# Patient Record
Sex: FEMALE | Race: WHITE | HISPANIC OR LATINO | Employment: UNEMPLOYED | URBAN - METROPOLITAN AREA
[De-identification: names, ages, dates, MRNs, and addresses within clinical notes are randomized per-mention and may not be internally consistent; named-entity substitution may affect disease eponyms.]

---

## 2017-12-28 ENCOUNTER — HOSPITAL ENCOUNTER (INPATIENT)
Facility: HOSPITAL | Age: 37
LOS: 6 days | Discharge: HOME | DRG: 663 | End: 2018-01-03
Attending: EMERGENCY MEDICINE | Admitting: INTERNAL MEDICINE
Payer: COMMERCIAL

## 2017-12-28 ENCOUNTER — APPOINTMENT (EMERGENCY)
Dept: CT IMAGING | Facility: HOSPITAL | Age: 37
DRG: 663 | End: 2017-12-28
Payer: COMMERCIAL

## 2017-12-28 DIAGNOSIS — N39.0 URINARY TRACT INFECTION: Primary | ICD-10-CM

## 2017-12-28 DIAGNOSIS — R04.2 HEMOPTYSIS: ICD-10-CM

## 2017-12-28 DIAGNOSIS — K50.10 CROHN'S COLITIS (HCC): ICD-10-CM

## 2017-12-28 DIAGNOSIS — R07.9 CHEST PAIN: ICD-10-CM

## 2017-12-28 DIAGNOSIS — R00.0 TACHYCARDIA: ICD-10-CM

## 2017-12-28 DIAGNOSIS — A41.9 SEPSIS (HCC): ICD-10-CM

## 2017-12-28 DIAGNOSIS — R47.81 SLURRED SPEECH: ICD-10-CM

## 2017-12-28 DIAGNOSIS — R20.0 LEFT SIDED NUMBNESS: ICD-10-CM

## 2017-12-28 LAB
ALBUMIN SERPL BCP-MCNC: 3.9 G/DL (ref 3.5–5)
ALP SERPL-CCNC: 75 U/L (ref 46–116)
ALT SERPL W P-5'-P-CCNC: 50 U/L (ref 12–78)
ANION GAP SERPL CALCULATED.3IONS-SCNC: 9 MMOL/L (ref 4–13)
APTT PPP: 27 SECONDS (ref 23–35)
AST SERPL W P-5'-P-CCNC: 13 U/L (ref 5–45)
BACTERIA UR QL AUTO: ABNORMAL /HPF
BASOPHILS # BLD AUTO: 0.04 THOUSANDS/ΜL (ref 0–0.1)
BASOPHILS NFR BLD AUTO: 0 % (ref 0–1)
BILIRUB SERPL-MCNC: 0.5 MG/DL (ref 0.2–1)
BILIRUB UR QL STRIP: ABNORMAL
BUN SERPL-MCNC: 14 MG/DL (ref 5–25)
CALCIUM SERPL-MCNC: 9.6 MG/DL (ref 8.3–10.1)
CAOX CRY URNS QL MICRO: ABNORMAL /HPF
CHLORIDE SERPL-SCNC: 104 MMOL/L (ref 100–108)
CLARITY UR: ABNORMAL
CO2 SERPL-SCNC: 27 MMOL/L (ref 21–32)
COLOR UR: ABNORMAL
CREAT SERPL-MCNC: 0.77 MG/DL (ref 0.6–1.3)
EOSINOPHIL # BLD AUTO: 0 THOUSAND/ΜL (ref 0–0.61)
EOSINOPHIL NFR BLD AUTO: 0 % (ref 0–6)
ERYTHROCYTE [DISTWIDTH] IN BLOOD BY AUTOMATED COUNT: 15.5 % (ref 11.6–15.1)
EXT PREG TEST URINE: NEGATIVE
GFR SERPL CREATININE-BSD FRML MDRD: 98 ML/MIN/1.73SQ M
GLUCOSE SERPL-MCNC: 77 MG/DL (ref 65–140)
GLUCOSE UR STRIP-MCNC: NEGATIVE MG/DL
HCT VFR BLD AUTO: 39.6 % (ref 34.8–46.1)
HGB BLD-MCNC: 13 G/DL (ref 11.5–15.4)
HGB UR QL STRIP.AUTO: NEGATIVE
INR PPP: 1.06 (ref 0.86–1.16)
KETONES UR STRIP-MCNC: ABNORMAL MG/DL
LACTATE SERPL-SCNC: 1.7 MMOL/L (ref 0.5–2)
LACTATE SERPL-SCNC: 2.9 MMOL/L (ref 0.5–2)
LEUKOCYTE ESTERASE UR QL STRIP: NEGATIVE
LIPASE SERPL-CCNC: 104 U/L (ref 73–393)
LYMPHOCYTES # BLD AUTO: 4.73 THOUSANDS/ΜL (ref 0.6–4.47)
LYMPHOCYTES NFR BLD AUTO: 22 % (ref 14–44)
MCH RBC QN AUTO: 28.8 PG (ref 26.8–34.3)
MCHC RBC AUTO-ENTMCNC: 32.8 G/DL (ref 31.4–37.4)
MCV RBC AUTO: 88 FL (ref 82–98)
MONOCYTES # BLD AUTO: 1.1 THOUSAND/ΜL (ref 0.17–1.22)
MONOCYTES NFR BLD AUTO: 5 % (ref 4–12)
MUCOUS THREADS UR QL AUTO: ABNORMAL
NEUTROPHILS # BLD AUTO: 15.38 THOUSANDS/ΜL (ref 1.85–7.62)
NEUTS SEG NFR BLD AUTO: 72 % (ref 43–75)
NITRITE UR QL STRIP: POSITIVE
NON-SQ EPI CELLS URNS QL MICRO: ABNORMAL /HPF
NRBC BLD AUTO-RTO: 0 /100 WBCS
PH UR STRIP.AUTO: 5.5 [PH] (ref 4.5–8)
PLATELET # BLD AUTO: 441 THOUSANDS/UL (ref 149–390)
PMV BLD AUTO: 9.6 FL (ref 8.9–12.7)
POTASSIUM SERPL-SCNC: 3.2 MMOL/L (ref 3.5–5.3)
PROT SERPL-MCNC: 7.9 G/DL (ref 6.4–8.2)
PROT UR STRIP-MCNC: ABNORMAL MG/DL
PROTHROMBIN TIME: 14 SECONDS (ref 12.1–14.4)
RBC # BLD AUTO: 4.52 MILLION/UL (ref 3.81–5.12)
RBC #/AREA URNS AUTO: ABNORMAL /HPF
SODIUM SERPL-SCNC: 140 MMOL/L (ref 136–145)
SP GR UR STRIP.AUTO: >=1.03 (ref 1–1.03)
TROPONIN I SERPL-MCNC: <0.02 NG/ML
UROBILINOGEN UR QL STRIP.AUTO: 1 E.U./DL
WBC # BLD AUTO: 21.39 THOUSAND/UL (ref 4.31–10.16)
WBC #/AREA URNS AUTO: ABNORMAL /HPF

## 2017-12-28 PROCEDURE — 81001 URINALYSIS AUTO W/SCOPE: CPT | Performed by: EMERGENCY MEDICINE

## 2017-12-28 PROCEDURE — 84484 ASSAY OF TROPONIN QUANT: CPT | Performed by: EMERGENCY MEDICINE

## 2017-12-28 PROCEDURE — 80053 COMPREHEN METABOLIC PANEL: CPT | Performed by: EMERGENCY MEDICINE

## 2017-12-28 PROCEDURE — 93005 ELECTROCARDIOGRAM TRACING: CPT

## 2017-12-28 PROCEDURE — 85025 COMPLETE CBC W/AUTO DIFF WBC: CPT | Performed by: EMERGENCY MEDICINE

## 2017-12-28 PROCEDURE — 36415 COLL VENOUS BLD VENIPUNCTURE: CPT | Performed by: EMERGENCY MEDICINE

## 2017-12-28 PROCEDURE — 83090 ASSAY OF HOMOCYSTEINE: CPT | Performed by: PHYSICIAN ASSISTANT

## 2017-12-28 PROCEDURE — 86140 C-REACTIVE PROTEIN: CPT | Performed by: PHYSICIAN ASSISTANT

## 2017-12-28 PROCEDURE — 81025 URINE PREGNANCY TEST: CPT | Performed by: EMERGENCY MEDICINE

## 2017-12-28 PROCEDURE — 83690 ASSAY OF LIPASE: CPT | Performed by: EMERGENCY MEDICINE

## 2017-12-28 PROCEDURE — 93005 ELECTROCARDIOGRAM TRACING: CPT | Performed by: EMERGENCY MEDICINE

## 2017-12-28 PROCEDURE — 74177 CT ABD & PELVIS W/CONTRAST: CPT

## 2017-12-28 PROCEDURE — 87040 BLOOD CULTURE FOR BACTERIA: CPT | Performed by: EMERGENCY MEDICINE

## 2017-12-28 PROCEDURE — 83605 ASSAY OF LACTIC ACID: CPT | Performed by: EMERGENCY MEDICINE

## 2017-12-28 PROCEDURE — 96366 THER/PROPH/DIAG IV INF ADDON: CPT

## 2017-12-28 PROCEDURE — 71275 CT ANGIOGRAPHY CHEST: CPT

## 2017-12-28 PROCEDURE — 85610 PROTHROMBIN TIME: CPT | Performed by: EMERGENCY MEDICINE

## 2017-12-28 PROCEDURE — 85730 THROMBOPLASTIN TIME PARTIAL: CPT | Performed by: EMERGENCY MEDICINE

## 2017-12-28 PROCEDURE — 96365 THER/PROPH/DIAG IV INF INIT: CPT

## 2017-12-28 PROCEDURE — 70450 CT HEAD/BRAIN W/O DYE: CPT

## 2017-12-28 RX ORDER — CYCLOBENZAPRINE HCL 10 MG
10 TABLET ORAL 3 TIMES DAILY PRN
COMMUNITY
End: 2020-01-07

## 2017-12-28 RX ORDER — OXYCODONE HYDROCHLORIDE 30 MG/1
30 TABLET ORAL 3 TIMES DAILY
COMMUNITY
End: 2020-01-07

## 2017-12-28 RX ORDER — MESALAMINE 1.2 G/1
1200 TABLET, DELAYED RELEASE ORAL
COMMUNITY
End: 2020-01-07

## 2017-12-28 RX ORDER — PROPRANOLOL HYDROCHLORIDE 80 MG/1
80 TABLET ORAL 2 TIMES DAILY
COMMUNITY
End: 2021-05-26

## 2017-12-28 RX ORDER — MECLIZINE HCL 12.5 MG/1
12.5 TABLET ORAL EVERY 8 HOURS PRN
COMMUNITY
End: 2021-05-26

## 2017-12-28 RX ORDER — PANTOPRAZOLE SODIUM 40 MG/1
40 TABLET, DELAYED RELEASE ORAL DAILY
COMMUNITY

## 2017-12-28 RX ORDER — OLANZAPINE 10 MG/1
10 TABLET, ORALLY DISINTEGRATING ORAL
Status: DISCONTINUED | OUTPATIENT
Start: 2017-12-28 | End: 2017-12-29

## 2017-12-28 RX ORDER — GABAPENTIN 300 MG/1
600 CAPSULE ORAL 3 TIMES DAILY
COMMUNITY
End: 2020-04-14 | Stop reason: SDUPTHER

## 2017-12-28 RX ORDER — SUMATRIPTAN 100 MG/1
1 TABLET, FILM COATED ORAL ONCE AS NEEDED
COMMUNITY

## 2017-12-28 RX ORDER — MONTELUKAST SODIUM 10 MG/1
10 TABLET ORAL
COMMUNITY

## 2017-12-28 RX ORDER — DIVALPROEX SODIUM 250 MG/1
250 TABLET, DELAYED RELEASE ORAL 2 TIMES DAILY
COMMUNITY

## 2017-12-28 RX ORDER — OXYCODONE AND ACETAMINOPHEN 10; 325 MG/1; MG/1
1 TABLET ORAL 3 TIMES DAILY
COMMUNITY
End: 2018-01-03 | Stop reason: HOSPADM

## 2017-12-28 RX ORDER — ASPIRIN 81 MG/1
81 TABLET ORAL DAILY
COMMUNITY
End: 2020-01-07

## 2017-12-28 RX ORDER — ALPRAZOLAM 0.5 MG/1
2 TABLET ORAL ONCE
Status: COMPLETED | OUTPATIENT
Start: 2017-12-28 | End: 2017-12-28

## 2017-12-28 RX ORDER — ALPRAZOLAM 2 MG/1
2 TABLET ORAL 2 TIMES DAILY
COMMUNITY
End: 2020-01-07 | Stop reason: SDUPTHER

## 2017-12-28 RX ORDER — AMITRIPTYLINE HYDROCHLORIDE 100 MG/1
100 TABLET, FILM COATED ORAL
COMMUNITY
End: 2021-03-02 | Stop reason: SDUPTHER

## 2017-12-28 RX ORDER — DEXTROAMPHETAMINE SACCHARATE, AMPHETAMINE ASPARTATE, DEXTROAMPHETAMINE SULFATE AND AMPHETAMINE SULFATE 5; 5; 5; 5 MG/1; MG/1; MG/1; MG/1
20 TABLET ORAL DAILY
COMMUNITY
End: 2018-01-12 | Stop reason: HOSPADM

## 2017-12-28 RX ORDER — OXYCODONE HYDROCHLORIDE 10 MG/1
20 TABLET ORAL ONCE
Status: COMPLETED | OUTPATIENT
Start: 2017-12-28 | End: 2017-12-28

## 2017-12-28 RX ADMIN — IOHEXOL 100 ML: 350 INJECTION, SOLUTION INTRAVENOUS at 21:30

## 2017-12-28 RX ADMIN — OXYCODONE HYDROCHLORIDE 20 MG: 10 TABLET ORAL at 20:10

## 2017-12-28 RX ADMIN — CEFTRIAXONE 1000 MG: 1 INJECTION, SOLUTION INTRAVENOUS at 18:12

## 2017-12-28 RX ADMIN — SODIUM CHLORIDE 1000 ML: 0.9 INJECTION, SOLUTION INTRAVENOUS at 17:52

## 2017-12-28 RX ADMIN — ALPRAZOLAM 2 MG: 0.5 TABLET ORAL at 20:10

## 2017-12-28 NOTE — ED PROVIDER NOTES
History  Chief Complaint   Patient presents with    Chest Pain     Per pt - c/o shortness of breath, chest pain radiating to left arm w/ tingling, pt reports confusion and slurred speech  Symptoms started last night   Shortness of Breath     Patient is a anxious appearing 75-year-old female with a reported history of multiple medical problems including endocarditis, gastritis, pancreatitis, rheumatoid arthritis, restrictive lung disease, stroke, cancer  I am unsure as to the validity of all of these historical factors and suspect that this may be anxiety related however for now will continue the workup assuming that these are all legitimate past medical history is that this pleasant patient has  She reports that she is very concerned that she is currently having a stroke  On exam     No dysarthria, nystagmus, dysphagia, diplopia, aphasia, vertigo, ataxia, visions loss, dysmetria  Normal finger to nose, gait, rapid alternating movement,  tandem gait, strength and sensation in bilat upper and lower extremities  Normal upper and lower reflexes  No pronator drift  Normal heel to shin  EOMI, no visual field defects  CN 2-13 intact  GCS 15  AOx3  Normal babinski  She notes that for the past 3 days she has had substernal chest pressure with some shortness of breath that radiates down the left arm  She notes a tingling sensation in the left arm but her entire neurological exam is normal  She notes night sweats and subjective fevers  She notes epigastric abdominal pain  She notes            Prior to Admission Medications   Prescriptions Last Dose Informant Patient Reported? Taking?    ALPRAZolam (XANAX) 2 MG tablet   Yes No   Sig: Take 2 mg by mouth Three times a day   Calcium Carb-Ergocalciferol 250-125 MG-UNIT TABS   Yes No   Sig: Take 1 tablet by mouth   SUMAtriptan (IMITREX) 100 mg tablet   Yes No   Sig: Take 1 tablet by mouth daily   amitriptyline (ELAVIL) 100 mg tablet   Yes No   Sig: Take 100 mg by mouth   amphetamine-dextroamphetamine (ADDERALL) 20 mg tablet   Yes No   Sig: Take 20 mg by mouth daily   aspirin (ECOTRIN LOW STRENGTH) 81 mg EC tablet   Yes Yes   Sig: Take 81 mg by mouth daily   cholecalciferol (VITAMIN D3) 1,000 units tablet   Yes No   Sig: Take 1,000 Units by mouth daily   cyclobenzaprine (FLEXERIL) 10 mg tablet   Yes Yes   Sig: Take 10 mg by mouth 3 (three) times a day as needed for muscle spasms   divalproex sodium (DEPAKOTE) 250 mg EC tablet   Yes Yes   Sig: Take 250 mg by mouth every 12 (twelve) hours   gabapentin (NEURONTIN) 300 mg capsule   Yes No   Sig: Take 900 mg by mouth 3 (three) times a day   meclizine (ANTIVERT) 12 5 MG tablet   Yes No   Sig: Take 12 5 mg by mouth every 8 (eight) hours as needed   mesalamine (LIALDA) 1 2 g EC tablet   Yes Yes   Sig: Take 4,800 mg by mouth daily with breakfast     montelukast (SINGULAIR) 10 mg tablet   Yes Yes   Sig: Take 10 mg by mouth daily at bedtime   oxyCODONE (ROXICODONE) 30 MG immediate release tablet   Yes No   Sig: Take 30 mg by mouth 3 (three) times a day   oxyCODONE-acetaminophen (PERCOCET)  mg per tablet   Yes No   Sig: Take 1 tablet by mouth Three times a day   pantoprazole (PROTONIX) 40 mg tablet   Yes Yes   Sig: Take 40 mg by mouth daily   propranolol (INDERAL) 80 mg tablet   Yes Yes   Sig: Take 80 mg by mouth 3 (three) times a day      Facility-Administered Medications: None       Past Medical History:   Diagnosis Date    Cancer ([facility] [address] )     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colitis (HCC)     Crohn's colitis (HCC)     Endocarditis     Fibromyalgia     Gastritis     Hypertension     IBS (irritable bowel syndrome)     Pancreatitis     RA (rheumatoid arthritis) (HCC)     Restrictive lung disease     Vertigo        Past Surgical History:   Procedure Laterality Date    APPENDECTOMY         History reviewed  No pertinent family history  I have reviewed and agree with the history as documented      Social History   Substance Use Topics    Smoking status: Current Every Day Smoker     Packs/day: 1 00    Smokeless tobacco: Never Used    Alcohol use No        Review of Systems   Constitutional: Negative for chills and fever  HENT: Negative for ear discharge and facial swelling  Respiratory: Negative for chest tightness, shortness of breath and wheezing  Cardiovascular: Negative for chest pain and palpitations  Gastrointestinal: Negative for abdominal pain, diarrhea and vomiting  Genitourinary: Negative for dysuria and hematuria  Musculoskeletal: Negative for arthralgias and neck stiffness  Skin: Negative for color change and rash  Allergic/Immunologic: Negative  Neurological: Negative for tremors, seizures and syncope  Psychiatric/Behavioral: Negative for hallucinations and suicidal ideas  All other systems reviewed and are negative  Physical Exam  ED Triage Vitals [12/28/17 1609]   Temperature Pulse Respirations Blood Pressure SpO2   98 8 °F (37 1 °C) (!) 132 21 135/80 100 %      Temp Source Heart Rate Source Patient Position - Orthostatic VS BP Location FiO2 (%)   Oral Monitor Sitting Left arm --      Pain Score       8           Orthostatic Vital Signs  Vitals:    12/28/17 1609 12/28/17 2312   BP: 135/80 131/62   Pulse: (!) 132 89   Patient Position - Orthostatic VS: Sitting        Physical Exam   Constitutional: She is oriented to person, place, and time  She appears well-developed and well-nourished  HENT:   Head: Normocephalic and atraumatic  Right Ear: External ear normal    Left Ear: External ear normal    Eyes: Conjunctivae and EOM are normal    Neck: Normal range of motion  Neck supple  No JVD present  No tracheal deviation present  Cardiovascular: Regular rhythm and normal heart sounds  tachycardia   Pulmonary/Chest: Effort normal  No respiratory distress  She has no wheezes  She has no rales  Abdominal: Soft  Bowel sounds are normal  There is no tenderness   There is no rebound and no guarding  Musculoskeletal: She exhibits no edema or tenderness  Neurological: She is alert and oriented to person, place, and time  Skin: Skin is warm and dry  No rash noted  No erythema  Psychiatric: She has a normal mood and affect  Thought content normal    Nursing note and vitals reviewed  ED Medications  Medications   OLANZapine (ZyPREXA ZYDIS) dispersible tablet 10 mg (10 mg Oral Not Given 12/28/17 2257)   sodium chloride 0 9 % bolus 1,000 mL (0 mL Intravenous Stopped 12/28/17 1852)   cefTRIAXone (ROCEPHIN) IVPB (premix) 1,000 mg (0 mg Intravenous Stopped 12/28/17 2251)   oxyCODONE (ROXICODONE) immediate release tablet 20 mg (20 mg Oral Given 12/28/17 2010)   ALPRAZolam (XANAX) tablet 2 mg (2 mg Oral Given 12/28/17 2010)   iohexol (OMNIPAQUE) 350 MG/ML injection (MULTI-DOSE) 100 mL (100 mL Intravenous Given 12/28/17 2130)       Diagnostic Studies  Results Reviewed     Procedure Component Value Units Date/Time    Lactic acid, plasma [[id number]]  (Normal) Collected:  12/28/17 2118    Lab Status:  Final result Specimen:  Blood from Arm, Right Updated:  12/28/17 2145     LACTIC ACID 1 7 mmol/L     Narrative:         Result may be elevated if tourniquet was used during collection  Lactic acid, plasma [[id number]]  (Abnormal) Collected:  12/28/17 1811    Lab Status:  Final result Specimen:  Blood from Arm, Left Updated:  12/28/17 1844     LACTIC ACID 2 9 (HH) mmol/L     Narrative:         Result may be elevated if tourniquet was used during collection      Troponin I [[id number]]  (Normal) Collected:  12/28/17 1746    Lab Status:  Final result Specimen:  Blood from Arm, Left Updated:  12/28/17 1822     Troponin I <0 02 ng/mL     Narrative:         [employer] Chemistry analyzer 99% cutoff is > 0 04 ng/mL in network labs    o cTnI 99% cutoff is useful only when applied to patients in the clinical setting of myocardial ischemia  o cTnI 99% cutoff should be interpreted in the context of clinical history, ECG findings and possibly cardiac imaging to establish correct diagnosis  o cTnI 99% cutoff may be suggestive but clearly not indicative of a coronary event without the clinical setting of myocardial ischemia  Lipase [[id number]]  (Normal) Collected:  12/28/17 1746    Lab Status:  Final result Specimen:  Blood from Arm, Left Updated:  12/28/17 1820     Lipase 104 u/L     Comprehensive metabolic panel [[id number]]  (Abnormal) Collected:  12/28/17 1746    Lab Status:  Final result Specimen:  Blood from Arm, Left Updated:  12/28/17 1820     Sodium 140 mmol/L      Potassium 3 2 (L) mmol/L      Chloride 104 mmol/L      CO2 27 mmol/L      Anion Gap 9 mmol/L      BUN 14 mg/dL      Creatinine 0 77 mg/dL      Glucose 77 mg/dL      Calcium 9 6 mg/dL      AST 13 U/L      ALT 50 U/L      Alkaline Phosphatase 75 U/L      Total Protein 7 9 g/dL      Albumin 3 9 g/dL      Total Bilirubin 0 50 mg/dL      eGFR 98 ml/min/1 73sq m     Narrative:         National Kidney Disease Education Program recommendations are as follows:  GFR calculation is accurate only with a steady state creatinine  Chronic Kidney disease less than 60 ml/min/1 73 sq  meters  Kidney failure less than 15 ml/min/1 73 sq  meters  Roseannsonia Short [[id number]]  (Normal) Collected:  12/28/17 1746    Lab Status:  Final result Specimen:  Blood from Arm, Left Updated:  12/28/17 1816     Protime 14 0 seconds      INR 1 06    APTT [[id number]]  (Normal) Collected:  12/28/17 1746    Lab Status:  Final result Specimen:  Blood from Arm, Left Updated:  12/28/17 1816     PTT 27 seconds     Narrative:          Therapeutic Heparin Range = 60-90 seconds    CBC and differential [[id number]]  (Abnormal) Collected:  12/28/17 1745    Lab Status:  Final result Specimen:  Blood from Arm, Left Updated:  12/28/17 1801     WBC 21 39 (H) Thousand/uL      RBC 4 52 Million/uL      Hemoglobin 13 0 g/dL      Hematocrit 39 6 %      MCV 88 fL      MCH 28 8 pg      MCHC 32 8 g/dL      RDW 15 5 (H) %      MPV 9 6 fL      Platelets 644 (H) Thousands/uL      nRBC 0 /100 WBCs      Neutrophils Relative 72 %      Lymphocytes Relative 22 %      Monocytes Relative 5 %      Eosinophils Relative 0 %      Basophils Relative 0 %      Neutrophils Absolute 15 38 (H) Thousands/µL      Lymphocytes Absolute 4 73 (H) Thousands/µL      Monocytes Absolute 1 10 Thousand/µL      Eosinophils Absolute 0 00 Thousand/µL      Basophils Absolute 0 04 Thousands/µL     Blood culture [[id number]] Collected:  12/28/17 1746    Lab Status: In process Specimen:  Blood from Arm, Left Updated:  12/28/17 1759    Blood culture [[id number]] Collected:  12/28/17 1746    Lab Status:   In process Specimen:  Blood from Arm, Left Updated:  12/28/17 1759    Urine Microscopic [[id number]]  (Abnormal) Collected:  12/28/17 1653    Lab Status:  Final result Specimen:  Urine from Urine, Clean Catch Updated:  12/28/17 1709     RBC, UA 0-1 (A) /hpf      WBC, UA None Seen /hpf      Epithelial Cells Occasional /hpf      Bacteria, UA Moderate (A) /hpf      Ca Oxalate Alexia, UA Moderate (A) /hpf      MUCOUS THREADS Moderate    UA w Reflex to Microscopic w Reflex to Culture [[id number]]  (Abnormal) Collected:  12/28/17 1653    Lab Status:  Final result Specimen:  Urine from Urine, Clean Catch Updated:  12/28/17 1702     Color, UA Dk Yellow     Clarity, UA Slightly Cloudy     Specific Gravity, UA >=1 030     pH, UA 5 5     Leukocytes, UA Negative     Nitrite, UA Positive (A)     Protein, UA 30 (1+) (A) mg/dl      Glucose, UA Negative mg/dl      Ketones, UA Trace (A) mg/dl      Urobilinogen, UA 1 0 E U /dl      Bilirubin, UA Moderate (A)     Blood, UA Negative    POCT pregnancy, urine [[id number]]  (Normal) Resulted:  12/28/17 1654    Lab Status:  Final result Updated:  12/28/17 1654     EXT PREG TEST UR (Ref: Negative) negative                 CT head without contrast   Final Result by Robert Jackson MD (12/28 2240)      No acute intracranial abnormality given limitations of streak artifact at the skull base limiting evaluation of the temporal lobes  If symptoms persist or worsen, consider brain MRI  Workstation performed: [id number]         PE Study with CT abdomen & pelvis with contrast   Final Result by Bairon Sanchez MD (12/28 2230)         1  No acute CT findings  2  Mild nonspecific intrahepatic biliary ductal dilatation  Extrahepatic biliary duct is prominent but appears to taper towards the ampulla  However, the pancreatic duct also appears to be prominent/dilated  Consider nonemergent outpatient MRCP  3  Questionable mild hepatomegaly  Workstation performed: [id number]                    Procedures  Procedures       Phone Contacts  ED Phone Contact    ED Course  ED Course as of Dec 29 0031   Thu Dec 28, 2017   1719 EKG with rate of 111, sinus, narrow QRS, no STEMI  1847 Recently finished levaquin, 750mg for 10 days for PNA and cough    2233 1  No acute CT findings  2  Mild nonspecific intrahepatic biliary ductal dilatation  Extrahepatic biliary duct is prominent but appears to taper towards the ampulla  However, the pancreatic duct also appears to be prominent/dilated  Consider nonemergent outpatient MRCP  3  Questionable mild hepatomegaly  Initial Sepsis Screening     Row Name 12/28/17 2239                Is the patient's history suggestive of a new or worsening infection? (!)  Yes (Proceed)  -JK        Suspected source of infection urinary tract infection  -JK        Are two or more of the following signs & symptoms of infection both present and new to the patient?  (!)  Yes (Proceed)  -JK        Indicate SIRS criteria Tachycardia > 90 bpm;Leukocytosis (WBC > 50388 IJL)  -JK        If the answer is yes to both questions, suspicion of sepsis is present          If severe sepsis is present AND tissue hypoperfusion perists in the hour after fluid resuscitation or lactate > 4, the patient meets criteria for SEPTIC SHOCK   Are any of the following organ dysfunction criteria present within 6 hours of suspected infection and SIRS criteria that are NOT considered to be chronic conditions? (!)  Yes  -JK        Organ dysfunction Lactate > 2 0 mmol/L  -JK        Date of presentation of severe sepsis 12/28/17  -JK        Time of presentation of severe sepsis 1720  -JK        Tissue hypoperfusion persists in the hour after crystalloid fluid administration, evidenced, by either:          Was hypotension present within one hour of the conclusion of crystalloid fluid administration?         Date of presentation of septic shock          Time of presentation of septic shock            User Key  (r) = Recorded By, (t) = Taken By, (c) = Cosigned By    Initials Name Provider Type    Selin Garcia MD Physician                  MDM  CritCare Time    Disposition  Final diagnoses:   Urinary tract infection   Chest pain   Tachycardia     Time reflects when diagnosis was documented in both MDM as applicable and the Disposition within this note     Time User Action Codes Description Comment    12/28/2017 10:37 PM [employer]ke, [address] [N39 0] Urinary tract infection     12/28/2017 10:37 PM [employer]ke, [place] T Add [R07 9] Chest pain     12/28/2017 10:37 PM [employer]ke, [place] T Add [R00 0] Tachycardia       ED Disposition     ED Disposition Condition Comment    Admit  Case was discussed with Wesly Brice and the patient's admission status was agreed to be Admission Status: inpatient status to the service of Dr Melvin Donovan   Follow-up Information    None       Patient's Medications   Discharge Prescriptions    No medications on file     No discharge procedures on file      ED Provider  Electronically Signed by           Francisco De La Cruz MD  12/29/17 9855

## 2017-12-29 ENCOUNTER — APPOINTMENT (INPATIENT)
Dept: ULTRASOUND IMAGING | Facility: HOSPITAL | Age: 37
DRG: 663 | End: 2017-12-29
Payer: COMMERCIAL

## 2017-12-29 ENCOUNTER — APPOINTMENT (INPATIENT)
Dept: NON INVASIVE DIAGNOSTICS | Facility: HOSPITAL | Age: 37
DRG: 663 | End: 2017-12-29
Payer: COMMERCIAL

## 2017-12-29 ENCOUNTER — APPOINTMENT (INPATIENT)
Dept: MRI IMAGING | Facility: HOSPITAL | Age: 37
DRG: 663 | End: 2017-12-29
Payer: COMMERCIAL

## 2017-12-29 PROBLEM — R07.9 CHEST PAIN: Status: ACTIVE | Noted: 2017-12-29

## 2017-12-29 PROBLEM — K83.8 DILATED BILE DUCT: Status: ACTIVE | Noted: 2017-12-29

## 2017-12-29 PROBLEM — Z72.0 TOBACCO ABUSE: Status: ACTIVE | Noted: 2017-12-29

## 2017-12-29 PROBLEM — R20.0 LEFT SIDED NUMBNESS: Status: ACTIVE | Noted: 2017-12-29

## 2017-12-29 PROBLEM — A41.9 SEPSIS (HCC): Status: ACTIVE | Noted: 2017-12-29

## 2017-12-29 PROBLEM — E87.6 HYPOKALEMIA: Status: ACTIVE | Noted: 2017-12-29

## 2017-12-29 PROBLEM — R16.0 HEPATOMEGALY: Status: ACTIVE | Noted: 2017-12-29

## 2017-12-29 PROBLEM — R47.81 SLURRED SPEECH: Status: ACTIVE | Noted: 2017-12-29

## 2017-12-29 PROBLEM — N39.0 UTI (URINARY TRACT INFECTION): Status: ACTIVE | Noted: 2017-12-29

## 2017-12-29 LAB
ANION GAP SERPL CALCULATED.3IONS-SCNC: 9 MMOL/L (ref 4–13)
APTT PPP: 29 SECONDS (ref 23–35)
ATRIAL RATE: 0 BPM
ATRIAL RATE: 111 BPM
BUN SERPL-MCNC: 16 MG/DL (ref 5–25)
CALCIUM SERPL-MCNC: 7.9 MG/DL (ref 8.3–10.1)
CHLORIDE SERPL-SCNC: 111 MMOL/L (ref 100–108)
CHOLEST SERPL-MCNC: 127 MG/DL (ref 50–200)
CO2 SERPL-SCNC: 26 MMOL/L (ref 21–32)
CREAT SERPL-MCNC: 0.58 MG/DL (ref 0.6–1.3)
CRP SERPL QL: 12.2 MG/L
ERYTHROCYTE [DISTWIDTH] IN BLOOD BY AUTOMATED COUNT: 15.7 % (ref 11.6–15.1)
EST. AVERAGE GLUCOSE BLD GHB EST-MCNC: 114 MG/DL
GFR SERPL CREATININE-BSD FRML MDRD: 116 ML/MIN/1.73SQ M
GLUCOSE SERPL-MCNC: 108 MG/DL (ref 65–140)
HBA1C MFR BLD: 5.6 % (ref 4.2–6.3)
HCT VFR BLD AUTO: 33.1 % (ref 34.8–46.1)
HCYS SERPL-SCNC: 12 UMOL/L (ref 3.7–11.2)
HDLC SERPL-MCNC: 31 MG/DL (ref 40–60)
HGB BLD-MCNC: 10.7 G/DL (ref 11.5–15.4)
INR PPP: 1.18 (ref 0.86–1.16)
LDLC SERPL CALC-MCNC: 63 MG/DL (ref 0–100)
MAGNESIUM SERPL-MCNC: 2 MG/DL (ref 1.6–2.6)
MCH RBC QN AUTO: 28.7 PG (ref 26.8–34.3)
MCHC RBC AUTO-ENTMCNC: 32.3 G/DL (ref 31.4–37.4)
MCV RBC AUTO: 89 FL (ref 82–98)
P AXIS: 80 DEGREES
PLATELET # BLD AUTO: 362 THOUSANDS/UL (ref 149–390)
PLATELET # BLD AUTO: 366 THOUSANDS/UL (ref 149–390)
PMV BLD AUTO: 10.4 FL (ref 8.9–12.7)
PMV BLD AUTO: 9.8 FL (ref 8.9–12.7)
POTASSIUM SERPL-SCNC: 3.5 MMOL/L (ref 3.5–5.3)
PR INTERVAL: 156 MS
PROTHROMBIN TIME: 15.3 SECONDS (ref 12.1–14.4)
QRS AXIS: 0 DEGREES
QRS AXIS: 44 DEGREES
QRSD INTERVAL: 0 MS
QRSD INTERVAL: 86 MS
QT INTERVAL: 0 MS
QT INTERVAL: 328 MS
QTC INTERVAL: 0 MS
QTC INTERVAL: 446 MS
RBC # BLD AUTO: 3.73 MILLION/UL (ref 3.81–5.12)
SODIUM SERPL-SCNC: 146 MMOL/L (ref 136–145)
T WAVE AXIS: 0 DEGREES
T WAVE AXIS: 65 DEGREES
TRIGL SERPL-MCNC: 163 MG/DL
TROPONIN I SERPL-MCNC: <0.02 NG/ML
VENTRICULAR RATE: 0 BPM
VENTRICULAR RATE: 111 BPM
WBC # BLD AUTO: 12.49 THOUSAND/UL (ref 4.31–10.16)

## 2017-12-29 PROCEDURE — 36415 COLL VENOUS BLD VENIPUNCTURE: CPT | Performed by: PHYSICIAN ASSISTANT

## 2017-12-29 PROCEDURE — 85610 PROTHROMBIN TIME: CPT | Performed by: PHYSICIAN ASSISTANT

## 2017-12-29 PROCEDURE — 85027 COMPLETE CBC AUTOMATED: CPT | Performed by: PHYSICIAN ASSISTANT

## 2017-12-29 PROCEDURE — 93306 TTE W/DOPPLER COMPLETE: CPT

## 2017-12-29 PROCEDURE — 70551 MRI BRAIN STEM W/O DYE: CPT

## 2017-12-29 PROCEDURE — 84484 ASSAY OF TROPONIN QUANT: CPT | Performed by: INTERNAL MEDICINE

## 2017-12-29 PROCEDURE — 83036 HEMOGLOBIN GLYCOSYLATED A1C: CPT | Performed by: PHYSICIAN ASSISTANT

## 2017-12-29 PROCEDURE — 80048 BASIC METABOLIC PNL TOTAL CA: CPT | Performed by: PHYSICIAN ASSISTANT

## 2017-12-29 PROCEDURE — 83735 ASSAY OF MAGNESIUM: CPT | Performed by: PHYSICIAN ASSISTANT

## 2017-12-29 PROCEDURE — 85049 AUTOMATED PLATELET COUNT: CPT | Performed by: PHYSICIAN ASSISTANT

## 2017-12-29 PROCEDURE — 80061 LIPID PANEL: CPT | Performed by: PHYSICIAN ASSISTANT

## 2017-12-29 PROCEDURE — 93970 EXTREMITY STUDY: CPT

## 2017-12-29 PROCEDURE — 99285 EMERGENCY DEPT VISIT HI MDM: CPT

## 2017-12-29 PROCEDURE — 84484 ASSAY OF TROPONIN QUANT: CPT | Performed by: PHYSICIAN ASSISTANT

## 2017-12-29 PROCEDURE — 85730 THROMBOPLASTIN TIME PARTIAL: CPT | Performed by: PHYSICIAN ASSISTANT

## 2017-12-29 RX ORDER — POTASSIUM CHLORIDE 20 MEQ/1
40 TABLET, EXTENDED RELEASE ORAL ONCE
Status: COMPLETED | OUTPATIENT
Start: 2017-12-29 | End: 2017-12-29

## 2017-12-29 RX ORDER — ALPRAZOLAM 0.5 MG/1
2 TABLET ORAL 3 TIMES DAILY PRN
Status: DISCONTINUED | OUTPATIENT
Start: 2017-12-29 | End: 2018-01-03 | Stop reason: HOSPADM

## 2017-12-29 RX ORDER — SUMATRIPTAN 25 MG/1
100 TABLET, FILM COATED ORAL DAILY PRN
Status: DISCONTINUED | OUTPATIENT
Start: 2017-12-29 | End: 2018-01-03 | Stop reason: HOSPADM

## 2017-12-29 RX ORDER — ATORVASTATIN CALCIUM 40 MG/1
40 TABLET, FILM COATED ORAL EVERY EVENING
Status: DISCONTINUED | OUTPATIENT
Start: 2017-12-29 | End: 2018-01-03 | Stop reason: HOSPADM

## 2017-12-29 RX ORDER — OXYCODONE HYDROCHLORIDE 10 MG/1
30 TABLET ORAL 3 TIMES DAILY
Status: DISCONTINUED | OUTPATIENT
Start: 2017-12-29 | End: 2018-01-02

## 2017-12-29 RX ORDER — AMITRIPTYLINE HYDROCHLORIDE 100 MG/1
100 TABLET, FILM COATED ORAL
Status: DISCONTINUED | OUTPATIENT
Start: 2017-12-29 | End: 2018-01-03 | Stop reason: HOSPADM

## 2017-12-29 RX ORDER — OXYCODONE HYDROCHLORIDE 10 MG/1
10 TABLET ORAL ONCE
Status: COMPLETED | OUTPATIENT
Start: 2017-12-29 | End: 2017-12-29

## 2017-12-29 RX ORDER — ONDANSETRON 2 MG/ML
4 INJECTION INTRAMUSCULAR; INTRAVENOUS EVERY 6 HOURS PRN
Status: DISCONTINUED | OUTPATIENT
Start: 2017-12-29 | End: 2018-01-03 | Stop reason: HOSPADM

## 2017-12-29 RX ORDER — ASPIRIN 81 MG/1
81 TABLET, CHEWABLE ORAL DAILY
Status: DISCONTINUED | OUTPATIENT
Start: 2017-12-29 | End: 2018-01-03 | Stop reason: HOSPADM

## 2017-12-29 RX ORDER — GABAPENTIN 300 MG/1
600 CAPSULE ORAL 3 TIMES DAILY
Status: DISCONTINUED | OUTPATIENT
Start: 2017-12-29 | End: 2018-01-03 | Stop reason: HOSPADM

## 2017-12-29 RX ORDER — PROPRANOLOL HYDROCHLORIDE 80 MG/1
80 CAPSULE, EXTENDED RELEASE ORAL DAILY
Status: DISCONTINUED | OUTPATIENT
Start: 2017-12-29 | End: 2018-01-03 | Stop reason: HOSPADM

## 2017-12-29 RX ORDER — PANTOPRAZOLE SODIUM 40 MG/1
40 TABLET, DELAYED RELEASE ORAL
Status: DISCONTINUED | OUTPATIENT
Start: 2017-12-29 | End: 2018-01-03 | Stop reason: HOSPADM

## 2017-12-29 RX ORDER — MORPHINE SULFATE 2 MG/ML
1 INJECTION, SOLUTION INTRAMUSCULAR; INTRAVENOUS EVERY 6 HOURS PRN
Status: DISCONTINUED | OUTPATIENT
Start: 2017-12-29 | End: 2017-12-29

## 2017-12-29 RX ORDER — MONTELUKAST SODIUM 10 MG/1
10 TABLET ORAL
Status: DISCONTINUED | OUTPATIENT
Start: 2017-12-29 | End: 2018-01-03 | Stop reason: HOSPADM

## 2017-12-29 RX ORDER — SIMETHICONE 80 MG
80 TABLET,CHEWABLE ORAL 4 TIMES DAILY PRN
Status: DISCONTINUED | OUTPATIENT
Start: 2017-12-29 | End: 2018-01-03 | Stop reason: HOSPADM

## 2017-12-29 RX ORDER — OXYCODONE HYDROCHLORIDE 10 MG/1
10 TABLET ORAL EVERY 4 HOURS PRN
Status: DISCONTINUED | OUTPATIENT
Start: 2017-12-29 | End: 2018-01-03 | Stop reason: HOSPADM

## 2017-12-29 RX ORDER — NICOTINE 21 MG/24HR
1 PATCH, TRANSDERMAL 24 HOURS TRANSDERMAL DAILY
Status: DISCONTINUED | OUTPATIENT
Start: 2017-12-29 | End: 2018-01-03 | Stop reason: HOSPADM

## 2017-12-29 RX ORDER — ALBUTEROL SULFATE 2.5 MG/3ML
2.5 SOLUTION RESPIRATORY (INHALATION) EVERY 4 HOURS PRN
Status: DISCONTINUED | OUTPATIENT
Start: 2017-12-29 | End: 2018-01-03 | Stop reason: HOSPADM

## 2017-12-29 RX ORDER — ACETAMINOPHEN 325 MG/1
650 TABLET ORAL EVERY 4 HOURS PRN
Status: DISCONTINUED | OUTPATIENT
Start: 2017-12-29 | End: 2018-01-03 | Stop reason: HOSPADM

## 2017-12-29 RX ORDER — MECLIZINE HCL 12.5 MG/1
12.5 TABLET ORAL EVERY 8 HOURS PRN
Status: DISCONTINUED | OUTPATIENT
Start: 2017-12-29 | End: 2018-01-03 | Stop reason: HOSPADM

## 2017-12-29 RX ORDER — ASPIRIN 81 MG/1
81 TABLET ORAL DAILY
Status: DISCONTINUED | OUTPATIENT
Start: 2017-12-29 | End: 2017-12-29 | Stop reason: SDUPTHER

## 2017-12-29 RX ORDER — SODIUM CHLORIDE 9 MG/ML
100 INJECTION, SOLUTION INTRAVENOUS CONTINUOUS
Status: DISCONTINUED | OUTPATIENT
Start: 2017-12-29 | End: 2018-01-02

## 2017-12-29 RX ORDER — DEXTROAMPHETAMINE SACCHARATE, AMPHETAMINE ASPARTATE, DEXTROAMPHETAMINE SULFATE AND AMPHETAMINE SULFATE 2.5; 2.5; 2.5; 2.5 MG/1; MG/1; MG/1; MG/1
20 TABLET ORAL
Status: DISCONTINUED | OUTPATIENT
Start: 2017-12-29 | End: 2018-01-03 | Stop reason: HOSPADM

## 2017-12-29 RX ORDER — MELATONIN
1000 DAILY
Status: DISCONTINUED | OUTPATIENT
Start: 2017-12-29 | End: 2018-01-03 | Stop reason: HOSPADM

## 2017-12-29 RX ADMIN — DEXTROAMPHETAMINE SACCHARATE, AMPHETAMINE ASPARTATE, DEXTROAMPHETAMINE SULFATE, AND AMPHETAMINE SULFATE 20 MG: 2.5; 2.5; 2.5; 2.5 TABLET ORAL at 14:05

## 2017-12-29 RX ADMIN — OXYCODONE HYDROCHLORIDE 10 MG: 10 TABLET ORAL at 02:59

## 2017-12-29 RX ADMIN — AMITRIPTYLINE HYDROCHLORIDE 100 MG: 100 TABLET, FILM COATED ORAL at 22:23

## 2017-12-29 RX ADMIN — AMITRIPTYLINE HYDROCHLORIDE 100 MG: 100 TABLET, FILM COATED ORAL at 03:00

## 2017-12-29 RX ADMIN — ASPIRIN 81 MG 81 MG: 81 TABLET ORAL at 08:09

## 2017-12-29 RX ADMIN — Medication 2000 MG: at 14:02

## 2017-12-29 RX ADMIN — ATORVASTATIN CALCIUM 40 MG: 40 TABLET, FILM COATED ORAL at 18:57

## 2017-12-29 RX ADMIN — OXYCODONE HYDROCHLORIDE 30 MG: 10 TABLET ORAL at 20:21

## 2017-12-29 RX ADMIN — GABAPENTIN 600 MG: 300 CAPSULE ORAL at 16:01

## 2017-12-29 RX ADMIN — DEXTROAMPHETAMINE SACCHARATE, AMPHETAMINE ASPARTATE, DEXTROAMPHETAMINE SULFATE, AND AMPHETAMINE SULFATE 20 MG: 2.5; 2.5; 2.5; 2.5 TABLET ORAL at 08:09

## 2017-12-29 RX ADMIN — NICOTINE 1 PATCH: 21 PATCH, EXTENDED RELEASE TRANSDERMAL at 08:08

## 2017-12-29 RX ADMIN — OXYCODONE HYDROCHLORIDE 30 MG: 10 TABLET ORAL at 15:56

## 2017-12-29 RX ADMIN — ALPRAZOLAM 2 MG: 0.5 TABLET ORAL at 22:24

## 2017-12-29 RX ADMIN — CHOLECALCIFEROL TAB 25 MCG (1000 UNIT) 1000 UNITS: 25 TAB at 09:49

## 2017-12-29 RX ADMIN — SODIUM CHLORIDE 125 ML/HR: 0.9 INJECTION, SOLUTION INTRAVENOUS at 03:04

## 2017-12-29 RX ADMIN — PROPRANOLOL HYDROCHLORIDE 80 MG: 80 CAPSULE, EXTENDED RELEASE ORAL at 09:49

## 2017-12-29 RX ADMIN — PANTOPRAZOLE SODIUM 40 MG: 40 TABLET, DELAYED RELEASE ORAL at 06:32

## 2017-12-29 RX ADMIN — POTASSIUM CHLORIDE 40 MEQ: 1500 TABLET, EXTENDED RELEASE ORAL at 03:03

## 2017-12-29 RX ADMIN — OXYCODONE HYDROCHLORIDE 10 MG: 10 TABLET ORAL at 14:05

## 2017-12-29 RX ADMIN — GABAPENTIN 600 MG: 300 CAPSULE ORAL at 08:09

## 2017-12-29 RX ADMIN — Medication 2000 MG: at 02:57

## 2017-12-29 RX ADMIN — ALPRAZOLAM 2 MG: 0.5 TABLET ORAL at 02:04

## 2017-12-29 RX ADMIN — SODIUM CHLORIDE 125 ML/HR: 0.9 INJECTION, SOLUTION INTRAVENOUS at 21:47

## 2017-12-29 RX ADMIN — OXYCODONE HYDROCHLORIDE 10 MG: 10 TABLET ORAL at 22:52

## 2017-12-29 RX ADMIN — MORPHINE SULFATE 1 MG: 2 INJECTION, SOLUTION INTRAMUSCULAR; INTRAVENOUS at 06:37

## 2017-12-29 RX ADMIN — ASPIRIN 81 MG 81 MG: 81 TABLET ORAL at 02:47

## 2017-12-29 RX ADMIN — ALPRAZOLAM 2 MG: 0.5 TABLET ORAL at 15:55

## 2017-12-29 RX ADMIN — MONTELUKAST SODIUM 10 MG: 10 TABLET, FILM COATED ORAL at 22:23

## 2017-12-29 RX ADMIN — MONTELUKAST SODIUM 10 MG: 10 TABLET, FILM COATED ORAL at 03:03

## 2017-12-29 RX ADMIN — GABAPENTIN 600 MG: 300 CAPSULE ORAL at 20:22

## 2017-12-29 RX ADMIN — OXYCODONE HYDROCHLORIDE 30 MG: 10 TABLET ORAL at 08:08

## 2017-12-29 NOTE — ED NOTES
Patient began to become frustrated about her pain medication schedule  Patient reports she does not want to wait for a doctor, she just wants to go home  Patient reassured and agreed to wait to see when internal medicine would be down to evaluate her  Mckenzie Alejandre PA-C made aware        Delano Ng RN  12/29/17 0372

## 2017-12-29 NOTE — ED NOTES
Patient became upset because her medication schedule is now off  Patient began to yell and stating she wants to go home  Patient states "I should have never given that nurse my medication, I am going to call my  to bring them to me so I can take them when I need them " It was explained to patient that medications were coming from the supervisor and would be dispensed to her as soon as possible       Lawrence Nelson RN  12/29/17 1985

## 2017-12-29 NOTE — CASE MANAGEMENT
Initial Clinical Review    Admission: Date/Time/Statement: 12/28/17 @ 2238     Orders Placed This Encounter   Procedures    Inpatient Admission (expected length of stay for this patient is greater than two midnights)     Standing Status:   Standing     Number of Occurrences:   1     Order Specific Question:   Admitting Physician     Answer:   Willie Winters [[id number]]     Order Specific Question:   Level of Care     Answer:   Med Surg [16]     Order Specific Question:   Estimated length of stay     Answer:   More than 2 Midnights     Order Specific Question:   Certification     Answer:   I certify that inpatient services are medically necessary for this patient for a duration of greater than two midnights  See H&P and MD Progress Notes for additional information about the patient's course of treatment  ED: Date/Time/Mode of Arrival:   ED Arrival Information     Expected Arrival Acuity Means of Arrival Escorted By Service Admission Type    - 12/28/2017 16:01 Urgent Walk-In Self General Medicine Urgent    Arrival Complaint    Stroke Like Symptoms          Chief Complaint:   Chief Complaint   Patient presents with    Chest Pain     Per pt - c/o shortness of breath, chest pain radiating to left arm w/ tingling, pt reports confusion and slurred speech  Symptoms started last night   Shortness of Breath       History of Illness:    Stephan Wall is a 42 y o  female who presents with past medical history of cervical cancer , pan colitis, Crohn's disease, endocarditis, fibromyalgia, hypertension, irritable bowel syndrome, pancreatitis, rheumatoid arthritis, restrictive lung disease, vertigo presenting with numerous complaints  I was unable to find records regarding this patient  She currently presented with chest pain with radiation down her left arm with numbness and tingling of her left upper and lower extremity  She also notes dizziness  She states that her  states that she was not making sense  She denies headache at this time  She states that she had a syncopal episode last week  She also admits to shortness of breath with coughing and wheezing  She states that she was recently admitted to [facility] last week and diagnosed with a PE  She does however deny that she was on any anticoagulation but then or now  She does not have a PE on exam at this time  She admits to epigastric pain  She did also admit to coughing up black stuff that looked like charcoal and bleeding out of her ears with epistaxis  I unsure how reliable her review of systems for diagnoses are given no records and multitude of complaints  We will need to get records from her doctors  She would greatly benefit from a psychiatric consult  She was very adamant and giving her pain medicine as scheduled at exactly the right time       ED Vital Signs:   ED Triage Vitals [12/28/17 1609]   Temperature Pulse Respirations Blood Pressure SpO2   98 8 °F (37 1 °C) (!) 132 21 135/80 100 %      Temp Source Heart Rate Source Patient Position - Orthostatic VS BP Location FiO2 (%)   Oral Monitor Sitting Left arm --      Pain Score       8        Wt Readings from Last 1 Encounters:   12/28/17 65 8 kg (145 lb)       Vital Signs (abnormal):   12/29/17 0415  --   107  20  107/83  99 %  --  --   12/29/17 0315  --  92  20  113/58  100 %  --  --   12/29/17 0215  --  90  18  120/78  100 %  None (Room air)  Sitting   12/28/17 2312  --  89  18  131/62  100 %  None (Room air)       Abnormal Labs/Diagnostic Test Results: lactic acid 2 9, K   3 2 , wbc  21 39, plt 441  Nitrite, UA Positive (A) Negative      Protein, UA 30 (1+) (A) Negative mg/dl     Glucose, UA Negative Negative mg/dl     Ketones, UA Trace (A) Negative mg/dl     Urobilinogen, UA 1 0 0 2, 1 0 E U /dl E U /dl     Bilirubin, UA Moderate (A) Negative    CT head- No acute intracranial abnormality given limitations of streak artifact at the skull base limiting evaluation of the temporal lobes   CT abd-    1  No acute CT findings  2  Mild nonspecific intrahepatic biliary ductal dilatation  Extrahepatic biliary duct is prominent but appears to taper towards the ampulla  However, the pancreatic duct also appears to be prominent/dilated  Consider nonemergent outpatient MRCP  3  Questionable mild hepatomegaly        EKG-ST       ED Treatment:   Medication Administration from 12/28/2017 1601 to 12/29/2017 1103       Date/Time Order Dose Route Action Action by Comments     12/28/2017 1852 sodium chloride 0 9 % bolus 1,000 mL 0 mL Intravenous Stopped Fely Prakash RN      12/28/2017 1752 sodium chloride 0 9 % bolus 1,000 mL 1,000 mL Intravenous New Bag Percemary kate Freed, TAYLOR      12/28/2017 2257 OLANZapine (ZyPREXA ZYDIS) dispersible tablet 10 mg 10 mg Oral Not Given Ashli Palmer RN      12/28/2017 2251 cefTRIAXone (ROCEPHIN) IVPB (premix) 1,000 mg 0 mg Intravenous Stopped Ashli Palmer RN      12/28/2017 1812 cefTRIAXone (ROCEPHIN) IVPB (premix) 1,000 mg 1,000 mg Intravenous New Bag Maria Luz Palmer RN      12/28/2017 2010 oxyCODONE (ROXICODONE) immediate release tablet 20 mg 20 mg Oral Given Ashli Palmer RN      12/28/2017 2010 ALPRAZolam Kaity Ron) tablet 2 mg 2 mg Oral Given Maria Luz Palmer RN      12/28/2017 2130 iohexol (OMNIPAQUE) 350 MG/ML injection (MULTI-DOSE) 100 mL 100 mL Intravenous Given Mario Hunt      12/29/2017 3506 ALPRAZolam (XANAX) tablet 2 mg 2 mg Oral Given Eris Thurston RN      12/29/2017 0300 amitriptyline (ELAVIL) tablet 100 mg 100 mg Oral Given Fely Prakash RN      12/29/2017 0809 amphetamine-dextroamphetamine (ADDERALL) tablet 20 mg 20 mg Oral Given Natalia Castillo RN      12/29/2017 6577 cholecalciferol (VITAMIN D3) tablet 1,000 Units 1,000 Units Oral Given Natalia Castillo RN      12/29/2017 0809 gabapentin (NEURONTIN) capsule 600 mg 600 mg Oral Given Natalia Castillo RN      12/29/2017 0303 montelukast (SINGULAIR) tablet 10 mg 10 mg Oral Given Mynor Santana RN      12/29/2017 7414 oxyCODONE (ROXICODONE) immediate release tablet 30 mg 30 mg Oral Given José Miguel Whalen RN      12/29/2017 4245 pantoprazole (PROTONIX) EC tablet 40 mg 40 mg Oral Given Mynor Santana RN      12/29/2017 0949 propranolol (INDERAL LA) 24 hr capsule 80 mg 80 mg Oral Given José Miguel Whalen RN      12/29/2017 0304 sodium chloride 0 9 % infusion 125 mL/hr Intravenous [address] [facility]      12/29/2017 8019 nicotine (NICODERM CQ) 21 mg/24 hr TD 24 hr patch 1 patch 1 patch Transdermal Medication Applied José Miguel Whalen RN      12/29/2017 0809 aspirin chewable tablet 81 mg 81 mg Oral Given José Miguel Whalen RN      12/29/2017 0247 aspirin chewable tablet 81 mg 81 mg Oral Given Mynor Santana RN      12/29/2017 0808 enoxaparin (LOVENOX) subcutaneous injection 40 mg 40 mg Subcutaneous Not Given José Miguel Whalen RN      12/29/2017 0303 potassium chloride (K-DUR,KLOR-CON) CR tablet 40 mEq 40 mEq Oral Given Mynor Santnaa RN      12/29/2017 0327 cefepime (MAXIPIME) 2,000 mg in dextrose 5 % 50 mL IVPB 0 mg Intravenous Stopped Mynor Santana RN      12/29/2017 0257 cefepime (MAXIPIME) 2,000 mg in dextrose 5 % 50 mL IVPB 2,000 mg Intravenous [address] Mynor Santana RN      12/29/2017 0259 oxyCODONE (ROXICODONE) immediate release tablet 10 mg 10 mg Oral Given Mynor Santana RN      12/29/2017 3783 morphine injection 1 mg 1 mg Intravenous Given Mynor Santana RN           Past Medical/Surgical History:    Active Ambulatory Problems     Diagnosis Date Noted    No Active Ambulatory Problems     Resolved Ambulatory Problems     Diagnosis Date Noted    No Resolved Ambulatory Problems     Past Medical History:   Diagnosis Date    Cancer [facility])     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[facility] )     Crohn's colitis [facility] )     Endocarditis     Fibromyalgia     Gastritis     Hypertension     IBS (irritable bowel syndrome)     Pancreatitis     RA (rheumatoid arthritis) (HCC)     Restrictive lung disease     Vertigo        Admitting Diagnosis: Stroke-like symptoms [R29 90]    Age/Sex: 42 y o  female    Assessment/Plan: Assessment/Plan:     Hospital Problem List:    Principal Problem:    Sepsis ([address] )  Active Problems:    UTI (urinary tract infection)    Hepatomegaly    Dilated bile duct    Hypokalemia    Chest pain    Slurred speech    Tobacco abuse    Left sided numbness   Plan for the Primary Problem(s):  · Sepsis secondary to urinary tract infection  ? Tachycardic, leukocytosis of 21, positive UA nitrate  Received IV rocephin in ED-will switch to cefepime to cover empiric sepsis coverage and  await blood and urine cultures, fluids, monitor vitals, lactic acid now within normal limits     Plan for Additional Problems:   · Hepatomegaly, dilated bile duct, dilated pancreatic duct  ? Follow up with outpatient GI   · Hypokalemia  ? Replace p o , monitor morning  · Chest pain with radiation down the left arm  ? Telemetry monitoring, trend troponins, aspirin, statin, lipid panel  · Slurred speech with left-sided numbness and tingling  ? I do not believe the patient is having a stroke at this time however I am unable to relate out  I will order brain MRI in the morning  Aspirin, statin, neuro checks and vital signs per stroke protocol, CT of the head was within normal limits, fall precautions  ? I did not order physical therapy, occupational therapy, speech, Physical Medicine Rehab, nutrition, case management consult given no acute CVA was found at this time  I did not order stroke education is no CVA was found at this time  ? I believe the patient would benefit from a psychiatric consult  · Tobacco abuse  ?  Cessation counseling, Nicoderm patch     VTE Prophylaxis: Enoxaparin (Lovenox)  / sequential compression device   Code Status:  Needs addressing  POLST: POLST form is not discussed and not completed at this time      Anticipated Length of Stay:  Patient will be admitted on an Inpatient basis with an anticipated length of stay of  > 2 midnights  Justification for Hospital Stay:  IV antibiotics and IV fluid hydration    Admission Orders:  Scheduled Meds:   amitriptyline 100 mg Oral HS   amphetamine-dextroamphetamine 20 mg Oral BID (AM & Afternoon)   aspirin 81 mg Oral Daily   atorvastatin 40 mg Oral QPM   cefepime 2,000 mg Intravenous Q12H   cholecalciferol 1,000 Units Oral Daily   enoxaparin 40 mg Subcutaneous Daily   gabapentin 600 mg Oral TID   montelukast 10 mg Oral HS   nicotine 1 patch Transdermal Daily   oxyCODONE 30 mg Oral TID   pantoprazole 40 mg Oral Daily Before Breakfast   propranolol 80 mg Oral Daily     Continuous Infusions:   sodium chloride 125 mL/hr Last Rate: 125 mL/hr (12/29/17 0304)     PRN Meds:   acetaminophen    ALPRAZolam    meclizine    morphine injection    ondansetron    simethicone    SUMAtriptan    Cardiac diet   Fall precautions  Up w/ assist   Inc spirom  Neuro checks q4hr   Echo   MRI brain   Serial trop   SCD  Tele   Neuro consult     Neuro consult 12/29  Assessment:  1  Symptoms of left-sided numbness and slurred speech presently with a nonfocal neurologic exam in the face of multiple systemic symptoms  2  Urinary tract infection     Plan:  MRI scan of the brain pending    MRI 12/29  2 small T2 and FLAIR hyperintense foci involving the right frontal lobe white matter, suspicious for mild chronic microangiopathic changes such as may accompany migraine headaches  Clinical correlation recommended  These foci were not visible by CT    No acute pathology identified by diffusion imaging       Thank you,  [address] [place] [address] in the [facility] by Juan Gonsalez for 2017  Network Utilization Review Department  Phone: [phone]; Fax [phone]  ATTENTION: The Network Utilization Review Department is now centralized for our 7 Facilities   Make a note that we have a new phone and fax numbers for our Department  Please call with any questions or concerns to [phone] and carefully follow the prompts so that you are directed to the right person  All voicemails are confidential  Fax any determinations, approvals, denials, and requests for initial or continue stay review clinical to [phone]  Due to HIGH CALL volume, it would be easier if you could please send faxed requests to expedite your requests and in part, help us provide discharge notifications faster

## 2017-12-29 NOTE — CONSULTS
Consultation - Neurology   Leesa Kirkland 42 y o  female MRN: [id number]  Unit/Bed#: ED 26 Encounter: [id number]      Physician Requesting Consult: Saúl Britt MD  Reason for Consult:  Symptoms of slurred speech and left-sided numbness    HPI: Leesa Kirkland is a right handed  42 y o  female who  I am asked to see for the above complaints  She reports that yesterday she noticed numbness in her left arm and leg associated with pain in the left side of her chest radiating into her left arm  She states her  reported that she had some slurring of her speech but she does not feel that she has any slurring right now  She states that she had similar symptoms of numbness associated with weakness on the left side about a month ago and went to [facility]  She states she was told to take aspirin after workup  She states she has a history of migraine headaches but did not have a headache associated with the symptoms  She reports multiple complaints including a history of PE as well as Crohn's disease and colitis and rheumatoid arthritis  She was noted to have an elevated white count and bacteria it was started on antibiotics  Review of Systems:  Pertinent review of systems as per HPI, otherwise negative  Historical Information   Past Medical History:   Diagnosis Date    Cancer [facility])     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[facility] ([facility] )     Crohn's colitis ([facility] )     Endocarditis     Fibromyalgia     Gastritis     Hypertension     IBS (irritable bowel syndrome)     Pancreatitis     RA [place]rheumatoid arthritis) ([place] [id number] )     Restrictive lung disease     Vertigo      Past Surgical History:   Procedure Laterality Date    APPENDECTOMY       Social History   History   Smoking Status    Current Every Day Smoker    Packs/day: 1 00    Types: Cigarettes   Smokeless Tobacco    Never Used     History   Alcohol Use No     History   Drug Use    Types: Marijuana       Family History:   History reviewed   No pertinent family history  Allergies   Allergen Reactions    Penicillins Hives    Toradol [Ketorolac Tromethamine] Rash       Meds:  All current active meds have been reviewed    Scheduled Meds:  amitriptyline 100 mg Oral HS   amphetamine-dextroamphetamine 20 mg Oral BID (AM & Afternoon)   aspirin 81 mg Oral Daily   atorvastatin 40 mg Oral QPM   cefepime 2,000 mg Intravenous Q12H   cholecalciferol 1,000 Units Oral Daily   enoxaparin 40 mg Subcutaneous Daily   gabapentin 600 mg Oral TID   montelukast 10 mg Oral HS   nicotine 1 patch Transdermal Daily   oxyCODONE 30 mg Oral TID   pantoprazole 40 mg Oral Daily Before Breakfast   propranolol 80 mg Oral Daily     PRN Meds:   acetaminophen    ALPRAZolam    meclizine    morphine injection    ondansetron    simethicone    SUMAtriptan    Physical Exam:   Objective   Vitals:   Vitals:    12/29/17 0808   BP: 105/55   Pulse: 98   Resp: 20   Temp:    SpO2:    ,Body mass index is 26 52 kg/m²  Patient was examined in emergency department bed  General appearance: Cooperative in no acute distress  Head & neck head is atraumatic and normocephalic  Neck is supple with full range of motion  Cardiovascular: Carotid arteriesno carotid bruits  Neurologic:   Mental statusthe patient is awake alert and oriented without aphasia or apraxia  Other high cortical  intellectual functions are intact  CN: Visual fields are full to confrontation, disks are flat  Extraocular movements are full without nystagmus  Pupils are 3 mm and reactive  Face is symmetrical to light touch  Movements of facial expression move symmetrically  Hearing is normal to finger rub bilaterally  Soft palate lifts symmetrically  Shoulder shrug is symmetrical  Tongue is midline without atrophy  Motor: No drift is noted on arm extension  Strength is full in the upper and lower extremities with normal bulk and tone     Sensory: Intact to temperature and vibratory sensation in the upper and lower extremities bilaterally  Cortical function is intact  Coordination: Finger to nose testing is performed accurately  Romberg is negative  Gait reveals a normal base with symmetrical arm swing  Tandem gait is normal    Reflexes: 2/4 and symmetrical in the biceps, triceps, brachial radialis, knee jerk and ankle jerk regions  Toes are downgoing  Lab Results:Lab Results:   CBC:   Results from last 7 days  Lab Units 12/29/17  0639 12/29/17  0305 12/28/17  1745   WBC Thousand/uL 12 49*  --  21 39*   RBC Million/uL 3 73*  --  4 52   HEMOGLOBIN g/dL 10 7*  --  13 0   HEMATOCRIT % 33 1*  --  39 6   MCV fL 89  --  88   PLATELETS Thousands/uL 362 366 441*   , BMP/CMP:   Results from last 7 days  Lab Units 12/29/17  0639 12/28/17  1746   SODIUM mmol/L 146* 140   POTASSIUM mmol/L 3 5 3 2*   CHLORIDE mmol/L 111* 104   CO2 mmol/L 26 27   ANION GAP mmol/L 9 9   BUN mg/dL 16 14   CREATININE mg/dL 0 58* 0 77   GLUCOSE RANDOM mg/dL 108 77   CALCIUM mg/dL 7 9* 9 6   AST U/L  --  13   ALT U/L  --  50   ALK PHOS U/L  --  75   TOTAL PROTEIN g/dL  --  7 9   ALBUMIN g/dL  --  3 9   BILIRUBIN TOTAL mg/dL  --  0 50   EGFR ml/min/1 73sq m 116 98   , Urinalysis:   Results from last 7 days  Lab Units 12/28/17  1653   COLOR UA  Dk Yellow   CLARITY UA  Slightly Cloudy   SPEC GRAV UA  >=1 030   PH UA  5 5   LEUKOCYTES UA  Negative   NITRITE UA  Positive*   PROTEIN UA mg/dl 30 (1+)*   GLUCOSE UA mg/dl Negative   KETONES UA mg/dl Trace*   BILIRUBIN UA  Moderate*   BLOOD UA  Negative     I have personally reviewed pertinent reports  EEG, Echo, Pathology, and Other Studies: I have personally reviewed pertinent films in PACS    Family, was not present at the bedside for history and examination  Assessment:  1  Symptoms of left-sided numbness and slurred speech presently with a nonfocal neurologic exam in the face of multiple systemic symptoms  2   Urinary tract infection    Plan:  MRI scan of the brain pending        Danelle Templeton MD  12/29/2017,10:52 AM    Dictation voice to text software has been used in the creation of this document

## 2017-12-29 NOTE — H&P
History and Physical - [facility] Internal Medicine    Patient Information: Alyce Villavicencio 42 y o  female MRN: [id number]  Unit/Bed#: ED 28 Encounter: [id number]  Admitting Physician: Pauline Acharya PA-C  PCP: Lola Lindsey DO  Date of Admission:  12/29/17    Assessment/Plan:    Hospital Problem List:     Principal Problem:    Sepsis [facility])  Active Problems:    UTI (urinary tract infection)    Hepatomegaly    Dilated bile duct    Hypokalemia    Chest pain    Slurred speech    Tobacco abuse    Left sided numbness      Plan for the Primary Problem(s):  · Sepsis secondary to urinary tract infection  · Tachycardic, leukocytosis of 21, positive UA nitrate  Received IV rocephin in ED-will switch to cefepime to cover empiric sepsis coverage and  await blood and urine cultures, fluids, monitor vitals, lactic acid now within normal limits    Plan for Additional Problems:   · Hepatomegaly, dilated bile duct, dilated pancreatic duct  · Follow up with outpatient GI   · Hypokalemia  · Replace p o , monitor morning  · Chest pain with radiation down the left arm  · Telemetry monitoring, trend troponins, aspirin, statin, lipid panel  · Slurred speech with left-sided numbness and tingling  · I do not believe the patient is having a stroke at this time however I am unable to relate out  I will order brain MRI in the morning  Aspirin, statin, neuro checks and vital signs per stroke protocol, CT of the head was within normal limits, fall precautions  · I did not order physical therapy, occupational therapy, speech, Physical Medicine Rehab, nutrition, case management consult given no acute CVA was found at this time  I did not order stroke education is no CVA was found at this time    · I believe the patient would benefit from a psychiatric consult  · Tobacco abuse  · Cessation counseling, Nicoderm patch    VTE Prophylaxis: Enoxaparin (Lovenox)  / sequential compression device   Code Status:  Needs addressing  POLST: POLST form is not discussed and not completed at this time  Anticipated Length of Stay:  Patient will be admitted on an Inpatient basis with an anticipated length of stay of  > 2 midnights  Justification for Hospital Stay:  IV antibiotics and IV fluid hydration    Total Time for Visit, including Counseling / Coordination of Care: 1 hour  Greater than 50% of this total time spent on direct patient counseling and coordination of care  Chief Complaint:   Chest pain with radiation down the left arm    History of Present Illness:    Glenice Carrel is a 42 y o  female who presents with past medical history of cervical cancer , pan colitis, Crohn's disease, endocarditis, fibromyalgia, hypertension, irritable bowel syndrome, pancreatitis, rheumatoid arthritis, restrictive lung disease, vertigo presenting with numerous complaints  I was unable to find records regarding this patient  She currently presented with chest pain with radiation down her left arm with numbness and tingling of her left upper and lower extremity  She also notes dizziness  She states that her  states that she was not making sense  She denies headache at this time  She states that she had a syncopal episode last week  She also admits to shortness of breath with coughing and wheezing  She states that she was recently admitted to [facility] last week and diagnosed with a PE  She does however deny that she was on any anticoagulation but then or now  She does not have a PE on exam at this time  She admits to epigastric pain  She did also admit to coughing up black stuff that looked like charcoal and bleeding out of her ears with epistaxis  I unsure how reliable her review of systems for diagnoses are given no records and multitude of complaints  We will need to get records from her doctors  She would greatly benefit from a psychiatric consult  She was very adamant and giving her pain medicine as scheduled at exactly the right time  Review of Systems:    Review of Systems   HENT: Positive for nosebleeds  Respiratory: Positive for cough, chest tightness, shortness of breath and wheezing  Cardiovascular: Positive for chest pain  Gastrointestinal: Positive for abdominal pain  Neurological: Positive for dizziness, syncope, speech difficulty, numbness and headaches (hx of migraine, none currently)  Past Medical and Surgical History:     Past Medical History:   Diagnosis Date    Cancer [facility])     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colitis ([facility] )     Crohn's colitis ([place] [address] )     Endocarditis     Fibromyalgia     Gastritis     Hypertension     IBS (irritable bowel syndrome)     Pancreatitis     RA (rheumatoid arthritis) ([place] [address] )     Restrictive lung disease     Vertigo        Past Surgical History:   Procedure Laterality Date    APPENDECTOMY         Meds/Allergies:    Prior to Admission medications    Medication Sig Start Date End Date Taking?  Authorizing Provider   aspirin (ECOTRIN LOW STRENGTH) 81 mg EC tablet Take 81 mg by mouth daily   Yes Historical Provider, MD   cyclobenzaprine (FLEXERIL) 10 mg tablet Take 10 mg by mouth 3 (three) times a day as needed for muscle spasms   Yes Historical Provider, MD   divalproex sodium (DEPAKOTE) 250 mg EC tablet Take 250 mg by mouth every 12 (twelve) hours   Yes Historical Provider, MD   mesalamine (LIALDA) 1 2 g EC tablet Take 4,800 mg by mouth daily with breakfast     Yes Historical Provider, MD   montelukast (SINGULAIR) 10 mg tablet Take 10 mg by mouth daily at bedtime   Yes Historical Provider, MD   pantoprazole (PROTONIX) 40 mg tablet Take 40 mg by mouth daily   Yes Historical Provider, MD   propranolol (INDERAL) 80 mg tablet Take 80 mg by mouth 3 (three) times a day   Yes Historical Provider, MD   ALPRAZolam (XANAX) 2 MG tablet Take 2 mg by mouth Three times a day    Historical Provider, MD   amitriptyline (ELAVIL) 100 mg tablet Take 100 mg by mouth    Historical Provider, MD amphetamine-dextroamphetamine (ADDERALL) 20 mg tablet Take 20 mg by mouth daily    Historical Provider, MD   Calcium Carb-Ergocalciferol 250-125 MG-UNIT TABS Take 1 tablet by mouth    Historical Provider, MD   cholecalciferol (VITAMIN D3) 1,000 units tablet Take 1,000 Units by mouth daily    Historical Provider, MD   gabapentin (NEURONTIN) 300 mg capsule Take 900 mg by mouth 3 (three) times a day    Historical Provider, MD   meclizine (ANTIVERT) 12 5 MG tablet Take 12 5 mg by mouth every 8 (eight) hours as needed    Historical Provider, MD   oxyCODONE (ROXICODONE) 30 MG immediate release tablet Take 30 mg by mouth 3 (three) times a day    Historical Provider, MD   oxyCODONE-acetaminophen (PERCOCET)  mg per tablet Take 1 tablet by mouth Three times a day    Historical Provider, MD   SUMAtriptan (IMITREX) 100 mg tablet Take 1 tablet by mouth daily    Historical Provider, MD     nurse med rec    Allergies: Allergies   Allergen Reactions    Penicillins Hives    Toradol [Ketorolac Tromethamine] Rash       Social History:     Marital Status: /Civil Union   Occupation: works in a half-way  Patient Pre-hospital Living Situation: has a   Patient Pre-hospital Level of Mobility: full  Patient Pre-hospital Diet Restrictions: unknown  Substance Use History:   History   Alcohol Use No     History   Smoking Status    Current Every Day Smoker    Packs/day: 1 00    Types: Cigarettes   Smokeless Tobacco    Never Used     History   Drug Use    Types: Marijuana       Family History:    History reviewed  No pertinent family history      Physical Exam:     Vitals:   Blood Pressure: 131/62 (12/28/17 2312)  Pulse: 89 (12/28/17 2312)  Temperature: 98 8 °F (37 1 °C) (12/28/17 1609)  Temp Source: Oral (12/28/17 1609)  Respirations: 18 (12/28/17 2312)  Height: 5' 2" (157 5 cm) (12/28/17 1609)  Weight - Scale: 65 8 kg (145 lb) (12/28/17 1609)  SpO2: 100 % (12/28/17 2312)    Physical Exam   Constitutional: She is oriented to person, place, and time  She appears well-developed and well-nourished  No distress  HENT:   Head: Normocephalic and atraumatic  Eyes: Conjunctivae are normal  Right eye exhibits no discharge  Left eye exhibits no discharge  No scleral icterus  Neck: Neck supple  Cardiovascular: Normal rate, regular rhythm, normal heart sounds and intact distal pulses  Exam reveals no gallop and no friction rub  No murmur heard  Pulmonary/Chest: Effort normal  No respiratory distress  She has wheezes  She has no rales  She exhibits no tenderness  Abdominal: Soft  Bowel sounds are normal  She exhibits no distension and no mass  There is tenderness (suprapubic)  There is no rebound and no guarding  Musculoskeletal: Normal range of motion  She exhibits no edema, tenderness or deformity  Muscle strength 4/5 LUE and LLE compared to 5/5 on the RUE and RLE   Neurological: She is alert and oriented to person, place, and time  Full sensation UE and LE bilaterally  No facial droop or slurred speech noted   Skin: Skin is warm and dry  No rash noted  She is not diaphoretic  No erythema  No pallor  Psychiatric:   Appears anxious   Nursing note and vitals reviewed  Additional Data:     Lab Results: I have personally reviewed pertinent reports  Results from last 7 days  Lab Units 12/28/17  1745   WBC Thousand/uL 21 39*   HEMOGLOBIN g/dL 13 0   HEMATOCRIT % 39 6   PLATELETS Thousands/uL 441*   NEUTROS PCT % 72   LYMPHS PCT % 22   MONOS PCT % 5   EOS PCT % 0       Results from last 7 days  Lab Units 12/28/17  1746   SODIUM mmol/L 140   POTASSIUM mmol/L 3 2*   CHLORIDE mmol/L 104   CO2 mmol/L 27   BUN mg/dL 14   CREATININE mg/dL 0 77   CALCIUM mg/dL 9 6   TOTAL PROTEIN g/dL 7 9   BILIRUBIN TOTAL mg/dL 0 50   ALK PHOS U/L 75   ALT U/L 50   AST U/L 13   GLUCOSE RANDOM mg/dL 77       Results from last 7 days  Lab Units 12/28/17  1746   INR  1 06       Imaging: I have personally reviewed pertinent reports  Ct Head Without Contrast    Result Date: 12/28/2017  Narrative: CT BRAIN - WITHOUT CONTRAST INDICATION:  Headache COMPARISON:  None  TECHNIQUE:  CT examination of the brain was performed  In addition to axial images, coronal reformatted images were created and submitted for interpretation  Radiation dose length product (DLP) for this visit:  990 mGy-cm   This examination, like all CT scans performed in the [place], was performed utilizing techniques to minimize radiation dose exposure, including the use of iterative reconstruction and automated exposure control  IMAGE QUALITY:  Diagnostic  FINDINGS:  PARENCHYMA:  No intracranial mass, mass effect or midline shift  No CT signs of acute infarction  There is no parenchymal hemorrhage  Streak artifact at the skull base limiting evaluation of the temporal lobes  VENTRICLES AND EXTRA-AXIAL SPACES:  Normal for patient's age  VISUALIZED ORBITS AND PARANASAL SINUSES:  Unremarkable  CALVARIUM AND EXTRACRANIAL SOFT TISSUES:   Normal      Impression: No acute intracranial abnormality given limitations of streak artifact at the skull base limiting evaluation of the temporal lobes  If symptoms persist or worsen, consider brain MRI  Workstation performed: [id number]     Pe Study With Ct Abdomen & Pelvis With Contrast    Result Date: 12/28/2017  Narrative: CT PULMONARY ANGIOGRAM OF THE CHEST AND CT ABDOMEN AND PELVIS WITH INTRAVENOUS CONTRAST INDICATION:  Chest pain radiating to left arm  COMPARISON: None  TECHNIQUE:  CT examination of the chest, abdomen and pelvis was performed  Thin section CT angiographic technique was used in the chest in order to evaluate for pulmonary embolus and coronal 3D MIP postprocessing was performed on the acquisition scanner  Reformatted images were created in axial, sagittal, and coronal planes  Radiation dose length product (DLP) for this visit:  612 mGy-cm     This examination, like all CT scans performed in the [facility], was performed utilizing techniques to minimize radiation dose exposure, including the use of iterative reconstruction and automated exposure control  IV Contrast:  100 mL of iohexol (OMNIPAQUE)         Enteric Contrast:  Enteric contrast was not administered  FINDINGS: CHEST PULMONARY ARTERIAL TREE:  No pulmonary embolus is seen  LUNGS:  There is mucus filling of a left lower lobe bronchus best seen on series 2 image 143 There is no tracheal or endobronchial lesion  PLEURA:  Unremarkable  HEART/AORTA:  Unremarkable for patient's age  MEDIASTINUM AND JONATHAN:  Mild left and infrahilar adenopathy  Right hilar mild adenopathy  CHEST WALL AND LOWER NECK:       Normal  ABDOMEN LIVER/BILIARY TREE:  Mild nonspecific intrahepatic biliary ductal dilatation  Extrahepatic biliary duct is prominent but appears to taper towards the ampulla  However, the pancreatic duct also appears to be prominent/dilated  Consider nonemergent outpatient MRCP  Questionable mild hepatomegaly  GALLBLADDER:  No calcified gallstones  No pericholecystic inflammatory change  SPLEEN:  Unremarkable  PANCREAS:  Unremarkable  ADRENAL GLANDS: Unremarkable  KIDNEYS/URETERS:  Unremarkable  No hydronephrosis  STOMACH AND BOWEL:  Unremarkable  APPENDIX:  No findings to suggest appendicitis  ABDOMINOPELVIC CAVITY:  No ascites or free intraperitoneal air  No lymphadenopathy  VESSELS:  Unremarkable for patient's age  PELVIS REPRODUCTIVE ORGANS:  Unremarkable for patient's age  URINARY BLADDER:  Unremarkable  ABDOMINAL WALL/INGUINAL REGIONS:  Unremarkable  OSSEOUS STRUCTURES:  No acute fracture or destructive osseous lesion  Impression: 1  No acute CT findings  2  Mild nonspecific intrahepatic biliary ductal dilatation  Extrahepatic biliary duct is prominent but appears to taper towards the ampulla  However, the pancreatic duct also appears to be prominent/dilated  Consider nonemergent outpatient MRCP   3  Questionable mild hepatomegaly  Workstation performed: [id number]       EKG, Pathology, and Other Studies Reviewed on Admission:   · CT head, PE study, CMP, trop, lactic, CBC    Allscripts / Epic Records Reviewed: No     ** Please Note: This note has been constructed using a voice recognition system   **

## 2017-12-29 NOTE — ED NOTES
MRI came to  patient  Patient refused to go to at this time  Patient states "I need to sleep, I won't go now " Patient informed that refusing MRI at this time will hold up her care  Patient states she understands and could wait until tomorrow        Hugo Woods RN  12/29/17 4615

## 2017-12-29 NOTE — ED NOTES
Patient given lunch box at this time as requested  Patient laying in bed, call bell at bedside  Will continue to monitor        Adrienne Roger RN  12/29/17 4111

## 2017-12-29 NOTE — PLAN OF CARE
Problem: Potential for Falls  Goal: Patient will remain free of falls  INTERVENTIONS:  - Assess patient frequently for physical needs  -  Identify cognitive and physical deficits and behaviors that affect risk of falls    -  [place] fall precautions as indicated by assessment   - Educate patient/family on patient safety including physical limitations  - Instruct patient to call for assistance with activity based on assessment  - Modify environment to reduce risk of injury  - Consider OT/PT consult to assist with strengthening/mobility   Outcome: Progressing

## 2017-12-29 NOTE — SEPSIS NOTE
Sepsis Note   Stephan Wall 42 y o  female MRN: [id number]  Unit/Bed#: ED 28 Encounter: [id number]            Initial Sepsis Screening     Row Name 12/28/17 4133                Is the patient's history suggestive of a new or worsening infection? (!)  Yes (Proceed)  -JK        Suspected source of infection urinary tract infection  -JK        Are two or more of the following signs & symptoms of infection both present and new to the patient? (!)  Yes (Proceed)  -JK        Indicate SIRS criteria Tachycardia > 90 bpm;Leukocytosis (WBC > [address] IJL)  -JK        If the answer is yes to both questions, suspicion of sepsis is present          If severe sepsis is present AND tissue hypoperfusion perists in the hour after fluid resuscitation or lactate > 4, the patient meets criteria for SEPTIC SHOCK          Are any of the following organ dysfunction criteria present within 6 hours of suspected infection and SIRS criteria that are NOT considered to be chronic conditions? (!)  Yes  -JK        Organ dysfunction Lactate > 2 0 mmol/L  -JK        Date of presentation of severe sepsis 12/28/17  -JK        Time of presentation of severe sepsis 1720  -JK        Tissue hypoperfusion persists in the hour after crystalloid fluid administration, evidenced, by either:          Was hypotension present within one hour of the conclusion of crystalloid fluid administration?           Date of presentation of septic shock          Time of presentation of septic shock            User Key  (r) = Recorded By, (t) = Taken By, (c) = Cosigned By    [address] Name Provider Zarina Webb MD Physician

## 2017-12-30 LAB
ANION GAP SERPL CALCULATED.3IONS-SCNC: 7 MMOL/L (ref 4–13)
BUN SERPL-MCNC: 14 MG/DL (ref 5–25)
CALCIUM SERPL-MCNC: 8.1 MG/DL (ref 8.3–10.1)
CHLORIDE SERPL-SCNC: 111 MMOL/L (ref 100–108)
CO2 SERPL-SCNC: 27 MMOL/L (ref 21–32)
CREAT SERPL-MCNC: 0.6 MG/DL (ref 0.6–1.3)
ERYTHROCYTE [DISTWIDTH] IN BLOOD BY AUTOMATED COUNT: 15.2 % (ref 11.6–15.1)
GFR SERPL CREATININE-BSD FRML MDRD: 115 ML/MIN/1.73SQ M
GLUCOSE SERPL-MCNC: 102 MG/DL (ref 65–140)
HCT VFR BLD AUTO: 33.1 % (ref 34.8–46.1)
HGB BLD-MCNC: 10.5 G/DL (ref 11.5–15.4)
MAGNESIUM SERPL-MCNC: 2 MG/DL (ref 1.6–2.6)
MCH RBC QN AUTO: 28.9 PG (ref 26.8–34.3)
MCHC RBC AUTO-ENTMCNC: 31.7 G/DL (ref 31.4–37.4)
MCV RBC AUTO: 91 FL (ref 82–98)
PLATELET # BLD AUTO: 314 THOUSANDS/UL (ref 149–390)
PMV BLD AUTO: 9.9 FL (ref 8.9–12.7)
POTASSIUM SERPL-SCNC: 3.6 MMOL/L (ref 3.5–5.3)
RBC # BLD AUTO: 3.63 MILLION/UL (ref 3.81–5.12)
SODIUM SERPL-SCNC: 145 MMOL/L (ref 136–145)
WBC # BLD AUTO: 9.44 THOUSAND/UL (ref 4.31–10.16)

## 2017-12-30 PROCEDURE — 94640 AIRWAY INHALATION TREATMENT: CPT

## 2017-12-30 PROCEDURE — 94664 DEMO&/EVAL PT USE INHALER: CPT

## 2017-12-30 PROCEDURE — 85027 COMPLETE CBC AUTOMATED: CPT | Performed by: FAMILY MEDICINE

## 2017-12-30 PROCEDURE — 94760 N-INVAS EAR/PLS OXIMETRY 1: CPT

## 2017-12-30 PROCEDURE — 83735 ASSAY OF MAGNESIUM: CPT | Performed by: FAMILY MEDICINE

## 2017-12-30 PROCEDURE — 80048 BASIC METABOLIC PNL TOTAL CA: CPT | Performed by: FAMILY MEDICINE

## 2017-12-30 RX ORDER — SODIUM CHLORIDE FOR INHALATION 0.9 %
3 VIAL, NEBULIZER (ML) INHALATION
Status: DISCONTINUED | OUTPATIENT
Start: 2017-12-30 | End: 2018-01-03 | Stop reason: HOSPADM

## 2017-12-30 RX ORDER — GUAIFENESIN 600 MG
600 TABLET, EXTENDED RELEASE 12 HR ORAL EVERY 12 HOURS SCHEDULED
Status: DISCONTINUED | OUTPATIENT
Start: 2017-12-30 | End: 2018-01-03 | Stop reason: HOSPADM

## 2017-12-30 RX ORDER — AZITHROMYCIN 250 MG/1
500 TABLET, FILM COATED ORAL EVERY 24 HOURS
Status: DISCONTINUED | OUTPATIENT
Start: 2017-12-30 | End: 2017-12-31

## 2017-12-30 RX ORDER — LEVALBUTEROL 1.25 MG/.5ML
1.25 SOLUTION, CONCENTRATE RESPIRATORY (INHALATION)
Status: DISCONTINUED | OUTPATIENT
Start: 2017-12-30 | End: 2018-01-03 | Stop reason: HOSPADM

## 2017-12-30 RX ADMIN — OXYCODONE HYDROCHLORIDE 10 MG: 10 TABLET ORAL at 12:14

## 2017-12-30 RX ADMIN — Medication 2000 MG: at 02:32

## 2017-12-30 RX ADMIN — PROPRANOLOL HYDROCHLORIDE 80 MG: 80 CAPSULE, EXTENDED RELEASE ORAL at 09:09

## 2017-12-30 RX ADMIN — PANTOPRAZOLE SODIUM 40 MG: 40 TABLET, DELAYED RELEASE ORAL at 06:38

## 2017-12-30 RX ADMIN — DEXTROAMPHETAMINE SACCHARATE, AMPHETAMINE ASPARTATE, DEXTROAMPHETAMINE SULFATE, AND AMPHETAMINE SULFATE 20 MG: 2.5; 2.5; 2.5; 2.5 TABLET ORAL at 14:17

## 2017-12-30 RX ADMIN — CHOLECALCIFEROL TAB 25 MCG (1000 UNIT) 1000 UNITS: 25 TAB at 09:09

## 2017-12-30 RX ADMIN — ATORVASTATIN CALCIUM 40 MG: 40 TABLET, FILM COATED ORAL at 19:03

## 2017-12-30 RX ADMIN — OXYCODONE HYDROCHLORIDE 10 MG: 10 TABLET ORAL at 20:31

## 2017-12-30 RX ADMIN — GUAIFENESIN 600 MG: 600 TABLET, EXTENDED RELEASE ORAL at 12:13

## 2017-12-30 RX ADMIN — NICOTINE 1 PATCH: 21 PATCH, EXTENDED RELEASE TRANSDERMAL at 09:11

## 2017-12-30 RX ADMIN — SIMETHICONE CHEW TAB 80 MG 80 MG: 80 TABLET ORAL at 20:31

## 2017-12-30 RX ADMIN — AMITRIPTYLINE HYDROCHLORIDE 100 MG: 100 TABLET, FILM COATED ORAL at 21:28

## 2017-12-30 RX ADMIN — OXYCODONE HYDROCHLORIDE 10 MG: 10 TABLET ORAL at 16:25

## 2017-12-30 RX ADMIN — OXYCODONE HYDROCHLORIDE 30 MG: 10 TABLET ORAL at 21:27

## 2017-12-30 RX ADMIN — ALPRAZOLAM 2 MG: 0.5 TABLET ORAL at 16:24

## 2017-12-30 RX ADMIN — SODIUM CHLORIDE 125 ML/HR: 0.9 INJECTION, SOLUTION INTRAVENOUS at 10:07

## 2017-12-30 RX ADMIN — GABAPENTIN 600 MG: 300 CAPSULE ORAL at 09:09

## 2017-12-30 RX ADMIN — OXYCODONE HYDROCHLORIDE 30 MG: 10 TABLET ORAL at 15:04

## 2017-12-30 RX ADMIN — OXYCODONE HYDROCHLORIDE 30 MG: 10 TABLET ORAL at 07:25

## 2017-12-30 RX ADMIN — SODIUM CHLORIDE 125 ML/HR: 0.9 INJECTION, SOLUTION INTRAVENOUS at 20:27

## 2017-12-30 RX ADMIN — DEXTROAMPHETAMINE SACCHARATE, AMPHETAMINE ASPARTATE, DEXTROAMPHETAMINE SULFATE, AND AMPHETAMINE SULFATE 20 MG: 2.5; 2.5; 2.5; 2.5 TABLET ORAL at 09:09

## 2017-12-30 RX ADMIN — AZITHROMYCIN 500 MG: 250 TABLET, FILM COATED ORAL at 12:13

## 2017-12-30 RX ADMIN — LEVALBUTEROL 1.25 MG: 1.25 SOLUTION, CONCENTRATE RESPIRATORY (INHALATION) at 21:05

## 2017-12-30 RX ADMIN — ASPIRIN 81 MG 81 MG: 81 TABLET ORAL at 09:09

## 2017-12-30 RX ADMIN — GABAPENTIN 600 MG: 300 CAPSULE ORAL at 16:24

## 2017-12-30 RX ADMIN — MONTELUKAST SODIUM 10 MG: 10 TABLET, FILM COATED ORAL at 21:26

## 2017-12-30 RX ADMIN — ALPRAZOLAM 2 MG: 0.5 TABLET ORAL at 09:19

## 2017-12-30 RX ADMIN — GABAPENTIN 600 MG: 300 CAPSULE ORAL at 21:27

## 2017-12-30 RX ADMIN — GUAIFENESIN 600 MG: 600 TABLET, EXTENDED RELEASE ORAL at 21:26

## 2017-12-30 RX ADMIN — ISODIUM CHLORIDE 3 ML: 0.03 SOLUTION RESPIRATORY (INHALATION) at 21:05

## 2017-12-30 NOTE — PROGRESS NOTES
[facility] Internal Medicine Progress Note  Patient: Laura Ferguson 42 y o  female   MRN: [id number]  PCP: Gonzalo Gallegos DO  Unit/Bed#: -01 Encounter: [id number]  Date Of Visit: 12/30/17    Assessment:    Principal Problem:    Sepsis ([address] )  Active Problems:    UTI (urinary tract infection)    Hepatomegaly    Dilated bile duct    Hypokalemia    Chest pain    Slurred speech    Tobacco abuse    Left sided numbness      Plan:    · Sepsis: This present on admission  Unfortunately do not have a good source  We were initially thinking a urinary tract infection secondary to nitrites however she has no WBCs noted  She could likely have a acute bronchitis  She also has some mucous plugging noted  I will put her on azithromycin and monitor  She has finished a course of Levaquin as an outpatient  She will need close pulmonology follow-up  I do not think this is GI related  The patient was recently diagnosed with pancolitis but does not have any a evidence of that at this time  · Left-sided numbness: This is likely secondary to atypical migraines  Neurology evaluation was appreciated  · Hypokalemia:  Replete  · Dilated bile duct: Follow up as an outpatient with an MRCP  · Tobacco abuse:  Continue Nicoderm patch      VTE Pharmacologic Prophylaxis:   Pharmacologic: Enoxaparin (Lovenox)  Mechanical VTE Prophylaxis in Place: Yes    Patient Centered Rounds: I have performed bedside rounds with nursing staff today  Education and Discussions with Family / Patient:  Discussed with patient    Time Spent for Care: 20 minutes  More than 50% of total time spent on counseling and coordination of care as described above      Current Length of Stay: 2 day(s)    Current Patient Status: Inpatient   Certification Statement: The patient will continue to require additional inpatient hospital stay due to Needing further monitoring and follow up on the cultures    Discharge Plan: Anticipate discharge tomorrow    Code Status: Level 1 - Full Code      Subjective:   Patient seen and examined  She still has a cough and some shortness of breath  No diarrhea today  Objective:     Vitals:   Temp (24hrs), Av 3 °F (36 8 °C), Min:98 °F (36 7 °C), Max:98 6 °F (37 °C)    HR:  [83-99] 86  Resp:  [18-22] 18  BP: ()/(52-78) 90/52  SpO2:  [95 %-98 %] 98 %  Body mass index is 27 07 kg/m²  Input and Output Summary (last 24 hours): Intake/Output Summary (Last 24 hours) at 17 1053  Last data filed at 17 1007   Gross per 24 hour   Intake          4121 25 ml   Output                0 ml   Net          4121 25 ml       Physical Exam:     General Appearance:    Alert, cooperative, no distress, appears stated age                               Lungs:     Clear to auscultation bilaterally, respirations unlabored       Heart:    Regular rate and rhythm, S1 and S2 normal, no murmur, rub    or gallop   Abdomen:     Soft, non-tender, bowel sounds active all four quadrants,     no masses, no organomegaly           Extremities:   Extremities normal, atraumatic, no cyanosis or edema                       Additional Data:     Labs:      Results from last 7 days  Lab Units 17  0558  17  1745   WBC Thousand/uL 9 44  < > 21 39*   HEMOGLOBIN g/dL 10 5*  < > 13 0   HEMATOCRIT % 33 1*  < > 39 6   PLATELETS Thousands/uL 314  < > 441*   NEUTROS PCT %  --   --  72   LYMPHS PCT %  --   --  22   MONOS PCT %  --   --  5   EOS PCT %  --   --  0   < > = values in this interval not displayed      Results from last 7 days  Lab Units 17  0558  17  1746   SODIUM mmol/L 145  < > 140   POTASSIUM mmol/L 3 6  < > 3 2*   CHLORIDE mmol/L 111*  < > 104   CO2 mmol/L 27  < > 27   BUN mg/dL 14  < > 14   CREATININE mg/dL 0 60  < > 0 77   CALCIUM mg/dL 8 1*  < > 9 6   TOTAL PROTEIN g/dL  --   --  7 9   BILIRUBIN TOTAL mg/dL  --   --  0 50   ALK PHOS U/L  --   --  75   ALT U/L  --   --  50   AST U/L  --   --  13   GLUCOSE RANDOM mg/dL 102  < > 77   < > = values in this interval not displayed  Results from last 7 days  Lab Units 12/29/17  0639   INR  1 18*       * I Have Reviewed All Lab Data Listed Above  * Additional Pertinent Lab Tests Reviewed: Tono Billy Admission Reviewed        Recent Cultures (last 7 days):       Results from last 7 days  Lab Units 12/28/17  1746   BLOOD CULTURE  No Growth at 24 hrs  No Growth at 24 hrs  Last 24 Hours Medication List:     amitriptyline 100 mg Oral HS   amphetamine-dextroamphetamine 20 mg Oral BID (AM & Afternoon)   aspirin 81 mg Oral Daily   atorvastatin 40 mg Oral QPM   azithromycin 500 mg Oral Q24H   cholecalciferol 1,000 Units Oral Daily   enoxaparin 40 mg Subcutaneous Daily   gabapentin 600 mg Oral TID   guaiFENesin 600 mg Oral Q12H MIKE   montelukast 10 mg Oral HS   nicotine 1 patch Transdermal Daily   oxyCODONE 30 mg Oral TID   pantoprazole 40 mg Oral Daily Before Breakfast   propranolol 80 mg Oral Daily        Today, Patient Was Seen By: Diaz Moy MD    ** Please Note: Dragon 360 Dictation voice to text software may have been used in the creation of this document   **

## 2017-12-30 NOTE — PROGRESS NOTES
Progress Note - Neurology   Velma Daniel 42 y o  female MRN: [id number]  Unit/Bed#: -01 Encounter: [id number]      Subjective:   Monica Aviles currently reports some numbness in her left ear  She denies any numbness in her face or in her extremities  She denies any headache or localizing numbness or weakness  She does report some shortness of breath and it is felt that she may have bronchitis is the source of her infection    ROS: 12 system cued query was unchanged from org  consult note  Vitals:   Vitals:    12/30/17 0900   BP: 90/52   Pulse: 86   Resp: 18   Temp: 98 6 °F (37 °C)   SpO2: 98%   ,Body mass index is 27 07 kg/m²  MEDS:    amitriptyline 100 mg Oral HS   amphetamine-dextroamphetamine 20 mg Oral BID (AM & Afternoon)   aspirin 81 mg Oral Daily   atorvastatin 40 mg Oral QPM   azithromycin 500 mg Oral Q24H   cholecalciferol 1,000 Units Oral Daily   enoxaparin 40 mg Subcutaneous Daily   gabapentin 600 mg Oral TID   guaiFENesin 600 mg Oral Q12H MIKE   montelukast 10 mg Oral HS   nicotine 1 patch Transdermal Daily   oxyCODONE 30 mg Oral TID   pantoprazole 40 mg Oral Daily Before Breakfast   propranolol 80 mg Oral Daily   :    Physical Exam:  General appearance: alert, appears stated age and cooperative  HEENT/NECK: Head is atraumatic normocephalic, neck is supple  NEUROLOGIC:  Mental Status: Awake and alert without aphasia  Cranial Nerves: Extraocular movements are full  Face is symmetrical  Motor:  No drift is noted on arm extension  Coordination:  Minimal end tremor is noted on finger-to-nose testing    Lab Results: I have personally reviewed pertinent reports  Imaging Studies: I have personally reviewed pertinent films in PACS 1 or 2 areas of high signal on T2 weighted imaging is noted in the right frontal region, most likely secondary to migraine  No other significant abnormalities noted      Assessment:  1   Transient left-sided paresthesias - no obvious organic source, question migraine related however she was not having migraine around the time her symptoms occurred  2  History of migraine headaches  3  Bronchitis    Plan:  She will follow up with her primary neurologist as an outpatient      Navya Bruce MD  12/30/2017,1:05 PM    Dictation voice to text software has been used in the creation of this document

## 2017-12-31 PROBLEM — K62.5 RECTAL BLEEDING: Status: ACTIVE | Noted: 2017-12-31

## 2017-12-31 LAB
ANION GAP SERPL CALCULATED.3IONS-SCNC: 8 MMOL/L (ref 4–13)
BUN SERPL-MCNC: 11 MG/DL (ref 5–25)
CALCIUM SERPL-MCNC: 8.9 MG/DL (ref 8.3–10.1)
CHLORIDE SERPL-SCNC: 106 MMOL/L (ref 100–108)
CO2 SERPL-SCNC: 28 MMOL/L (ref 21–32)
CREAT SERPL-MCNC: 0.56 MG/DL (ref 0.6–1.3)
ERYTHROCYTE [DISTWIDTH] IN BLOOD BY AUTOMATED COUNT: 14.1 % (ref 11.6–15.1)
GFR SERPL CREATININE-BSD FRML MDRD: 118 ML/MIN/1.73SQ M
GLUCOSE SERPL-MCNC: 133 MG/DL (ref 65–140)
HCT VFR BLD AUTO: 32.9 % (ref 34.8–46.1)
HGB BLD-MCNC: 11 G/DL (ref 11.5–15.4)
MCH RBC QN AUTO: 29.3 PG (ref 26.8–34.3)
MCHC RBC AUTO-ENTMCNC: 33.4 G/DL (ref 31.4–37.4)
MCV RBC AUTO: 88 FL (ref 82–98)
PLATELET # BLD AUTO: 305 THOUSANDS/UL (ref 149–390)
PMV BLD AUTO: 10.1 FL (ref 8.9–12.7)
POTASSIUM SERPL-SCNC: 3.9 MMOL/L (ref 3.5–5.3)
RBC # BLD AUTO: 3.75 MILLION/UL (ref 3.81–5.12)
SODIUM SERPL-SCNC: 142 MMOL/L (ref 136–145)
WBC # BLD AUTO: 14.81 THOUSAND/UL (ref 4.31–10.16)

## 2017-12-31 PROCEDURE — 94760 N-INVAS EAR/PLS OXIMETRY 1: CPT

## 2017-12-31 PROCEDURE — 94640 AIRWAY INHALATION TREATMENT: CPT

## 2017-12-31 PROCEDURE — 80048 BASIC METABOLIC PNL TOTAL CA: CPT | Performed by: FAMILY MEDICINE

## 2017-12-31 PROCEDURE — 85027 COMPLETE CBC AUTOMATED: CPT | Performed by: FAMILY MEDICINE

## 2017-12-31 RX ORDER — DOXYCYCLINE HYCLATE 100 MG/1
100 CAPSULE ORAL EVERY 12 HOURS SCHEDULED
Status: DISCONTINUED | OUTPATIENT
Start: 2017-12-31 | End: 2018-01-01

## 2017-12-31 RX ADMIN — ATORVASTATIN CALCIUM 40 MG: 40 TABLET, FILM COATED ORAL at 17:37

## 2017-12-31 RX ADMIN — OXYCODONE HYDROCHLORIDE 10 MG: 10 TABLET ORAL at 10:33

## 2017-12-31 RX ADMIN — CHOLECALCIFEROL TAB 25 MCG (1000 UNIT) 1000 UNITS: 25 TAB at 09:56

## 2017-12-31 RX ADMIN — ALPRAZOLAM 2 MG: 0.5 TABLET ORAL at 10:33

## 2017-12-31 RX ADMIN — ISODIUM CHLORIDE 3 ML: 0.03 SOLUTION RESPIRATORY (INHALATION) at 08:19

## 2017-12-31 RX ADMIN — NICOTINE 1 PATCH: 21 PATCH, EXTENDED RELEASE TRANSDERMAL at 09:58

## 2017-12-31 RX ADMIN — DEXTROAMPHETAMINE SACCHARATE, AMPHETAMINE ASPARTATE, DEXTROAMPHETAMINE SULFATE, AND AMPHETAMINE SULFATE 20 MG: 2.5; 2.5; 2.5; 2.5 TABLET ORAL at 10:29

## 2017-12-31 RX ADMIN — OXYCODONE HYDROCHLORIDE 30 MG: 10 TABLET ORAL at 07:38

## 2017-12-31 RX ADMIN — ALPRAZOLAM 2 MG: 0.5 TABLET ORAL at 16:09

## 2017-12-31 RX ADMIN — OXYCODONE HYDROCHLORIDE 10 MG: 10 TABLET ORAL at 05:15

## 2017-12-31 RX ADMIN — OXYCODONE HYDROCHLORIDE 10 MG: 10 TABLET ORAL at 22:24

## 2017-12-31 RX ADMIN — OXYCODONE HYDROCHLORIDE 10 MG: 10 TABLET ORAL at 17:37

## 2017-12-31 RX ADMIN — PANTOPRAZOLE SODIUM 40 MG: 40 TABLET, DELAYED RELEASE ORAL at 07:38

## 2017-12-31 RX ADMIN — ALPRAZOLAM 2 MG: 0.5 TABLET ORAL at 00:04

## 2017-12-31 RX ADMIN — GABAPENTIN 600 MG: 300 CAPSULE ORAL at 20:47

## 2017-12-31 RX ADMIN — GABAPENTIN 600 MG: 300 CAPSULE ORAL at 09:56

## 2017-12-31 RX ADMIN — LEVALBUTEROL 1.25 MG: 1.25 SOLUTION, CONCENTRATE RESPIRATORY (INHALATION) at 08:19

## 2017-12-31 RX ADMIN — GUAIFENESIN 600 MG: 600 TABLET, EXTENDED RELEASE ORAL at 09:56

## 2017-12-31 RX ADMIN — ASPIRIN 81 MG 81 MG: 81 TABLET ORAL at 09:56

## 2017-12-31 RX ADMIN — GUAIFENESIN 600 MG: 600 TABLET, EXTENDED RELEASE ORAL at 20:46

## 2017-12-31 RX ADMIN — OXYCODONE HYDROCHLORIDE 30 MG: 10 TABLET ORAL at 20:47

## 2017-12-31 RX ADMIN — MONTELUKAST SODIUM 10 MG: 10 TABLET, FILM COATED ORAL at 22:24

## 2017-12-31 RX ADMIN — OXYCODONE HYDROCHLORIDE 30 MG: 10 TABLET ORAL at 15:16

## 2017-12-31 RX ADMIN — OXYCODONE HYDROCHLORIDE 10 MG: 10 TABLET ORAL at 00:31

## 2017-12-31 RX ADMIN — DOXYCYCLINE HYCLATE 100 MG: 100 CAPSULE, GELATIN COATED ORAL at 09:56

## 2017-12-31 RX ADMIN — DEXTROAMPHETAMINE SACCHARATE, AMPHETAMINE ASPARTATE, DEXTROAMPHETAMINE SULFATE, AND AMPHETAMINE SULFATE 20 MG: 2.5; 2.5; 2.5; 2.5 TABLET ORAL at 13:18

## 2017-12-31 RX ADMIN — DOXYCYCLINE HYCLATE 100 MG: 100 CAPSULE, GELATIN COATED ORAL at 20:47

## 2017-12-31 RX ADMIN — PROPRANOLOL HYDROCHLORIDE 80 MG: 80 CAPSULE, EXTENDED RELEASE ORAL at 09:58

## 2017-12-31 RX ADMIN — GABAPENTIN 600 MG: 300 CAPSULE ORAL at 15:16

## 2017-12-31 NOTE — PROGRESS NOTES
[facility] Progress Note  Patient: Ayanna Garcia 42 y o  female   MRN: [id number]  PCP: Audrey Bender DO  Unit/Bed#: [facility]-01 Encounter: [id number]  Date Of Visit: 12/31/17    Assessment:    Principal Problem:    Sepsis ([address] )  Active Problems:    UTI (urinary tract infection)    Hepatomegaly    Dilated bile duct    Hypokalemia    Chest pain    Slurred speech    Tobacco abuse    Left sided numbness    Rectal bleeding      Plan:    · Sepsis: This is present on admission  This is likely and Respiratory source versus secondary to her Crohn's  However the CT scan was negative for any abdominal pathology  Her leukocytosis did increase today  I did change her antibiotics from azithromycin to doxycycline in anticipation of discharge tomorrow  I took her off IV antibiotics yesterday secondary to seeing if she can tolerate the medications and what happens with her WBC count  · Hemoptysis: This has been an ongoing thing for the patient  She did have a bronchoscopy to follow up with primary pulmonologist  · Rectal bleed with history of Crohn's  H&H has been stable  I will continue to monitor  If her hemoglobin were to drop or she were to have increased bleeding  I will have GI evaluate the patient but will hold off for now  · Left-sided numbness: This is likely secondary to buckle migraines  This is stable  · Dilated bile duct: Follow up with an MRCP as an outpatient  · Tobacco abuse:  Continue Nicoderm patch  ·       VTE Pharmacologic Prophylaxis:   Pharmacologic: Enoxaparin (Lovenox)  Mechanical VTE Prophylaxis in Place: Yes    Patient Centered Rounds: I have performed bedside rounds with nursing staff today  Education and Discussions with Family / Patient:  Patient    Time Spent for Care: 20 minutes  More than 50% of total time spent on counseling and coordination of care as described above      Current Length of Stay: 3 day(s)    Current Patient Status: Inpatient   Certification Statement: The patient will continue to require additional inpatient hospital stay due to Having worsening WBC count as well as hemoptysis    Discharge Plan:  Hopeful discharge in 24 hours    Code Status: Level 1 - Full Code      Subjective:   Patient seen examined  She states she had some diaphoresis this morning and was soaked  She also had some hemoptysis    Objective:     Vitals:   Temp (24hrs), Av 5 °F (36 9 °C), Min:98 °F (36 7 °C), Max:99 3 °F (37 4 °C)    HR:  [77-91] 77  Resp:  [16-18] 18  BP: (106-123)/(58-70) 109/68  SpO2:  [94 %-97 %] 96 %  Body mass index is 27 07 kg/m²  Input and Output Summary (last 24 hours): Intake/Output Summary (Last 24 hours) at 17 2940  Last data filed at 17   Gross per 24 hour   Intake          5581 25 ml   Output              200 ml   Net          5381 25 ml       Physical Exam:     General Appearance:    Alert, cooperative, no distress, appears stated age                               Lungs:      scattered rhonchi       Heart:    Regular rate and rhythm, S1 and S2 normal, no murmur, rub    or gallop   Abdomen:     Soft, non-tender, bowel sounds active all four quadrants,     no masses, no organomegaly           Extremities:   Extremities normal, atraumatic, no cyanosis or edema                       Additional Data:     Labs:      Results from last 7 days  Lab Units 17  0507  17  1745   WBC Thousand/uL 14 81*  < > 21 39*   HEMOGLOBIN g/dL 11 0*  < > 13 0   HEMATOCRIT % 32 9*  < > 39 6   PLATELETS Thousands/uL 305  < > 441*   NEUTROS PCT %  --   --  72   LYMPHS PCT %  --   --  22   MONOS PCT %  --   --  5   EOS PCT %  --   --  0   < > = values in this interval not displayed      Results from last 7 days  Lab Units 17  0507  17  1746   SODIUM mmol/L 142  < > 140   POTASSIUM mmol/L 3 9  < > 3 2*   CHLORIDE mmol/L 106  < > 104   CO2 mmol/L 28  < > 27   BUN mg/dL 11  < > 14   CREATININE mg/dL 0 56*  < > 0 77   CALCIUM mg/dL 8 9  < > 9 6   TOTAL PROTEIN g/dL  --   --  7 9   BILIRUBIN TOTAL mg/dL  --   --  0 50   ALK PHOS U/L  --   --  75   ALT U/L  --   --  50   AST U/L  --   --  13   GLUCOSE RANDOM mg/dL 133  < > 77   < > = values in this interval not displayed  Results from last 7 days  Lab Units 12/29/17  0639   INR  1 18*       * I Have Reviewed All Lab Data Listed Above  * Additional Pertinent Lab Tests Reviewed: [facility] Admission Reviewed        Recent Cultures (last 7 days):       Results from last 7 days  Lab Units 12/28/17  1746   BLOOD CULTURE  No Growth at 48 hrs  No Growth at 48 hrs  Last 24 Hours Medication List:     amitriptyline 100 mg Oral HS   amphetamine-dextroamphetamine 20 mg Oral BID (AM & Afternoon)   aspirin 81 mg Oral Daily   atorvastatin 40 mg Oral QPM   cholecalciferol 1,000 Units Oral Daily   doxycycline hyclate 100 mg Oral Q12H MIKE   enoxaparin 40 mg Subcutaneous Daily   gabapentin 600 mg Oral TID   guaiFENesin 600 mg Oral Q12H MIKE   levalbuterol 1 25 mg Nebulization TID   montelukast 10 mg Oral HS   nicotine 1 patch Transdermal Daily   oxyCODONE 30 mg Oral TID   pantoprazole 40 mg Oral Daily Before Breakfast   propranolol 80 mg Oral Daily   sodium chloride 3 mL Nebulization TID        Today, Patient Was Seen By: Lamont Rosas MD    ** Please Note: Dragon 360 Dictation voice to text software may have been used in the creation of this document   **

## 2017-12-31 NOTE — PROGRESS NOTES
Pt complains of being confused, stating that she thought she was in th operating room this am  RN made Dr Julio Mohan aware of this complaint, will continue to monitor

## 2018-01-01 LAB
ANION GAP SERPL CALCULATED.3IONS-SCNC: 7 MMOL/L (ref 4–13)
BUN SERPL-MCNC: 14 MG/DL (ref 5–25)
CALCIUM SERPL-MCNC: 9.1 MG/DL (ref 8.3–10.1)
CHLORIDE SERPL-SCNC: 107 MMOL/L (ref 100–108)
CO2 SERPL-SCNC: 31 MMOL/L (ref 21–32)
CREAT SERPL-MCNC: 0.62 MG/DL (ref 0.6–1.3)
ERYTHROCYTE [DISTWIDTH] IN BLOOD BY AUTOMATED COUNT: 14.6 % (ref 11.6–15.1)
GFR SERPL CREATININE-BSD FRML MDRD: 114 ML/MIN/1.73SQ M
GLUCOSE SERPL-MCNC: 89 MG/DL (ref 65–140)
HCT VFR BLD AUTO: 39.5 % (ref 34.8–46.1)
HGB BLD-MCNC: 12.4 G/DL (ref 11.5–15.4)
MCH RBC QN AUTO: 28.6 PG (ref 26.8–34.3)
MCHC RBC AUTO-ENTMCNC: 31.4 G/DL (ref 31.4–37.4)
MCV RBC AUTO: 91 FL (ref 82–98)
PLATELET # BLD AUTO: 331 THOUSANDS/UL (ref 149–390)
PMV BLD AUTO: 10.7 FL (ref 8.9–12.7)
POTASSIUM SERPL-SCNC: 3.9 MMOL/L (ref 3.5–5.3)
RBC # BLD AUTO: 4.33 MILLION/UL (ref 3.81–5.12)
SODIUM SERPL-SCNC: 145 MMOL/L (ref 136–145)
WBC # BLD AUTO: 19.7 THOUSAND/UL (ref 4.31–10.16)

## 2018-01-01 PROCEDURE — 80048 BASIC METABOLIC PNL TOTAL CA: CPT | Performed by: FAMILY MEDICINE

## 2018-01-01 PROCEDURE — 94640 AIRWAY INHALATION TREATMENT: CPT

## 2018-01-01 PROCEDURE — 94760 N-INVAS EAR/PLS OXIMETRY 1: CPT

## 2018-01-01 PROCEDURE — 85027 COMPLETE CBC AUTOMATED: CPT | Performed by: FAMILY MEDICINE

## 2018-01-01 RX ORDER — LEVOFLOXACIN 5 MG/ML
750 INJECTION, SOLUTION INTRAVENOUS EVERY 24 HOURS
Status: DISCONTINUED | OUTPATIENT
Start: 2018-01-01 | End: 2018-01-02

## 2018-01-01 RX ORDER — MESALAMINE 1.2 G/1
4.8 TABLET, DELAYED RELEASE ORAL DAILY
Status: DISCONTINUED | OUTPATIENT
Start: 2018-01-01 | End: 2018-01-03 | Stop reason: HOSPADM

## 2018-01-01 RX ADMIN — ATORVASTATIN CALCIUM 40 MG: 40 TABLET, FILM COATED ORAL at 17:48

## 2018-01-01 RX ADMIN — ISODIUM CHLORIDE 3 ML: 0.03 SOLUTION RESPIRATORY (INHALATION) at 14:33

## 2018-01-01 RX ADMIN — ISODIUM CHLORIDE 3 ML: 0.03 SOLUTION RESPIRATORY (INHALATION) at 08:12

## 2018-01-01 RX ADMIN — DOXYCYCLINE HYCLATE 100 MG: 100 CAPSULE, GELATIN COATED ORAL at 09:10

## 2018-01-01 RX ADMIN — ISODIUM CHLORIDE 3 ML: 0.03 SOLUTION RESPIRATORY (INHALATION) at 19:36

## 2018-01-01 RX ADMIN — PROPRANOLOL HYDROCHLORIDE 80 MG: 80 CAPSULE, EXTENDED RELEASE ORAL at 09:16

## 2018-01-01 RX ADMIN — ALPRAZOLAM 2 MG: 0.5 TABLET ORAL at 09:27

## 2018-01-01 RX ADMIN — OXYCODONE HYDROCHLORIDE 10 MG: 10 TABLET ORAL at 14:19

## 2018-01-01 RX ADMIN — OXYCODONE HYDROCHLORIDE 10 MG: 10 TABLET ORAL at 17:48

## 2018-01-01 RX ADMIN — LEVALBUTEROL 1.25 MG: 1.25 SOLUTION, CONCENTRATE RESPIRATORY (INHALATION) at 19:36

## 2018-01-01 RX ADMIN — GABAPENTIN 600 MG: 300 CAPSULE ORAL at 16:24

## 2018-01-01 RX ADMIN — AMITRIPTYLINE HYDROCHLORIDE 100 MG: 100 TABLET, FILM COATED ORAL at 01:34

## 2018-01-01 RX ADMIN — OXYCODONE HYDROCHLORIDE 30 MG: 10 TABLET ORAL at 20:07

## 2018-01-01 RX ADMIN — CHOLECALCIFEROL TAB 25 MCG (1000 UNIT) 1000 UNITS: 25 TAB at 09:10

## 2018-01-01 RX ADMIN — GUAIFENESIN 600 MG: 600 TABLET, EXTENDED RELEASE ORAL at 09:10

## 2018-01-01 RX ADMIN — MONTELUKAST SODIUM 10 MG: 10 TABLET, FILM COATED ORAL at 22:44

## 2018-01-01 RX ADMIN — ASPIRIN 81 MG 81 MG: 81 TABLET ORAL at 09:11

## 2018-01-01 RX ADMIN — ALPRAZOLAM 2 MG: 0.5 TABLET ORAL at 16:24

## 2018-01-01 RX ADMIN — OXYCODONE HYDROCHLORIDE 10 MG: 10 TABLET ORAL at 22:44

## 2018-01-01 RX ADMIN — LEVOFLOXACIN 750 MG: 5 INJECTION, SOLUTION INTRAVENOUS at 12:19

## 2018-01-01 RX ADMIN — PANTOPRAZOLE SODIUM 40 MG: 40 TABLET, DELAYED RELEASE ORAL at 08:22

## 2018-01-01 RX ADMIN — MECLIZINE 12.5 MG: 12.5 TABLET ORAL at 14:54

## 2018-01-01 RX ADMIN — GABAPENTIN 600 MG: 300 CAPSULE ORAL at 20:07

## 2018-01-01 RX ADMIN — LEVALBUTEROL 1.25 MG: 1.25 SOLUTION, CONCENTRATE RESPIRATORY (INHALATION) at 14:33

## 2018-01-01 RX ADMIN — DEXTROAMPHETAMINE SACCHARATE, AMPHETAMINE ASPARTATE, DEXTROAMPHETAMINE SULFATE, AND AMPHETAMINE SULFATE 20 MG: 2.5; 2.5; 2.5; 2.5 TABLET ORAL at 14:19

## 2018-01-01 RX ADMIN — LEVALBUTEROL 1.25 MG: 1.25 SOLUTION, CONCENTRATE RESPIRATORY (INHALATION) at 08:12

## 2018-01-01 RX ADMIN — ALPRAZOLAM 2 MG: 0.5 TABLET ORAL at 01:34

## 2018-01-01 RX ADMIN — NICOTINE 1 PATCH: 21 PATCH, EXTENDED RELEASE TRANSDERMAL at 09:14

## 2018-01-01 RX ADMIN — MESALAMINE 4.8 G: 1.2 TABLET, DELAYED RELEASE ORAL at 14:20

## 2018-01-01 RX ADMIN — OXYCODONE HYDROCHLORIDE 10 MG: 10 TABLET ORAL at 08:22

## 2018-01-01 RX ADMIN — OXYCODONE HYDROCHLORIDE 10 MG: 10 TABLET ORAL at 02:24

## 2018-01-01 RX ADMIN — GABAPENTIN 600 MG: 300 CAPSULE ORAL at 09:11

## 2018-01-01 RX ADMIN — GUAIFENESIN 600 MG: 600 TABLET, EXTENDED RELEASE ORAL at 20:06

## 2018-01-01 RX ADMIN — OXYCODONE HYDROCHLORIDE 30 MG: 10 TABLET ORAL at 05:40

## 2018-01-01 RX ADMIN — OXYCODONE HYDROCHLORIDE 30 MG: 10 TABLET ORAL at 12:17

## 2018-01-01 RX ADMIN — DEXTROAMPHETAMINE SACCHARATE, AMPHETAMINE ASPARTATE, DEXTROAMPHETAMINE SULFATE, AND AMPHETAMINE SULFATE 20 MG: 2.5; 2.5; 2.5; 2.5 TABLET ORAL at 09:11

## 2018-01-01 NOTE — PROGRESS NOTES
Had ED RN come up and place IV with site right, able to get site  Upon reattaching IV ABX, site infiltrated  Pt became tearful, complaining of increased pain and stomach cramps  Pain meds given and Dr Margarita Magana aware of situation  Will continue to monitor

## 2018-01-01 NOTE — PROGRESS NOTES
Pt states no pain relief from meds,  becomes irate and calls floor  Situation escalated to [facility] Supervisor  Pt requesting 3mg dilaudid IV  Case discussed with Sigrid JARRELL with [facility]  No changes to pain medication made

## 2018-01-01 NOTE — PROGRESS NOTES
[facility] Progress Note  Patient: Ayanna Garcia 42 y o  female   MRN: [id number]  PCP: Audrey Bender DO  Unit/Bed#: -01 Encounter: [id number]  Date Of Visit: 01/01/18    Assessment:    Principal Problem:    Sepsis ([address] )  Active Problems:    UTI (urinary tract infection)    Hepatomegaly    Dilated bile duct    Hypokalemia    Chest pain    Slurred speech    Tobacco abuse    Left sided numbness    Rectal bleeding      Plan:    · Sepsis: This present on admission  I do not have a source  This notes not seem to be urinary in nature  I tried to put the patient on oral antibiotics initially with azithromycin for possible bronchitis and switch over to doxycycline however her leukocytosis is worsening  I will put on IV Levaquin and have ID evaluate the patient tomorrow  · Hemoptysis: We will have pulmonology evaluated the patient  · Dilated bile duct:  Patient will need an none emergent MRCP  · Slurred speech with left-sided weakness:  Neurology evaluation was appreciated  This is not a stroke  The patient likely has atypical migraines  · History of Crohn's:  Continue the patient on mesalamine  · Tobacco abuse:  Continue with Nicoderm patch  ·       VTE Pharmacologic Prophylaxis:   Pharmacologic: Enoxaparin (Lovenox)  Mechanical VTE Prophylaxis in Place: Yes    Patient Centered Rounds: I have performed bedside rounds with nursing staff today  Education and Discussions with Family / Patient:  Family at the bedside    Time Spent for Care: 20 minutes  More than 50% of total time spent on counseling and coordination of care as described above      Current Length of Stay: 4 day(s)    Current Patient Status: Inpatient   Certification Statement: The patient will continue to require additional inpatient hospital stay due to Having worsening leukocytosis needing monitoring    Discharge Plan:  Patient might be here for at least 1 or 2 days    Code Status: Level 1 - Full Code      Subjective: Patient seen examined  She is complaining of hemoptysis  States he also had some arm pain last night and left-sided numbness and tingling    Objective:     Vitals:   Temp (24hrs), Av 2 °F (36 8 °C), Min:98 °F (36 7 °C), Max:98 6 °F (37 °C)    HR:  [82-94] 88  Resp:  [16-18] 16  BP: (105-133)/(53-76) 105/57  SpO2:  [92 %-99 %] 99 %  Body mass index is 27 07 kg/m²  Input and Output Summary (last 24 hours): Intake/Output Summary (Last 24 hours) at 18 0935  Last data filed at 17 2044   Gross per 24 hour   Intake              480 ml   Output                0 ml   Net              480 ml       Physical Exam:     General Appearance:    Alert, cooperative, no distress, appears stated age                               Lungs:     Diffuse rhonchi       Heart:    Regular rate and rhythm, S1 and S2 normal, no murmur, rub    or gallop   Abdomen:     Soft, non-tender, bowel sounds active all four quadrants,     no masses, no organomegaly           Extremities:   Extremities normal, atraumatic, no cyanosis or edema   Pulses:   2+ and symmetric all extremities                   Additional Data:     Labs:      Results from last 7 days  Lab Units 18  0517  1745   WBC Thousand/uL 19 70*  < > 21 39*   HEMOGLOBIN g/dL 12 4  < > 13 0   HEMATOCRIT % 39 5  < > 39 6   PLATELETS Thousands/uL 331  < > 441*   NEUTROS PCT %  --   --  72   LYMPHS PCT %  --   --  22   MONOS PCT %  --   --  5   EOS PCT %  --   --  0   < > = values in this interval not displayed      Results from last 7 days  Lab Units 18  0517  1746   SODIUM mmol/L 145  < > 140   POTASSIUM mmol/L 3 9  < > 3 2*   CHLORIDE mmol/L 107  < > 104   CO2 mmol/L 31  < > 27   BUN mg/dL 14  < > 14   CREATININE mg/dL 0 62  < > 0 77   CALCIUM mg/dL 9 1  < > 9 6   TOTAL PROTEIN g/dL  --   --  7 9   BILIRUBIN TOTAL mg/dL  --   --  0 50   ALK PHOS U/L  --   --  75   ALT U/L  --   --  50   AST U/L  --   --  13   GLUCOSE RANDOM mg/dL 89  < > 77   < > = values in this interval not displayed  Results from last 7 days  Lab Units 12/29/17  0639   INR  1 18*       * I Have Reviewed All Lab Data Listed Above  * Additional Pertinent Lab Tests Reviewed: [facility] Admission Reviewed        Recent Cultures (last 7 days):       Results from last 7 days  Lab Units 12/28/17  1746   BLOOD CULTURE  No Growth at 72 hrs  No Growth at 72 hrs  Last 24 Hours Medication List:     amitriptyline 100 mg Oral HS   amphetamine-dextroamphetamine 20 mg Oral BID (AM & Afternoon)   aspirin 81 mg Oral Daily   atorvastatin 40 mg Oral QPM   cholecalciferol 1,000 Units Oral Daily   enoxaparin 40 mg Subcutaneous Daily   gabapentin 600 mg Oral TID   guaiFENesin 600 mg Oral Q12H [facility]   levalbuterol 1 25 mg Nebulization TID   levofloxacin 750 mg Intravenous Q24H   montelukast 10 mg Oral HS   nicotine 1 patch Transdermal Daily   NON FORMULARY 4 8 g Oral Daily   oxyCODONE 30 mg Oral TID   pantoprazole 40 mg Oral Daily Before Breakfast   propranolol 80 mg Oral Daily   sodium chloride 3 mL Nebulization TID        Today, Patient Was Seen By: Brian Antunez MD    ** Please Note: Dragon 360 Dictation voice to text software may have been used in the creation of this document   **

## 2018-01-02 ENCOUNTER — APPOINTMENT (INPATIENT)
Dept: CT IMAGING | Facility: HOSPITAL | Age: 38
DRG: 663 | End: 2018-01-02
Payer: COMMERCIAL

## 2018-01-02 LAB
ALBUMIN SERPL BCP-MCNC: 3.3 G/DL (ref 3.5–5)
ALP SERPL-CCNC: 62 U/L (ref 46–116)
ALT SERPL W P-5'-P-CCNC: 30 U/L (ref 12–78)
AMMONIA PLAS-SCNC: 16 UMOL/L (ref 11–35)
ANION GAP SERPL CALCULATED.3IONS-SCNC: 7 MMOL/L (ref 4–13)
AST SERPL W P-5'-P-CCNC: 29 U/L (ref 5–45)
BACTERIA BLD CULT: NORMAL
BACTERIA BLD CULT: NORMAL
BILIRUB DIRECT SERPL-MCNC: 0.08 MG/DL (ref 0–0.2)
BILIRUB SERPL-MCNC: 0.4 MG/DL (ref 0.2–1)
BUN SERPL-MCNC: 14 MG/DL (ref 5–25)
CALCIUM SERPL-MCNC: 8.8 MG/DL (ref 8.3–10.1)
CHLORIDE SERPL-SCNC: 107 MMOL/L (ref 100–108)
CO2 SERPL-SCNC: 29 MMOL/L (ref 21–32)
CREAT SERPL-MCNC: 0.63 MG/DL (ref 0.6–1.3)
ERYTHROCYTE [DISTWIDTH] IN BLOOD BY AUTOMATED COUNT: 15 % (ref 11.6–15.1)
GFR SERPL CREATININE-BSD FRML MDRD: 113 ML/MIN/1.73SQ M
GLUCOSE SERPL-MCNC: 78 MG/DL (ref 65–140)
HCT VFR BLD AUTO: 38.8 % (ref 34.8–46.1)
HGB BLD-MCNC: 12.4 G/DL (ref 11.5–15.4)
MCH RBC QN AUTO: 29.1 PG (ref 26.8–34.3)
MCHC RBC AUTO-ENTMCNC: 32 G/DL (ref 31.4–37.4)
MCV RBC AUTO: 91 FL (ref 82–98)
PLATELET # BLD AUTO: 301 THOUSANDS/UL (ref 149–390)
PMV BLD AUTO: 10.4 FL (ref 8.9–12.7)
POTASSIUM SERPL-SCNC: 4.6 MMOL/L (ref 3.5–5.3)
PROT SERPL-MCNC: 6.8 G/DL (ref 6.4–8.2)
RBC # BLD AUTO: 4.26 MILLION/UL (ref 3.81–5.12)
SODIUM SERPL-SCNC: 143 MMOL/L (ref 136–145)
WBC # BLD AUTO: 13.79 THOUSAND/UL (ref 4.31–10.16)

## 2018-01-02 PROCEDURE — 94640 AIRWAY INHALATION TREATMENT: CPT

## 2018-01-02 PROCEDURE — 70450 CT HEAD/BRAIN W/O DYE: CPT

## 2018-01-02 PROCEDURE — 85027 COMPLETE CBC AUTOMATED: CPT | Performed by: FAMILY MEDICINE

## 2018-01-02 PROCEDURE — 80048 BASIC METABOLIC PNL TOTAL CA: CPT | Performed by: FAMILY MEDICINE

## 2018-01-02 PROCEDURE — 82140 ASSAY OF AMMONIA: CPT | Performed by: FAMILY MEDICINE

## 2018-01-02 PROCEDURE — 94760 N-INVAS EAR/PLS OXIMETRY 1: CPT

## 2018-01-02 PROCEDURE — 80076 HEPATIC FUNCTION PANEL: CPT | Performed by: PHYSICIAN ASSISTANT

## 2018-01-02 RX ORDER — DICYCLOMINE HYDROCHLORIDE 10 MG/1
10 CAPSULE ORAL
Status: DISCONTINUED | OUTPATIENT
Start: 2018-01-02 | End: 2018-01-03 | Stop reason: HOSPADM

## 2018-01-02 RX ORDER — ACETYLCYSTEINE 200 MG/ML
3 SOLUTION ORAL; RESPIRATORY (INHALATION)
Status: DISCONTINUED | OUTPATIENT
Start: 2018-01-02 | End: 2018-01-02

## 2018-01-02 RX ORDER — ACETYLCYSTEINE 200 MG/ML
3 SOLUTION ORAL; RESPIRATORY (INHALATION)
Status: DISCONTINUED | OUTPATIENT
Start: 2018-01-02 | End: 2018-01-03 | Stop reason: HOSPADM

## 2018-01-02 RX ORDER — OXYCODONE HYDROCHLORIDE 10 MG/1
30 TABLET ORAL EVERY 8 HOURS
Status: DISCONTINUED | OUTPATIENT
Start: 2018-01-02 | End: 2018-01-03 | Stop reason: HOSPADM

## 2018-01-02 RX ORDER — FUROSEMIDE 10 MG/ML
40 INJECTION INTRAMUSCULAR; INTRAVENOUS
Status: DISCONTINUED | OUTPATIENT
Start: 2018-01-02 | End: 2018-01-02

## 2018-01-02 RX ADMIN — DEXTROAMPHETAMINE SACCHARATE, AMPHETAMINE ASPARTATE, DEXTROAMPHETAMINE SULFATE, AND AMPHETAMINE SULFATE 20 MG: 2.5; 2.5; 2.5; 2.5 TABLET ORAL at 14:15

## 2018-01-02 RX ADMIN — ALPRAZOLAM 2 MG: 0.5 TABLET ORAL at 12:28

## 2018-01-02 RX ADMIN — ONDANSETRON 4 MG: 2 INJECTION INTRAMUSCULAR; INTRAVENOUS at 14:15

## 2018-01-02 RX ADMIN — GUAIFENESIN 600 MG: 600 TABLET, EXTENDED RELEASE ORAL at 21:02

## 2018-01-02 RX ADMIN — GABAPENTIN 600 MG: 300 CAPSULE ORAL at 16:56

## 2018-01-02 RX ADMIN — GABAPENTIN 600 MG: 300 CAPSULE ORAL at 21:02

## 2018-01-02 RX ADMIN — MESALAMINE 4.8 G: 1.2 TABLET, DELAYED RELEASE ORAL at 09:21

## 2018-01-02 RX ADMIN — PANTOPRAZOLE SODIUM 40 MG: 40 TABLET, DELAYED RELEASE ORAL at 06:08

## 2018-01-02 RX ADMIN — GUAIFENESIN 600 MG: 600 TABLET, EXTENDED RELEASE ORAL at 09:14

## 2018-01-02 RX ADMIN — GABAPENTIN 600 MG: 300 CAPSULE ORAL at 09:13

## 2018-01-02 RX ADMIN — ISODIUM CHLORIDE 3 ML: 0.03 SOLUTION RESPIRATORY (INHALATION) at 09:16

## 2018-01-02 RX ADMIN — ATORVASTATIN CALCIUM 40 MG: 40 TABLET, FILM COATED ORAL at 17:07

## 2018-01-02 RX ADMIN — AMITRIPTYLINE HYDROCHLORIDE 100 MG: 100 TABLET, FILM COATED ORAL at 21:02

## 2018-01-02 RX ADMIN — AMITRIPTYLINE HYDROCHLORIDE 100 MG: 100 TABLET, FILM COATED ORAL at 00:51

## 2018-01-02 RX ADMIN — OXYCODONE HYDROCHLORIDE 10 MG: 10 TABLET ORAL at 13:38

## 2018-01-02 RX ADMIN — OXYCODONE HYDROCHLORIDE 10 MG: 10 TABLET ORAL at 09:14

## 2018-01-02 RX ADMIN — DICYCLOMINE HYDROCHLORIDE 10 MG: 10 CAPSULE ORAL at 16:56

## 2018-01-02 RX ADMIN — NICOTINE 1 PATCH: 21 PATCH, EXTENDED RELEASE TRANSDERMAL at 09:13

## 2018-01-02 RX ADMIN — OXYCODONE HYDROCHLORIDE 30 MG: 10 TABLET ORAL at 06:07

## 2018-01-02 RX ADMIN — OXYCODONE HYDROCHLORIDE 30 MG: 10 TABLET ORAL at 22:34

## 2018-01-02 RX ADMIN — DEXTROAMPHETAMINE SACCHARATE, AMPHETAMINE ASPARTATE, DEXTROAMPHETAMINE SULFATE, AND AMPHETAMINE SULFATE 20 MG: 2.5; 2.5; 2.5; 2.5 TABLET ORAL at 09:15

## 2018-01-02 RX ADMIN — CHOLECALCIFEROL TAB 25 MCG (1000 UNIT) 1000 UNITS: 25 TAB at 09:13

## 2018-01-02 RX ADMIN — LEVALBUTEROL 1.25 MG: 1.25 SOLUTION, CONCENTRATE RESPIRATORY (INHALATION) at 13:58

## 2018-01-02 RX ADMIN — LEVALBUTEROL 1.25 MG: 1.25 SOLUTION, CONCENTRATE RESPIRATORY (INHALATION) at 09:15

## 2018-01-02 RX ADMIN — ALPRAZOLAM 2 MG: 0.5 TABLET ORAL at 21:13

## 2018-01-02 RX ADMIN — OXYCODONE HYDROCHLORIDE 10 MG: 10 TABLET ORAL at 18:41

## 2018-01-02 RX ADMIN — FUROSEMIDE 40 MG: 10 INJECTION, SOLUTION INTRAMUSCULAR; INTRAVENOUS at 12:28

## 2018-01-02 RX ADMIN — MONTELUKAST SODIUM 10 MG: 10 TABLET, FILM COATED ORAL at 21:02

## 2018-01-02 RX ADMIN — OXYCODONE HYDROCHLORIDE 30 MG: 10 TABLET ORAL at 13:59

## 2018-01-02 RX ADMIN — ISODIUM CHLORIDE 3 ML: 0.03 SOLUTION RESPIRATORY (INHALATION) at 13:58

## 2018-01-02 RX ADMIN — ASPIRIN 81 MG 81 MG: 81 TABLET ORAL at 09:22

## 2018-01-02 NOTE — CONSULTS
Consultation - Pulmonary Medicine   Noe Lux 42 y o  female MRN: [id number]  Unit/Bed#: -01 Encounter: [id number]      Assessment:  1  Hemoptysis  2  Mucus plugging on CT of chest with mild hilar lymphadenopathy  3  Leukocytosis    Plan:   1  Hemoptysis with Mucus plugging on CT of chest with mild hilar lymphadenopathy  - currently saturating 98% on room air  - patient reports episodes of hemoptysis have been going on for months, she has pictures of these episodes (most recently last night?); advised patient to keep samples so we can view the samples in person  - reviewed CT of chest with no evidence of acute infiltrate; agree with monitoring off antibiotics  - consider holding aspirin?  - patient has had 2 bronchoscopies at [facility] within the last few months  Both of these were negative for endobronchial lesion/unremarkable viral, fungal, bacterial, PCP, and chlamydial cultures  Will hold off on bronchoscopy at this time  - continue nebulizer treatments with levalbuterol in saline t i d ; add in Mucomyst and Mucinex given visible mucus secretions on CT of chest  - will attempt to PFTs from [facility]? 10/2017 PFTs in care everywhere in 11/2017 note with reduced FEV1 of 2 23 L, TLC of 4 45 L, diffusion capacity reduced at 42%  - will discuss above with attending physician  2  Leukocytosis  - Infectious Disease following  - monitoring off antibiotics  - continue to monitor    History of Present Illness   Physician Requesting Consult: Josh Dash MD  Reason for Consult / Principal Problem:  Hemoptysis  Hx and PE limited by:  None  HPI: Noe Lux is a 42y o  year old female actively smoking with about a 20 pack-year smoking history with significant past medical history of Crohn's colitis, fibromyalgia, anxiety, IBS, hypertension, endocarditis in 2008 who originally presented to the ED for numbness/tingling of the left side    Patient has had multiple hospitalizations within the last few months secondary to pneumonia/shortness of breath/hemoptysis  Patient was diagnosed with pneumonia in August 2017 and treated with IV antibiotics at [facility]  Patient underwent bronchoscopy x 2 within the last few months, most recently at the beginning of December  Each time, cultures were unremarkable; no evidence of endobronchial lesion  Patient states she has had episodes of hemoptysis over the last few months  She states that she is "bleeding out of her ears, nose, and coughing up blood "      Inpatient consult to Pulmonology  Consult performed by: Amanda Kaufman ordered by: Sera Hernandez          Review of Systems   Constitutional: Positive for activity change and fatigue  Negative for chills and fever  HENT: Positive for congestion and ear discharge  Respiratory: Positive for cough and shortness of breath  Negative for chest tightness and wheezing  Cardiovascular: Negative for chest pain and leg swelling  Gastrointestinal: Positive for abdominal pain  Genitourinary: Negative for difficulty urinating  Musculoskeletal: Negative for arthralgias and gait problem  Skin: Positive for rash  Neurological: Positive for tremors, light-headedness and numbness  Hematological: Negative for adenopathy  Psychiatric/Behavioral: Positive for behavioral problems  Negative for agitation         Historical Information   Past Medical History:   Diagnosis Date    Cancer [facility])     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colitis ([facility] )     Crohn's colitis ([place] [address] )     Endocarditis     Fibromyalgia     Gastritis     Hypertension     IBS (irritable bowel syndrome)     Pancreatitis     RA (rheumatoid arthritis) ([place] [address] )     Restrictive lung disease     Vertigo      Past Surgical History:   Procedure Laterality Date    APPENDECTOMY       Social History   History   Alcohol Use No     History   Drug Use    Types: Marijuana     History   Smoking Status    Current Every Day Smoker    Packs/day: 1 00    Types: Cigarettes   Smokeless Tobacco    Never Used     Occupational History: disabled     Family History: non-contributory    Meds/Allergies   all current active meds have been reviewed    Allergies   Allergen Reactions    Penicillins Hives    Toradol [Ketorolac Tromethamine] Rash       Objective   Vitals: Blood pressure 90/60, pulse 90, temperature 97 7 °F (36 5 °C), temperature source Oral, resp  rate 16, height 5' 2" (1 575 m), weight 67 1 kg (148 lb), SpO2 98 %  ,Body mass index is 27 07 kg/m²  Intake/Output Summary (Last 24 hours) at 01/02/18 1354  Last data filed at 01/02/18 0913   Gross per 24 hour   Intake              240 ml   Output                0 ml   Net              240 ml     Invasive Devices     Peripheral Intravenous Line            Peripheral IV 01/01/18 Right Antecubital less than 1 day                Physical Exam    Lab Results:   I have personally reviewed pertinent lab results  , CBC:   Lab Results   Component Value Date    WBC 13 79 (H) 01/02/2018    HGB 12 4 01/02/2018    HCT 38 8 01/02/2018    MCV 91 01/02/2018     01/02/2018    MCH 29 1 01/02/2018    MCHC 32 0 01/02/2018    RDW 15 0 01/02/2018    MPV 10 4 01/02/2018   , CMP:   Lab Results   Component Value Date     01/02/2018    K 4 6 01/02/2018     01/02/2018    CO2 29 01/02/2018    ANIONGAP 7 01/02/2018    BUN 14 01/02/2018    CREATININE 0 63 01/02/2018    GLUCOSE 78 01/02/2018    CALCIUM 8 8 01/02/2018    AST 29 01/02/2018    ALT 30 01/02/2018    ALKPHOS 62 01/02/2018    PROT 6 8 01/02/2018    ALBUMIN 3 3 (L) 01/02/2018    BILITOT 0 40 01/02/2018    EGFR 113 01/02/2018     Imaging Studies: I have personally reviewed pertinent reports  EKG, Pathology, and Other Studies: I have personally reviewed pertinent reports      VTE Prophylaxis: Reason for no pharmacologic prophylaxis hemoptysis    Code Status: Level 1 - Full Code  Advance Directive and Living Will:      Power of :    POLST:      Counseling/Coordination of Care: Total floor / unit time spent today Thirty-five minutes  Greater than 50% of total time was spent with the patient and / or family counseling and / or coordination of care   A description of the counseling / coordination of care: Discussion with attending physician, review of diagnostic studies with patient, discussion of treatment plan/options, review of clinical impressions, discussion with nursing staff

## 2018-01-02 NOTE — PLAN OF CARE
DISCHARGE PLANNING     Discharge to home or other facility with appropriate resources Progressing        INFECTION - ADULT     Absence or prevention of progression during hospitalization Progressing        Knowledge Deficit     Patient/family/caregiver demonstrates understanding of disease process, treatment plan, medications, and discharge instructions Progressing        PAIN - ADULT     Verbalizes/displays adequate comfort level or baseline comfort level Progressing        Potential for Falls     Patient will remain free of falls Progressing        SAFETY ADULT     Maintain or return to baseline ADL function Progressing     Maintain or return mobility status to optimal level [address] Discharge to home or other facility with appropriate resources Progressing        INFECTION - ADULT     Absence or prevention of progression during hospitalization Progressing        Knowledge Deficit     Patient/family/caregiver demonstrates understanding of disease process, treatment plan, medications, and discharge instructions Progressing        PAIN - ADULT     Verbalizes/displays adequate comfort level or baseline comfort level Progressing        Potential for Falls     Patient will remain free of falls Progressing        SAFETY ADULT     Maintain or return to baseline ADL function Progressing     Maintain or return mobility status to optimal level Progressing

## 2018-01-02 NOTE — CONSULTS
Consultation - Infectious Disease   Leticia Bloom 42 y o  female MRN: [id number]  Unit/Bed#: -01 Encounter: [id number]      IMPRESSION & RECOMMENDATIONS:   Impression/Recommendations:  1  SIRS versus sepsis  POA:  Leukocytosis and tachycardia  Unclear source   UA, blood cultures, CT chest negative  Treated extensively in the past several weeks to months with antibiotics  No obvious acute infection at this time  Appears clinically stable with benign exam  Rec:   · Discontinue antibiotics and follow closely  · Follow up final blood cultures  · Follow temperatures and white blood cell count closely  · Supportive care as per the primary service    2  COPD/restrictive lung disease  Unclear etiology and history  No clinical or radiographic evidence of acute infection  Rec:   · Discontinue antibiotics as above  · Await Pulmonary consultation    3   UC/Crohn's disease  On outpatient mesalamine  Appears to be reasonably controlled  Rec:   · Continue outpatient regimen    4  Acute encephalopathy  POA and fluctuating during admission  Suspect secondary to extensive psychotropic medications  MRI brain unremarkable  Rec:  · Continue to follow mental status closely  · Consider weaning or discontinuation of some of her medications    Patient appears stable from an ID perspective  Discussed in detail with Dr Susan Rosas    Antibiotics:  Levofloxacin #2  Antibiotics #6    Thank you for this consultation  We will follow along with you  HISTORY OF PRESENT ILLNESS:  Reason for Consult:  Sepsis    HPI: Leticia Bloom is a 42 y o  female who is somewhat of a vague historian so the history is obtained both from the patient in the medical record  She has a history of Crohn/ulcerative colitis, rheumatoid arthritis  She is on mesalamine  She was previously on Humira but this was stopped due to infection and compromised immune system  She is followed by a doctor at [facility] and was seen 3 weeks ago    She also carries a diagnosis of COPD with restrictive lung disease with recent PFT showing reduced total lung capacity and decreased diffusion capacity  She is known to have chronic dyspnea and cough  She has been seen extensively through [facility] and was last seen by pulmonology there on 11/28/2017  She is on inhalers  She states she has been on and off antibiotics since August   She is also noted to be on multiple psychiatric medications including Flexeril, Xanax, Adderall, Neurontin, oxycodone, and amitriptyline  She presented to our emergency department on 12/28 complaining of chest pain, shortness of breath, and left arm tingling  She also had confusion and slurred speech  Upon presentation she was noted to be afebrile but had tachycardia with a leukocytosis and lactic acidosis  She is given a dose of ceftriaxone and started on cefepime which she received 12/29-12/30, then Doxycycline 12/31-1/1, then levofloxacin 1/1  Since admission she has remained afebrile but her white blood cell count has fluctuated  Given the concern for leukocytosis we are asked to comment on further evaluation and management  Of note prior to my exam she was noted to be very difficult to arouse by nursing  She is now awake eating breakfast but appears to be groggy  Over the past 24 hour she has received 140 mg of oxycodone, 6 mg of Xanax, 12 5 mg of meclizine, 1800 mg of Neurontin, 100 mg of Elavil, and 400 mg of Adderall  REVIEW OF SYSTEMS:  Patient complains of chronic headache, cough, nausea, diarrhea, and joint pains  A complete system-based review of systems is otherwise negative      PAST MEDICAL HISTORY:  Past Medical History:   Diagnosis Date    Cancer [facility])     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[facility] [facility]     [facility]     Endocarditis     Fibromyalgia     Gastritis     Hypertension     IBS (irritable bowel syndrome)     Pancreatitis     [facility]     Restrictive lung disease     Vertigo      Past Surgical History:   Procedure Laterality Date    APPENDECTOMY         FAMILY HISTORY:  Non-contributory    SOCIAL HISTORY:  History   Alcohol Use No     History   Drug Use    Types: Marijuana     History   Smoking Status    Current Every Day Smoker    Packs/day: 1 00    Types: Cigarettes   Smokeless Tobacco    Never Used       ALLERGIES:  Allergies   Allergen Reactions    Penicillins Hives    Toradol [Ketorolac Tromethamine] Rash       MEDICATIONS:  All current active medications have been reviewed  PHYSICAL EXAM:  Vitals:  HR:  [75-96] 75  Resp:  [16-18] 16  BP: (105-173)/(57-76) 120/73  SpO2:  [97 %-100 %] 98 %  Temp (24hrs), Av °F (36 7 °C), Min:97 7 °F (36 5 °C), Max:98 2 °F (36 8 °C)  Current: Temperature: 97 7 °F (36 5 °C)     Physical Exam:  General:  Well-nourished, well-developed, in no acute distress, vigorously eating pancakes for breakfast  Eyes:  Conjunctive clear with no hemorrhages or effusions  Oropharynx:  No ulcers, no lesions  Neck:  Supple, no lymphadenopathy  Lungs:  Scattered expiratory rhonchi, no accessory muscle use  Cardiac:  Regular rate and rhythm, no murmurs  Abdomen:  Soft, diffuse non reproducible tenderness, non-distended  Extremities:  No peripheral cyanosis, clubbing  Nonpitting lower extremity edema  Skin:  No rashes, no ulcers  Neurological:  Moves all four extremities spontaneously, sensation grossly intact  Patient appears groggy with some slurred speech  LABS, IMAGING, & OTHER STUDIES:  Lab Results:  I have personally reviewed pertinent labs      Results from last 7 days  Lab Units 18  0520 18  0526 17  0507  17  1746   SODIUM mmol/L 143 145 142  < > 140   POTASSIUM mmol/L 4 6 3 9 3 9  < > 3 2*   CHLORIDE mmol/L 107 107 106  < > 104   CO2 mmol/L 29 31 28  < > 27   ANION GAP mmol/L 7 7 8  < > 9   BUN mg/dL 14 14 11  < > 14   CREATININE mg/dL 0 63 0 62 0 56*  < > 0 77   EGFR ml/min/1 73sq m 113 114 118  < > 98   GLUCOSE RANDOM mg/dL 78 89 133  < > 77   CALCIUM mg/dL 8 8 9 1 8 9  < > 9 6   AST U/L  --   --   --   --  13   ALT U/L  --   --   --   --  50   ALK PHOS U/L  --   --   --   --  75   TOTAL PROTEIN g/dL  --   --   --   --  7 9   ALBUMIN g/dL  --   --   --   --  3 9   BILIRUBIN TOTAL mg/dL  --   --   --   --  0 50   < > = values in this interval not displayed  Results from last 7 days  Lab Units 01/02/18  0520 01/01/18  0526 12/31/17  0507   WBC Thousand/uL 13 79* 19 70* 14 81*   HEMOGLOBIN g/dL 12 4 12 4 11 0*   PLATELETS Thousands/uL 301 331 305       Results from last 7 days  Lab Units 12/28/17  1746   BLOOD CULTURE  No Growth After 4 Days  No Growth After 4 Days  HIV April 2017    Imaging Studies:   I have personally reviewed pertinent imaging study reports and images in PACS  Dopplers 12/29 no DVT  MRI brain 12/29 no acute abnormality  CT chest/abdomen/pelvis 12/28 no pneumonia    EKG, Pathology, and Other Studies:   I have personally reviewed pertinent reports

## 2018-01-02 NOTE — PROGRESS NOTES
[facility] Progress Note  Patient: Leticia Bloom 42 y o  female   MRN: [id number]  PCP: Amalia De La Cruz DO  Unit/Bed#: [facility]-01 Encounter: [id number]  Date Of Visit: 01/02/18    Assessment:    Principal Problem:    Sepsis ([address] )  Active Problems:    UTI (urinary tract infection)    Hepatomegaly    Dilated bile duct    Hypokalemia    Chest pain    Slurred speech    Tobacco abuse    Left sided numbness    Rectal bleeding      Plan:    · Sepsis: This is present on admission  Her leukocytosis is improving with IV fluids  I do not have a source  This could possibly Respiratory  · Lethargy: This could be secondary to not being able to sleep  We will continue to monitor  If it continues we may check an ABG  May need to consider EEG  · Possible bronchitis with a bronchoscopy in the recent past   She still continues to have some hemoptysis   Pulmonology evaluation is requested  Since the patient also has this leukocytosis with no source I will ask for ID evaluation  · Slurred speech with left-sided numbness: This is probably atypical migraines  Neurology did evaluate the patient earlier on  Patient did not have a stroke  · Chest pain:  This has resolved  Cardiac enzymes are negative  · Dilated common bile duct:  Patient will need an MRA as an outpatient  · Rectal bleeding with a history of Crohn's:  This is stable  H&H has remained stable  · Events from last night were reviewed      VTE Pharmacologic Prophylaxis:   Pharmacologic: Enoxaparin (Lovenox)  Mechanical VTE Prophylaxis in Place: Yes    Patient Centered Rounds: I have performed bedside rounds with nursing staff today  Discussions with Specialists or Other Care Team Provider:  Infectious Disease    Education and Discussions with Family / Patient:  Patient    Time Spent for Care: 20 minutes  More than 50% of total time spent on counseling and coordination of care as described above      Current Length of Stay: 5 day(s)    Current Patient Status: Inpatient   Certification Statement: The patient will continue to require additional inpatient hospital stay due to Being lethargic and having leukocytosis needing further workup    Discharge Plan:  Patient will hopefully be discharged in 24-48 hours    Code Status: Level 1 - Full Code      Subjective:   Patient seen examined  Little lethargic this morning but easily arousable    Objective:     Vitals:   Temp (24hrs), Av °F (36 7 °C), Min:97 7 °F (36 5 °C), Max:98 2 °F (36 8 °C)    HR:  [75-96] 75  Resp:  [16-18] 16  BP: (105-173)/(57-76) 120/73  SpO2:  [97 %-100 %] 98 %  Body mass index is 27 07 kg/m²  Input and Output Summary (last 24 hours): Intake/Output Summary (Last 24 hours) at 18 0814  Last data filed at 18 1300   Gross per 24 hour   Intake              240 ml   Output              200 ml   Net               40 ml       Physical Exam:     General Appearance:    Alert, cooperative, no distress, appears stated age                               Lungs:     Diffuse rhonchi       Heart:    Regular rate and rhythm, S1 and S2 normal, no murmur, rub    or gallop   Abdomen:     Soft, non-tender, bowel sounds active all four quadrants,     no masses, no organomegaly           Extremities:   Extremities normal, atraumatic, no cyanosis or edema                       Additional Data:     Labs:      Results from last 7 days  Lab Units 18  0517  1745   WBC Thousand/uL 13 79*  < > 21 39*   HEMOGLOBIN g/dL 12 4  < > 13 0   HEMATOCRIT % 38 8  < > 39 6   PLATELETS Thousands/uL 301  < > 441*   NEUTROS PCT %  --   --  72   LYMPHS PCT %  --   --  22   MONOS PCT %  --   --  5   EOS PCT %  --   --  0   < > = values in this interval not displayed      Results from last 7 days  Lab Units 18  0517  1746   SODIUM mmol/L 143  < > 140   POTASSIUM mmol/L 4 6  < > 3 2*   CHLORIDE mmol/L 107  < > 104   CO2 mmol/L 29  < > 27   BUN mg/dL 14  < > 14   CREATININE mg/dL 0 63  < > 0 77   CALCIUM mg/dL 8 8  < > 9 6   TOTAL PROTEIN g/dL  --   --  7 9   BILIRUBIN TOTAL mg/dL  --   --  0 50   ALK PHOS U/L  --   --  75   ALT U/L  --   --  50   AST U/L  --   --  13   GLUCOSE RANDOM mg/dL 78  < > 77   < > = values in this interval not displayed  Results from last 7 days  Lab Units 12/29/17  0639   INR  1 18*       * I Have Reviewed All Lab Data Listed Above  * Additional Pertinent Lab Tests Reviewed: [address] Admission Reviewed      Recent Cultures (last 7 days):       Results from last 7 days  Lab Units 12/28/17  1746   BLOOD CULTURE  No Growth After 4 Days  No Growth After 4 Days  Last 24 Hours Medication List:     amitriptyline 100 mg Oral HS   amphetamine-dextroamphetamine 20 mg Oral BID (AM & Afternoon)   aspirin 81 mg Oral Daily   atorvastatin 40 mg Oral QPM   cholecalciferol 1,000 Units Oral Daily   enoxaparin 40 mg Subcutaneous Daily   gabapentin 600 mg Oral TID   guaiFENesin 600 mg Oral Q12H MIKE   levalbuterol 1 25 mg Nebulization TID   levofloxacin 750 mg Intravenous Q24H   mesalamine 4 8 g Oral Daily   montelukast 10 mg Oral HS   nicotine 1 patch Transdermal Daily   oxyCODONE 30 mg Oral TID   pantoprazole 40 mg Oral Daily Before Breakfast   propranolol 80 mg Oral Daily   sodium chloride 3 mL Nebulization TID        Today, Patient Was Seen By: Blaise Ochoa MD    ** Please Note: Dragon 360 Dictation voice to text software may have been used in the creation of this document   **

## 2018-01-02 NOTE — CONSULTS
Consultation - [facility]  Glenice Carrel 42 y o  female MRN: [id number]  Unit/Bed#: -01 Encounter: [id number]        Consults    Reason for Consult / Principal Problem: Hx Crohn's    HPI: Ms Shamar Mace is a 41 yo F with a PMH of Crohn's colitis, RA, pancreatitis, HTN, hx endocarditis, who initially presented on 12/29 with chest pain radiating down the left arm associated with paresthesias  GI is consulted for her hx of IBD  She reports her last colonoscopy was 1 month ago while inpatient at [place] [address] with her GI doctor, Dr Jerry Ulloa, and was told she has pancolitis  She unfortunately had to stop her Humira in August due to being treated for pneumonia and then subsequently lost her insurance so she could not restart  She was given Lialda instead to help her symptoms until she could get insurance and restart on Humira or another biologic  She was not on prednisone either due to concern for immunosuppression, per the patient  She is having abdominal pain but denies any melena or hematochezia  She denies nausea, vomiting or reflux symptoms  The symptoms mentioned are chronic symptoms and are not acute  REVIEW OF SYSTEMS: Negative except for as stated above    Historical Information   Past Medical History:   Diagnosis Date    Cancer ([facility] )     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colitis ([facility] )     Crohn's colitis ([facility] )     Endocarditis     Fibromyalgia     Gastritis     Hypertension     IBS (irritable bowel syndrome)     Pancreatitis     RA [facility]rheumatoid arthritis) ([facility] )     Restrictive lung disease     Vertigo      Past Surgical History:   Procedure Laterality Date    APPENDECTOMY       Social History   History   Alcohol Use No     History   Drug Use    Types: Marijuana     History   Smoking Status    Current Every Day Smoker    Packs/day: 1 00    Types: Cigarettes   Smokeless Tobacco    Never Used     History reviewed  No pertinent family history      Meds/Allergies     Prescriptions Prior to Admission   Medication    aspirin (ECOTRIN LOW STRENGTH) 81 mg EC tablet    cyclobenzaprine (FLEXERIL) 10 mg tablet    divalproex sodium (DEPAKOTE) 250 mg EC tablet    mesalamine (LIALDA) 1 2 g EC tablet    montelukast (SINGULAIR) 10 mg tablet    pantoprazole (PROTONIX) 40 mg tablet    propranolol (INDERAL) 80 mg tablet    ALPRAZolam (XANAX) 2 MG tablet    amitriptyline (ELAVIL) 100 mg tablet    amphetamine-dextroamphetamine (ADDERALL) 20 mg tablet    Calcium Carb-Ergocalciferol 250-125 MG-UNIT TABS    cholecalciferol (VITAMIN D3) 1,000 units tablet    gabapentin (NEURONTIN) 300 mg capsule    meclizine (ANTIVERT) 12 5 MG tablet    oxyCODONE (ROXICODONE) 30 MG immediate release tablet    oxyCODONE-acetaminophen (PERCOCET)  mg per tablet    SUMAtriptan (IMITREX) 100 mg tablet     Current Facility-Administered Medications   Medication Dose Route Frequency    acetaminophen (TYLENOL) tablet 650 mg  650 mg Oral Q4H PRN    acetylcysteine (MUCOMYST) 200 mg/mL inhalation solution 600 mg  3 mL Nebulization TID    albuterol inhalation solution 2 5 mg  2 5 mg Nebulization Q4H PRN    ALPRAZolam (XANAX) tablet 2 mg  2 mg Oral TID PRN    amitriptyline (ELAVIL) tablet 100 mg  100 mg Oral HS    amphetamine-dextroamphetamine (ADDERALL) tablet 20 mg  20 mg Oral BID (AM & Afternoon)    aspirin chewable tablet 81 mg  81 mg Oral Daily    atorvastatin (LIPITOR) tablet 40 mg  40 mg Oral QPM    cholecalciferol (VITAMIN D3) tablet 1,000 Units  1,000 Units Oral Daily    enoxaparin (LOVENOX) subcutaneous injection 40 mg  40 mg Subcutaneous Daily    gabapentin (NEURONTIN) capsule 600 mg  600 mg Oral TID    guaiFENesin (MUCINEX) 12 hr tablet 600 mg  600 mg Oral Q12H MIKE    levalbuterol (XOPENEX) inhalation solution 1 25 mg  1 25 mg Nebulization TID    meclizine (ANTIVERT) tablet 12 5 mg  12 5 mg Oral Q8H PRN    mesalamine (LIALDA) EC tablet 4 8 g  4 8 g Oral Daily    montelukast (SINGULAIR) tablet 10 mg  10 mg Oral HS    nicotine (NICODERM CQ) 21 mg/24 hr TD 24 hr patch 1 patch  1 patch Transdermal Daily    ondansetron (ZOFRAN) injection 4 mg  4 mg Intravenous Q6H PRN    oxyCODONE (ROXICODONE) immediate release tablet 10 mg  10 mg Oral Q4H PRN    oxyCODONE (ROXICODONE) immediate release tablet 30 mg  30 mg Oral Q8H    pantoprazole (PROTONIX) EC tablet 40 mg  40 mg Oral Daily Before Breakfast    propranolol (INDERAL LA) 24 hr capsule 80 mg  80 mg Oral Daily    simethicone (MYLICON) chewable tablet 80 mg  80 mg Oral 4x Daily PRN    sodium chloride 0 9 % inhalation solution 3 mL  3 mL Nebulization TID    SUMAtriptan (IMITREX) tablet 100 mg  100 mg Oral Daily PRN    witch hazel-glycerin (TUCKS) topical pad 1 pad  1 pad Topical Q4H PRN       Allergies   Allergen Reactions    Penicillins Hives    Toradol [Ketorolac Tromethamine] Rash           Objective     Blood pressure 124/50, pulse 103, temperature 98 1 °F (36 7 °C), temperature source Oral, resp  rate 20, height 5' 2" (1 575 m), weight 67 1 kg (148 lb), SpO2 94 %        Intake/Output Summary (Last 24 hours) at 01/02/18 1523  Last data filed at 01/02/18 1359   Gross per 24 hour   Intake              480 ml   Output                0 ml   Net              480 ml         PHYSICAL EXAM:      General Appearance:   Alert, no acute distress, appears disheveled, pressured speech   HENT:  Eyes[de-identified]   Normocephalic, atraumatic  Anicteric   Neck:  Supple, symmetrical, trachea midline   Lungs:   Clear to auscultation bilaterally, no respiratory distress   Heart[de-identified]   RRR, no murmur   Abdomen:   (+) Mild distention, BS active, generalized TTP   Rectal:   Deferred    Extremities:  No cyanosis or edema    Skin:  No jaundice or pallor     Lab Results:     Results from last 7 days  Lab Units 01/02/18  0520  12/28/17  1745   WBC Thousand/uL 13 79*  < > 21 39*   HEMOGLOBIN g/dL 12 4  < > 13 0   HEMATOCRIT % 38 8  < > 39 6   PLATELETS Thousands/uL 301  < > 441*   NEUTROS PCT %  --   --  72   LYMPHS PCT %  --   --  22   MONOS PCT %  --   --  5   EOS PCT %  --   --  0   < > = values in this interval not displayed  Results from last 7 days  Lab Units 01/02/18  0520   SODIUM mmol/L 143   POTASSIUM mmol/L 4 6   CHLORIDE mmol/L 107   CO2 mmol/L 29   BUN mg/dL 14   CREATININE mg/dL 0 63   CALCIUM mg/dL 8 8   TOTAL PROTEIN g/dL 6 8   BILIRUBIN TOTAL mg/dL 0 40   ALK PHOS U/L 62   ALT U/L 30   AST U/L 29   GLUCOSE RANDOM mg/dL 78       Results from last 7 days  Lab Units 12/29/17  0639   INR  1 18*       Results from last 7 days  Lab Units 12/28/17  1746   LIPASE u/L 104       Imaging Studies: I have personally reviewed pertinent imaging studies  Ct Head Wo Contrast  Result Date: 1/2/2018  Impression: No acute intracranial abnormality  Ct Head Without Contrast  Result Date: 12/28/2017  Impression: No acute intracranial abnormality given limitations of streak artifact at the skull base limiting evaluation of the temporal lobes  If symptoms persist or worsen, consider brain MRI  Mri Brain Wo Contrast  Result Date: 12/29/2017  Impression: 2 small T2 and FLAIR hyperintense foci involving the right frontal lobe white matter, suspicious for mild chronic microangiopathic changes such as may accompany migraine headaches  Clinical correlation recommended  These foci were not visible by CT  No acute pathology identified by diffusion imaging  Pe Study With Ct Abdomen & Pelvis With Contrast  Result Date: 12/28/2017  Impression: 1  No acute CT findings  2  Mild nonspecific intrahepatic biliary ductal dilatation  Extrahepatic biliary duct is prominent but appears to taper towards the ampulla  However, the pancreatic duct also appears to be prominent/dilated  Consider nonemergent outpatient MRCP  3  Questionable mild hepatomegaly        ASSESSMENT and PLAN:    Principal Problem:    Sepsis ([address] )  Active Problems:    UTI (urinary tract infection)    Hepatomegaly    Dilated bile duct Hypokalemia    Chest pain    Slurred speech    Tobacco abuse    Left sided numbness    Rectal bleeding    Hx IBD  - Unclear if she has Crohn's v ulcerative colitis, no scope reports/path available  - Per patient, she is on Lialda as outpatient in the meantime until she has insurance and can restart Humira or another biologic  - Continue Lialda as ordered  - She will need close follow up with her outpatient GI, she reports a scheduled appt on 1/5/18  - No plan for repeat endoscopic evaluation as she reports colonoscopy 1 month ago      Intrahepatic/Extrahepatic Biliary Dilatation  - LFTs stable on bloodwork  - Agree with outpatient MRI/MRCP with her outpatient GI      Patient will be seen and examined by Dr Greg Bell  All myers medical decisions were made by Dr Greg Bell  GI will sign off  Call with questions

## 2018-01-02 NOTE — CASE MANAGEMENT
Continued Stay Review    Date: 1/2    Vital Signs: BP 90/60   Pulse 90   Temp 97 7 °F (36 5 °C) (Oral)   Resp 16   Ht 5' 2" (1 575 m)   Wt 67 1 kg (148 lb)   SpO2 94%   BMI 27 07 kg/m²     Medications:   Scheduled Meds:   acetylcysteine 3 mL Nebulization Q8H   amitriptyline 100 mg Oral HS   amphetamine-dextroamphetamine 20 mg Oral BID (AM & Afternoon)   aspirin 81 mg Oral Daily   atorvastatin 40 mg Oral QPM   cholecalciferol 1,000 Units Oral Daily   enoxaparin 40 mg Subcutaneous Daily   gabapentin 600 mg Oral TID   guaiFENesin 600 mg Oral Q12H MIKE   levalbuterol 1 25 mg Nebulization TID   mesalamine 4 8 g Oral Daily   montelukast 10 mg Oral HS   nicotine 1 patch Transdermal Daily   oxyCODONE 30 mg Oral Q8H   pantoprazole 40 mg Oral Daily Before Breakfast   propranolol 80 mg Oral Daily   sodium chloride 3 mL Nebulization TID     Continuous Infusions:    PRN Meds:   acetaminophen    albuterol    ALPRAZolam    meclizine    ondansetron    oxyCODONE    simethicone    SUMAtriptan    witch hazel-glycerin  Abnormal Labs/Diagnostic Results:   Results from last 7 days  Lab Units 01/02/18  0520   12/28/17  1745   WBC Thousand/uL 13 79*  < > 21 39*   HEMOGLOBIN g/dL 12 4  < > 13 0   HEMATOCRIT % 38 8  < > 39 6   PLATELETS Thousands/uL 301  < > 441*     1/2  CT head- wnl   1/2 wbc  13 79 , alb  3 3        Age/Sex: 42 y o  female     Assessment/Plan:    Assessment:     Principal Problem:    Sepsis ([address] )  Active Problems:    UTI (urinary tract infection)    Hepatomegaly    Dilated bile duct    Hypokalemia    Chest pain    Slurred speech    Tobacco abuse    Left sided numbness    Rectal bleeding        Plan:     · Sepsis: This is present on admission  Her leukocytosis is improving with IV fluids  I do not have a source  This could possibly Respiratory  · Lethargy: This could be secondary to not being able to sleep  We will continue to monitor  If it continues we may check an ABG    May need to consider EEG  · Possible bronchitis with a bronchoscopy in the recent past   She still continues to have some hemoptysis   Pulmonology evaluation is requested  Since the patient also has this leukocytosis with no source I will ask for ID evaluation  · Slurred speech with left-sided numbness: This is probably atypical migraines  Neurology did evaluate the patient earlier on  Patient did not have a stroke  · Chest pain:  This has resolved  Cardiac enzymes are negative  · Dilated common bile duct:  Patient will need an MRA as an outpatient  · Rectal bleeding with a history of Crohn's:  This is stable  H&H has remained stable  · Events from last night were reviewed        VTE Pharmacologic Prophylaxis:   Pharmacologic: Enoxaparin (Lovenox)  Mechanical VTE Prophylaxis in Place: Yes     Patient Centered Rounds: I have performed bedside rounds with nursing staff today      Discussions with Specialists or Other Care Team Provider:  Infectious Disease     Education and Discussions with Family / Patient:  Patient     Time Spent for Care: 20 minutes  More than 50% of total time spent on counseling and coordination of care as described above      Current Length of Stay: 5 day(s)     Current Patient Status: Inpatient   Certification Statement: The patient will continue to require additional inpatient hospital stay due to Being lethargic and having leukocytosis needing further workup     Discharge Plan:  Patient will hopefully be discharged in 24-48 hours     ID consult   1/2  Impression/Recommendations:  1    SIRS versus sepsis  POA:  Leukocytosis and tachycardia  Unclear source   UA, blood cultures, CT chest negative  Treated extensively in the past several weeks to months with antibiotics  No obvious acute infection at this time  Appears clinically stable with benign exam  Rec:       · Discontinue antibiotics and follow closely  · Follow up final blood cultures  · Follow temperatures and white blood cell count closely  · Supportive care as per the primary service     2  COPD/restrictive lung disease  Unclear etiology and history  No clinical or radiographic evidence of acute infection  Rec:       § Discontinue antibiotics as above  § Await Pulmonary consultation     3   UC/Crohn's disease  On outpatient mesalamine  Appears to be reasonably controlled  Rec:       § Continue outpatient regimen     4  Acute encephalopathy  POA and fluctuating during admission  Suspect secondary to extensive psychotropic medications  MRI brain unremarkable  Rec:  · Continue to follow mental status closely  · Consider weaning or discontinuation of some of her medications     Patient appears stable from an ID perspective  Discussed in detail with Dr Mary Conley     Antibiotics:  Levofloxacin #2  Antibiotics #6    Pulm consult 1/2  Assessment:  1  Hemoptysis  2  Mucus plugging on CT of chest with mild hilar lymphadenopathy  3  Leukocytosis     Plan:   1  Hemoptysis with Mucus plugging on CT of chest with mild hilar lymphadenopathy  - currently saturating 98% on room air  - patient reports episodes of hemoptysis have been going on for months, she has pictures of these episodes (most recently last night?); advised patient to keep samples so we can view the samples in person  - reviewed CT of chest with no evidence of acute infiltrate; agree with monitoring off antibiotics  - consider holding aspirin?  - patient has had 2 bronchoscopies at [facility] within the last few months  Both of these were negative for endobronchial lesion/unremarkable viral, fungal, bacterial, PCP, and chlamydial cultures  Will hold off on bronchoscopy at this time     - continue nebulizer treatments with levalbuterol in saline t i d ; add in Mucomyst and Mucinex given visible mucus secretions on CT of chest  - will attempt to PFTs from [facility]? 10/2017 PFTs in care everywhere in 11/2017 note with reduced FEV1 of 2 23 L, TLC of 4 45 L, diffusion capacity reduced at 42%  - will discuss above with attending physician  2  Leukocytosis  - Infectious Disease following  - monitoring off antibiotics  - continue to monitor    Thank you,  [facility] in the [facility] by Juan Gonsalez for 2017  Network Utilization Review Department  Phone: [phone]; Fax [phone]  ATTENTION: The Network Utilization Review Department is now centralized for our 7 Facilities  Make a note that we have a new phone and fax numbers for our Department  Please call with any questions or concerns to [phone] and carefully follow the prompts so that you are directed to the right person  All voicemails are confidential  Fax any determinations, approvals, denials, and requests for initial or continue stay review clinical to [phone]  Due to HIGH CALL volume, it would be easier if you could please send faxed requests to expedite your requests and in part, help us provide discharge notifications faster

## 2018-01-03 VITALS
SYSTOLIC BLOOD PRESSURE: 161 MMHG | DIASTOLIC BLOOD PRESSURE: 85 MMHG | HEIGHT: 62 IN | OXYGEN SATURATION: 97 % | HEART RATE: 91 BPM | WEIGHT: 148 LBS | TEMPERATURE: 97.8 F | RESPIRATION RATE: 18 BRPM | BODY MASS INDEX: 27.23 KG/M2

## 2018-01-03 PROBLEM — A41.9 SEPSIS (HCC): Status: RESOLVED | Noted: 2017-12-29 | Resolved: 2018-01-03

## 2018-01-03 PROBLEM — K62.5 RECTAL BLEEDING: Status: RESOLVED | Noted: 2017-12-31 | Resolved: 2018-01-03

## 2018-01-03 PROBLEM — R07.9 CHEST PAIN: Status: RESOLVED | Noted: 2017-12-29 | Resolved: 2018-01-03

## 2018-01-03 PROBLEM — R20.0 LEFT SIDED NUMBNESS: Status: RESOLVED | Noted: 2017-12-29 | Resolved: 2018-01-03

## 2018-01-03 LAB
ALBUMIN SERPL BCP-MCNC: 3.1 G/DL (ref 3.5–5)
ALP SERPL-CCNC: 58 U/L (ref 46–116)
ALT SERPL W P-5'-P-CCNC: 28 U/L (ref 12–78)
ANION GAP SERPL CALCULATED.3IONS-SCNC: 5 MMOL/L (ref 4–13)
AST SERPL W P-5'-P-CCNC: 15 U/L (ref 5–45)
BILIRUB SERPL-MCNC: 0.2 MG/DL (ref 0.2–1)
BUN SERPL-MCNC: 13 MG/DL (ref 5–25)
CALCIUM SERPL-MCNC: 9.1 MG/DL (ref 8.3–10.1)
CHLORIDE SERPL-SCNC: 102 MMOL/L (ref 100–108)
CO2 SERPL-SCNC: 34 MMOL/L (ref 21–32)
CREAT SERPL-MCNC: 0.75 MG/DL (ref 0.6–1.3)
ERYTHROCYTE [DISTWIDTH] IN BLOOD BY AUTOMATED COUNT: 14.4 % (ref 11.6–15.1)
GFR SERPL CREATININE-BSD FRML MDRD: 101 ML/MIN/1.73SQ M
GLUCOSE SERPL-MCNC: 128 MG/DL (ref 65–140)
HCT VFR BLD AUTO: 36.4 % (ref 34.8–46.1)
HGB BLD-MCNC: 11.5 G/DL (ref 11.5–15.4)
MCH RBC QN AUTO: 29.1 PG (ref 26.8–34.3)
MCHC RBC AUTO-ENTMCNC: 31.6 G/DL (ref 31.4–37.4)
MCV RBC AUTO: 92 FL (ref 82–98)
PLATELET # BLD AUTO: 265 THOUSANDS/UL (ref 149–390)
PMV BLD AUTO: 10 FL (ref 8.9–12.7)
POTASSIUM SERPL-SCNC: 4.4 MMOL/L (ref 3.5–5.3)
PROT SERPL-MCNC: 6.6 G/DL (ref 6.4–8.2)
RBC # BLD AUTO: 3.95 MILLION/UL (ref 3.81–5.12)
SODIUM SERPL-SCNC: 141 MMOL/L (ref 136–145)
WBC # BLD AUTO: 19.35 THOUSAND/UL (ref 4.31–10.16)

## 2018-01-03 PROCEDURE — 80053 COMPREHEN METABOLIC PANEL: CPT | Performed by: FAMILY MEDICINE

## 2018-01-03 PROCEDURE — 85027 COMPLETE CBC AUTOMATED: CPT | Performed by: FAMILY MEDICINE

## 2018-01-03 PROCEDURE — 94640 AIRWAY INHALATION TREATMENT: CPT

## 2018-01-03 PROCEDURE — 94760 N-INVAS EAR/PLS OXIMETRY 1: CPT

## 2018-01-03 RX ADMIN — NICOTINE 1 PATCH: 21 PATCH, EXTENDED RELEASE TRANSDERMAL at 08:30

## 2018-01-03 RX ADMIN — MESALAMINE 4.8 G: 1.2 TABLET, DELAYED RELEASE ORAL at 08:30

## 2018-01-03 RX ADMIN — OXYCODONE HYDROCHLORIDE 10 MG: 10 TABLET ORAL at 00:41

## 2018-01-03 RX ADMIN — OXYCODONE HYDROCHLORIDE 30 MG: 10 TABLET ORAL at 06:01

## 2018-01-03 RX ADMIN — LEVALBUTEROL 1.25 MG: 1.25 SOLUTION, CONCENTRATE RESPIRATORY (INHALATION) at 08:42

## 2018-01-03 RX ADMIN — DEXTROAMPHETAMINE SACCHARATE, AMPHETAMINE ASPARTATE, DEXTROAMPHETAMINE SULFATE, AND AMPHETAMINE SULFATE 20 MG: 2.5; 2.5; 2.5; 2.5 TABLET ORAL at 11:10

## 2018-01-03 RX ADMIN — CHOLECALCIFEROL TAB 25 MCG (1000 UNIT) 1000 UNITS: 25 TAB at 08:28

## 2018-01-03 RX ADMIN — PROPRANOLOL HYDROCHLORIDE 80 MG: 80 CAPSULE, EXTENDED RELEASE ORAL at 08:29

## 2018-01-03 RX ADMIN — ISODIUM CHLORIDE 3 ML: 0.03 SOLUTION RESPIRATORY (INHALATION) at 13:43

## 2018-01-03 RX ADMIN — GUAIFENESIN 600 MG: 600 TABLET, EXTENDED RELEASE ORAL at 08:28

## 2018-01-03 RX ADMIN — OXYCODONE HYDROCHLORIDE 30 MG: 10 TABLET ORAL at 14:34

## 2018-01-03 RX ADMIN — LEVALBUTEROL 1.25 MG: 1.25 SOLUTION, CONCENTRATE RESPIRATORY (INHALATION) at 13:43

## 2018-01-03 RX ADMIN — ASPIRIN 81 MG 81 MG: 81 TABLET ORAL at 08:28

## 2018-01-03 RX ADMIN — PANTOPRAZOLE SODIUM 40 MG: 40 TABLET, DELAYED RELEASE ORAL at 06:01

## 2018-01-03 RX ADMIN — ALPRAZOLAM 2 MG: 0.5 TABLET ORAL at 08:29

## 2018-01-03 RX ADMIN — DICYCLOMINE HYDROCHLORIDE 10 MG: 10 CAPSULE ORAL at 06:01

## 2018-01-03 RX ADMIN — ACETYLCYSTEINE 600 MG: 200 SOLUTION ORAL; RESPIRATORY (INHALATION) at 13:43

## 2018-01-03 RX ADMIN — ACETYLCYSTEINE 600 MG: 200 SOLUTION ORAL; RESPIRATORY (INHALATION) at 08:43

## 2018-01-03 RX ADMIN — GABAPENTIN 600 MG: 300 CAPSULE ORAL at 08:28

## 2018-01-03 RX ADMIN — OXYCODONE HYDROCHLORIDE 10 MG: 10 TABLET ORAL at 08:29

## 2018-01-03 RX ADMIN — ISODIUM CHLORIDE 3 ML: 0.03 SOLUTION RESPIRATORY (INHALATION) at 08:42

## 2018-01-03 NOTE — PROGRESS NOTES
Progress Note - Neurology   Edie Wasserman 42 y o  female MRN: [id number]  Unit/Bed#: -01 Encounter: [id number]      Subjective:   Martin Da Silva reports multiple neurologic symptoms that occurred yesterday including her vision blacking out for about 30 seconds  She noted no other symptoms associated with this  She also reported some slurred speech yesterday as well as numbness in her left ear, left arm and left leg  She states the symptoms have all resolved  There is no change in level of consciousness associated with them  ROS: 12 system cued query was unchanged from org  consult note  Vitals:   Vitals:    01/03/18 0844   BP:    Pulse:    Resp:    Temp:    SpO2: 96%   ,Body mass index is 27 07 kg/m²  MEDS:    acetylcysteine 3 mL Nebulization TID   amitriptyline 100 mg Oral HS   amphetamine-dextroamphetamine 20 mg Oral BID (AM & Afternoon)   aspirin 81 mg Oral Daily   atorvastatin 40 mg Oral QPM   cholecalciferol 1,000 Units Oral Daily   dicyclomine 10 mg Oral TID AC   enoxaparin 40 mg Subcutaneous Daily   gabapentin 600 mg Oral TID   guaiFENesin 600 mg Oral Q12H MIKE   levalbuterol 1 25 mg Nebulization TID   mesalamine 4 8 g Oral Daily   montelukast 10 mg Oral HS   nicotine 1 patch Transdermal Daily   oxyCODONE 30 mg Oral Q8H   pantoprazole 40 mg Oral Daily Before Breakfast   propranolol 80 mg Oral Daily   sodium chloride 3 mL Nebulization TID   :    Physical Exam:  General appearance: alert, appears stated age and cooperative, currently eating breakfast  HEENT/NECK: Head is atraumatic normocephalic, neck is supple  NEUROLOGIC:  Mental Status: Awake and alert without aphasia  Cranial Nerves: Extraocular movements are full  Face is symmetrical to light touch in movements  Motor:  No drift is noted on arm extension  Coordination:  Finger-to-nose testing is performed accurately  Reflexes:  1+ and symmetrical    Lab Results: I have personally reviewed pertinent reports    Imaging Studies: I have personally reviewed pertinent films in PACS      Assessment:  1  Multiple transient neurologic symptoms in the face of normal MRI brain with history of migraine, rule out migraine aura without headache    Plan:  Continue supportive treatment  She plans to follow up with her neurologist as an outpatient regarding migraine management       Ben Ayala MD  1/3/2018,8:54 AM    Dictation voice to text software has been used in the creation of this document

## 2018-01-03 NOTE — PROGRESS NOTES
Progress Note - Infectious Disease   Tonya Blanco 42 y o  female MRN: [id number]  Unit/Bed#: -01 Encounter: [id number]      Impression/Recommendations:  1  Leukocytosis  Consider chronic as WBC in teens in early December and 2016  UA, blood cultures, CT chest negative  Low suspicion for intra-abdominal infection given benign exam  Treated extensively in the past several weeks to months with antibiotics  No obvious acute infection at this time  Appears clinically stable and well  Rec:       · Continue to follow closely off antibiotics  · Repeat CBC as an outpatient  · If WBC remains elevated may need outpatient hematology evaluation  · The patient is stable from an ID standpoint for DC home today with close outpatient follow-up     2  COPD/restrictive lung disease  Unclear etiology and history  No clinical or radiographic evidence of acute infection  Rec:       · Continue to follow closely off antibiotics  · Mucomyst and chest PT as per Pulmonary  · Outpatient Pulmonary follow-up at [facility]     3   UC/Crohn's disease  On outpatient mesalamine  Appears to be reasonably controlled  Rec:      · Continue outpatient regimen with close GI follow-up ongoing     4  Acute encephalopathy  POA and fluctuating during admission  Suspect secondary to extensive psychotropic medications  MRI brain unremarkable  Rec:  · Continue to follow mental status closel  · Advised consideration of weaning or discontinuation of some of her medications to which the patient was resistant and defensive     Patient appears stable from an ID perspective  Discussed in detail with Dr Margarita Magana     Antibiotics:  Off antibiotics # 1    Subjective:  Patient seen on AM rounds  Continues to complain of hand and leg tingling  Denies fevers, chills, or sweats  Denies nausea, vomiting, or diarrhea  And I suggested some of her symptoms may be due to medication she became very defensive about weaning      Objective:  Vitals:  HR:  [] 93  Resp: [18-20] 18  BP: ()/(50-61) 106/58  SpO2:  [94 %-98 %] 96 %  Temp (24hrs), Av 2 °F (36 8 °C), Min:97 8 °F (36 6 °C), Max:98 9 °F (37 2 °C)  Current: Temperature: 97 8 °F (36 6 °C)    Physical Exam:   General:  No acute distress  Psychiatric:  Awake and alert  Pulmonary:  Normal respiratory excursion without accessory muscle use, clear to auscultation bilaterally  Abdomen:  Soft, nontender  Extremities:  No edema  Skin:  No rashes    Lab Results:  I have personally reviewed pertinent labs  Results from last 7 days  Lab Units 18  0525 18  0520 18  0526  17  1746   SODIUM mmol/L 141 143 145  < > 140   POTASSIUM mmol/L 4 4 4 6 3 9  < > 3 2*   CHLORIDE mmol/L 102 107 107  < > 104   CO2 mmol/L 34* 29 31  < > 27   ANION GAP mmol/L 5 7 7  < > 9   BUN mg/dL 13 14 14  < > 14   CREATININE mg/dL 0 75 0 63 0 62  < > 0 77   EGFR ml/min/1 73sq m 101 113 114  < > 98   GLUCOSE RANDOM mg/dL 128 78 89  < > 77   CALCIUM mg/dL 9 1 8 8 9 1  < > 9 6   AST U/L 15 29  --   --  13   ALT U/L 28 30  --   --  50   ALK PHOS U/L 58 62  --   --  75   TOTAL PROTEIN g/dL 6 6 6 8  --   --  7 9   ALBUMIN g/dL 3 1* 3 3*  --   --  3 9   BILIRUBIN TOTAL mg/dL 0 20 0 40  --   --  0 50   < > = values in this interval not displayed  Results from last 7 days  Lab Units 18  0525 18  0520 18  0526   WBC Thousand/uL 19 35* 13 79* 19 70*   HEMOGLOBIN g/dL 11 5 12 4 12 4   PLATELETS Thousands/uL 265 301 331       Results from last 7 days  Lab Units 17  1746   BLOOD CULTURE  No Growth After 5 Days  No Growth After 5 Days  Imaging Studies:   I have personally reviewed pertinent imaging study reports and images in PACS  EKG, Pathology, and Other Studies:   I have personally reviewed pertinent reports

## 2018-01-03 NOTE — DISCHARGE SUMMARY
Discharge Summary - [facility] Internal Medicine    Patient Information: Rita Mcmahon 42 y o  female MRN: [id number]  Unit/Bed#: -01 Encounter: [id number]    Discharging Physician / Practitioner: Abdiel Lazo MD  PCP: Renda Severe, DO  Admission Date: 12/28/2017  Discharge Date: 01/03/18    Reason for Admission:  Sepsis criteria with multiple complaints including slurred speech and left-sided weakness as well as rectal bleeding and shortness of breath    Discharge Diagnoses:     Principal Problem (Resolved):    Sepsis ([address] )  Active Problems:    UTI (urinary tract infection)    Hepatomegaly    Dilated bile duct    Hypokalemia    Slurred speech    Tobacco abuse  Resolved Problems:    Chest pain    Left sided numbness    Rectal bleeding      Consultations During Hospital Stay:  · Infectious disease  · Pulmonology  · Gastroenterology  · Neurology    Procedures Performed:     CT scan:1  No acute CT findings  2  Mild nonspecific intrahepatic biliary ductal dilatation  Extrahepatic biliary duct is prominent but appears to taper towards the ampulla  However, the pancreatic duct also appears to be prominent/dilated  Consider nonemergent outpatient MRCP  3  Questionable mild hepatomegaly  MRI:2 small T2 and FLAIR hyperintense foci involving the right frontal lobe white matter, suspicious for mild chronic microangiopathic changes such as may accompany migraine headaches    Normal Vascuar dopplers    Echocardiogram:  Showed grade 1 diastolic dysfunction with normal ejection fraction of 55-65%    Significant Findings / Test Results:     · None    Incidental Findings:   · See above for the CT scan findings     Test Results Pending at Discharge (will require follow up):    · None     Outpatient Tests Requested:  · MRCP will need to be ordered as an outpatient    Complications:  None    Hospital Course:     Rita Mcmahon is a 42 y o  female patient who originally presented to the hospital on 12/28/2017 due to multiple complaints  History of presenting Chang Lebron is a 42 y o  female who presents with past medical history of cervical cancer , pan colitis, Crohn's disease, endocarditis, fibromyalgia, hypertension, irritable bowel syndrome, pancreatitis, rheumatoid arthritis, restrictive lung disease, vertigo presenting with numerous complaints  I was unable to find records regarding this patient  She currently presented with chest pain with radiation down her left arm with numbness and tingling of her left upper and lower extremity  She also notes dizziness  She states that her  states that she was not making sense  She denies headache at this time  She states that she had a syncopal episode last week  She also admits to shortness of breath with coughing and wheezing  She states that she was recently admitted to [facility] last week and diagnosed with a PE  She does however deny that she was on any anticoagulation but then or now  She does not have a PE on exam at this time  She admits to epigastric pain  She did also admit to coughing up black stuff that looked like charcoal and bleeding out of her ears with epistaxis  I unsure how reliable her review of systems for diagnoses are given no records and multitude of complaints  We will need to get records from her doctors  She would greatly benefit from a psychiatric consult  She was very adamant and giving her pain medicine as scheduled at exactly the right time    Hospital course:  Patient was admitted for the reasons while she was here she continued to have multiple complaints  One was a shortness of breath but she has had multiple bronchoscopies in the past all which failed to reveal any abnormality  All the cultures from the previous bronchoscopies have been negative per pulmonology  We did have pulmonology evaluate the patient here there is nothing from their standpoint to be done    Patient to follow up with her primary pulmonologist as an outpatient    In regards to her abdominal pain  She did have a little bit of abdominal discomfort  She does follow up with GI as an outpatient  She did have a CT scan done which they recommend an MRCP as an outpatient on a nonurgent basis  She does have a history of Crohn's as was maintained on her Lialda  Patient states she has dark stools but hemoglobin has been stable and she has a follow-up appointment with Molly Araiza  She can follow up with him  Patient was seen by GI here and there is nothing further to be done    In regards to the patient's facial numbness and left-sided numbness and tingling  This was likely atypical migraines  The patient had an MRI and echocardiogram done both were unremarkable  The MRI could be consistent with migraines  The patient was seen by Neurology  Nothing further from the Neurology standpoint to be done  Patient never had any decreased muscle strength and was otherwise doing okay from that standpoint  She did have some episodes where she said she had slurred speech but when we went to evaluate her she was okay  Patient is on multiple medications which could be causing some of her symptoms  She is set her medications and does not want to change them and has multiple follow ups  She will follow up with her neurologist    The patient was also found to have a leukocytosis and was originally thought to have urinary tract infection present on admission however she had no bacteria noted  She does have some nitrites and WBC counts were negative  We felt the patient could have had a bronchitis  She [place] a [place] [address] which with IV antibiotics improved a little to 14,000  I did switch over to oral antibiotics at 1 point and put her on azithromycin but her white count continued to climb and then she was put on doxycycline white count continued to climb a little again    I did put her on IV Levaquin then treating a respiratory source and her white count did improve slightly  Given the patient's leukocytosis I had Infectious Disease see the patient  We did not have any real source for the infection so the patient was observed off antibiotics  Her white count is 18729 on discharge but she has no source of infection  Looking back at the records she does have a history of some leukocytosis in the past   She should follow up with Hematology if this continues  But there is nothing further from the infectious standpoint to be done at this time    Condition at Discharge:  Stable    Discharge Day Visit / Exam:     Subjective:  Patient seen examined  No complaints today  Vitals: Blood Pressure: 106/58 (01/03/18 0711)  Pulse: 93 (01/03/18 0711)  Temperature: 97 8 °F (36 6 °C) (01/03/18 0711)  Temp Source: Oral (01/03/18 0711)  Respirations: 18 (01/03/18 0711)  Height: 5' 2" (157 5 cm) (12/29/17 1823)  Weight - Scale: 67 1 kg (148 lb) (12/29/17 1823)  SpO2: 96 % (01/03/18 0844)  Exam:   Physical Exam  (   General Appearance:    Alert, cooperative, no distress, appears stated age                               Lungs:     Clear to auscultation bilaterally, respirations unlabored       Heart:    Regular rate and rhythm, S1 and S2 normal, no murmur, rub    or gallop   Abdomen:     Soft, non-tender, bowel sounds active all four quadrants,     no masses, no organomegaly           Extremities:   Extremities normal, atraumatic, no cyanosis or edema     Discussion with Family:  Patient    Discharge instructions/Information to patient and family:   See after visit summary for information provided to patient and family  Provisions for Follow-Up Care:  See after visit summary for information related to follow-up care and any pertinent home health orders        Disposition:     Home    For Discharges to [facility] SNF:   · Not Applicable to this Patient - Not Applicable to this Patient    Planned Readmission:  None anticipated     Discharge Statement:  I spent 35 minutes discharging the patient  This time was spent on the day of discharge  I had direct contact with the patient on the day of discharge  Greater than 50% of the total time was spent examining patient, answering all patient questions, arranging and discussing plan of care with patient as well as directly providing post-discharge instructions  Additional time then spent on discharge activities  Time was spent in reviewing the hospital course and speaking to consultants involved in the care  Discharge Medications:  See after visit summary for reconciled discharge medications provided to patient and family        ** Please Note: This note has been constructed using a voice recognition system **

## 2018-01-03 NOTE — PLAN OF CARE
Problem: DISCHARGE PLANNING - CARE MANAGEMENT  Goal: Discharge to post-acute care or home with appropriate resources  INTERVENTIONS:  - Conduct assessment to determine patient/family and health care team treatment goals, and need for post-acute services based on payer coverage, community resources, and patient preferences, and barriers to discharge  - Address psychosocial, clinical, and financial barriers to discharge as identified in assessment in conjunction with the patient/family and health care team  - Arrange appropriate level of post-acute services according to patient's   needs and preference and payer coverage in collaboration with the physician and health care team  - Communicate with and update the patient/family, physician, and health care team regarding progress on the discharge plan  - Arrange appropriate transportation to post-acute venues  Outcome: Progressing  CM met with patient at bedside  Pt was defensive with answering questions stating "I would answer questions and next thing I know is someone coming in to try to take my children  Patient states she lives home with her   Patient will not disclose the type of home she lives in and the number of steps it takes to enter the house  Patient states she does not have rehab hx and hhc hx patient states she fills her Rx wherever it is convenient for her  Patient did not answer question on mental health dx and substance abuse  Patient states her chldren will speak for her in the even she is unable to speak for herself  Patient states she is employed  Patient states she drives to her doctor's appointments  Patient states upon discharge she will probably drive herself home

## 2018-01-03 NOTE — PROGRESS NOTES
Progress Note - Pulmonary   Bernarda Medley 42 y o  female MRN: [id number]  Unit/Bed#: -01 Encounter: [id number]    Assessment:  1  Hemoptysis? Questionable  2   Mucus plugging on CT of chest with mild hilar lymphadenopathy  3  Leukocytosis, chronic    Plan:  1  Hemoptysis? with Mucus plugging on CT of chest with mild hilar lymphadenopathy  - currently saturating 98% on room air  - patient reports episodes of hemoptysis have been going on for months; upon my examination, minimal/mild streaky blood with mucus; hemoglobin stable; vitals stable  - reviewed CT of chest with no evidence of acute infiltrate; agree with monitoring off antibiotics  - patient has had 2 bronchoscopies at [facility] within the last few months  Both of these were negative for endobronchial lesion/unremarkable viral, fungal, bacterial, PCP, and chlamydial cultures  Will hold off on bronchoscopy at this time  - continue nebulizer treatments with levalbuterol in saline t i d ; continue Mucomyst and Mucinex given visible mucus secretions on CT of chest  - plan to follow-up with outpatient pulmonologist, Dr Ryan Cordero  2  Leukocytosis  - Infectious Disease following  - monitoring off antibiotics  - continue to monitor    Patient is stable from a pulmonary standpoint for discharge home today    Chief Complaint:   Numbness and tingling    Subjective:   Patient continues to complain of numbness and tingling; not able to show any samples of "hemoptysis "  Does continue to cough up mild amount of mucus  Denies shortness of breath, fever, chills  Objective:     Vitals: Blood pressure 106/58, pulse 93, temperature 97 8 °F (36 6 °C), temperature source Oral, resp  rate 18, height 5' 2" (1 575 m), weight 67 1 kg (148 lb), SpO2 96 %  ,Body mass index is 27 07 kg/m²        Intake/Output Summary (Last 24 hours) at 01/03/18 1311  Last data filed at 01/02/18 2115   Gross per 24 hour   Intake              480 ml   Output                0 ml   Net 480 ml       Invasive Devices          No matching active lines, drains, or airways          Physical Exam: /58   Pulse 93   Temp 97 8 °F (36 6 °C) (Oral)   Resp 18   Ht 5' 2" (1 575 m)   Wt 67 1 kg (148 lb)   SpO2 96%   BMI 27 07 kg/m²   General appearance: alert, appears stated age and cooperative  Head: Normocephalic, without obvious abnormality, atraumatic  Lungs: clear to auscultation bilaterally  Heart: regular rate and rhythm, S1, S2 normal, no murmur, click, rub or gallop     Labs: I have personally reviewed pertinent lab results  , CBC:   Lab Results   Component Value Date    WBC 19 35 (H) 01/03/2018    HGB 11 5 01/03/2018    HCT 36 4 01/03/2018    MCV 92 01/03/2018     01/03/2018    MCH 29 1 01/03/2018    MCHC 31 6 01/03/2018    RDW 14 4 01/03/2018    MPV 10 0 01/03/2018   , CMP:   Lab Results   Component Value Date     01/03/2018    K 4 4 01/03/2018     01/03/2018    CO2 34 (H) 01/03/2018    ANIONGAP 5 01/03/2018    BUN 13 01/03/2018    CREATININE 0 75 01/03/2018    GLUCOSE 128 01/03/2018    CALCIUM 9 1 01/03/2018    AST 15 01/03/2018    ALT 28 01/03/2018    ALKPHOS 58 01/03/2018    PROT 6 6 01/03/2018    ALBUMIN 3 1 (L) 01/03/2018    BILITOT 0 20 01/03/2018    EGFR 101 01/03/2018     Imaging and other studies: I have personally reviewed pertinent reports

## 2018-01-03 NOTE — SOCIAL WORK
CM met with patient at bedside  Pt was defensive with answering questions stating "I would answer questions and next thing I know is someone coming in to try to take my children  Patient states she lives home with her   Patient will not disclose the type of home she lives in and the number of steps it takes to enter the house  Patient states she does not have rehab hx and [facility] hx patient states she fills her Rx wherever it is convenient for her  Patient did not answer question on mental health dx and substance abuse  Patient states her chldren will speak for her in the even she is unable to speak for herself  Patient states she is employed  Patient states she drives to her doctor's appointments  Patient states upon discharge she will probably drive herself home

## 2018-01-07 ENCOUNTER — HOSPITAL ENCOUNTER (INPATIENT)
Facility: HOSPITAL | Age: 38
LOS: 1 days | Discharge: LEFT BEFORE DISCHARGE | DRG: 663 | End: 2018-01-07
Attending: EMERGENCY MEDICINE | Admitting: INTERNAL MEDICINE
Payer: COMMERCIAL

## 2018-01-07 ENCOUNTER — APPOINTMENT (EMERGENCY)
Dept: CT IMAGING | Facility: HOSPITAL | Age: 38
DRG: 663 | End: 2018-01-07
Payer: COMMERCIAL

## 2018-01-07 VITALS
OXYGEN SATURATION: 96 % | HEIGHT: 62 IN | WEIGHT: 162.92 LBS | TEMPERATURE: 99.3 F | SYSTOLIC BLOOD PRESSURE: 140 MMHG | RESPIRATION RATE: 20 BRPM | BODY MASS INDEX: 29.98 KG/M2 | DIASTOLIC BLOOD PRESSURE: 65 MMHG | HEART RATE: 122 BPM

## 2018-01-07 DIAGNOSIS — R00.0 TACHYCARDIA: ICD-10-CM

## 2018-01-07 DIAGNOSIS — J18.9 HEALTHCARE-ASSOCIATED PNEUMONIA: Primary | ICD-10-CM

## 2018-01-07 PROBLEM — D72.829 LEUKOCYTOSIS: Status: ACTIVE | Noted: 2018-01-07

## 2018-01-07 PROBLEM — R87.612 LOW GRADE SQUAMOUS INTRAEPITH LESION ON CYTOLOGIC SMEAR CERVIX (LGSIL): Status: ACTIVE | Noted: 2018-01-07

## 2018-01-07 PROBLEM — K50.90 CROHN DISEASE (HCC): Chronic | Status: ACTIVE | Noted: 2018-01-07

## 2018-01-07 PROBLEM — R20.0 LEFT ARM NUMBNESS: Status: ACTIVE | Noted: 2018-01-07

## 2018-01-07 LAB
ALBUMIN SERPL BCP-MCNC: 3.7 G/DL (ref 3.5–5)
ALP SERPL-CCNC: 84 U/L (ref 46–116)
ALT SERPL W P-5'-P-CCNC: 28 U/L (ref 12–78)
AMPHETAMINES SERPL QL SCN: NEGATIVE
ANION GAP SERPL CALCULATED.3IONS-SCNC: 8 MMOL/L (ref 4–13)
AST SERPL W P-5'-P-CCNC: 17 U/L (ref 5–45)
ATRIAL RATE: 130 BPM
BARBITURATES UR QL: NEGATIVE
BASOPHILS # BLD AUTO: 0.09 THOUSANDS/ΜL (ref 0–0.1)
BASOPHILS NFR BLD AUTO: 0 % (ref 0–1)
BENZODIAZ UR QL: POSITIVE
BILIRUB SERPL-MCNC: 0.2 MG/DL (ref 0.2–1)
BILIRUB UR QL STRIP: NEGATIVE
BUN SERPL-MCNC: 10 MG/DL (ref 5–25)
CALCIUM SERPL-MCNC: 9 MG/DL (ref 8.3–10.1)
CHLORIDE SERPL-SCNC: 101 MMOL/L (ref 100–108)
CLARITY UR: ABNORMAL
CO2 SERPL-SCNC: 33 MMOL/L (ref 21–32)
COCAINE UR QL: NEGATIVE
COLOR UR: YELLOW
CREAT SERPL-MCNC: 0.85 MG/DL (ref 0.6–1.3)
EOSINOPHIL # BLD AUTO: 0.27 THOUSAND/ΜL (ref 0–0.61)
EOSINOPHIL NFR BLD AUTO: 1 % (ref 0–6)
ERYTHROCYTE [DISTWIDTH] IN BLOOD BY AUTOMATED COUNT: 14.8 % (ref 11.6–15.1)
EXT PREG TEST URINE: NEGATIVE
GFR SERPL CREATININE-BSD FRML MDRD: 87 ML/MIN/1.73SQ M
GLUCOSE SERPL-MCNC: 87 MG/DL (ref 65–140)
GLUCOSE UR STRIP-MCNC: NEGATIVE MG/DL
HCT VFR BLD AUTO: 36 % (ref 34.8–46.1)
HGB BLD-MCNC: 11.7 G/DL (ref 11.5–15.4)
HGB UR QL STRIP.AUTO: NEGATIVE
KETONES UR STRIP-MCNC: ABNORMAL MG/DL
LACTATE SERPL-SCNC: 0.8 MMOL/L (ref 0.5–2)
LACTATE SERPL-SCNC: 1.5 MMOL/L (ref 0.5–2)
LACTATE SERPL-SCNC: 2 MMOL/L (ref 0.5–2)
LEUKOCYTE ESTERASE UR QL STRIP: NEGATIVE
LIPASE SERPL-CCNC: 97 U/L (ref 73–393)
LYMPHOCYTES # BLD AUTO: 4.69 THOUSANDS/ΜL (ref 0.6–4.47)
LYMPHOCYTES NFR BLD AUTO: 21 % (ref 14–44)
MAGNESIUM SERPL-MCNC: 2.1 MG/DL (ref 1.6–2.6)
MCH RBC QN AUTO: 29.6 PG (ref 26.8–34.3)
MCHC RBC AUTO-ENTMCNC: 32.5 G/DL (ref 31.4–37.4)
MCV RBC AUTO: 91 FL (ref 82–98)
METHADONE UR QL: NEGATIVE
MONOCYTES # BLD AUTO: 1.18 THOUSAND/ΜL (ref 0.17–1.22)
MONOCYTES NFR BLD AUTO: 5 % (ref 4–12)
NEUTROPHILS # BLD AUTO: 16.23 THOUSANDS/ΜL (ref 1.85–7.62)
NEUTS SEG NFR BLD AUTO: 72 % (ref 43–75)
NITRITE UR QL STRIP: NEGATIVE
NRBC BLD AUTO-RTO: 0 /100 WBCS
OPIATES UR QL SCN: POSITIVE
P AXIS: 71 DEGREES
PCP UR QL: NEGATIVE
PH UR STRIP.AUTO: 8 [PH] (ref 4.5–8)
PLATELET # BLD AUTO: 276 THOUSANDS/UL (ref 149–390)
PMV BLD AUTO: 9.7 FL (ref 8.9–12.7)
POTASSIUM SERPL-SCNC: 3.2 MMOL/L (ref 3.5–5.3)
PR INTERVAL: 140 MS
PROT SERPL-MCNC: 7.1 G/DL (ref 6.4–8.2)
PROT UR STRIP-MCNC: NEGATIVE MG/DL
QRS AXIS: 44 DEGREES
QRSD INTERVAL: 86 MS
QT INTERVAL: 298 MS
QTC INTERVAL: 438 MS
RBC # BLD AUTO: 3.95 MILLION/UL (ref 3.81–5.12)
S PNEUM AG UR QL: NEGATIVE
SODIUM SERPL-SCNC: 142 MMOL/L (ref 136–145)
SP GR UR STRIP.AUTO: 1.01 (ref 1–1.03)
T WAVE AXIS: 41 DEGREES
THC UR QL: NEGATIVE
TROPONIN I SERPL-MCNC: <0.02 NG/ML
TSH SERPL DL<=0.05 MIU/L-ACNC: 1.18 UIU/ML (ref 0.36–3.74)
UROBILINOGEN UR QL STRIP.AUTO: 0.2 E.U./DL
VENTRICULAR RATE: 130 BPM
WBC # BLD AUTO: 22.58 THOUSAND/UL (ref 4.31–10.16)

## 2018-01-07 PROCEDURE — 87040 BLOOD CULTURE FOR BACTERIA: CPT | Performed by: EMERGENCY MEDICINE

## 2018-01-07 PROCEDURE — 93005 ELECTROCARDIOGRAM TRACING: CPT | Performed by: EMERGENCY MEDICINE

## 2018-01-07 PROCEDURE — 84484 ASSAY OF TROPONIN QUANT: CPT | Performed by: EMERGENCY MEDICINE

## 2018-01-07 PROCEDURE — 96375 TX/PRO/DX INJ NEW DRUG ADDON: CPT

## 2018-01-07 PROCEDURE — 80053 COMPREHEN METABOLIC PANEL: CPT | Performed by: EMERGENCY MEDICINE

## 2018-01-07 PROCEDURE — 85025 COMPLETE CBC W/AUTO DIFF WBC: CPT | Performed by: EMERGENCY MEDICINE

## 2018-01-07 PROCEDURE — 93005 ELECTROCARDIOGRAM TRACING: CPT

## 2018-01-07 PROCEDURE — 96374 THER/PROPH/DIAG INJ IV PUSH: CPT

## 2018-01-07 PROCEDURE — 36415 COLL VENOUS BLD VENIPUNCTURE: CPT | Performed by: EMERGENCY MEDICINE

## 2018-01-07 PROCEDURE — 96361 HYDRATE IV INFUSION ADD-ON: CPT

## 2018-01-07 PROCEDURE — 81003 URINALYSIS AUTO W/O SCOPE: CPT | Performed by: EMERGENCY MEDICINE

## 2018-01-07 PROCEDURE — 80307 DRUG TEST PRSMV CHEM ANLYZR: CPT | Performed by: EMERGENCY MEDICINE

## 2018-01-07 PROCEDURE — 84443 ASSAY THYROID STIM HORMONE: CPT | Performed by: EMERGENCY MEDICINE

## 2018-01-07 PROCEDURE — 87798 DETECT AGENT NOS DNA AMP: CPT | Performed by: PHYSICIAN ASSISTANT

## 2018-01-07 PROCEDURE — 81025 URINE PREGNANCY TEST: CPT | Performed by: EMERGENCY MEDICINE

## 2018-01-07 PROCEDURE — 87449 NOS EACH ORGANISM AG IA: CPT | Performed by: PHYSICIAN ASSISTANT

## 2018-01-07 PROCEDURE — 83690 ASSAY OF LIPASE: CPT | Performed by: EMERGENCY MEDICINE

## 2018-01-07 PROCEDURE — 87077 CULTURE AEROBIC IDENTIFY: CPT | Performed by: EMERGENCY MEDICINE

## 2018-01-07 PROCEDURE — 99285 EMERGENCY DEPT VISIT HI MDM: CPT

## 2018-01-07 PROCEDURE — 87086 URINE CULTURE/COLONY COUNT: CPT | Performed by: EMERGENCY MEDICINE

## 2018-01-07 PROCEDURE — 74177 CT ABD & PELVIS W/CONTRAST: CPT

## 2018-01-07 PROCEDURE — 83605 ASSAY OF LACTIC ACID: CPT | Performed by: EMERGENCY MEDICINE

## 2018-01-07 PROCEDURE — 70450 CT HEAD/BRAIN W/O DYE: CPT

## 2018-01-07 PROCEDURE — 71275 CT ANGIOGRAPHY CHEST: CPT

## 2018-01-07 PROCEDURE — 83735 ASSAY OF MAGNESIUM: CPT | Performed by: PHYSICIAN ASSISTANT

## 2018-01-07 RX ORDER — SODIUM CHLORIDE 9 MG/ML
125 INJECTION, SOLUTION INTRAVENOUS CONTINUOUS
Status: DISCONTINUED | OUTPATIENT
Start: 2018-01-07 | End: 2018-01-07 | Stop reason: HOSPADM

## 2018-01-07 RX ORDER — MECLIZINE HCL 12.5 MG/1
12.5 TABLET ORAL EVERY 8 HOURS PRN
Status: DISCONTINUED | OUTPATIENT
Start: 2018-01-07 | End: 2018-01-07 | Stop reason: HOSPADM

## 2018-01-07 RX ORDER — ONDANSETRON 2 MG/ML
4 INJECTION INTRAMUSCULAR; INTRAVENOUS EVERY 6 HOURS PRN
Status: DISCONTINUED | OUTPATIENT
Start: 2018-01-07 | End: 2018-01-07 | Stop reason: HOSPADM

## 2018-01-07 RX ORDER — OXYCODONE HYDROCHLORIDE 10 MG/1
30 TABLET ORAL 3 TIMES DAILY
Status: DISCONTINUED | OUTPATIENT
Start: 2018-01-07 | End: 2018-01-07 | Stop reason: HOSPADM

## 2018-01-07 RX ORDER — ACETAMINOPHEN 325 MG/1
650 TABLET ORAL EVERY 6 HOURS PRN
Status: DISCONTINUED | OUTPATIENT
Start: 2018-01-07 | End: 2018-01-07 | Stop reason: HOSPADM

## 2018-01-07 RX ORDER — MONTELUKAST SODIUM 10 MG/1
10 TABLET ORAL
Status: DISCONTINUED | OUTPATIENT
Start: 2018-01-07 | End: 2018-01-07 | Stop reason: HOSPADM

## 2018-01-07 RX ORDER — CALCIUM CARBONATE 200(500)MG
1000 TABLET,CHEWABLE ORAL DAILY PRN
Status: DISCONTINUED | OUTPATIENT
Start: 2018-01-07 | End: 2018-01-07 | Stop reason: HOSPADM

## 2018-01-07 RX ORDER — VANCOMYCIN HYDROCHLORIDE 1 G/200ML
15 INJECTION, SOLUTION INTRAVENOUS ONCE
Status: COMPLETED | OUTPATIENT
Start: 2018-01-07 | End: 2018-01-07

## 2018-01-07 RX ORDER — PANTOPRAZOLE SODIUM 40 MG/1
40 TABLET, DELAYED RELEASE ORAL
Status: DISCONTINUED | OUTPATIENT
Start: 2018-01-08 | End: 2018-01-07 | Stop reason: HOSPADM

## 2018-01-07 RX ORDER — ALPRAZOLAM 0.5 MG/1
2 TABLET ORAL 3 TIMES DAILY
Status: DISCONTINUED | OUTPATIENT
Start: 2018-01-07 | End: 2018-01-07 | Stop reason: HOSPADM

## 2018-01-07 RX ORDER — DEXTROAMPHETAMINE SACCHARATE, AMPHETAMINE ASPARTATE, DEXTROAMPHETAMINE SULFATE AND AMPHETAMINE SULFATE 2.5; 2.5; 2.5; 2.5 MG/1; MG/1; MG/1; MG/1
20 TABLET ORAL DAILY
Status: DISCONTINUED | OUTPATIENT
Start: 2018-01-08 | End: 2018-01-07 | Stop reason: HOSPADM

## 2018-01-07 RX ORDER — GABAPENTIN 300 MG/1
900 CAPSULE ORAL 3 TIMES DAILY
Status: DISCONTINUED | OUTPATIENT
Start: 2018-01-07 | End: 2018-01-07 | Stop reason: HOSPADM

## 2018-01-07 RX ORDER — PROPRANOLOL HYDROCHLORIDE 20 MG/1
80 TABLET ORAL 3 TIMES DAILY
Status: DISCONTINUED | OUTPATIENT
Start: 2018-01-07 | End: 2018-01-07 | Stop reason: HOSPADM

## 2018-01-07 RX ORDER — AMITRIPTYLINE HYDROCHLORIDE 100 MG/1
100 TABLET, FILM COATED ORAL
Status: DISCONTINUED | OUTPATIENT
Start: 2018-01-07 | End: 2018-01-07 | Stop reason: HOSPADM

## 2018-01-07 RX ORDER — LORAZEPAM 2 MG/ML
2 INJECTION INTRAMUSCULAR ONCE
Status: COMPLETED | OUTPATIENT
Start: 2018-01-07 | End: 2018-01-07

## 2018-01-07 RX ORDER — MESALAMINE 1.2 G/1
4800 TABLET, DELAYED RELEASE ORAL
Status: DISCONTINUED | OUTPATIENT
Start: 2018-01-08 | End: 2018-01-07 | Stop reason: HOSPADM

## 2018-01-07 RX ORDER — VANCOMYCIN HYDROCHLORIDE 1 G/200ML
15 INJECTION, SOLUTION INTRAVENOUS ONCE
Status: DISCONTINUED | OUTPATIENT
Start: 2018-01-07 | End: 2018-01-07

## 2018-01-07 RX ORDER — VANCOMYCIN HYDROCHLORIDE 1 G/200ML
15 INJECTION, SOLUTION INTRAVENOUS EVERY 12 HOURS
Status: DISCONTINUED | OUTPATIENT
Start: 2018-01-07 | End: 2018-01-07 | Stop reason: HOSPADM

## 2018-01-07 RX ORDER — CYCLOBENZAPRINE HCL 10 MG
10 TABLET ORAL 3 TIMES DAILY PRN
Status: DISCONTINUED | OUTPATIENT
Start: 2018-01-07 | End: 2018-01-07 | Stop reason: HOSPADM

## 2018-01-07 RX ORDER — NICOTINE 21 MG/24HR
1 PATCH, TRANSDERMAL 24 HOURS TRANSDERMAL DAILY
Status: DISCONTINUED | OUTPATIENT
Start: 2018-01-07 | End: 2018-01-07 | Stop reason: HOSPADM

## 2018-01-07 RX ORDER — ASPIRIN 81 MG/1
81 TABLET ORAL DAILY
Status: DISCONTINUED | OUTPATIENT
Start: 2018-01-07 | End: 2018-01-07 | Stop reason: HOSPADM

## 2018-01-07 RX ORDER — POTASSIUM CHLORIDE 20 MEQ/1
40 TABLET, EXTENDED RELEASE ORAL ONCE
Status: COMPLETED | OUTPATIENT
Start: 2018-01-07 | End: 2018-01-07

## 2018-01-07 RX ADMIN — LORAZEPAM 2 MG: 2 INJECTION INTRAMUSCULAR; INTRAVENOUS at 06:19

## 2018-01-07 RX ADMIN — VANCOMYCIN HYDROCHLORIDE 1000 MG: 1 INJECTION, SOLUTION INTRAVENOUS at 11:22

## 2018-01-07 RX ADMIN — HYDROMORPHONE HYDROCHLORIDE 1 MG: 1 INJECTION, SOLUTION INTRAMUSCULAR; INTRAVENOUS; SUBCUTANEOUS at 10:49

## 2018-01-07 RX ADMIN — CEFEPIME HYDROCHLORIDE 2000 MG: 2 INJECTION, POWDER, FOR SOLUTION INTRAVENOUS at 10:35

## 2018-01-07 RX ADMIN — POTASSIUM CHLORIDE 40 MEQ: 1500 TABLET, EXTENDED RELEASE ORAL at 11:09

## 2018-01-07 RX ADMIN — NICOTINE 1 PATCH: 21 PATCH, EXTENDED RELEASE TRANSDERMAL at 13:46

## 2018-01-07 RX ADMIN — SODIUM CHLORIDE 1000 ML: 0.9 INJECTION, SOLUTION INTRAVENOUS at 06:12

## 2018-01-07 RX ADMIN — SODIUM CHLORIDE 125 ML/HR: 0.9 INJECTION, SOLUTION INTRAVENOUS at 13:26

## 2018-01-07 RX ADMIN — SODIUM CHLORIDE 1000 ML: 0.9 INJECTION, SOLUTION INTRAVENOUS at 10:32

## 2018-01-07 RX ADMIN — IOHEXOL 100 ML: 350 INJECTION, SOLUTION INTRAVENOUS at 08:03

## 2018-01-07 NOTE — ASSESSMENT & PLAN NOTE
Recently admitted and evaluated by Neurology, who felt symptoms were most likely related to complex migraines  Patient has a nonfocal neurologic exam, neurologic evaluation last admission was unrevealing for acute etiology  Will continue to monitor, she will continue to follow up outpatient  There appears to be no acute change, will hold off on neurology evaluation  If new neurologic symptoms or progression, will consult Neurology for re-evaluation

## 2018-01-07 NOTE — ED PROVIDER NOTES
History  Chief Complaint   Patient presents with    Dizziness     pt has multiple complains that include loss of balance, confusion, numbness and tingeling in L arm that "it is not going away"     Patient is a 23-year-old female  She has a history of Crohn's disease  There is a history of cervical cancer  She has a history of fibromyalgia  She gives a history of endocarditis  She also gives a history of rheumatoid arthritis  She was recently admitted she is being worked up for left-sided numbness and slurred speech  MRI was negative for acute stroke  It did have small vessel changes that were possibly felt to be due to migraine  Neurology saw patient and felt that she was appropriate for outpatient management  They felt that her slurred speech might be related to medications  There was a question of infection  She had a leukocytosis of 19,000  She had a history of leukocytosis in the past   Initially it was felt that she might have a urinary tract infection  Urine did not culture out anything  It was later felt that she had bronchitis  Patient was referred to Hematology for her elevated white blood cell count  She has not followed up yet  She returns to the emergency room today complaining of continued left arm numbness  She also reports low-grade fevers  She is a smoker and continues to cough  She is wheezing  She denies abdominal pain  No diarrhea  No vomiting  She has no urinary symptoms  No rashes  No headache, neck pain or photophobia  Prior to Admission Medications   Prescriptions Last Dose Informant Patient Reported? Taking?    ALPRAZolam (XANAX) 2 MG tablet   Yes No   Sig: Take 2 mg by mouth Three times a day   Calcium Carb-Ergocalciferol 250-125 MG-UNIT TABS   Yes No   Sig: Take 1 tablet by mouth   SUMAtriptan (IMITREX) 100 mg tablet   Yes No   Sig: Take 1 tablet by mouth daily   amitriptyline (ELAVIL) 100 mg tablet   Yes No   Sig: Take 100 mg by mouth amphetamine-dextroamphetamine (ADDERALL) 20 mg tablet   Yes No   Sig: Take 20 mg by mouth daily   aspirin (ECOTRIN LOW STRENGTH) 81 mg EC tablet   Yes No   Sig: Take 81 mg by mouth daily   cholecalciferol (VITAMIN D3) 1,000 units tablet   Yes No   Sig: Take 1,000 Units by mouth daily   cyclobenzaprine (FLEXERIL) 10 mg tablet   Yes No   Sig: Take 10 mg by mouth 3 (three) times a day as needed for muscle spasms   divalproex sodium (DEPAKOTE) 250 mg EC tablet   Yes No   Sig: Take 250 mg by mouth every 12 (twelve) hours   gabapentin (NEURONTIN) 300 mg capsule   Yes No   Sig: Take 900 mg by mouth 3 (three) times a day   meclizine (ANTIVERT) 12 5 MG tablet   Yes No   Sig: Take 12 5 mg by mouth every 8 (eight) hours as needed   mesalamine (LIALDA) 1 2 g EC tablet   Yes No   Sig: Take 4,800 mg by mouth daily with breakfast     montelukast (SINGULAIR) 10 mg tablet   Yes No   Sig: Take 10 mg by mouth daily at bedtime   oxyCODONE (ROXICODONE) 30 MG immediate release tablet   Yes No   Sig: Take 30 mg by mouth 3 (three) times a day   pantoprazole (PROTONIX) 40 mg tablet   Yes No   Sig: Take 40 mg by mouth daily   propranolol (INDERAL) 80 mg tablet   Yes No   Sig: Take 80 mg by mouth 3 (three) times a day      Facility-Administered Medications: None       Past Medical History:   Diagnosis Date    Cancer ([facility] [address] )     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colitis (HCC)     Crohn's colitis (HCC)     Endocarditis     Fibromyalgia     Gastritis     Hypertension     IBS (irritable bowel syndrome)     Pancreatitis     RA (rheumatoid arthritis) (HCC)     Restrictive lung disease     Vertigo        Past Surgical History:   Procedure Laterality Date    APPENDECTOMY         History reviewed  No pertinent family history  I have reviewed and agree with the history as documented      Social History   Substance Use Topics    Smoking status: Current Every Day Smoker     Packs/day: 1 00     Types: Cigarettes    Smokeless tobacco: Never Used  Alcohol use No        Review of Systems   Constitutional: Positive for fever  Negative for chills  HENT: Negative for rhinorrhea and sore throat  Eyes: Negative for pain, redness and visual disturbance  Respiratory: Positive for cough  Negative for shortness of breath  Cardiovascular: Negative for chest pain and leg swelling  Gastrointestinal: Positive for diarrhea  Negative for abdominal pain and vomiting  Endocrine: Negative for polydipsia and polyuria  Genitourinary: Negative for dysuria, frequency, hematuria, vaginal bleeding and vaginal discharge  Musculoskeletal: Negative for back pain and neck pain  Skin: Negative for rash and wound  Allergic/Immunologic: Negative for immunocompromised state  Neurological: Positive for dizziness and numbness  Negative for weakness and headaches  Hematological: Does not bruise/bleed easily  Psychiatric/Behavioral: Negative for hallucinations and suicidal ideas  The patient is nervous/anxious  Physical Exam  ED Triage Vitals   Temperature Pulse Respirations Blood Pressure SpO2   01/07/18 0530 01/07/18 0526 01/07/18 0526 01/07/18 0526 01/07/18 0526   99 3 °F (37 4 °C) (!) 134 18 164/79 100 %      Temp Source Heart Rate Source Patient Position - Orthostatic VS BP Location FiO2 (%)   01/07/18 0530 01/07/18 0526 01/07/18 0526 01/07/18 0526 --   Oral Monitor Lying Right arm       Pain Score       01/07/18 0526       Worst Possible Pain           Orthostatic Vital Signs  Vitals:    01/07/18 0526   BP: 164/79   Pulse: (!) 134   Patient Position - Orthostatic VS: Lying       Physical Exam   Constitutional: She is oriented to person, place, and time  She appears well-developed and well-nourished  HENT:   Head: Normocephalic and atraumatic  Mouth/Throat: Oropharynx is clear and moist    Eyes: Conjunctivae and EOM are normal  Pupils are equal, round, and reactive to light  Right eye exhibits no discharge  Left eye exhibits no discharge   No scleral icterus  Neck: Normal range of motion  Neck supple  Cardiovascular: Regular rhythm, normal heart sounds and intact distal pulses  Exam reveals no gallop and no friction rub  No murmur heard  Tachycardic rate   Pulmonary/Chest: Effort normal  No stridor  No respiratory distress  She has wheezes  She has no rales  Abdominal: Soft  Bowel sounds are normal  She exhibits no distension  There is no tenderness  There is no rebound and no guarding  Musculoskeletal: Normal range of motion  She exhibits no edema, tenderness or deformity  No CVA tenderness  No calf tenderness  Neurological: She is alert and oriented to person, place, and time  She has normal strength  No sensory deficit  GCS eye subscore is 4  GCS verbal subscore is 5  GCS motor subscore is 6  Skin: Skin is warm and dry  No rash noted  Psychiatric:   Patient is very anxious  Vitals reviewed        ED Medications  Medications   sodium chloride 0 9 % bolus 1,000 mL (not administered)   LORazepam (ATIVAN) 2 mg/mL injection 2 mg (not administered)       Diagnostic Studies  Results Reviewed     Procedure Component Value Units Date/Time    Lipase [[id number]]     Lab Status:  No result Specimen:  Blood     Lactic acid, plasma [[id number]]     Lab Status:  No result Specimen:  Blood     Troponin I [[id number]]     Lab Status:  No result Specimen:  Blood     Rapid drug screen, urine [[id number]]     Lab Status:  No result Specimen:  Urine     POCT pregnancy, urine [[id number]]     Lab Status:  No result     CBC and differential [[id number]]     Lab Status:  No result Specimen:  Blood     Blood culture #1 [[id number]]     Lab Status:  No result Specimen:  Blood     Blood culture #2 [[id number]]     Lab Status:  No result Specimen:  Blood     UA w Reflex to Microscopic w Reflex to Culture [[id number]]     Lab Status:  No result Specimen:  Urine     Comprehensive metabolic panel [[id number]]     Lab Status:  No result Specimen:  Blood     TSH [[id number]]     Lab Status:  No result Specimen:  Blood                  CT head without contrast    (Results Pending)   PE Study with CT Abdomen and Pelvis with contrast    (Results Pending)              Procedures  ECG 12 Lead Documentation  Date/Time: 1/7/2018 6:15 AM  Performed by: Wilfrido Islas by: Stoney Avitia     ECG reviewed by me, the ED Provider: yes    Patient location:  ED  Interpretation:     Interpretation: non-specific    Rate:     ECG rate assessment: tachycardic    Rhythm:     Rhythm: sinus rhythm    Ectopy:     Ectopy: none    QRS:     QRS axis:  Normal  Conduction:     Conduction: normal    ST segments:     ST segments:  Normal  T waves:     T waves: normal             Phone Contacts  ED Phone Contact    ED Course  ED Course                                [facility]  CritCare Time    Disposition  Final diagnoses:   None     ED Disposition     None      Follow-up Information    None       Patient's Medications   Discharge Prescriptions    No medications on file     No discharge procedures on file      ED Provider  Electronically Signed by           Kaylah Freeman MD  01/10/18 7699

## 2018-01-07 NOTE — ASSESSMENT & PLAN NOTE
Patient with multiple hospitalizations, recently discharged 1/03, has been on antibiotics frequently since August, presented with continued left arm numbness  CT of the chest on previous admission showed no evidence of pneumonia  CTA of the chest 1/7 does not show clear evidence of pulmonary embolism, but does show bilateral patchy to nodular pulmonary infiltrates suspicious for bronchopneumonia  Mild reactive adenopathy in the hilar and mediastinal regions  Patient had and initiated on HCAP pathway with vancomycin and cefepime  Unfortunately, patient reports a family emergency and left the hospital AMA  Per nursing, she had already pulled her IV site and was dressed and nearly out the door, before they could try to discuss with her stay in the hospital   I caught patient in the hallway, very briefly was able to advise her of the risks of leaving AMA including worsening of her infection and death, and she reported to she would need leave  There is no pharmacy on file, patient stated [facility], but I cannot find any local [facility] with this information  Unfortunately, I am unable to prescribe this patient any antibiotics at this time  AMA paperwork signed by the patient with nurse, I signed the paper after

## 2018-01-07 NOTE — ASSESSMENT & PLAN NOTE
Appears chronic, on review of her labs  She was to be seen by Hematology outpatient, has not yet had the opportunity to follow-up  Presently 22,000, which is more elevated in the setting of acute pulmonary infection (was 19,000 on last discharge)    Patient will need to follow up Hematology

## 2018-01-07 NOTE — ASSESSMENT & PLAN NOTE
Patient presently smoking 1 pack per day, she does not appear ready to quit  I did  the patient on tobacco use and risks, hopefully she can follow up with her a provider regarding quitting

## 2018-01-07 NOTE — ASSESSMENT & PLAN NOTE
Previously on Humira, currently on hold secondary to lack of insurance as well as discontinued after pneumonia in the fall of 2017  Continue to hold Humira, okay to continue mesalamine at this point  No acute GI complaints, will monitor

## 2018-01-07 NOTE — ASSESSMENT & PLAN NOTE
Patient presently smoking 1 pack per day, she does not appear ready to quit  Nicotine patch while hospitalized, will continue to educate patient  I counseled her on the risks and benefits

## 2018-01-07 NOTE — ASSESSMENT & PLAN NOTE
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

## 2018-01-07 NOTE — ED NOTES
Bladder scanner- 302ml  Dr Chandler Nelson made aware  Awaiting order for straight cath        Roberta Velasco, TAYLOR  01/07/18 8897

## 2018-01-07 NOTE — ED NOTES
Patient appears frantic and pulled off all wires and IV line and attempting to get dressed  RN notified Slim and attempted to deescalate patient     Patient asked to wait until admitting came down to see her  Patient stating"she has to leave due to a family emergency"  Patient stated"Idont have to sign an AMA form I am leaving"  RN notifed charge nurse and paged Slim  Slim came down and explained to patient she should stay but patient refused and left AMA signature was obtained prior to departure       Aryan Jerez RN  01/07/18 4029

## 2018-01-07 NOTE — H&P
H&P- Edie Wasserman 1980, 42 y o  female MRN: [id number]    Unit/Bed#: ED 14 Encounter: [id number]    Primary Care Provider: Jay Jay Lira DO   Date and time admitted to hospital: 1/7/2018  5:19 AM        * HCAP (healthcare-associated pneumonia)   Assessment & Plan    Patient with multiple hospitalizations, recently discharged 1/03, has been on antibiotics frequently since August, presented with continued left arm numbness  CT of the chest on previous admission showed no evidence of pneumonia  CTA of the chest today does not show clear evidence of pulmonary embolism, but does show bilateral patchy to nodular pulmonary infiltrates suspicious for bronchopneumonia  Mild reactive adenopathy in the hilar and mediastinal regions  Admit patient, HCAP pathway with vancomycin and cefepime for gram-(-) coverage and MRSA coverage  Sputum culture pending  Tylenol as needed for fever, afebrile presently  Sepsis criteria met on admission with tachycardia, leukocytosis, infiltrates  Will give fluids and monitor  Lactic acid has normalized already  Consider pulmonary consult Monday, patient has had multiple bronchoscopies in the past which are reported as (-), through [facility]  Tobacco abuse   Assessment & Plan    Patient presently smoking 1 pack per day, she does not appear ready to quit  Nicotine patch while hospitalized, will continue to educate patient  I counseled her on the risks and benefits  Crohn disease ([facility] [address] )   Assessment & Plan    Previously on Humira, currently on hold secondary to lack of insurance as well as discontinued after pneumonia in the fall of 2017  Continue to hold Humira, okay to continue mesalamine at this point  No acute GI complaints, will monitor  Leukocytosis   Assessment & Plan    Appears chronic, on review of her labs  She was to be seen by Hematology outpatient, has not yet had the opportunity to follow-up    Presently 22 58, which is elevated in the setting of acute pulmonary infection  Will trend CBC with diff  Recommend follow-up with Hematology after discharge  Left arm numbness   Assessment & Plan    Recently admitted and evaluated by Neurology, who felt symptoms were most likely related to complex migraines  Patient has a nonfocal neurologic exam, neurologic evaluation last admission was unrevealing for acute etiology  Will continue to monitor, she will continue to follow up outpatient  There appears to be no acute change, will hold off on neurology evaluation  If new neurologic symptoms or progression, will consult Neurology for re-evaluation  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)   Assessment & Plan    Patient complains of "rapidly growing vaginal lesion"  Patient is followed by an outside gynecologist, through [facility], and per review of the chart has history of LGSIL  Patient is unsure of what treatment she has had to this point, though when asked she tells me that she "had cervical cancer"  Will need to obtain records from outside gynecologist, and can consider inpatient Gynecology consultation if develops bleeding or severe pain, otherwise can follow up outpatient  VTE Prophylaxis: Enoxaparin (Lovenox)  / sequential compression device   Code Status: FULL  POLST: POLST is not applicable to this patient  Discussion with family: patient seen in ER, no family members at the bedside  Patient declined to have me call her  for an update  Anticipated Length of Stay:  Patient will be admitted on an Inpatient basis with an anticipated length of stay of  > 2 midnights  Justification for Hospital Stay:  Healthcare associated pneumonia requiring IV antibiotics, leukocytosis requiring close monitoring    Total Time for Visit, including Counseling / Coordination of Care: 1 hour  Greater than 50% of this total time spent on direct patient counseling and coordination of care      Chief Complaint:   "I am sick and I just want to know what's wrong"    History of Present Illness:    Tru Martinez is a 42 y o  female who presents with HCAP  Patient has a past medical history significant for fibromyalgia, rheumatoid arthritis, Crohn's disease, pancolitis, hypertension, IBS, restrictive lung disease, vertigo  Patient presents here with multiple complaints  She was recently in the hospital and discharged 1/03 for complaints of left facial and arm numbness and tingling  Neurologic workup at that time was unrevealing, neurology felt she was having atypical migraines  She was to follow up outpatient  She reports continued numbness of the left arm without weakness  She also reports cough and shortness of breath  No chest pain or palpitations  CT on admission does show evidence of bilateral pneumonia  She reports I have had pneumonia since August and has been on multiple rounds of antibiotics  She was seen by infectious disease the last admission, secondary to persistent leukocytosis  At that time, there is no obvious source of infection and antibiotics were discontinued  Patient reports temporal temperature at home was T-max 102° F  Patient also reports headache behind the right eye since discontinuation of Depakote after the last admission  She reports she was on this medication for her migraines  She remains on propanolol, reports taking Imitrex 15 days a months, and remains on amitriptyline  Patient also reports rapid growing lesion in the vaginal region  She reports she has a history of cervical cancer, she is unsure what treatment she received, follows with Dr Carol Morales at [facility]  On review of her chart, I am not able to see office notes in detail, however there is notation of low-grade squamous intraepithelial lesion      Review of Systems:    Patient appears to be a poor historian, and is jumping between thought processes  Review of Systems   Constitutional: Positive for activity change, appetite change and fever (Patient reported 102)  HENT: Positive for hearing loss (Reported left hearing loss)  Negative for congestion and ear pain  Respiratory: Positive for cough (Productive, thick yellow) and shortness of breath  Cardiovascular: Positive for leg swelling (Ankle bilaterally)  Negative for chest pain and palpitations  Gastrointestinal: Negative for blood in stool, diarrhea and nausea  Genitourinary: Negative for difficulty urinating, dysuria, vaginal bleeding and vaginal pain  Patient reports vaginal lesion   Musculoskeletal:        Chronic pain, fibromyalgia   Allergic/Immunologic: Negative for immunocompromised state (Not presently on biologic agent)  Past Medical and Surgical History:     Past Medical History:   Diagnosis Date    Cervical cancer ([facility] )     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colitis ([place] [address] )     Crohn's colitis ([place] [address] )     Endocarditis     Fibromyalgia     Gastritis     Hypertension     IBS (irritable bowel syndrome)     Pancreatitis     RA (rheumatoid arthritis) ([place] [address] )     Restrictive lung disease     Vertigo        Past Surgical History:   Procedure Laterality Date    APPENDECTOMY      BRONCHOSCOPY      multiple       Meds/Allergies:    Prior to Admission medications    Medication Sig Start Date End Date Taking?  Authorizing Provider   ALPRAZolam Laith Rush) 2 MG tablet Take 2 mg by mouth Three times a day    Historical Provider, MD   amitriptyline (ELAVIL) 100 mg tablet Take 100 mg by mouth    Historical Provider, MD   amphetamine-dextroamphetamine (ADDERALL) 20 mg tablet Take 20 mg by mouth daily    Historical Provider, MD   aspirin (ECOTRIN LOW STRENGTH) 81 mg EC tablet Take 81 mg by mouth daily    Historical Provider, MD   Calcium Carb-Ergocalciferol 250-125 MG-UNIT TABS Take 1 tablet by mouth    Historical Provider, MD   cholecalciferol (VITAMIN D3) 1,000 units tablet Take 1,000 Units by mouth daily    Historical Provider, MD cyclobenzaprine (FLEXERIL) 10 mg tablet Take 10 mg by mouth 3 (three) times a day as needed for muscle spasms    Historical Provider, MD   divalproex sodium (DEPAKOTE) 250 mg EC tablet Take 250 mg by mouth every 12 (twelve) hours    Historical Provider, MD   gabapentin (NEURONTIN) 300 mg capsule Take 900 mg by mouth 3 (three) times a day    Historical Provider, MD   meclizine (ANTIVERT) 12 5 MG tablet Take 12 5 mg by mouth every 8 (eight) hours as needed    Historical Provider, MD   mesalamine (LIALDA) 1 2 g EC tablet Take 4,800 mg by mouth daily with breakfast      Historical Provider, MD   montelukast (SINGULAIR) 10 mg tablet Take 10 mg by mouth daily at bedtime    Historical Provider, MD   oxyCODONE (ROXICODONE) 30 MG immediate release tablet Take 30 mg by mouth 3 (three) times a day    Historical Provider, MD   pantoprazole (PROTONIX) 40 mg tablet Take 40 mg by mouth daily    Historical Provider, MD   propranolol (INDERAL) 80 mg tablet Take 80 mg by mouth 3 (three) times a day    Historical Provider, MD   SUMAtriptan (IMITREX) 100 mg tablet Take 1 tablet by mouth daily    Historical Provider, MD     I have reviewed home medications with patient personally  Allergies:    Allergies   Allergen Reactions    Penicillins Hives    Toradol [Ketorolac Tromethamine] Rash       Social History:     Marital Status: /Civil Union   Occupation: not presently employed  Patient Pre-hospital Living Situation:  Home, with children and   Patient Pre-hospital Level of Mobility:  Independent  Patient Pre-hospital Diet Restrictions:  Regular diet  Substance Use History:   History   Alcohol Use No     History   Smoking Status    Current Every Day Smoker    Packs/day: 1 00    Types: Cigarettes   Smokeless Tobacco    Never Used     History   Drug Use    Types: Marijuana       Family History:    Family History   Problem Relation Age of Onset    Colon cancer Brother 28    Crohn's disease Brother        Physical Exam:     Vitals:   Blood Pressure: 140/65 (01/07/18 1205)  Pulse: (!) 109 (01/07/18 1205)  Temperature: 99 3 °F (37 4 °C) (01/07/18 0530)  Temp Source: Oral (01/07/18 0530)  Respirations: 20 (01/07/18 1205)  Height: 5' 2" (157 5 cm) (01/07/18 0526)  Weight - Scale: 73 9 kg (162 lb 14 7 oz) (01/07/18 0526)  SpO2: 97 % (01/07/18 1205)    Physical Exam   Constitutional: She appears well-developed and well-nourished  Non-toxic appearance  Majority of examination/interview the patient, she kept her eyes closed though does not report any light sensitivity   HENT:   Head: Normocephalic and atraumatic  Right Ear: Hearing, tympanic membrane, external ear and ear canal normal    Left Ear: Hearing, tympanic membrane, external ear and ear canal normal    Nose: No rhinorrhea  Mouth/Throat: Mucous membranes are normal  No oropharyngeal exudate  Eyes: Conjunctivae are normal    Neck: Neck supple  Cardiovascular: Regular rhythm, S1 normal, S2 normal, normal heart sounds and intact distal pulses  Tachycardia present  No murmur heard  Pulmonary/Chest: Effort normal  No respiratory distress  She has no wheezes  She has rhonchi in the right lower field, the left middle field and the left lower field  She has no rales  Abdominal: Soft  Normal appearance and bowel sounds are normal  She exhibits no distension  There is no tenderness  Musculoskeletal: Normal range of motion  She exhibits no edema  Lymphadenopathy:     She has no cervical adenopathy  Neurological: She is alert  She has normal strength and normal reflexes  No cranial nerve deficit  Coordination normal    Skin: Skin is warm, dry and intact  Psychiatric: Her mood appears anxious  Patient is tangential, anxious, time lines are not consistent   Nursing note and vitals reviewed  Additional Data:     Lab Results: I have personally reviewed pertinent reports          Results from last 7 days  Lab Units 01/07/18  0608   WBC Thousand/uL 22 58* HEMOGLOBIN g/dL 11 7   HEMATOCRIT % 36 0   PLATELETS Thousands/uL 276   NEUTROS PCT % 72   LYMPHS PCT % 21   MONOS PCT % 5   EOS PCT % 1       Results from last 7 days  Lab Units 01/07/18  0608   SODIUM mmol/L 142   POTASSIUM mmol/L 3 2*   CHLORIDE mmol/L 101   CO2 mmol/L 33*   BUN mg/dL 10   CREATININE mg/dL 0 85   CALCIUM mg/dL 9 0   TOTAL PROTEIN g/dL 7 1   BILIRUBIN TOTAL mg/dL 0 20   ALK PHOS U/L 84   ALT U/L 28   AST U/L 17   GLUCOSE RANDOM mg/dL 87           Imaging: I have personally reviewed pertinent reports  PE Study with CT Abdomen and Pelvis with contrast   Final Result by Freedom Garcia MD (01/07 0827)      Motion degraded exam mostly in the chest region  No definitive evidence of pulmonary emboli  The patient has developed bilateral patchy to nodular pulmonary infiltrates suspicious for bronchopneumonia  Mild reactive adenopathy in the hilar and mediastinal regions  No acute abdominopelvic pathology seen  Persistent mild common bile duct prominence without obstructing stone identified  Workstation performed: [id number]         CT head without contrast   Final Result by Freedom Garcia MD (01/07 0820)      No acute intracranial abnormality is visible  Workstation performed: [id number]             EKG, Pathology, and Other Studies Reviewed on Admission:   · EKG: sinus tachy    Allscripts / Epic Records Reviewed: Yes     ** Please Note: This note has been constructed using a voice recognition system   **

## 2018-01-07 NOTE — ASSESSMENT & PLAN NOTE
Patient with multiple hospitalizations, recently discharged 1/03, has been on antibiotics frequently since August, presented with continued left arm numbness  CT of the chest on previous admission showed no evidence of pneumonia  CTA of the chest today does not show clear evidence of pulmonary embolism, but does show bilateral patchy to nodular pulmonary infiltrates suspicious for bronchopneumonia  Mild reactive adenopathy in the hilar and mediastinal regions  Admit patient, HCAP pathway with vancomycin and cefepime for gram-(-) coverage and MRSA coverage  Sputum culture pending  Tylenol as needed for fever, afebrile presently  Sepsis criteria met on admission with tachycardia, leukocytosis, infiltrates  Will give fluids and monitor  Lactic acid has normalized already

## 2018-01-07 NOTE — ASSESSMENT & PLAN NOTE
Recently admitted and evaluated by Neurology, who felt symptoms were most likely related to complex migraines  Patient has a nonfocal neurologic exam, neurologic evaluation last admission was unrevealing for acute etiology  Will continue to monitor, she will continue to follow up outpatient  There is no obvious acute change, patient had full range of motion and strength    Follow-up Neurology outpatient

## 2018-01-07 NOTE — DISCHARGE SUMMARY
Discharge- Leonie Madrid 1980, 42 y o  female MRN: [id number]    Unit/Bed#: ED 14 Encounter: [id number]    Primary Care Provider: Noel Farr DO   Date and time admitted to hospital: 1/7/2018  5:19 AM        * HCAP (healthcare-associated pneumonia)   Assessment & Plan    Patient with multiple hospitalizations, recently discharged 1/03, has been on antibiotics frequently since August, presented with continued left arm numbness  CT of the chest on previous admission showed no evidence of pneumonia  CTA of the chest 1/7 does not show clear evidence of pulmonary embolism, but does show bilateral patchy to nodular pulmonary infiltrates suspicious for bronchopneumonia  Mild reactive adenopathy in the hilar and mediastinal regions  Patient was admitted to [place] [address], initiated on HCAP pathway with vancomycin and cefepime  Unfortunately, patient reports a family emergency and left the hospital AMA  Per nursing, she had already pulled her IV site and was dressed and nearly out the door, before they could try to discuss with her stay in the hospital   I caught patient in the hallway, very briefly was able to advise her of the risks of leaving AMA including worsening of her infection and death, and she reported to she would need leave  There is no pharmacy on file, patient stated [facility], unfortunately I cannot find any local [facility] with this information and am therefore unable to prescribe this patient any antibiotics at this time  AMA paperwork signed by the patient with nurse, I signed the paper after  Tobacco abuse   Assessment & Plan    Patient presently smoking 1 pack per day, she does not appear ready to quit  I did  the patient on tobacco use and risks, hopefully she can follow up with her a provider regarding quitting          Crohn disease ([facility] [address] )   Assessment & Plan    Previously on Humira, currently on hold secondary to lack of insurance as well as discontinued after pneumonia in the fall of 2017  Continue to hold Humira, okay to continue mesalamine at this point  Patient did not have any GI complaints during brief hospitalization  Leukocytosis   Assessment & Plan    Appears chronic, on review of her labs  She was to be seen by Hematology outpatient, has not yet had the opportunity to follow-up  Presently 22,000, which is more elevated in the setting of acute pulmonary infection (was 19,000 on last discharge)  Patient will need to follow up Hematology        Left arm numbness   Assessment & Plan    Recently admitted and evaluated by Neurology, who felt symptoms were most likely related to complex migraines  Patient has a nonfocal neurologic exam, neurologic evaluation last admission was unrevealing for acute etiology  Will continue to monitor, she will continue to follow up outpatient  There is no obvious acute change, patient had full range of motion and strength  Follow-up Neurology outpatient        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)   Assessment & Plan    Patient complained of "rapidly growing vaginal lesion"  Patient is followed by an outside gynecologist, through [facility], and per review of the chart has history of LGSIL  Patient is unsure of what treatment she has had to this point (suspect colposcopy, but I do not see any results)  When asked, she tells me that she "had cervical cancer"  She will need to follow up with her gynecologist for further evaluation  Consultations During Hospital Stay:  · None    Procedures Performed:     · CTA chest - bilateral pneumonia    Significant Findings / Test Results:     · Leukocytosis 22,000 with elevated absolute neutrophil count  · Hypokalemia  · Urine drug screen (+) benzos, opiates    Incidental Findings:   · None     Test Results Pending at Discharge (will require follow up):    · Blood cultures, sputum culture, influenza swab     Outpatient Tests Requested:  · Hematology consultation for leukocytosis  · Pulmonology follow-up for pneumonia    Complications:  Patient left AMA    Reason for Admission:  Pneumonia    Hospital Course:     Alyce Villavicencio is a 42 y o  female patient who originally presented to the hospital on 1/7/2018 due to multiple complaints  She was recently in the hospital and discharged 1/03 for complaints of left facial and arm numbness and tingling  Neurologic workup at that time was unrevealing, neurology felt she was having atypical migraines  She was to follow up outpatient  She reports continued numbness of the left arm without weakness  Patient also had complained of shortness of breath and cough, headache, vaginal lesion      Patient was admitted to the hospitalist service, however reported a family emergency, and left the hospital AMA despite attempts to keep patient for treatment  Very briefly I was able to discuss risks and benefits of her leaving, she was able to sign AMA paperwork  Please see above list of diagnoses and related plan for additional information  Condition at Discharge: serious     Discharge Day Visit / Exam:     * Please refer to separate progress note for these details *    Discussion with Family:  None, no family members present    Discharge instructions/Information to patient and family:   See after visit summary for information provided to patient and family  Provisions for Follow-Up Care:  See after visit summary for information related to follow-up care and any pertinent home health orders  Disposition:     Home    For Discharges to [facility]:   · Not Applicable to this Patient - Not Applicable to this Patient    Planned Readmission:  Highly likely     Discharge Statement:  I spent approximately 10 minutes discharging the patient  This time was spent on the day of discharge  I had direct contact with the patient on the day of discharge   Greater than 50% of the total time was spent examining patient, answering all patient questions, arranging and discussing plan of care with patient as well as directly providing post-discharge instructions  Additional time then spent on discharge activities  Discharge Medications:  See after visit summary for reconciled discharge medications provided to patient and family        ** Please Note: This note has been constructed using a voice recognition system **

## 2018-01-07 NOTE — ASSESSMENT & PLAN NOTE
Appears chronic, on review of her labs  She was to be seen by Hematology outpatient, has not yet had the opportunity to follow-up  Presently 22 58, which is elevated in the setting of acute pulmonary infection  Will trend CBC with diff  Recommend follow-up with Hematology after discharge

## 2018-01-07 NOTE — ASSESSMENT & PLAN NOTE
Patient complains of "rapidly growing vaginal lesion"  Patient is followed by an outside gynecologist, through [facility], and per review of the chart has history of LGSIL  Patient is unsure of what treatment she has had to this point, though when asked she tells me that she "had cervical cancer"  Will need to obtain records from outside gynecologist, and can consider inpatient Gynecology consultation if develops bleeding or severe pain, otherwise can follow up outpatient

## 2018-01-08 LAB
FLUAV AG SPEC QL: NORMAL
FLUBV AG SPEC QL: NORMAL
RSV B RNA SPEC QL NAA+PROBE: NORMAL

## 2018-01-08 NOTE — CASE MANAGEMENT
Initial Clinical Review    Admission: Date/Time/Statement: 1/7/18 @ 1039     Orders Placed This Encounter   Procedures    Inpatient Admission (expected length of stay for this patient is greater than two midnights)     Standing Status:   Standing     Number of Occurrences:   1     Order Specific Question:   Admitting Physician     Answer:   Hernan Ng, Bienvenido Espino     Order Specific Question:   Level of Care     Answer:   Med Surg [16]     Order Specific Question:   Estimated length of stay     Answer:   More than 2 Midnights     Order Specific Question:   Certification     Answer:   I certify that inpatient services are medically necessary for this patient for a duration of greater than two midnights  See H&P and MD Progress Notes for additional information about the patient's course of treatment  ED: Date/Time/Mode of Arrival:   ED Arrival Information     Expected Arrival Acuity Means of Arrival Escorted By Service Admission Type    - 1/7/2018 05:16 Urgent Walk-In Self General Medicine Urgent    Arrival Complaint    fever,confusion, dizziness          Chief Complaint:   Chief Complaint   Patient presents with    Dizziness     pt has multiple complains that include loss of balance, confusion, numbness and tingeling in L arm that "it is not going away"       History of Illness:    Laura Ferguson is a 42 y o  female who presents with HCAP  Patient has a past medical history significant for fibromyalgia, rheumatoid arthritis, Crohn's disease, pancolitis, hypertension, IBS, restrictive lung disease, vertigo      Patient presents here with multiple complaints  She was recently in the hospital and discharged 1/03 for complaints of left facial and arm numbness and tingling  Neurologic workup at that time was unrevealing, neurology felt she was having atypical migraines  She was to follow up outpatient    She reports continued numbness of the left arm without weakness      She also reports cough and shortness of breath  No chest pain or palpitations  CT on admission does show evidence of bilateral pneumonia  She reports I have had pneumonia since August and has been on multiple rounds of antibiotics  She was seen by infectious disease the last admission, secondary to persistent leukocytosis  At that time, there is no obvious source of infection and antibiotics were discontinued  Patient reports temporal temperature at home was T-max 102° F      Patient also reports headache behind the right eye since discontinuation of Depakote after the last admission  She reports she was on this medication for her migraines  She remains on propanolol, reports taking Imitrex 15 days a months, and remains on amitriptyline      Patient also reports rapid growing lesion in the vaginal region  She reports she has a history of cervical cancer, she is unsure what treatment she received, follows with Dr Catalina Giff[facility] at [facility]    On review of her chart, I am not able to see office notes in detail, however there is notation of low-grade squamous intraepithelial lesion        ED Vital Signs:   ED Triage Vitals   Temperature Pulse Respirations Blood Pressure SpO2   01/07/18 0530 01/07/18 0526 01/07/18 0526 01/07/18 0526 01/07/18 0526   99 3 °F (37 4 °C) (!) 134 18 164/79 100 %      Temp Source Heart Rate Source Patient Position - Orthostatic VS BP Location FiO2 (%)   01/07/18 0530 01/07/18 0526 01/07/18 0526 01/07/18 0526 --   Oral Monitor Lying Right arm       Pain Score       01/07/18 0526       Worst Possible Pain        Wt Readings from Last 1 Encounters:   01/07/18 73 9 kg (162 lb 14 7 oz)       Vital Signs (abnormal):   01/07/18 0957 --  123 18 125/74 97 % -- BF   01/07/18 0930 --  125 22 -- -- -- DB   01/07/18 0915 --  119  24 -- 96 % -- DB   01/07/18 0900 --  117 22 -- 98 % -- DB   01/07/18 0745 --  114 22 -- 97 % -- DB   01/07/18 0730 --  123 22 134/77 98 % -- DB   01/07/18 0645 --  130 21 -- -- -- DB         Abnormal Labs/Diagnostic Test Results: UDS + benzos and opiates , K  3 2, co2 33, wbc  22 58  cThead- wnl  Ct chest -      Motion degraded exam mostly in the chest region   No definitive evidence of pulmonary emboli  The patient has developed bilateral patchy to nodular pulmonary infiltrates suspicious for bronchopneumonia   Mild reactive adenopathy in the hilar and mediastinal regions        EKG - ST     ED Treatment:   Medication Administration from 01/07/2018 0516 to 01/08/2018 1409       Date/Time Order Dose Route Action Action by Comments     01/07/2018 1032 sodium chloride 0 9 % bolus 1,000 mL 0 mL Intravenous Stopped Nata Medellin RN      01/07/2018 0612 sodium chloride 0 9 % bolus 1,000 mL 1,000 mL Intravenous [address] Aretha Doyle RN      01/07/2018 4616 LORazepam (ATIVAN) 2 mg/mL injection 2 mg 2 mg Intravenous Given Leticia Ferguson RN      01/07/2018 0803 iohexol (OMNIPAQUE) 350 MG/ML injection (MULTI-DOSE) 100 mL 100 mL Intravenous Given Lila Teran      01/07/2018 1050 cefepime (MAXIPIME) 2,000 mg in dextrose 5 % 50 mL IVPB 0 mg Intravenous Stopped Nata Medellin RN      01/07/2018 1035 cefepime (MAXIPIME) 2,000 mg in dextrose 5 % 50 mL IVPB 2,000 mg Intravenous [address] Nata Medellin RN      01/07/2018 1256 vancomycin (VANCOCIN) IVPB (premix) 1,000 mg 0 mg/kg Intravenous Stopped Nata Medellin RN      01/07/2018 1122 vancomycin (VANCOCIN) IVPB (premix) 1,000 mg 1,000 mg Intravenous [address] Nata Medellin RN      01/07/2018 1257 sodium chloride 0 9 % bolus 1,000 mL 0 mL Intravenous Stopped Nata Medellin RN      01/07/2018 1032 sodium chloride 0 9 % bolus 1,000 mL 1,000 mL Intravenous [address] Nata Medellin RN      01/07/2018 1049 HYDROmorphone (DILAUDID) 1 mg/mL injection 1 mg 1 mg Intravenous Given Nata Medellin RN      01/07/2018 1109 potassium chloride (K-DUR,KLOR-CON) CR tablet 40 mEq 40 mEq Oral Given Nata Medellin RN      01/07/2018 1433 aspirin (ECOTRIN LOW STRENGTH) EC tablet 81 mg 81 mg Oral Not Given Nick Ibarra RN      01/07/2018 1459 sodium chloride 0 9 % infusion 0 mL/hr Intravenous Stopped Nick Ibarra RN      01/07/2018 1326 sodium chloride 0 9 % infusion 125 mL/hr Intravenous New Bag Desiree Interiano RN      01/07/2018 1346 nicotine (NICODERM CQ) 21 mg/24 hr TD 24 hr patch 1 patch 1 patch Transdermal Medication Applied Desiree Interiano RN      01/07/2018 1327 enoxaparin (LOVENOX) subcutaneous injection 40 mg 40 mg Subcutaneous Not Given Desiree Interiano RN           Past Medical/Surgical History: Active Ambulatory Problems     Diagnosis Date Noted    UTI (urinary tract infection) 12/29/2017    Hepatomegaly 12/29/2017    Dilated bile duct 12/29/2017    Hypokalemia 12/29/2017    Slurred speech 12/29/2017    Tobacco abuse 12/29/2017     Resolved Ambulatory Problems     Diagnosis Date Noted    Sepsis ([facility] ) 12/29/2017    Chest pain 12/29/2017    Left sided numbness 12/29/2017    Rectal bleeding 12/31/2017     Past Medical History:   Diagnosis Date    Cervical cancer ([facility] )     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colitis ([facility] )     Crohn's colitis ([facility] )     Endocarditis     Fibromyalgia     Gastritis     Hypertension     IBS (irritable bowel syndrome)     Pancreatitis     RA (rheumatoid arthritis) ([facility] )     Restrictive lung disease     Vertigo        Admitting Diagnosis: Dizziness [R42]    Age/Sex: 42 y o  female    Assessment/Plan:   * HCAP (healthcare-associated pneumonia)   Assessment & Plan     Patient with multiple hospitalizations, recently discharged 1/03, has been on antibiotics frequently since August, presented with continued left arm numbness  CT of the chest on previous admission showed no evidence of pneumonia    CTA of the chest today does not show clear evidence of pulmonary embolism, but does show bilateral patchy to nodular pulmonary infiltrates suspicious for bronchopneumonia   Mild reactive adenopathy in the hilar and mediastinal regions       Admit patient, HCAP pathway with vancomycin and cefepime for gram-(-) coverage and MRSA coverage  Sputum culture pending      Tylenol as needed for fever, afebrile presently      Sepsis criteria met on admission with tachycardia, leukocytosis, infiltrates  Will give fluids and monitor  Lactic acid has normalized already      Consider pulmonary consult Monday, patient has had multiple bronchoscopies in the past which are reported as (-), through [facility]      Tobacco abuse   Assessment & Plan     Patient presently smoking 1 pack per day, she does not appear ready to quit  Nicotine patch while hospitalized, will continue to educate patient  I counseled her on the risks and benefits        Crohn disease ([facility] [facility] )   Assessment & Plan     Previously on Humira, currently on hold secondary to lack of insurance as well as discontinued after pneumonia in the fall of 2017  Continue to hold Humira, okay to continue mesalamine at this point  No acute GI complaints, will monitor        Leukocytosis   Assessment & Plan     Appears chronic, on review of her labs  She was to be seen by Hematology outpatient, has not yet had the opportunity to follow-up  Presently 22 58, which is elevated in the setting of acute pulmonary infection  Will trend CBC with diff  Recommend follow-up with Hematology after discharge        Left arm numbness   Assessment & Plan     Recently admitted and evaluated by Neurology, who felt symptoms were most likely related to complex migraines  Patient has a nonfocal neurologic exam, neurologic evaluation last admission was unrevealing for acute etiology  Will continue to monitor, she will continue to follow up outpatient      There appears to be no acute change, will hold off on neurology evaluation    If new neurologic symptoms or progression, will consult Neurology for re-evaluation        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)   Assessment & Plan     Patient complains of "rapidly growing vaginal lesion"  Patient is followed by an outside gynecologist, through [facility], and per review of the chart has history of LGSIL  Patient is unsure of what treatment she has had to this point, though when asked she tells me that she "had cervical cancer"  Will need to obtain records from outside gynecologist, and can consider inpatient Gynecology consultation if develops bleeding or severe pain, otherwise can follow up outpatient              VTE Prophylaxis: Enoxaparin (Lovenox)  / sequential compression device   Code Status: FULL  POLST: POLST is not applicable to this patient  Discussion with family: patient seen in ER, no family members at the bedside  Patient declined to have me call her  for an update      Anticipated Length of Stay:  Patient will be admitted on an Inpatient basis with an anticipated length of stay of  > 2 midnights  Justification for Hospital Stay:  Healthcare associated pneumonia requiring IV antibiotics, leukocytosis requiring close monitoring         Admission Orders:  Scheduled Meds:   Continuous Infusions:   No current facility-administered medications for this encounter  PRN Meds:     Tele   Iv cefepime q12h   Iv vanco q12h  Lactic acid q2hr   Act as justino   Reg diet   Po kdur   Flu swab   Droplet isolation  Iv nss @ 125/hr  Iv zofran q6h prn   Po xanax , elavil , adderall , asa, flexeril , neurontin , antivert, singulair , oxycodone, protonix, inderal , lialda, tums  Nicotine patch qd  Sq lovenox qd      DC summary  1/7  PT LEFT AMA   HCAP (healthcare-associated pneumonia)   Assessment & Plan     Patient with multiple hospitalizations, recently discharged 1/03, has been on antibiotics frequently since August, presented with continued left arm numbness  CT of the chest on previous admission showed no evidence of pneumonia    CTA of the chest 1/7 does not show clear evidence of pulmonary embolism, but does show bilateral patchy to nodular pulmonary infiltrates suspicious for bronchopneumonia   Mild reactive adenopathy in the hilar and mediastinal regions      Patient was admitted to [place] [address], initiated on HCAP pathway with vancomycin and cefepime  Unfortunately, patient reports a family emergency and left the hospital AMA  Per nursing, she had already pulled her IV site and was dressed and nearly out the door, before they could try to discuss with her stay in the hospital   I caught patient in the hallway, very briefly was able to advise her of the risks of leaving AMA including worsening of her infection and death, and she reported to she would need leave  There is no pharmacy on file, patient stated [facility], unfortunately I cannot find any local [facility] with this information and am therefore unable to prescribe this patient any antibiotics at this time      AMA paperwork signed by the patient with nurse, I signed the paper after                Thank you,  [facility] in the [facility] by Juan Gonsalez for 2017  Network Utilization Review Department  Phone: [phone]; Fax [phone]  ATTENTION: The Network Utilization Review Department is now centralized for our 7 Facilities  Make a note that we have a new phone and fax numbers for our Department  Please call with any questions or concerns to [phone] and carefully follow the prompts so that you are directed to the right person  All voicemails are confidential  Fax any determinations, approvals, denials, and requests for initial or continue stay review clinical to [phone]  Due to HIGH CALL volume, it would be easier if you could please send faxed requests to expedite your requests and in part, help us provide discharge notifications faster

## 2018-01-09 LAB — BACTERIA UR CULT: ABNORMAL

## 2018-01-09 NOTE — CASE MANAGEMENT
Notification of Discharge  [id number]      This is a Notification of Discharge from our facility [address]  Please be advised that this patient has been discharge from our facility  Below you will find the admission and discharge date and time including the patients disposition  PRESENTATION DATE: 12/28/2017  4:20 PM  IP ADMISSION DATE: 12/28/17 2238  DISCHARGE DATE: 1/3/2018  3:16 PM  DISPOSITION: Left against medical advice or discontinued care    [address] [facility] in the [facility] by Juan Gonsalez for 2017  Network Utilization Review Department  Phone: [phone]; Fax [phone]  ATTENTION: The Network Utilization Review Department is now centralized for our 7 Facilities  Make a note that we have a new phone and fax numbers for our Department  Please call with any questions or concerns to [phone] and carefully follow the prompts so that you are directed to the right person  All voicemails are confidential  Fax any determinations, approvals, denials, and requests for initial or continue stay review clinical to [phone]  Due to HIGH CALL volume, it would be easier if you could please send faxed requests to expedite your requests and in part, help us provide discharge notifications faster

## 2018-01-10 ENCOUNTER — APPOINTMENT (EMERGENCY)
Dept: RADIOLOGY | Facility: HOSPITAL | Age: 38
DRG: 720 | End: 2018-01-10
Payer: COMMERCIAL

## 2018-01-10 ENCOUNTER — HOSPITAL ENCOUNTER (INPATIENT)
Facility: HOSPITAL | Age: 38
LOS: 2 days | Discharge: HOME | DRG: 720 | End: 2018-01-12
Attending: EMERGENCY MEDICINE | Admitting: GENERAL PRACTICE
Payer: COMMERCIAL

## 2018-01-10 ENCOUNTER — APPOINTMENT (EMERGENCY)
Dept: CT IMAGING | Facility: HOSPITAL | Age: 38
DRG: 720 | End: 2018-01-10
Payer: COMMERCIAL

## 2018-01-10 DIAGNOSIS — R00.0 TACHYCARDIA: ICD-10-CM

## 2018-01-10 DIAGNOSIS — R20.0 LEFT ARM NUMBNESS: ICD-10-CM

## 2018-01-10 DIAGNOSIS — R65.20 SEVERE SEPSIS (HCC): ICD-10-CM

## 2018-01-10 DIAGNOSIS — R07.9 CHEST PAIN: Primary | ICD-10-CM

## 2018-01-10 DIAGNOSIS — J18.9 HCAP (HEALTHCARE-ASSOCIATED PNEUMONIA): ICD-10-CM

## 2018-01-10 DIAGNOSIS — N39.0 UTI (URINARY TRACT INFECTION): ICD-10-CM

## 2018-01-10 DIAGNOSIS — R47.81 SLURRED SPEECH: ICD-10-CM

## 2018-01-10 DIAGNOSIS — J18.9 BILATERAL PNEUMONIA: ICD-10-CM

## 2018-01-10 DIAGNOSIS — A41.9 SEVERE SEPSIS (HCC): ICD-10-CM

## 2018-01-10 LAB
ALBUMIN SERPL BCP-MCNC: 3.4 G/DL (ref 3.5–5)
ALP SERPL-CCNC: 79 U/L (ref 46–116)
ALT SERPL W P-5'-P-CCNC: 29 U/L (ref 12–78)
ANION GAP SERPL CALCULATED.3IONS-SCNC: 9 MMOL/L (ref 4–13)
AST SERPL W P-5'-P-CCNC: 19 U/L (ref 5–45)
ATRIAL RATE: 129 BPM
BACTERIA UR QL AUTO: ABNORMAL /HPF
BASOPHILS # BLD MANUAL: 0 THOUSAND/UL (ref 0–0.1)
BASOPHILS NFR MAR MANUAL: 0 % (ref 0–1)
BILIRUB SERPL-MCNC: 0.2 MG/DL (ref 0.2–1)
BILIRUB UR QL STRIP: ABNORMAL
BUN SERPL-MCNC: 8 MG/DL (ref 5–25)
CALCIUM SERPL-MCNC: 8.8 MG/DL (ref 8.3–10.1)
CHLORIDE SERPL-SCNC: 106 MMOL/L (ref 100–108)
CLARITY UR: ABNORMAL
CO2 SERPL-SCNC: 26 MMOL/L (ref 21–32)
COLOR UR: ABNORMAL
CREAT SERPL-MCNC: 0.61 MG/DL (ref 0.6–1.3)
CRP SERPL QL: 65.3 MG/L
EOSINOPHIL # BLD MANUAL: 0 THOUSAND/UL (ref 0–0.4)
EOSINOPHIL NFR BLD MANUAL: 0 % (ref 0–6)
ERYTHROCYTE [DISTWIDTH] IN BLOOD BY AUTOMATED COUNT: 15.4 % (ref 11.6–15.1)
ERYTHROCYTE [SEDIMENTATION RATE] IN BLOOD: 40 MM/HOUR (ref 0–20)
EXT PREG TEST URINE: NEGATIVE
GFR SERPL CREATININE-BSD FRML MDRD: 115 ML/MIN/1.73SQ M
GLUCOSE SERPL-MCNC: 96 MG/DL (ref 65–140)
GLUCOSE UR STRIP-MCNC: ABNORMAL MG/DL
HCT VFR BLD AUTO: 38.5 % (ref 34.8–46.1)
HGB BLD-MCNC: 12.4 G/DL (ref 11.5–15.4)
HGB UR QL STRIP.AUTO: ABNORMAL
KETONES UR STRIP-MCNC: ABNORMAL MG/DL
LACTATE SERPL-SCNC: 2.7 MMOL/L (ref 0.5–2)
LEUKOCYTE ESTERASE UR QL STRIP: ABNORMAL
LYMPHOCYTES # BLD AUTO: 1.41 THOUSAND/UL (ref 0.6–4.47)
LYMPHOCYTES # BLD AUTO: 9 % (ref 14–44)
MAGNESIUM SERPL-MCNC: 1.7 MG/DL (ref 1.6–2.6)
MCH RBC QN AUTO: 29.2 PG (ref 26.8–34.3)
MCHC RBC AUTO-ENTMCNC: 32.2 G/DL (ref 31.4–37.4)
MCV RBC AUTO: 91 FL (ref 82–98)
MONOCYTES # BLD AUTO: 0.47 THOUSAND/UL (ref 0–1.22)
MONOCYTES NFR BLD: 3 % (ref 4–12)
NEUTROPHILS # BLD MANUAL: 13.8 THOUSAND/UL (ref 1.85–7.62)
NEUTS BAND NFR BLD MANUAL: 4 % (ref 0–8)
NEUTS SEG NFR BLD AUTO: 84 % (ref 43–75)
NITRITE UR QL STRIP: POSITIVE
NON-SQ EPI CELLS URNS QL MICRO: ABNORMAL /HPF
NRBC BLD AUTO-RTO: 0 /100 WBCS
P AXIS: 116 DEGREES
PH UR STRIP.AUTO: 5.5 [PH] (ref 4.5–8)
PLATELET # BLD AUTO: 265 THOUSANDS/UL (ref 149–390)
PLATELET BLD QL SMEAR: ADEQUATE
PMV BLD AUTO: 9.9 FL (ref 8.9–12.7)
POTASSIUM SERPL-SCNC: 3.5 MMOL/L (ref 3.5–5.3)
PR INTERVAL: 146 MS
PROT SERPL-MCNC: 7.4 G/DL (ref 6.4–8.2)
PROT UR STRIP-MCNC: ABNORMAL MG/DL
QRS AXIS: 124 DEGREES
QRSD INTERVAL: 76 MS
QT INTERVAL: 294 MS
QTC INTERVAL: 430 MS
RBC # BLD AUTO: 4.25 MILLION/UL (ref 3.81–5.12)
RBC #/AREA URNS AUTO: ABNORMAL /HPF
RBC MORPH BLD: NORMAL
SODIUM SERPL-SCNC: 141 MMOL/L (ref 136–145)
SP GR UR STRIP.AUTO: >=1.03 (ref 1–1.03)
T WAVE AXIS: 154 DEGREES
TOTAL CELLS COUNTED SPEC: 100
TROPONIN I SERPL-MCNC: <0.02 NG/ML
UROBILINOGEN UR QL STRIP.AUTO: 0.2 E.U./DL
VENTRICULAR RATE: 129 BPM
WBC # BLD AUTO: 15.68 THOUSAND/UL (ref 4.31–10.16)
WBC #/AREA URNS AUTO: ABNORMAL /HPF

## 2018-01-10 PROCEDURE — 93005 ELECTROCARDIOGRAM TRACING: CPT | Performed by: EMERGENCY MEDICINE

## 2018-01-10 PROCEDURE — 87040 BLOOD CULTURE FOR BACTERIA: CPT | Performed by: EMERGENCY MEDICINE

## 2018-01-10 PROCEDURE — 83735 ASSAY OF MAGNESIUM: CPT | Performed by: EMERGENCY MEDICINE

## 2018-01-10 PROCEDURE — 96376 TX/PRO/DX INJ SAME DRUG ADON: CPT

## 2018-01-10 PROCEDURE — 71046 X-RAY EXAM CHEST 2 VIEWS: CPT

## 2018-01-10 PROCEDURE — 85652 RBC SED RATE AUTOMATED: CPT | Performed by: EMERGENCY MEDICINE

## 2018-01-10 PROCEDURE — 84484 ASSAY OF TROPONIN QUANT: CPT | Performed by: EMERGENCY MEDICINE

## 2018-01-10 PROCEDURE — 70498 CT ANGIOGRAPHY NECK: CPT

## 2018-01-10 PROCEDURE — 96361 HYDRATE IV INFUSION ADD-ON: CPT

## 2018-01-10 PROCEDURE — 96375 TX/PRO/DX INJ NEW DRUG ADDON: CPT

## 2018-01-10 PROCEDURE — 87086 URINE CULTURE/COLONY COUNT: CPT | Performed by: EMERGENCY MEDICINE

## 2018-01-10 PROCEDURE — 36415 COLL VENOUS BLD VENIPUNCTURE: CPT | Performed by: EMERGENCY MEDICINE

## 2018-01-10 PROCEDURE — 85007 BL SMEAR W/DIFF WBC COUNT: CPT | Performed by: EMERGENCY MEDICINE

## 2018-01-10 PROCEDURE — 86140 C-REACTIVE PROTEIN: CPT | Performed by: EMERGENCY MEDICINE

## 2018-01-10 PROCEDURE — 93005 ELECTROCARDIOGRAM TRACING: CPT

## 2018-01-10 PROCEDURE — 70496 CT ANGIOGRAPHY HEAD: CPT

## 2018-01-10 PROCEDURE — 81001 URINALYSIS AUTO W/SCOPE: CPT | Performed by: EMERGENCY MEDICINE

## 2018-01-10 PROCEDURE — 80053 COMPREHEN METABOLIC PANEL: CPT | Performed by: EMERGENCY MEDICINE

## 2018-01-10 PROCEDURE — 81025 URINE PREGNANCY TEST: CPT | Performed by: EMERGENCY MEDICINE

## 2018-01-10 PROCEDURE — 85027 COMPLETE CBC AUTOMATED: CPT | Performed by: EMERGENCY MEDICINE

## 2018-01-10 PROCEDURE — 83605 ASSAY OF LACTIC ACID: CPT | Performed by: EMERGENCY MEDICINE

## 2018-01-10 PROCEDURE — 96365 THER/PROPH/DIAG IV INF INIT: CPT

## 2018-01-10 RX ORDER — VANCOMYCIN HYDROCHLORIDE 1 G/200ML
15 INJECTION, SOLUTION INTRAVENOUS ONCE
Status: COMPLETED | OUTPATIENT
Start: 2018-01-10 | End: 2018-01-11

## 2018-01-10 RX ORDER — LORAZEPAM 2 MG/ML
0.5 INJECTION INTRAMUSCULAR ONCE
Status: COMPLETED | OUTPATIENT
Start: 2018-01-10 | End: 2018-01-10

## 2018-01-10 RX ORDER — FENTANYL CITRATE 50 UG/ML
50 INJECTION, SOLUTION INTRAMUSCULAR; INTRAVENOUS ONCE
Status: COMPLETED | OUTPATIENT
Start: 2018-01-10 | End: 2018-01-10

## 2018-01-10 RX ADMIN — FENTANYL CITRATE 50 MCG: 50 INJECTION INTRAMUSCULAR; INTRAVENOUS at 19:32

## 2018-01-10 RX ADMIN — LORAZEPAM 0.5 MG: 2 INJECTION, SOLUTION INTRAMUSCULAR; INTRAVENOUS at 19:32

## 2018-01-10 RX ADMIN — SODIUM CHLORIDE 1000 ML: 0.9 INJECTION, SOLUTION INTRAVENOUS at 17:50

## 2018-01-10 RX ADMIN — LORAZEPAM 0.5 MG: 2 INJECTION, SOLUTION INTRAMUSCULAR; INTRAVENOUS at 17:50

## 2018-01-10 RX ADMIN — SODIUM CHLORIDE 1000 ML: 0.9 INJECTION, SOLUTION INTRAVENOUS at 21:19

## 2018-01-10 RX ADMIN — IOHEXOL 100 ML: 350 INJECTION, SOLUTION INTRAVENOUS at 18:46

## 2018-01-10 RX ADMIN — CEFEPIME HYDROCHLORIDE 2000 MG: 2 INJECTION, POWDER, FOR SOLUTION INTRAVENOUS at 20:19

## 2018-01-10 RX ADMIN — VANCOMYCIN HYDROCHLORIDE 1000 MG: 1 INJECTION, SOLUTION INTRAVENOUS at 21:19

## 2018-01-10 NOTE — ED PROVIDER NOTES
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, rheumatoid arthritis, Crohn's, hypertension coming in today for left-sided chest discomfort as well as persistent numbness and tingling down the left upper extremity and bilateral lower extremities  Patient states she was here on January 3rd for left facial, left side arm numbness and tingling where she was admitted to the neuro eval including an MRI and was discharged home  These were thought to believed to be a typical migraines  (MRI was completed)  Patient then was admitted to the hospital on January 7th after CT of the chest showed no PE however nodule pulmonary infiltrates suspicious for bilateral bronchopneumonia  Hcap protocol was started for vancomycin and cefepime; however, patient had a leave AMA from the hospital due to a family emergency  Patient states left sided facial as well as left upper extremity paresthesias have not changed; however, now has paresthesias throughout the bilateral lower extremities  Patient unsure if she has had any fevers at home but felt feverish  She continues to have left-sided chest discomfort, blood streaks, productive cough with yellow-green sputum that has remained unchanged since her AMA  No hemoptysis  She has no nausea vomiting diarrhea  Approximately 1 hour ago patient started "shaking"  She has had no falls, change in mental status slurred speech  Spouse is in the room and states she has not slurring her speech or noticed any facial droop    Patient does complain of left-sided neck discomfort and ear "pressure"        History provided by:  Patient and spouse   used: No        Prior to Admission Medications   Prescriptions Last Dose Informant Patient Reported? Taking?    ALPRAZolam (XANAX) 2 MG tablet   Yes No   Sig: Take 2 mg by mouth Three times a day   Calcium Carb-Ergocalciferol 250-125 MG-UNIT TABS   Yes No   Sig: Take 1 tablet by mouth   SUMAtriptan (IMITREX) 100 mg tablet   Yes No   Sig: Take 1 tablet by mouth daily   amitriptyline (ELAVIL) 100 mg tablet   Yes No   Sig: Take 100 mg by mouth   amphetamine-dextroamphetamine (ADDERALL) 20 mg tablet   Yes No   Sig: Take 20 mg by mouth daily   aspirin (ECOTRIN LOW STRENGTH) 81 mg EC tablet   Yes No   Sig: Take 81 mg by mouth daily   cholecalciferol (VITAMIN D3) 1,000 units tablet   Yes No   Sig: Take 1,000 Units by mouth daily   cyclobenzaprine (FLEXERIL) 10 mg tablet   Yes No   Sig: Take 10 mg by mouth 3 (three) times a day as needed for muscle spasms   divalproex sodium (DEPAKOTE) 250 mg EC tablet   Yes No   Sig: Take 250 mg by mouth every 12 (twelve) hours   gabapentin (NEURONTIN) 300 mg capsule   Yes No   Sig: Take 900 mg by mouth 3 (three) times a day   meclizine (ANTIVERT) 12 5 MG tablet   Yes No   Sig: Take 12 5 mg by mouth every 8 (eight) hours as needed   mesalamine (LIALDA) 1 2 g EC tablet   Yes No   Sig: Take 4,800 mg by mouth daily with breakfast     montelukast (SINGULAIR) 10 mg tablet   Yes No   Sig: Take 10 mg by mouth daily at bedtime   oxyCODONE (ROXICODONE) 30 MG immediate release tablet   Yes No   Sig: Take 30 mg by mouth 3 (three) times a day   pantoprazole (PROTONIX) 40 mg tablet   Yes No   Sig: Take 40 mg by mouth daily   propranolol (INDERAL) 80 mg tablet   Yes No   Sig: Take 80 mg by mouth 3 (three) times a day      Facility-Administered Medications: None       Past Medical History:   Diagnosis Date    Cervical cancer ([facility] )     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colitis ([facility] )     Crohn's colitis ([facility] )     Endocarditis     Fibromyalgia     Gastritis     Hypertension     IBS (irritable bowel syndrome)     Pancreatitis     RA (rheumatoid arthritis) (HCC)     Restrictive lung disease     Vertigo        Past Surgical History:   Procedure Laterality Date    APPENDECTOMY      BRONCHOSCOPY      multiple       Family History   Problem Relation Age of Onset    Colon cancer Brother 28    Crohn's disease Brother      I have reviewed and agree with the history as documented  Social History   Substance Use Topics    Smoking status: Current Every Day Smoker     Packs/day: 1 00     Types: Cigarettes    Smokeless tobacco: Never Used    Alcohol use No        Review of Systems   Constitutional: Positive for fatigue and fever  Respiratory: Positive for cough  Negative for choking, chest tightness, shortness of breath, wheezing and stridor  Cardiovascular: Positive for chest pain and palpitations  Negative for leg swelling  Gastrointestinal: Negative for abdominal distention, abdominal pain, anal bleeding, blood in stool, constipation, diarrhea, nausea and vomiting  Musculoskeletal: Negative for neck pain and neck stiffness  Neurological: Positive for tremors and weakness  Physical Exam  ED Triage Vitals [01/10/18 1658]   Temperature Pulse Respirations Blood Pressure SpO2   98 2 °F (36 8 °C) (!) 126 20 133/56 100 %      Temp Source Heart Rate Source Patient Position - Orthostatic VS BP Location FiO2 (%)   Oral Monitor Sitting Left arm --      Pain Score       Worst Possible Pain           Orthostatic Vital Signs  Vitals:    01/10/18 1658 01/10/18 2022   BP: 133/56 116/72   Pulse: (!) 126 (!) 122   Patient Position - Orthostatic VS: Sitting Lying       Physical Exam   Constitutional: She is oriented to person, place, and time  She appears well-developed and well-nourished  No distress  HENT:   Head: Normocephalic and atraumatic  Right Ear: External ear normal    Left Ear: External ear normal    Nose: Nose normal    Oropharynx clear  Uvula midline with no edema  Dry mucous membranes   Eyes: Conjunctivae and EOM are normal  Pupils are equal, round, and reactive to light  Neck: Normal range of motion  Neck supple  No JVD present  No tracheal deviation present  Cardiovascular: Regular rhythm, normal heart sounds and intact distal pulses  Tachycardia present  No murmur heard  Pulmonary/Chest: Effort normal and breath sounds normal  No stridor  No respiratory distress  Abdominal: Soft  Bowel sounds are normal  She exhibits no distension  There is no tenderness  Musculoskeletal: Normal range of motion  She exhibits no edema  Neurological: She is alert and oriented to person, place, and time  She displays normal reflexes  No cranial nerve deficit  She exhibits normal muscle tone  Coordination normal    Generalized resting tremor  No focality  Negative pronator drift   Skin: Skin is warm  Capillary refill takes less than 2 seconds  She is not diaphoretic  Psychiatric: She has a normal mood and affect  Her behavior is normal    Nursing note and vitals reviewed  ED Medications  Medications   cefepime (MAXIPIME) 2,000 mg in dextrose 5 % 50 mL IVPB (2,000 mg Intravenous New Bag 1/10/18 2019)   sodium chloride 0 9 % bolus 1,000 mL (not administered)   vancomycin (VANCOCIN) IVPB (premix) 1,000 mg (not administered)   sodium chloride 0 9 % bolus 1,000 mL (0 mL Intravenous Stopped 1/10/18 2019)   LORazepam (ATIVAN) 2 mg/mL injection 0 5 mg (0 5 mg Intravenous Given 1/10/18 1750)   iohexol (OMNIPAQUE) 350 MG/ML injection (MULTI-DOSE) 100 mL (100 mL Intravenous Given 1/10/18 1846)   LORazepam (ATIVAN) 2 mg/mL injection 0 5 mg (0 5 mg Intravenous Given 1/10/18 1932)   fentanyl citrate (PF) 100 MCG/2ML 50 mcg (50 mcg Intravenous Given 1/10/18 1932)       Diagnostic Studies  Results Reviewed     Procedure Component Value Units Date/Time    Blood culture #2 [[id number]] Collected:  01/10/18 2035    Lab Status:   In process Specimen:  Blood from Arm, Left Updated:  01/10/18 2038    Blood culture #1 [[id number]] Collected:  01/10/18 2035    Lab Status: In process Specimen:  Blood from Hand, Right Updated:  01/10/18 2038    Lactic acid, plasma [[id number]] Collected:  01/10/18 2036    Lab Status: In process Specimen:  Blood from Arm, Left Updated:  01/10/18 2038    Sedimentation rate, automated [[id number]]  (Abnormal) Collected:  01/10/18 1756    Lab Status:  Final result Specimen:  Blood from Arm, Left Updated:  01/10/18 1929     Sed Rate 40 (H) mm/hour     C-reactive protein [[id number]]  (Abnormal) Collected:  01/10/18 1749    Lab Status:  Final result Specimen:  Blood from Arm, Left Updated:  01/10/18 1857     CRP 65 3 (H) mg/L     Magnesium [[id number]]  (Normal) Collected:  01/10/18 1749    Lab Status:  Final result Specimen:  Blood from Arm, Left Updated:  01/10/18 1857     Magnesium 1 7 mg/dL     CBC and differential [[id number]]  (Abnormal) Collected:  01/10/18 1749    Lab Status:  Final result Specimen:  Blood from Arm, Left Updated:  01/10/18 1855     WBC 15 68 (H) Thousand/uL      RBC 4 25 Million/uL      Hemoglobin 12 4 g/dL      Hematocrit 38 5 %      MCV 91 fL      MCH 29 2 pg      MCHC 32 2 g/dL      RDW 15 4 (H) %      MPV 9 9 fL      Platelets 702 Thousands/uL      nRBC 0 /100 WBCs     Urine Microscopic [[id number]]  (Abnormal) Collected:  01/10/18 1727    Lab Status:  Final result Specimen:  Urine from Urine, Clean Catch Updated:  01/10/18 1832     RBC, UA Innumerable (A) /hpf      WBC, UA 10-20 (A) /hpf      Epithelial Cells Innumerable (A) /hpf      Bacteria, UA Moderate (A) /hpf     Urine culture [[id number]] Collected:  01/10/18 1727    Lab Status:   In process Specimen:  Urine from Urine, Clean Catch Updated:  01/10/18 1832    Troponin I [[id number]]  (Normal) Collected:  01/10/18 1749    Lab Status:  Final result Specimen:  Blood from Arm, Left Updated:  01/10/18 1824     Troponin I <0 02 ng/mL     Narrative:         [employer] Chemistry analyzer 99% cutoff is > 0 04 ng/mL in network labs    o cTnI 99% cutoff is useful only when applied to patients in the clinical setting of myocardial ischemia  o cTnI 99% cutoff should be interpreted in the context of clinical history, ECG findings and possibly cardiac imaging to establish correct diagnosis  o cTnI 99% cutoff may be suggestive but clearly not indicative of a coronary event without the clinical setting of myocardial ischemia  Comprehensive metabolic panel [[id number]]  (Abnormal) Collected:  01/10/18 1749    Lab Status:  Final result Specimen:  Blood from Arm, Left Updated:  01/10/18 1823     Sodium 141 mmol/L      Potassium 3 5 mmol/L      Chloride 106 mmol/L      CO2 26 mmol/L      Anion Gap 9 mmol/L      BUN 8 mg/dL      Creatinine 0 61 mg/dL      Glucose 96 mg/dL      Calcium 8 8 mg/dL      AST 19 U/L      ALT 29 U/L      Alkaline Phosphatase 79 U/L      Total Protein 7 4 g/dL      Albumin 3 4 (L) g/dL      Total Bilirubin 0 20 mg/dL      eGFR 115 ml/min/1 73sq m     Narrative:         National Kidney Disease Education Program recommendations are as follows:  GFR calculation is accurate only with a steady state creatinine  Chronic Kidney disease less than 60 ml/min/1 73 sq  meters  Kidney failure less than 15 ml/min/1 73 sq  meters      UA w Reflex to Microscopic w Reflex to Culture [[id number]]  (Abnormal) Collected:  01/10/18 1727    Lab Status:  Final result Specimen:  Urine from Urine, Clean Catch Updated:  01/10/18 1755     Color, UA Gema     Clarity, UA Cloudy     Specific Gravity, UA >=1 030     pH, UA 5 5     Leukocytes, UA Trace (A)     Nitrite, UA Positive (A)     Protein, UA 30 (1+) (A) mg/dl      Glucose,  (1/10%) (A) mg/dl      Ketones, UA Trace (A) mg/dl      Urobilinogen, UA 0 2 E U /dl      Bilirubin, UA Moderate (A)     Blood, UA Large (A)    POCT pregnancy, urine [[id number]]  (Normal) Resulted:  01/10/18 1731    Lab Status:  Final result Updated:  01/10/18 1731     EXT PREG TEST UR (Ref: Negative) negative                 CTA head and neck w wo contrast   Final Result by Munir Zacarias MD (01/10 1905)      No evidence of occlusion, dissection, aneurysm or significant stenosis  No acute intracranial process  Groundglass opacities in the right middle and left upper lobe could suggest atelectasis and/or pneumonia  If there is persistent or worsening symptomatology, consider noncontrast brain MRI  Periodontal disease  Workstation performed: [id number]         X-ray chest 2 views   Final Result by Perez Alex DO (01/10 1900)      No active pulmonary disease  Workstation performed: [id number]                    Procedures  Procedures       Phone Contacts  ED Phone Contact    ED Course  ED Course                                MDM  Number of Diagnoses or Management Options  Bilateral pneumonia:   Chest pain:   Tachycardia:   UTI (urinary tract infection):   Diagnosis management comments: 6:33 PM  Patient resting in bed no tremors at this time after Ativan IV  Patient's heart rate 100-110 beats per minute  Patient feels better  Pending CTs and chest x-ray  7:19 PM  Chest x-ray without acute pathology; however, on CTs consistent with right middle and upper lobe pneumonia  Patient does have multiple comorbidities, has been having persistent symptoms left AMA several days ago and has not been properly treated for Hcap  Long discussion with patient regarding bed hold here and offered admission to warrant  Patient refused  8:46 PM    Discussed with Alonzo  We had a detailed discussion of the patient's condition and case,  including need for admission  Accepts to his/her service  Bed request/bridging orders placed  EKG: sinus tachycardia  Intervals within normal limits  QTC measured at 430 milliseconds  Nonspecific ST segment changes  Artifact present             Amount and/or Complexity of Data Reviewed  Clinical lab tests: ordered and reviewed  Tests in the radiology section of CPT®: ordered and reviewed  Tests in the medicine section of CPT®: ordered and reviewed  Independent visualization of images, tracings, or specimens: yes      CritCare Time    Disposition  Final diagnoses:   Chest pain   UTI (urinary tract infection)   Tachycardia   Bilateral pneumonia     Time reflects when diagnosis was documented in both MDM as applicable and the Disposition within this note     Time User Action Codes Description Comment    1/10/2018  6:55 PM Charanjit Tommy L Add [R07 9] Chest pain     1/10/2018  6:55 PM [place], [place] L Add [N39 0] UTI (urinary tract infection)     1/10/2018  6:55 PM [place], [place] L Add [R00 0] Tachycardia     1/10/2018  7:20 PM [place], [place] L Add [J18 9] Bilateral pneumonia       ED Disposition     ED Disposition Condition Comment    Admit  Case was discussed with Alonzo and the patient's admission status was agreed to be Admission Status: inpatient status to the service of Dr Jo Garner   Follow-up Information    None       Patient's Medications   Discharge Prescriptions    No medications on file     No discharge procedures on file      ED Provider  Electronically Signed by           Hill Anderson DO  01/10/18 9881

## 2018-01-11 LAB
ANION GAP SERPL CALCULATED.3IONS-SCNC: 8 MMOL/L (ref 4–13)
BACTERIA UR CULT: NORMAL
BUN SERPL-MCNC: 8 MG/DL (ref 5–25)
CALCIUM SERPL-MCNC: 8 MG/DL (ref 8.3–10.1)
CHLORIDE SERPL-SCNC: 112 MMOL/L (ref 100–108)
CO2 SERPL-SCNC: 24 MMOL/L (ref 21–32)
CREAT SERPL-MCNC: 0.54 MG/DL (ref 0.6–1.3)
ERYTHROCYTE [DISTWIDTH] IN BLOOD BY AUTOMATED COUNT: 15.3 % (ref 11.6–15.1)
GFR SERPL CREATININE-BSD FRML MDRD: 119 ML/MIN/1.73SQ M
GLUCOSE SERPL-MCNC: 117 MG/DL (ref 65–140)
HCT VFR BLD AUTO: 32.5 % (ref 34.8–46.1)
HGB BLD-MCNC: 10.6 G/DL (ref 11.5–15.4)
LACTATE SERPL-SCNC: 0.9 MMOL/L (ref 0.5–2)
MAGNESIUM SERPL-MCNC: 1.9 MG/DL (ref 1.6–2.6)
MCH RBC QN AUTO: 29.5 PG (ref 26.8–34.3)
MCHC RBC AUTO-ENTMCNC: 32.6 G/DL (ref 31.4–37.4)
MCV RBC AUTO: 91 FL (ref 82–98)
PLATELET # BLD AUTO: 211 THOUSANDS/UL (ref 149–390)
PMV BLD AUTO: 9.5 FL (ref 8.9–12.7)
POTASSIUM SERPL-SCNC: 3.2 MMOL/L (ref 3.5–5.3)
RBC # BLD AUTO: 3.59 MILLION/UL (ref 3.81–5.12)
SODIUM SERPL-SCNC: 144 MMOL/L (ref 136–145)
TROPONIN I SERPL-MCNC: <0.02 NG/ML
WBC # BLD AUTO: 8.66 THOUSAND/UL (ref 4.31–10.16)

## 2018-01-11 PROCEDURE — 87081 CULTURE SCREEN ONLY: CPT | Performed by: NURSE PRACTITIONER

## 2018-01-11 PROCEDURE — 80048 BASIC METABOLIC PNL TOTAL CA: CPT | Performed by: NURSE PRACTITIONER

## 2018-01-11 PROCEDURE — 36415 COLL VENOUS BLD VENIPUNCTURE: CPT | Performed by: NURSE PRACTITIONER

## 2018-01-11 PROCEDURE — 84484 ASSAY OF TROPONIN QUANT: CPT | Performed by: INTERNAL MEDICINE

## 2018-01-11 PROCEDURE — 83605 ASSAY OF LACTIC ACID: CPT | Performed by: NURSE PRACTITIONER

## 2018-01-11 PROCEDURE — 85027 COMPLETE CBC AUTOMATED: CPT | Performed by: NURSE PRACTITIONER

## 2018-01-11 PROCEDURE — 83735 ASSAY OF MAGNESIUM: CPT | Performed by: NURSE PRACTITIONER

## 2018-01-11 PROCEDURE — 87449 NOS EACH ORGANISM AG IA: CPT | Performed by: NURSE PRACTITIONER

## 2018-01-11 RX ORDER — NICOTINE 21 MG/24HR
1 PATCH, TRANSDERMAL 24 HOURS TRANSDERMAL DAILY
Status: DISCONTINUED | OUTPATIENT
Start: 2018-01-11 | End: 2018-01-12 | Stop reason: HOSPADM

## 2018-01-11 RX ORDER — POTASSIUM CHLORIDE 20 MEQ/1
40 TABLET, EXTENDED RELEASE ORAL ONCE
Status: COMPLETED | OUTPATIENT
Start: 2018-01-11 | End: 2018-01-11

## 2018-01-11 RX ORDER — PROPRANOLOL HYDROCHLORIDE 20 MG/1
80 TABLET ORAL 3 TIMES DAILY
Status: DISCONTINUED | OUTPATIENT
Start: 2018-01-11 | End: 2018-01-12 | Stop reason: HOSPADM

## 2018-01-11 RX ORDER — MELATONIN
1000 DAILY
Status: DISCONTINUED | OUTPATIENT
Start: 2018-01-11 | End: 2018-01-12 | Stop reason: HOSPADM

## 2018-01-11 RX ORDER — OXYCODONE HYDROCHLORIDE 10 MG/1
10 TABLET ORAL EVERY 4 HOURS PRN
Status: DISCONTINUED | OUTPATIENT
Start: 2018-01-11 | End: 2018-01-12 | Stop reason: HOSPADM

## 2018-01-11 RX ORDER — SODIUM CHLORIDE FOR INHALATION 0.9 %
3 VIAL, NEBULIZER (ML) INHALATION
Status: DISCONTINUED | OUTPATIENT
Start: 2018-01-11 | End: 2018-01-12 | Stop reason: HOSPADM

## 2018-01-11 RX ORDER — SUMATRIPTAN 50 MG/1
100 TABLET, FILM COATED ORAL DAILY PRN
Status: DISCONTINUED | OUTPATIENT
Start: 2018-01-11 | End: 2018-01-12 | Stop reason: HOSPADM

## 2018-01-11 RX ORDER — GABAPENTIN 300 MG/1
900 CAPSULE ORAL 3 TIMES DAILY
Status: DISCONTINUED | OUTPATIENT
Start: 2018-01-11 | End: 2018-01-12 | Stop reason: HOSPADM

## 2018-01-11 RX ORDER — LEVALBUTEROL 1.25 MG/.5ML
1.25 SOLUTION, CONCENTRATE RESPIRATORY (INHALATION)
Status: DISCONTINUED | OUTPATIENT
Start: 2018-01-11 | End: 2018-01-12 | Stop reason: HOSPADM

## 2018-01-11 RX ORDER — CYCLOBENZAPRINE HCL 10 MG
10 TABLET ORAL 3 TIMES DAILY PRN
Status: DISCONTINUED | OUTPATIENT
Start: 2018-01-11 | End: 2018-01-12 | Stop reason: HOSPADM

## 2018-01-11 RX ORDER — DIVALPROEX SODIUM 250 MG/1
250 TABLET, DELAYED RELEASE ORAL EVERY 12 HOURS SCHEDULED
Status: DISCONTINUED | OUTPATIENT
Start: 2018-01-11 | End: 2018-01-12 | Stop reason: HOSPADM

## 2018-01-11 RX ORDER — LORAZEPAM 2 MG/ML
1 INJECTION INTRAMUSCULAR ONCE
Status: COMPLETED | OUTPATIENT
Start: 2018-01-11 | End: 2018-01-11

## 2018-01-11 RX ORDER — MONTELUKAST SODIUM 10 MG/1
10 TABLET ORAL
Status: DISCONTINUED | OUTPATIENT
Start: 2018-01-11 | End: 2018-01-12 | Stop reason: HOSPADM

## 2018-01-11 RX ORDER — HEPARIN SODIUM 5000 [USP'U]/ML
5000 INJECTION, SOLUTION INTRAVENOUS; SUBCUTANEOUS EVERY 8 HOURS SCHEDULED
Status: DISCONTINUED | OUTPATIENT
Start: 2018-01-11 | End: 2018-01-12 | Stop reason: HOSPADM

## 2018-01-11 RX ORDER — AMITRIPTYLINE HYDROCHLORIDE 100 MG/1
100 TABLET, FILM COATED ORAL
Status: DISCONTINUED | OUTPATIENT
Start: 2018-01-11 | End: 2018-01-12 | Stop reason: HOSPADM

## 2018-01-11 RX ORDER — OXYCODONE HYDROCHLORIDE 10 MG/1
30 TABLET ORAL 3 TIMES DAILY
Status: DISCONTINUED | OUTPATIENT
Start: 2018-01-11 | End: 2018-01-12 | Stop reason: HOSPADM

## 2018-01-11 RX ORDER — DEXTROAMPHETAMINE SACCHARATE, AMPHETAMINE ASPARTATE, DEXTROAMPHETAMINE SULFATE AND AMPHETAMINE SULFATE 2.5; 2.5; 2.5; 2.5 MG/1; MG/1; MG/1; MG/1
20 TABLET ORAL DAILY
Status: DISCONTINUED | OUTPATIENT
Start: 2018-01-11 | End: 2018-01-12 | Stop reason: HOSPADM

## 2018-01-11 RX ORDER — VANCOMYCIN HYDROCHLORIDE 1 G/200ML
15 INJECTION, SOLUTION INTRAVENOUS EVERY 12 HOURS
Status: DISCONTINUED | OUTPATIENT
Start: 2018-01-11 | End: 2018-01-11

## 2018-01-11 RX ORDER — MORPHINE SULFATE 2 MG/ML
1 INJECTION, SOLUTION INTRAMUSCULAR; INTRAVENOUS EVERY 6 HOURS PRN
Status: DISCONTINUED | OUTPATIENT
Start: 2018-01-11 | End: 2018-01-11

## 2018-01-11 RX ORDER — MESALAMINE 1.2 G/1
4.8 TABLET, DELAYED RELEASE ORAL
Status: DISCONTINUED | OUTPATIENT
Start: 2018-01-11 | End: 2018-01-12 | Stop reason: HOSPADM

## 2018-01-11 RX ORDER — ACETAMINOPHEN 325 MG/1
650 TABLET ORAL EVERY 6 HOURS PRN
Status: DISCONTINUED | OUTPATIENT
Start: 2018-01-11 | End: 2018-01-12 | Stop reason: HOSPADM

## 2018-01-11 RX ORDER — SODIUM CHLORIDE 9 MG/ML
125 INJECTION, SOLUTION INTRAVENOUS CONTINUOUS
Status: DISCONTINUED | OUTPATIENT
Start: 2018-01-11 | End: 2018-01-12

## 2018-01-11 RX ORDER — ONDANSETRON 2 MG/ML
4 INJECTION INTRAMUSCULAR; INTRAVENOUS EVERY 6 HOURS PRN
Status: DISCONTINUED | OUTPATIENT
Start: 2018-01-11 | End: 2018-01-12 | Stop reason: HOSPADM

## 2018-01-11 RX ORDER — OXYCODONE HYDROCHLORIDE 10 MG/1
10 TABLET ORAL EVERY 4 HOURS PRN
Status: DISCONTINUED | OUTPATIENT
Start: 2018-01-11 | End: 2018-01-11

## 2018-01-11 RX ORDER — LORAZEPAM 2 MG/ML
INJECTION INTRAMUSCULAR
Status: DISPENSED
Start: 2018-01-11 | End: 2018-01-11

## 2018-01-11 RX ORDER — ASPIRIN 81 MG/1
81 TABLET ORAL DAILY
Status: DISCONTINUED | OUTPATIENT
Start: 2018-01-11 | End: 2018-01-12 | Stop reason: HOSPADM

## 2018-01-11 RX ORDER — MESALAMINE 400 MG/1
4800 CAPSULE, DELAYED RELEASE ORAL DAILY
Status: DISCONTINUED | OUTPATIENT
Start: 2018-01-11 | End: 2018-01-11 | Stop reason: SDUPTHER

## 2018-01-11 RX ORDER — PANTOPRAZOLE SODIUM 40 MG/1
40 TABLET, DELAYED RELEASE ORAL
Status: DISCONTINUED | OUTPATIENT
Start: 2018-01-11 | End: 2018-01-12 | Stop reason: HOSPADM

## 2018-01-11 RX ORDER — ALPRAZOLAM 0.5 MG/1
2 TABLET ORAL 3 TIMES DAILY PRN
Status: DISCONTINUED | OUTPATIENT
Start: 2018-01-11 | End: 2018-01-12 | Stop reason: HOSPADM

## 2018-01-11 RX ADMIN — ISODIUM CHLORIDE 3 ML: 0.03 SOLUTION RESPIRATORY (INHALATION) at 21:00

## 2018-01-11 RX ADMIN — GABAPENTIN 900 MG: 300 CAPSULE ORAL at 01:54

## 2018-01-11 RX ADMIN — GABAPENTIN 900 MG: 300 CAPSULE ORAL at 21:09

## 2018-01-11 RX ADMIN — DIVALPROEX SODIUM 250 MG: 250 TABLET, DELAYED RELEASE ORAL at 01:55

## 2018-01-11 RX ADMIN — LORAZEPAM 1 MG: 2 INJECTION INTRAMUSCULAR; INTRAVENOUS at 00:43

## 2018-01-11 RX ADMIN — SODIUM CHLORIDE 125 ML/HR: 0.9 INJECTION, SOLUTION INTRAVENOUS at 02:52

## 2018-01-11 RX ADMIN — ALPRAZOLAM 2 MG: 0.5 TABLET ORAL at 23:06

## 2018-01-11 RX ADMIN — SODIUM CHLORIDE 125 ML/HR: 0.9 INJECTION, SOLUTION INTRAVENOUS at 23:10

## 2018-01-11 RX ADMIN — ALPRAZOLAM 2 MG: 0.5 TABLET ORAL at 06:45

## 2018-01-11 RX ADMIN — CYCLOBENZAPRINE HYDROCHLORIDE 10 MG: 10 TABLET, FILM COATED ORAL at 06:21

## 2018-01-11 RX ADMIN — POTASSIUM CHLORIDE 40 MEQ: 1500 TABLET, EXTENDED RELEASE ORAL at 13:07

## 2018-01-11 RX ADMIN — MONTELUKAST SODIUM 10 MG: 10 TABLET, FILM COATED ORAL at 02:27

## 2018-01-11 RX ADMIN — OXYCODONE HYDROCHLORIDE 30 MG: 10 TABLET ORAL at 21:08

## 2018-01-11 RX ADMIN — ISODIUM CHLORIDE 3 ML: 0.03 SOLUTION RESPIRATORY (INHALATION) at 16:30

## 2018-01-11 RX ADMIN — OXYCODONE HYDROCHLORIDE 30 MG: 10 TABLET ORAL at 16:04

## 2018-01-11 RX ADMIN — GABAPENTIN 900 MG: 300 CAPSULE ORAL at 16:04

## 2018-01-11 RX ADMIN — PANTOPRAZOLE SODIUM 40 MG: 40 TABLET, DELAYED RELEASE ORAL at 06:21

## 2018-01-11 RX ADMIN — MONTELUKAST SODIUM 10 MG: 10 TABLET, FILM COATED ORAL at 22:01

## 2018-01-11 RX ADMIN — OXYCODONE HYDROCHLORIDE 30 MG: 10 TABLET ORAL at 01:54

## 2018-01-11 RX ADMIN — DIVALPROEX SODIUM 250 MG: 250 TABLET, DELAYED RELEASE ORAL at 21:08

## 2018-01-11 RX ADMIN — AMITRIPTYLINE HYDROCHLORIDE 100 MG: 100 TABLET, FILM COATED ORAL at 02:27

## 2018-01-11 RX ADMIN — OXYCODONE HYDROCHLORIDE 10 MG: 10 TABLET ORAL at 06:44

## 2018-01-11 RX ADMIN — AMITRIPTYLINE HYDROCHLORIDE 100 MG: 100 TABLET, FILM COATED ORAL at 21:00

## 2018-01-11 RX ADMIN — PROPRANOLOL HYDROCHLORIDE 80 MG: 20 TABLET ORAL at 21:13

## 2018-01-11 RX ADMIN — OXYCODONE HYDROCHLORIDE 10 MG: 10 TABLET ORAL at 13:07

## 2018-01-11 RX ADMIN — LEVALBUTEROL HYDROCHLORIDE 1.25 MG: 1.25 SOLUTION, CONCENTRATE RESPIRATORY (INHALATION) at 21:00

## 2018-01-11 RX ADMIN — PROPRANOLOL HYDROCHLORIDE 80 MG: 20 TABLET ORAL at 16:05

## 2018-01-11 RX ADMIN — PROPRANOLOL HYDROCHLORIDE 80 MG: 20 TABLET ORAL at 01:55

## 2018-01-11 RX ADMIN — LEVALBUTEROL HYDROCHLORIDE 1.25 MG: 1.25 SOLUTION, CONCENTRATE RESPIRATORY (INHALATION) at 16:30

## 2018-01-11 NOTE — CONSULTS
Consultation - Infectious Disease   Katy Sherman 42 y o  female MRN: [id number]  Unit/Bed#: FT 03 Encounter: [id number]      IMPRESSION & RECOMMENDATIONS:   Impression/Recommendations:  1  SIRS  POA:  Tachycardia and leukocytosis  No clear evidence of sepsis  Tachycardia may have be secondary to anxiety seen on admission, ?benzo withdrawal?  Leukocytosis has been chronic, now normal  Consider role of #3  Blood cultures pending, UA consistent with contaminated specimen  Exam benign although limited by # 2  Appears hemodynamically stable and nontoxic  Rec:   · Discontinue antibiotics and follow closely  · Follow up blood cultures from admission  · Follow temperatures and white blood cell count closely  · Supportive care as per the primary service    2  Acute encephalopathy  Likely due to over medication  Suspect transient neurological complaints also due to polypharmacy  Consider medication abuse as patient has been resistant to tapering  Rec:   · Limit psychotropic medications  · Follow mental status closely    3  Suspected aspiration  Likely due to # 2  In the absence of fever or significant respiratory symptoms doubt clinical pneumonia  Rec:   · Follow respiratory status closely  · Limit sedating medications    Discussed in detail with Dr Homer Cesar    Antibiotics:  Vancomycin/cefepime # 1    Thank you for this consultation  We will follow along with you  HISTORY OF PRESENT ILLNESS:  Reason for Consult: Bilateral pneumonia    HPI: Katy Sherman is a 42 y o  female with multiple medical problems who was recently admitted to the hospital in late December for multiple complaints including slurred speech and arm tingling  She previously had had multiple admissions at [facility] and [facility] and recently initiated care with [facility] over the past several months  She had an extensive workup at that time including MRI and neurology consultation which was largely unremarkable    There was a suggestion that many of her psychotropic medications could be contributing to her symptoms but the patient was resistant to tapering  She was seen by me during that time for leukocytosis which was felt to be chronic  She had received multiple courses of antibiotics over the past several months for questionable pneumonia  At that time her respiratory symptoms are minimal in her chest imaging was unremarkable  She was ultimately discharged home on 01/03  She was seen again in the emergency department on 01/07 for continued left arm numbness  She underwent pan CT with suggested patchy bilateral nodular infiltrates suspicious for bronchopneumonia  Patient subsequently left the ED AMA due to a family emergency  She returned to the ED yesterday with increased left-sided chest pain and numbness radiating down the left arm  She also appeared to have anxiety and shaking by ED staff  Upon presentation she was noted to be afebrile with tachycardia, leukocytosis, and lactic acidosis  She underwent a chest x-ray which was negative  A CTA of the neck showed possible right middle and left upper lobe ground-glass opacities suggesting atelectasis versus pneumonia  She was started on vancomycin and cefepime  The patient is currently obtunded and very difficult to arouse so was unable to provide any history so all of this was obtained through the medical record  Review of her medication list reveals that since midnight she has received 1 mg IV Ativan, 2 mg Ativan, 10 mg Flexeril, 40 mg oxycodone, amitriptyline, and Neurontin  REVIEW OF SYSTEMS:  Unable to be obtained as she is up tended  A complete system-based review of systems is otherwise negative      PAST MEDICAL HISTORY:  Past Medical History:   Diagnosis Date    [facility] )     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colitis ([facility] )     Crohn's colitis ([facility] )     Endocarditis     Fibromyalgia     Gastritis     Hypertension     IBS (irritable bowel syndrome)     Pancreatitis  RA (rheumatoid arthritis) (HCC)     Restrictive lung disease     Vertigo      Past Surgical History:   Procedure Laterality Date    APPENDECTOMY      BRONCHOSCOPY      multiple       FAMILY HISTORY:  Non-contributory    SOCIAL HISTORY:  History   Alcohol Use No     History   Drug Use    Types: Marijuana     History   Smoking Status    Current Every Day Smoker    Packs/day: 1 00    Types: Cigarettes   Smokeless Tobacco    Never Used       ALLERGIES:  Allergies   Allergen Reactions    Penicillins Hives    Toradol [Ketorolac Tromethamine] Rash       MEDICATIONS:  All current active medications have been reviewed  PHYSICAL EXAM:  Vitals:  HR:  [110-126] 110  Resp:  [17-20] 17  BP: (116-133)/(56-76) 123/76  SpO2:  [97 %-100 %] 99 %  Temp (24hrs), Av 3 °F (36 8 °C), Min:98 2 °F (36 8 °C), Max:98 3 °F (36 8 °C)  Current: Temperature: 98 3 °F (36 8 °C)     Physical Exam:  General:  Well-nourished, well-developed, in no acute distress  Eyes:  Conjunctive clear with no hemorrhages or effusions  Oropharynx:  No ulcers, no lesions  Neck:  Supple, no lymphadenopathy  Lungs:  Clear to auscultation bilaterally, no accessory muscle use  Cardiac:  Regular rate and rhythm, no murmurs  Abdomen:  Soft, non-tender, non-distended  Extremities:  No peripheral cyanosis, clubbing, or edema  Skin:  No rashes, no ulcers  Neurological:  Moves all four extremities spontaneously, sensation grossly intact  Psych:  Noted to be sleeping with sonorous breath sounds with contents of her purse open appearing that she was previously attempting to apply makeup  She is briefly arousable to sternal rub then quickly falls back asleep and returns to snoring  LABS, IMAGING, & OTHER STUDIES:  Lab Results:  I have personally reviewed pertinent labs      Results from last 7 days  Lab Units 18  0601 01/10/18  1749 18  0608   SODIUM mmol/L 144 141 142   POTASSIUM mmol/L 3 2* 3 5 3 2*   CHLORIDE mmol/L 112* 106 101   CO2 mmol/L 24 26 33*   ANION GAP mmol/L 8 9 8   BUN mg/dL 8 8 10   CREATININE mg/dL 0 54* 0 61 0 85   EGFR ml/min/1 73sq m 119 115 87   GLUCOSE RANDOM mg/dL 117 96 87   CALCIUM mg/dL 8 0* 8 8 9 0   AST U/L  --  19 17   ALT U/L  --  29 28   ALK PHOS U/L  --  79 84   TOTAL PROTEIN g/dL  --  7 4 7 1   ALBUMIN g/dL  --  3 4* 3 7   BILIRUBIN TOTAL mg/dL  --  0 20 0 20       Results from last 7 days  Lab Units 01/11/18  0601 01/10/18  1749 01/07/18  0608   WBC Thousand/uL 8 66 15 68* 22 58*   HEMOGLOBIN g/dL 10 6* 12 4 11 7   PLATELETS Thousands/uL 211 265 276       Results from last 7 days  Lab Units 01/07/18  1115 01/07/18  0732 01/07/18  0608 01/07/18  0606   BLOOD CULTURE   --  No Growth at 72 hrs  No Growth at 72 hrs   --    URINE CULTURE   --   --   --  80,000-89,000 cfu/ml Lactobacillus species*   INFLUENZA A PCR  None Detected  --   --   --    INFLUENZA B PCR  None Detected  --   --   --    RSV PCR  None Detected  --   --   --      Blood cultures pending    Imaging Studies:   I have personally reviewed pertinent imaging study reports and images in PACS  CT chest 1/7 and CT neck 1/10 show fleeting faint patchy lung infiltrates without lobar predominance    EKG, Pathology, and Other Studies:   I have personally reviewed pertinent reports

## 2018-01-11 NOTE — SOCIAL WORK
Spoke with patient in her ER room and patient was very lethargic and was slurring her speech and falling asleep when I was speaking to her  She states she lives with her family in a house with 6 steps to get in and 2 inside which she uses without difficulty  She is independanr with ADL's and ambulation  She has no DME, nor has used homecare  She purchases her meds at [employer] and is able to afford all of her meds  No POA  Pt drives and does have a ride home  She denies any history of mental illness or substance abuse

## 2018-01-11 NOTE — ASSESSMENT & PLAN NOTE
· Recently diagnosed with pneumonia, was hospitalized  Patient left AMA  · Patient does meet SIRS criteria  · Continue cefepime and Vanco pending cultures  · Review of records negative for flu  · Pending sputum cultures  · Respiratory protocol with p r n  neb treatments  Incentive spirometer  · Mucinex for cough  · Will consult Pulmonary as patient report previous history of multiple episodes of pneumonia, pulmonary nodules, history of recent bronch by Dr Lucian Maravilla from [facility]

## 2018-01-11 NOTE — PLAN OF CARE
Problem: DISCHARGE PLANNING  Goal: Discharge to home or other facility with appropriate resources  INTERVENTIONS:  - Identify barriers to discharge w/patient and caregiver  - Arrange for needed discharge resources and transportation as appropriate  - Identify discharge learning needs (meds, wound care, etc )  - Arrange for interpretive services to assist at discharge as needed  - Refer to Case Management Department for coordinating discharge planning if the patient needs post-hospital services based on physician/advanced practitioner order or complex needs related to functional status, cognitive ability, or social support system  Outcome: Progressing  Spoke with patient in her ER room and patient was very lethargic and was slurring her speech and falling asleep when I was speaking to her  She states she lives with her family in a house with 6 steps to get in and 2 inside which she uses without difficulty  She is independanr with ADL's and ambulation  She has no DME, nor has used homecare  [place] [address] and is able to afford all of her meds  No POA  Pt drives and does have a ride home  She denies any history of mental illness or substance abuse

## 2018-01-11 NOTE — CASE MANAGEMENT
Thank you,  [facility] in the [facility] by Juan Gonsalez for 2017  Network Utilization Review Department  Phone: [phone]; Fax [phone]  ATTENTION: The Network Utilization Review Department is now centralized for our 7 Facilities  Make a note that we have a new phone and fax numbers for our Department  Please call with any questions or concerns to [phone] and carefully follow the prompts so that you are directed to the right person  All voicemails are confidential  Fax any determinations, approvals, denials, and requests for initial or continue stay review clinical to [phone]  Due to HIGH CALL volume, it would be easier if you could please send faxed requests to expedite your requests and in part, help us provide discharge notifications faster  Initial Clinical Review    Admission: Date/Time/Statement: INPATIENT 1/10/18 @ 2040     Orders Placed This Encounter   Procedures    Inpatient Admission (expected length of stay for this patient is greater than two midnights)     Standing Status:   Standing     Number of Occurrences:   1     Order Specific Question:   Admitting Physician     Answer:   Vicente Mills [[id number]]     Order Specific Question:   Level of Care     Answer:   Med Surg [16]     Order Specific Question:   Estimated length of stay     Answer:   More than 2 Midnights     Order Specific Question:   Certification     Answer:   I certify that inpatient services are medically necessary for this patient for a duration of greater than two midnights  See H&P and MD Progress Notes for additional information about the patient's course of treatment         ED: Date/Time/Mode of Arrival:   ED Arrival Information     Expected Arrival Acuity Means of Arrival Escorted By Service Admission Type    - 1/10/2018 16:38 Emergent Walk-In Self General Medicine Emergency    Arrival Complaint    CHEST PAIN        Chief Complaint:   Chief Complaint Patient presents with    Chest Pain     Pt states she was here on Sunday for chest pain but "had to leave"  Pt returns to ED today due to increasing chest pain on L side of chest  Pt states the pain and numbness is radiating down L arm  Pt appears to be anxious and "shaking"     History of Illness: Jael Oconnor is a 42 y o  female history of hypertension,  bilateral pneumonia left AMA 3 days ago, endocarditis about 10 years ago history of Lyme, rheumatoid arthritis, Crohn's, who presents with chest pain radiating to left arm, she also complains of left ear pain, assess no acute infectious process noted     Patient was recently diagnosed 3 days ago with bilateral pneumonia Hcap was initiated with cefepime and Vanco   She left AMA due to family emergency  Patient associated symptoms include cough with yellow green sputum intermittent bloody streaks, positive chills  Denies fevers  Denies nausea, vomiting or diarrhea  ED Vital Signs:   ED Triage Vitals [01/10/18 1658]   Temperature Pulse Respirations Blood Pressure SpO2   98 2 °F (36 8 °C) (!) 126 20 133/56 100 %      Temp Source Heart Rate Source Patient Position - Orthostatic VS BP Location FiO2 (%)   Oral Monitor Sitting Left arm --      Pain Score       Worst Possible Pain        Wt Readings from Last 1 Encounters:   01/10/18 69 9 kg (154 lb)       Vital Signs (abnormal):   01/11/18 0930  -- 74 18  85/51 99 %   01/11/18 0048  98 3 °F (36 8 °C)  110 -- -- --   01/10/18 2235  --  112 17 123/76 99 %   01/10/18 2022  --  122 20 116/72 97 %     Abnormal Labs/Diagnostic Test Results:    Alb 3 4   Wbc 15 68   Lactic acid 2 7   Sed rate 40    crp 65 3  UA: cloudy, sg>=1 030   100 gluc   Tr ket   lg bld   +nitrites   Tr leuks  innum epithelials  +1prot   Mod bili   innum rbc   10-20 wbc   Mod bacteria  bld c&s pending   Urine c&s pending  EKG Sinus tachycardia   Cannot rule out Lateral infarct , age undetermined  CXR: no active dz  CTA head/neck: No evidence of occlusion, dissection, aneurysm or significant stenosis  No acute intracranial process  Groundglass opacities in the right middle and left upper lobe could suggest atelectasis and/or pneumonia  If there is persistent or worsening symptomatology, consider noncontrast brain MRI  Periodontal disease  1/11: k 3 2   Cl 112   Creat   54   Ca 8   hgb 10 6   hct 32 5       ED Treatment:   Medication Administration from 01/10/2018 1638 to 01/11/2018 1052       Date/Time Order Dose Route Action Action by Comments     01/10/2018 1750 sodium chloride 0 9 % bolus 1,000 mL 1,000 mL Intravenous Donnie Swanson RN      01/10/2018 1750 LORazepam (ATIVAN) 2 mg/mL injection 0 5 mg 0 5 mg Intravenous Given Alfredito Cazares RN      01/10/2018 2019 cefepime (MAXIPIME) 2,000 mg in dextrose 5 % 50 mL IVPB 2,000 mg Intravenous [address] Yoko Sykes RN      01/10/2018 2119 sodium chloride 0 9 % bolus 1,000 mL 1,000 mL Intravenous [address] Yoko Sykes RN      01/10/2018 1932 LORazepam (ATIVAN) 2 mg/mL injection 0 5 mg 0 5 mg Intravenous Given Yoko Sykes RN      01/10/2018 1932 fentanyl citrate (PF) 100 MCG/2ML 50 mcg 50 mcg Intravenous Given Yoko Sykes RN      01/10/2018 2119 vancomycin (VANCOCIN) IVPB (premix) 1,000 mg 1,000 mg Intravenous [address] Yoko Sykes, [address] [place] [address]      01/11/2018 0154 oxyCODONE (ROXICODONE) immediate release tablet 30 mg 30 mg Oral Given Smita Swift RN      01/11/2018 0252 sodium chloride 0 9 % infusion 125 mL/hr Intravenous [address]      01/11/2018 0043 LORazepam (ATIVAN) 2 mg/mL injection 1 mg 1 mg Intravenous Given Smita Swift RN      01/11/2018 5540 oxyCODONE (ROXICODONE) immediate release tablet 10 mg 10 mg Oral Given Smita Swift RN           Past Medical/Surgical History:    Active Ambulatory Problems     Diagnosis Date Noted    UTI (urinary tract infection) 12/29/2017    Hepatomegaly 12/29/2017    Dilated bile duct 12/29/2017    Hypokalemia 12/29/2017    Chest pain 12/29/2017    Slurred speech 12/29/2017    Tobacco abuse 12/29/2017    HCAP (healthcare-associated pneumonia) 01/07/2018    [facility] ) 01/07/2018    Left arm numbness 01/07/2018    Leukocytosis 01/07/2018    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) 01/07/2018     Resolved Ambulatory Problems     Diagnosis Date Noted    Sepsis ([facility] ) 12/29/2017    Left sided numbness 12/29/2017    Rectal bleeding 12/31/2017     Past Medical History:   Diagnosis Date    Cervical [place] ([place] [address] )     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colitis ([place] [address] )     Crohn's colitis ([place] [address] )     Endocarditis     Fibromyalgia     Gastritis     Hypertension     IBS (irritable bowel syndrome)     Pancreatitis     RA (rheumatoid arthritis) ([place] [address] )     Restrictive lung disease     Vertigo        Admitting Diagnosis: Chest pain [R07 9]    Age/Sex: 42 y o  female    Assessment/Plan:   Severe sepsis ([facility] )   Assessment & Plan     · Lactate 2 7,  leukocytosis WBC 15 6  Sinus tach, Likely secondary to UTI and Hcap  · Sepsis protocol initiated with IV fluids  · Will repeat lactate and monitor  · Will treat underlying infection  · Cefepime and vancomycin was initiated in ED and will continue  · Blood cultures, sputum cultures, urine culture pending  · Continue to monitor WBC and vital signs for fever        HCAP (healthcare-associated pneumonia)   Assessment & Plan     · Recently diagnosed with H  pneumonia, was hospitalized  Patient left AMA 3 days ago  · Patient does meet SIRS criteria  · Recent CT reviewed right middle and left upper lobe could suggest atelectasis and/or pneumonia  · Continue cefepime and Vanco pending cultures  · Review of records negative for flu  · Pending sputum cultures  · Respiratory protocol with p r n  neb treatments  Incentive spirometer  · Mucinex for cough    · Will consult Pulmonary as patient report previous history of multiple episodes of pneumonia, pulmonary nodules, history of recent bronch by Dr Aissatou Mckay from [facility]        * Chest pain   Assessment & Plan     · Chest pain with relief at this time  Likely due to pneumonia  · Noted on left chest radiating to left arm associated with numbness  · Telemetry  · Cardiology consult  · Trend troponins        Left arm numbness   Assessment & Plan     · Patient report symptoms persistent onset past few weeks  · CT of the brain reviewed no acute findings  · Patient does have history of Lyme and rheumatoid arthritis  · If this is persistent will consider Neurology consult        Tobacco abuse   Assessment & Plan     Smoking cessation education done  Nicotine patch offered patient refused        UTI (urinary tract infection)   Assessment & Plan     · IV antibiotics in session  · Pending cultures           Sinus tachycardia:  Patient afebrile  Likely secondary to neb treatments  Will keep on telemetry, and monitor      VTE Prophylaxis: Heparin  / sequential compression device   Code Status:  Full code  POLST: There is no POLST form on file for this patient (pre-hospital)  Discussion with family:  Family is at bedside     Anticipated Length of Stay:  Patient will be admitted on an Inpatient basis with an anticipated length of stay of  greater than 2 midnights     Justification for Hospital Stay:  Sepsis, Hcap      Admission Orders:  Scheduled Meds:   amitriptyline 100 mg Oral HS   amphetamine-dextroamphetamine 20 mg Oral Daily   aspirin 81 mg Oral Daily   cholecalciferol 1,000 Units Oral Daily   divalproex sodium 250 mg Oral Q12H MIKE   gabapentin 900 mg Oral TID   heparin (porcine) 5,000 Units Subcutaneous Q8H [address]   LORazepam      mesalamine 4 8 g Oral Daily With Breakfast   montelukast 10 mg Oral HS   nicotine 1 patch Transdermal Daily   oxyCODONE 30 mg Oral TID   pantoprazole 40 mg Oral Daily Before Breakfast   propranolol 80 mg Oral TID     Continuous Infusions:   sodium chloride 125 mL/hr Last Rate: 125 mL/hr (01/11/18 0252)     PRN Meds:   acetaminophen    ALPRAZolam    cyclobenzaprine    ondansetron    oxyCODONE    SUMAtriptan     Lo fiber, lo residue GI diet  Aspiration prec  oob as justino  Incentive spirom hourly wa  Airway clearance protocol  Respiratory protocol  Urine legionella/strep  Trop q3h  Cons pulm  Elevate hob 30 degr  SCD's  tele    PER ID 1/11:  1  SIRS  POA:  Tachycardia and leukocytosis  No clear evidence of sepsis  Tachycardia may have be secondary to anxiety seen on admission, ?benzo withdrawal?  Leukocytosis has been chronic, now normal  Consider role of #3  Blood cultures pending, UA consistent with contaminated specimen  Exam benign although limited by # 2  Appears hemodynamically stable and nontoxic  Rec:       · Discontinue antibiotics and follow closely  · Follow up blood cultures from admission  · Follow temperatures and white blood cell count closely  · Supportive care as per the primary service     2  Acute encephalopathy  Likely due to over medication  Suspect transient neurological complaints also due to polypharmacy  Consider medication abuse as patient has been resistant to tapering  Rec:       § Limit psychotropic medications  § Follow mental status closely     3    Suspected aspiration  Likely due to # 2  In the absence of fever or significant respiratory symptoms doubt clinical pneumonia  Rec:       § Follow respiratory status closely  § Limit sedating medications     Discussed in detail with Dr Kirby Ross     Antibiotics:  Vancomycin/cefepime # 1

## 2018-01-11 NOTE — ASSESSMENT & PLAN NOTE
· Chest pain with relief at this time  Likely due to pneumonia  · Noted on left chest radiating to left arm associated with numbness  · Telemetry  · Cardiology consult    · Trend troponins

## 2018-01-11 NOTE — ASSESSMENT & PLAN NOTE
· Patient report symptoms persistent onset past few weeks  · CT of the brain reviewed no acute findings  · Patient does have history of Lyme and rheumatoid arthritis  · If this is persistent will consider Neurology consult

## 2018-01-11 NOTE — ASSESSMENT & PLAN NOTE
· Lactate 2 7,  leukocytosis WBC 15 6  Likely secondary to UTI and Hcap  · Sepsis protocol initiated with IV fluids  · Will repeat lactate and monitor  · Will treat underlying infection  · Cefepime and vancomycin was initiated in ED and will continue  · Blood cultures, sputum cultures, urine culture pending  · Continue to monitor WBC and vital signs for fever

## 2018-01-11 NOTE — PROGRESS NOTES
Herline at bedside, pt is now willing to stay here at [facility]  Patient to be medicated with Ativan and then her regular night time meds

## 2018-01-11 NOTE — CONSULTS
Consultation - Cardiology Team One  Yasimn Hill 42 y o  female MRN: [id number]  Unit/Bed#: FT 03 Encounter: [id number]    Inpatient consult to Cardiology  Consult performed by: Ruby Gomez ordered by: Kareen Carlton A          Physician Requesting Consult: No att  providers found  Reason for Consult / Principal Problem: Chest pain    HPI: Cardiologist Dr Senia West is a 42y o  year old female who has a history of HTN, b/l pneumonia left AMA 3 days ago, endocarditis 10 years ago, Hx lyme presenting with chest pain from last hospitalization  Was diagnosed 3 days ago with bilateral pneumonia, now complains of mild to moderate chest pain described as dull, diffuse, radiating down L arm with associated L arm paresthesias  She also has yellow-green sputum, intermittent bloody streaks, chills  No fever, N/V, diarrhea at this time  She was initially thought to be septic but Infectious Disease states that there is no clear evidence of sepsis  No past history of cardiac disease  Family history includes uncle who had a myocardial infarction      REVIEW OF SYSTEMS:  Constitutional:  +chills, Denies fever   Eyes:  Denies change in visual acuity   HENT:  Denies nasal congestion or sore throat   Respiratory:  +cough, +SOB  Cardiovascular:  +dull chest pain  GI:  Denies abdominal pain, nausea, vomiting, bloody stools or diarrhea   :  Denies dysuria, frequency, difficulty in micturition and nocturia  Musculoskeletal:  Denies back pain or joint pain   Neurologic:  Denies headache, focal weakness or sensory changes   Endocrine:  Denies polyuria or polydipsia   Lymphatic:  Denies swollen glands   Psychiatric:  Denies depression or anxiety     Historical Information   Past Medical History:   Diagnosis Date    [facility] ([facility] )     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colitis ([facility] )     Crohn's colitis ([facility] )     Endocarditis     Fibromyalgia     Gastritis     Hypertension     IBS (irritable bowel syndrome)     Pancreatitis     RA (rheumatoid arthritis) (HCC)     Restrictive lung disease     Vertigo      Past Surgical History:   Procedure Laterality Date    APPENDECTOMY      BRONCHOSCOPY      multiple     History   Alcohol Use No     History   Drug Use    Types: Marijuana     History   Smoking Status    Current Every Day Smoker    Packs/day: 1 00    Types: Cigarettes   Smokeless Tobacco    Never Used       Family History:   Family History   Problem Relation Age of Onset    Colon cancer Brother 28    Crohn's disease Brother        MEDS & ALLERGIES:  all current active meds have been reviewed and current meds: Current Facility-Administered Medications   Medication Dose Route Frequency    acetaminophen (TYLENOL) tablet 650 mg  650 mg Oral Q6H PRN    ALPRAZolam (XANAX) tablet 2 mg  2 mg Oral TID PRN    amitriptyline (ELAVIL) tablet 100 mg  100 mg Oral HS    amphetamine-dextroamphetamine (ADDERALL) tablet 20 mg  20 mg Oral Daily    aspirin (ECOTRIN LOW STRENGTH) EC tablet 81 mg  81 mg Oral Daily    cholecalciferol (VITAMIN D3) tablet 1,000 Units  1,000 Units Oral Daily    cyclobenzaprine (FLEXERIL) tablet 10 mg  10 mg Oral TID PRN    divalproex sodium (DEPAKOTE) EC tablet 250 mg  250 mg Oral Q12H MIKE    gabapentin (NEURONTIN) capsule 900 mg  900 mg Oral TID    heparin (porcine) subcutaneous injection 5,000 Units  5,000 Units Subcutaneous Q8H [address]    LORazepam (ATIVAN) 2 mg/mL injection **AcuDose Override Pull**        mesalamine (LIALDA) EC tablet 4 8 g  4 8 g Oral Daily With Breakfast    montelukast (SINGULAIR) tablet 10 mg  10 mg Oral HS    nicotine (NICODERM CQ) 14 mg/24hr TD 24 hr patch 1 patch  1 patch Transdermal Daily    ondansetron (ZOFRAN) injection 4 mg  4 mg Intravenous Q6H PRN    oxyCODONE (ROXICODONE) immediate release tablet 10 mg  10 mg Oral Q4H PRN    oxyCODONE (ROXICODONE) immediate release tablet 30 mg  30 mg Oral TID    pantoprazole (PROTONIX) EC tablet 40 mg  40 mg Oral Daily Before Breakfast    potassium chloride (K-DUR,KLOR-CON) CR tablet 40 mEq  40 mEq Oral Once    propranolol (INDERAL) tablet 80 mg  80 mg Oral TID    sodium chloride 0 9 % infusion  125 mL/hr Intravenous Continuous    SUMAtriptan (IMITREX) tablet 100 mg  100 mg Oral Daily PRN       sodium chloride 125 mL/hr Last Rate: 125 mL/hr (01/11/18 0252)     Allergies   Allergen Reactions    Penicillins Hives    Toradol [Ketorolac Tromethamine] Rash       OBJECTIVE:  Vitals:   Vitals:    01/11/18 0930   BP: (!) 85/51   Pulse: 74   Resp: 18   Temp:    SpO2: 99%     Body mass index is 28 17 kg/m²  Systolic (87UUP), :235 , Min:85 , LHT:370     Diastolic (21NOQ), TST:50, Min:51, Max:76    No intake or output data in the 24 hours ending 01/11/18 1200  Weight (last 2 days)     Date/Time   Weight    01/10/18 1658  69 9 (154)            Invasive Devices     Peripheral Intravenous Line            Peripheral IV 01/10/18 Left Antecubital less than 1 day                PHYSICAL EXAMS:  General:  Patient is not in acute distress, laying in the bed comfortably, awake, alert responding to commands  Head: Normocephalic, Atraumatic  HEENT: White sclera, pink conjunctiva,  PERRLA,pharynx benign  Neck:  Supple, no neck vein distention, carotids+2/+2 no bruits, thyromegaly, adenopathy  Respiratory: clear to P/A  Cardiovascular:  PMI normal, S1-S2 normal, No  Murmurs, thrills, gallops, rubs   Regular rhythm  GI:  Abdomen soft nontender   No hepatosplenomegaly, adenopathy, ascites,or rebound tenderness  Extremities: No edema, normal pulses, no calf tenderness, no joint deformities, no venous disease   Integument:  No skin rashes or ulceration  Lymphatic:  No cervical or inguinal lymphadenopathy  Neurologic:  Patient is awake alert, responding to command, well-oriented to time and place and person moving all extremities    LABORATORY RESULTS:    Results from last 7 days  Lab Units 01/10/18  1749 01/07/18  0608   TROPONIN I ng/mL <0 02 <0 02     CBC with diff:   Results from last 7 days  Lab Units 01/11/18  0601 01/10/18  1749 01/07/18  0608   WBC Thousand/uL 8 66 15 68* 22 58*   HEMOGLOBIN g/dL 10 6* 12 4 11 7   HEMATOCRIT % 32 5* 38 5 36 0   MCV fL 91 91 91   PLATELETS Thousands/uL 211 265 276   MCH pg 29 5 29 2 29 6   MCHC g/dL 32 6 32 2 32 5   RDW % 15 3* 15 4* 14 8   MPV fL 9 5 9 9 9 7   NRBC AUTO /100 WBCs  --  0 0       CMP:  Results from last 7 days  Lab Units 01/11/18  0601 01/10/18  1749 01/07/18  0608   SODIUM mmol/L 144 141 142   POTASSIUM mmol/L 3 2* 3 5 3 2*   CHLORIDE mmol/L 112* 106 101   CO2 mmol/L 24 26 33*   ANION GAP mmol/L 8 9 8   BUN mg/dL 8 8 10   CREATININE mg/dL 0 54* 0 61 0 85   GLUCOSE RANDOM mg/dL 117 96 87   CALCIUM mg/dL 8 0* 8 8 9 0   AST U/L  --  19 17   ALT U/L  --  29 28   ALK PHOS U/L  --  79 84   TOTAL PROTEIN g/dL  --  7 4 7 1   ALBUMIN g/dL  --  3 4* 3 7   BILIRUBIN TOTAL mg/dL  --  0 20 0 20   EGFR ml/min/1 73sq m 119 115 87       BMP:  Results from last 7 days  Lab Units 01/11/18  0601 01/10/18  1749 01/07/18  0608   SODIUM mmol/L 144 141 142   POTASSIUM mmol/L 3 2* 3 5 3 2*   CHLORIDE mmol/L 112* 106 101   CO2 mmol/L 24 26 33*   BUN mg/dL 8 8 10   CREATININE mg/dL 0 54* 0 61 0 85   GLUCOSE RANDOM mg/dL 117 96 87   CALCIUM mg/dL 8 0* 8 8 9 0              Results from last 7 days  Lab Units 01/11/18  0601 01/10/18  1749 01/07/18  0608   MAGNESIUM mg/dL 1 9 1 7 2 1           Results from last 7 days  Lab Units 01/07/18  0608   TSH 3RD GENERATON uIU/mL 1 176           Lipid Profile:   Lab Results   Component Value Date    CHOL 127 12/29/2017     Lab Results   Component Value Date    HDL 31 (L) 12/29/2017     Lab Results   Component Value Date    LDLCALC 63 12/29/2017     Lab Results   Component Value Date    TRIG 163 (H) 12/29/2017       Cardiac testing:   Results for orders placed during the hospital encounter of 12/28/17   Echo complete with contrast if indicated    Phuong Valencia [address] [address]  [phone]    Transthoracic Echocardiogram  2D, M-mode, Doppler, and Color Doppler    Study date:  29-Dec-2017    Patient: Marc Wong  MR number: [id number]  Account number: [de-identified]  : 1980  Age: 42 years  Gender: Female  Status: Inpatient  Location: Emergency room  Height: 62 in  Weight: 145 lb  BP: 104/ 60 mmHg    Indications: Syncope, left sided numbness, rule out CVA, Evaluate for suspected cardiac source of embolism  Assess left ventricular function  Diagnoses: I63 9 - Cerebral infarction, unspecified, R55  - Syncope and collapse    Sonographer:   [facility] , RDCS  Interpreting Physician:  Connor Pantoja MD  Referring Physician:  Sobeida Oakley PA-C  Group:  [facility]    SUMMARY    LEFT VENTRICLE:  Systolic function was normal  Ejection fraction was estimated in the range of 55 % to 65 %  There were no regional wall motion abnormalities  Doppler parameters were consistent with abnormal left ventricular relaxation (grade 1 diastolic dysfunction)  MITRAL VALVE:  There was trace regurgitation  TRICUSPID VALVE:  There was mild regurgitation  PULMONIC VALVE:  There was mild regurgitation  HISTORY: Symptoms: chest pain  Consistent with transient ischemic attack or stroke  PRIOR HISTORY: Sepsis, UTI, slurred speech Risk factors: a history of current cigarette use (within the last month)  PROCEDURE: The procedure was performed in the emergency room  This was a routine study  The transthoracic approach was used  The study included complete 2D imaging, M-mode, complete spectral Doppler, and color Doppler  The heart rate was  99 bpm, at the start of the study  Images were obtained from the parasternal, apical, subcostal, and suprasternal notch acoustic windows  Image quality was adequate      LEFT VENTRICLE: Size was normal  Systolic function was normal  Ejection fraction was estimated in the range of 55 % to 65 %  There were no regional wall motion abnormalities  Wall thickness was normal  DOPPLER: Doppler parameters were  consistent with abnormal left ventricular relaxation (grade 1 diastolic dysfunction)  RIGHT VENTRICLE: The size was normal  Systolic function was normal  Wall thickness was normal     LEFT ATRIUM: Size was normal     RIGHT ATRIUM: Size was normal     MITRAL VALVE: Valve structure was normal  There was normal leaflet separation  DOPPLER: The transmitral velocity was within the normal range  There was no evidence for stenosis  There was trace regurgitation  AORTIC VALVE: The valve was trileaflet  Leaflets exhibited normal thickness and normal cuspal separation  DOPPLER: Transaortic velocity was within the normal range  There was no evidence for stenosis  There was no regurgitation  TRICUSPID VALVE: The valve structure was normal  There was normal leaflet separation  DOPPLER: The transtricuspid velocity was within the normal range  There was no evidence for stenosis  There was mild regurgitation  PULMONIC VALVE: Leaflets exhibited normal thickness, no calcification, and normal cuspal separation  DOPPLER: The transpulmonic velocity was within the normal range  There was mild regurgitation  PERICARDIUM: There was no pericardial effusion  The pericardium was normal in appearance  AORTA: The root exhibited normal size  SYSTEMIC VEINS: IVC: The inferior vena cava was normal in size and course   Respirophasic changes were normal     SYSTEM MEASUREMENT TABLES    2D  %FS: 24 8 %  Ao Diam: 2 7 cm  EDV(Teich): 74 1 ml  EF(Teich): 49 6 %  ESV(Teich): 37 3 ml  IVSd: 0 6 cm  LA Area: 10 7 cm2  LA Diam: 2 4 cm  LVEDV MOD A4C: 109 3 ml  LVEF MOD A4C: 47 8 %  LVESV MOD A4C: 57 ml  LVIDd: 4 1 cm  LVIDs: 3 1 cm  LVLd A4C: 7 9 cm  LVLs A4C: 6 8 cm  LVPWd: 0 7 cm  RA Area: 12 9 cm2  RVIDd: 3 2 cm  SV MOD A4C: 52 3 ml  SV(Teich): 36 7 ml    CW  TR Vmax: 2 3 m/s  TR maxP 8 mmHg    MM  TAPSE: 2 2 cm    PW  E': 0 1 m/s  E/E': 5 2  MV A Cliff: 0 7 m/s  MV Dec [place]: 3 9 m/s2  MV DecT: 163 ms  MV E Cliff: 0 6 m/s  MV E/A Ratio: 0 9  MV PHT: 47 3 ms  MVA By PHT: 4 7 cm2    [facility] Accredited Echocardiography Laboratory    Prepared and electronically signed by    Verónica Flannery MD  Signed 29-Dec-2017 18:00:17       No results found for this or any previous visit  No procedure found  No results found for this or any previous visit  Imaging:   I have personally reviewed pertinent reports  EKG reviewed personally:  NSR    Telemetry reviewed personally:   NSR    Assessment/Plan:  1  Chest pain: Troponins negative  EKG and ECHO unremarkable  Likely non-cardiac  Will order an ECHO to assess EF and regional wall motion abnormalities  Further recommendations after results of ECHO  2  HTN: BP 85/51  Hold all antihypertensive medications for now  Code Status: Level 1 - Full Code    Counseling / Coordination of Care  Total floor / unit time spent today 35 minutes  Greater than 50% of total time was spent with the patient and / or family counseling and / or coordination of care  A description of the counseling / coordination of care: Review of history, current assessment, development of a plan      Juan David Parry PA-C  1/11/2018,12:00 PM

## 2018-01-11 NOTE — CASE MANAGEMENT
Notification of Discharge  This is a Notification of Discharge from our facility [address]  Please be advised that this patient has been discharge from our facility  Below you will find the admission and discharge date and time including the patients disposition  PRESENTATION DATE: 1/7/2018  5:19 AM  IP ADMISSION DATE: 1/7/18 1039  DISCHARGE DATE: 1/7/2018  5:45 PM  DISPOSITION: Left against medical advice or discontinued care    [facility] in the [facility] by Juan Gonsalez for 2017  Network Utilization Review Department  Phone: [phone]; Fax [phone]  ATTENTION: The Network Utilization Review Department is now centralized for our 7 Facilities  Make a note that we have a new phone and fax numbers for our Department  Please call with any questions or concerns to [phone] and carefully follow the prompts so that you are directed to the right person  All voicemails are confidential  Fax any determinations, approvals, denials, and requests for initial or continue stay review clinical to [phone]  Due to HIGH CALL volume, it would be easier if you could please send faxed requests to expedite your requests and in part, help us provide discharge notifications faster

## 2018-01-11 NOTE — CONSULTS
Consultation - Pulmonary Medicine   Connor Baltazar 42 y o  female MRN: [id number]  Unit/Bed#: FT 03 Encounter: [id number]      Assessment:  1  Ground glass opacities on CT scan  2  Chest pain    Plan:   1  Ground glass opacities on CT scan  - Patient has been treated with multiple courses of antibiotics over the last several months for pneumonia, has undergone bronchoscopy x 2 at [facility]note 3] all with negative cultures and no endobronchial lesions found  - CTA of her head and neck show groundglass opacities in the right middle and left upper lobe, also seen on CT chest done 1/7 - she left AMA from that admission  She did not have any respiratory complaints at that time  - She has minimal respiratory complaints at this time, has a chronic cough, no hemoptysis at this time, will be monitored off antibiotics, ID following  - Concern that she may be chronically aspirating due to polypharmacy - she is falling asleep while I speak to her while she is eating her lunch  Discussed with her and her family member not to eat when sleepy or after taking a lot of sedative medications  - Psych consult pending, would consider cutting down on some of her psychotropic and sedative medications  - Leukocytosis has resolved, she is afebrile  Blood cultures pending, s pneumo and legionella pending, will order sputum culture  -  Will order ATC nebs, had mucous plugs on prior admission  Does have a cough though symptoms have been stable, no acute respiratory complaints  - Will monitor for fevers, increased leukocytosis, increased respiratory complaints  2   Chest pain  - This is a chronic chest pain associated with a tingling down her arm for which she has had many ER visits and hospital admissions  - Not likely pulmonary related      History of Present Illness   Physician Requesting Consult: Pedro Luis Bro, *  Reason for Consult / Principal Problem: Pneumonia  Hx and PE limited by: None  HPI: Connor Baltazar is a 42 y o  year old female current smoker with PMH of Crohn's, fibromyalgia, anxiety, IBS, HTN, endocarditis in 2008 who presents with complaint of chest pain with associated left arm tingling for which she has been undergoing a workup  She has been hospitalized here and at [facility] multiple times recently for many different complaints  She recently had bronchoscopy x 2 at [facility] for cough and hemoptysis - all cultured were negative, no endobronchial lesions  She has been treated with multiple courses of antibiotics  At this time she does not have any hemoptysis, does have a mild cough  No fever or chills  She was very sleepy while I was talking to her, drifting off multiple times - she was also eating lunch at the same time  She was found to have a leukocytosis and lactic acidosis and was also tachycardic on presentation which have all resolved       Inpatient consult to Pulmonology  Consult performed by: Maddison Anderson ordered by: Layla Gonzáles          Review of Systems    Historical Information   Past Medical History:   Diagnosis Date    [facility]     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colitis ([facility] )     Crohn's colitis ([facility] )     Endocarditis     Fibromyalgia     Gastritis     Hypertension     IBS (irritable bowel syndrome)     Pancreatitis     RA (rheumatoid arthritis) ([facility] )     Restrictive lung disease     Vertigo      Past Surgical History:   Procedure Laterality Date    APPENDECTOMY      BRONCHOSCOPY      multiple     Social History   History   Alcohol Use No     History   Drug Use    Types: Marijuana     History   Smoking Status    Current Every Day Smoker    Packs/day: 1 00    Types: Cigarettes   Smokeless Tobacco    Never Used     Occupational History: Noncontributory    Family History: non-contributory    Meds/Allergies   all current active meds have been reviewed and pertinent pulmonary meds have been reviewed    Allergies   Allergen Reactions    Penicillins Hives    Toradol [Ketorolac Tromethamine] Rash       Objective   Vitals: Blood pressure (!) 85/51, pulse 74, temperature 98 3 °F (36 8 °C), temperature source Oral, resp  rate 18, height 5' 2" (1 575 m), weight 69 9 kg (154 lb), SpO2 99 %  ,Body mass index is 28 17 kg/m²  No intake or output data in the 24 hours ending 01/11/18 1454  Invasive Devices     Peripheral Intravenous Line            Peripheral IV 01/11/18 Right Hand less than 1 day                Physical Exam   Constitutional: She is oriented to person, place, and time  She is arouseable though falls asleep multiple tiems during the conversation   Neck: Normal range of motion  Neck supple  Cardiovascular: Normal rate and regular rhythm  Pulmonary/Chest: Effort normal and breath sounds normal  No accessory muscle usage  No respiratory distress  She has no decreased breath sounds  She has no wheezes  She has no rhonchi  She has no rales  Musculoskeletal: She exhibits no edema  Neurological: She is alert and oriented to person, place, and time  Lab Results:   I have personally reviewed pertinent lab results  , CBC:   Lab Results   Component Value Date    WBC 8 66 01/11/2018    HGB 10 6 (L) 01/11/2018    HCT 32 5 (L) 01/11/2018    MCV 91 01/11/2018     01/11/2018    MCH 29 5 01/11/2018    MCHC 32 6 01/11/2018    RDW 15 3 (H) 01/11/2018    MPV 9 5 01/11/2018    NRBC 0 01/10/2018   , CMP:   Lab Results   Component Value Date     01/11/2018    K 3 2 (L) 01/11/2018     (H) 01/11/2018    CO2 24 01/11/2018    ANIONGAP 8 01/11/2018    BUN 8 01/11/2018    CREATININE 0 54 (L) 01/11/2018    GLUCOSE 117 01/11/2018    CALCIUM 8 0 (L) 01/11/2018    AST 19 01/10/2018    ALT 29 01/10/2018    ALKPHOS 79 01/10/2018    PROT 7 4 01/10/2018    ALBUMIN 3 4 (L) 01/10/2018    BILITOT 0 20 01/10/2018    EGFR 119 01/11/2018     Imaging Studies: I have personally reviewed pertinent reports     and I have personally reviewed pertinent films in PACS  EKG, Pathology, and Other Studies: I have personally reviewed pertinent reports     and I have personally reviewed pertinent films in PACS  VTE Prophylaxis: Sequential compression device (Venodyne)  and Heparin    Code Status: Level 1 - Full Code  Advance Directive and Living Will:      Power of :    POLST:

## 2018-01-11 NOTE — H&P
H&P- Byron Garsia 1980, 42 y o  female MRN: [id number]    Unit/Bed#: FT 03 Encounter: [id number]    Primary Care Provider: Jesi Weston DO   Date and time admitted to hospital: 1/10/2018  5:17 PM      Severe sepsis ([address] )   Assessment & Plan    · Lactate 2 7,  leukocytosis WBC 15 6  Sinus tach, Likely secondary to UTI and Hcap  · Sepsis protocol initiated with IV fluids  · Will repeat lactate and monitor  · Will treat underlying infection  · Cefepime and vancomycin was initiated in ED and will continue  · Blood cultures, sputum cultures, urine culture pending  · Continue to monitor WBC and vital signs for fever  HCAP (healthcare-associated pneumonia)   Assessment & Plan    · Recently diagnosed with H  pneumonia, was hospitalized  Patient left AMA 3 days ago  · Patient does meet SIRS criteria  · Recent CT reviewed right middle and left upper lobe could suggest atelectasis and/or pneumonia  · Continue cefepime and Vanco pending cultures  · Review of records negative for flu  · Pending sputum cultures  · Respiratory protocol with p r n  neb treatments  Incentive spirometer  · Mucinex for cough  · Will consult Pulmonary as patient report previous history of multiple episodes of pneumonia, pulmonary nodules, history of recent bronch by Dr Major Gates from [facility]  * Chest pain   Assessment & Plan    · Chest pain with relief at this time  Likely due to pneumonia  · Noted on left chest radiating to left arm associated with numbness  · Telemetry  · Cardiology consult  · Trend troponins       Left arm numbness   Assessment & Plan    · Patient report symptoms persistent onset past few weeks  · CT of the brain reviewed no acute findings  · Patient does have history of Lyme and rheumatoid arthritis  · If this is persistent will consider Neurology consult  Tobacco abuse   Assessment & Plan    Smoking cessation education done  Nicotine patch offered patient refused        UTI (urinary tract infection)   Assessment & Plan    · IV antibiotics in session  · Pending cultures  Sinus tachycardia:  Patient afebrile  Likely secondary to neb treatments  Will keep on telemetry, and monitor  VTE Prophylaxis: Heparin  / sequential compression device   Code Status:  Full code  POLST: There is no POLST form on file for this patient (pre-hospital)  Discussion with family:  Family is at bedside    Anticipated Length of Stay:  Patient will be admitted on an Inpatient basis with an anticipated length of stay of  greater than 2 midnights  Justification for Hospital Stay:  Sepsis, Hcap    Total Time for Visit, including Counseling / Coordination of Care: 1 hour  Greater than 50% of this total time spent on direct patient counseling and coordination of care  Chief Complaint:   Chest pain with left arm numbness    History of Present Illness:    Lester Goetz is a 42 y o  female history of hypertension,  bilateral pneumonia left AMA 3 days ago, endocarditis about 10 years ago history of Lyme, rheumatoid arthritis, Crohn's, who presents with chest pain radiating to left arm, she also complains of left ear pain, assess no acute infectious process noted     Patient was recently diagnosed 3 days ago with bilateral pneumonia Hcap was initiated with cefepime and Vanco   She left AMA due to family emergency  Patient associated symptoms include cough with yellow green sputum intermittent bloody streaks, positive chills  Denies fevers  Denies nausea, vomiting or diarrhea  Review of Systems:    Review of Systems   Constitutional: Positive for activity change, appetite change and chills  Negative for fever  Respiratory: Positive for cough, chest tightness and shortness of breath  Cardiovascular: Positive for chest pain  Negative for palpitations and leg swelling  Gastrointestinal: Negative for vomiting  Genitourinary: Positive for difficulty urinating and dysuria     All other systems reviewed and are negative  Past Medical and Surgical History:     Past Medical History:   Diagnosis Date    Cervical cancer ([facility] )     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colitis ([facility] )     Crohn's colitis ([place] [address] )     Endocarditis     Fibromyalgia     Gastritis     Hypertension     IBS (irritable bowel syndrome)     Pancreatitis     RA (rheumatoid arthritis) ([place] [address] )     Restrictive lung disease     Vertigo        Past Surgical History:   Procedure Laterality Date    APPENDECTOMY      BRONCHOSCOPY      multiple       Meds/Allergies:    Prior to Admission medications    Medication Sig Start Date End Date Taking?  Authorizing Provider   ALPRAZolam Leslee Granados) 2 MG tablet Take 2 mg by mouth Three times a day    Historical Provider, MD   amitriptyline (ELAVIL) 100 mg tablet Take 100 mg by mouth    Historical Provider, MD   amphetamine-dextroamphetamine (ADDERALL) 20 mg tablet Take 20 mg by mouth daily    Historical Provider, MD   aspirin (ECOTRIN LOW STRENGTH) 81 mg EC tablet Take 81 mg by mouth daily    Historical Provider, MD   Calcium Carb-Ergocalciferol 250-125 MG-UNIT TABS Take 1 tablet by mouth    Historical Provider, MD   cholecalciferol (VITAMIN D3) 1,000 units tablet Take 1,000 Units by mouth daily    Historical Provider, MD   cyclobenzaprine (FLEXERIL) 10 mg tablet Take 10 mg by mouth 3 (three) times a day as needed for muscle spasms    Historical Provider, MD   divalproex sodium (DEPAKOTE) 250 mg EC tablet Take 250 mg by mouth every 12 (twelve) hours    Historical Provider, MD   gabapentin (NEURONTIN) 300 mg capsule Take 900 mg by mouth 3 (three) times a day    Historical Provider, MD   meclizine (ANTIVERT) 12 5 MG tablet Take 12 5 mg by mouth every 8 (eight) hours as needed    Historical Provider, MD   mesalamine (LIALDA) 1 2 g EC tablet Take 4,800 mg by mouth daily with breakfast      Historical Provider, MD   montelukast (SINGULAIR) 10 mg tablet Take 10 mg by mouth daily at bedtime    Historical Provider, MD oxyCODONE (ROXICODONE) 30 MG immediate release tablet Take 30 mg by mouth 3 (three) times a day    Historical Provider, MD   pantoprazole (PROTONIX) 40 mg tablet Take 40 mg by mouth daily    Historical Provider, MD   propranolol (INDERAL) 80 mg tablet Take 80 mg by mouth 3 (three) times a day    Historical Provider, MD   SUMAtriptan (IMITREX) 100 mg tablet Take 1 tablet by mouth daily    Historical Provider, MD     I have reviewed home medications with patient personally  Allergies: Allergies   Allergen Reactions    Penicillins Hives    Toradol [Ketorolac Tromethamine] Rash       Social History:     Marital Status: /Civil Union   Occupation:  Unemployed  Patient Pre-hospital Living Situation:  Home with family  Patient Pre-hospital Level of Mobility:  Independent  Patient Pre-hospital Diet Restrictions:  She reports none  Substance Use History:   History   Alcohol Use No     History   Smoking Status    Current Every Day Smoker    Packs/day: 1 00    Types: Cigarettes   Smokeless Tobacco    Never Used     History   Drug Use    Types: Marijuana       Family History:    non-contributory    Physical Exam:     Vitals:   Blood Pressure: 123/76 (01/10/18 2235)  Pulse: (!) 112 (01/10/18 2235)  Temperature: 98 2 °F (36 8 °C) (01/10/18 1658)  Temp Source: Oral (01/10/18 1658)  Respirations: 17 (01/10/18 2235)  Height: 5' 2" (157 5 cm) (01/10/18 1658)  Weight - Scale: 69 9 kg (154 lb) (01/10/18 1658)  SpO2: 99 % (01/10/18 2235)    Physical Exam   Constitutional: She is oriented to person, place, and time  Vital signs are normal  She appears well-developed and well-nourished  She appears ill  Nasal cannula in place  HENT:   Head: Normocephalic and atraumatic  Right Ear: External ear normal    Left Ear: External ear normal    Neck: Normal range of motion  Neck supple  Cardiovascular: Normal rate and normal heart sounds  Pulmonary/Chest: She has decreased breath sounds  She has rhonchi     Abdominal: Soft  Normal appearance and bowel sounds are normal  There is tenderness in the suprapubic area  There is no CVA tenderness  Genitourinary:   Genitourinary Comments: Positive dysuria   Musculoskeletal: Normal range of motion  She exhibits edema (Trace bilateral lower extremity)  She exhibits no tenderness or deformity  Lymphadenopathy:     She has no cervical adenopathy  Neurological: She is alert and oriented to person, place, and time  Skin: Skin is warm and dry  Psychiatric:   Appears anxious   Nursing note and vitals reviewed  Additional Data:     Lab Results: I have personally reviewed pertinent reports  Results from last 7 days  Lab Units 01/10/18  1749 01/07/18  0608   WBC Thousand/uL 15 68* 22 58*   HEMOGLOBIN g/dL 12 4 11 7   HEMATOCRIT % 38 5 36 0   PLATELETS Thousands/uL 265 276   NEUTROS PCT %  --  72   LYMPHS PCT %  --  21   LYMPHO PCT % 9*  --    MONOS PCT %  --  5   MONO PCT MAN % 3*  --    EOS PCT %  --  1   EOSINO PCT MANUAL % 0  --        Results from last 7 days  Lab Units 01/10/18  1749   SODIUM mmol/L 141   POTASSIUM mmol/L 3 5   CHLORIDE mmol/L 106   CO2 mmol/L 26   BUN mg/dL 8   CREATININE mg/dL 0 61   CALCIUM mg/dL 8 8   TOTAL PROTEIN g/dL 7 4   BILIRUBIN TOTAL mg/dL 0 20   ALK PHOS U/L 79   ALT U/L 29   AST U/L 19   GLUCOSE RANDOM mg/dL 96           Imaging: I have personally reviewed pertinent reports  CTA head and neck w wo contrast   Final Result by Seema Enriquez MD (01/10 1905)      No evidence of occlusion, dissection, aneurysm or significant stenosis  No acute intracranial process  Groundglass opacities in the right middle and left upper lobe could suggest atelectasis and/or pneumonia  If there is persistent or worsening symptomatology, consider noncontrast brain MRI  Periodontal disease  Workstation performed: [id number]         X-ray chest 2 views   Final Result by Elliot Mcclendon DO (01/10 1900)      No active pulmonary disease  Workstation performed: [id number]             EKG, Pathology, and Other Studies Reviewed on Admission:   · EKG:  Sinus tach    Allscripts / Epic Records Reviewed: Yes     ** Please Note: This note has been constructed using a voice recognition system   **

## 2018-01-11 NOTE — PROGRESS NOTES
[facility] Internal Medicine Progress Note  Patient: Jael Oconnor 42 y o  female   MRN: [id number]  PCP: Belén Mathur DO  Unit/Bed#: FT 03 Encounter: [id number]  Date Of Visit: 01/11/18    Assessment:    Principal Problem:    Chest pain  Active Problems:    UTI (urinary tract infection)    Tobacco abuse    HCAP (healthcare-associated pneumonia)    Left arm numbness    Severe sepsis ([address] )      Plan:  Replace K         Severe sepsis ([address] )   Assessment & Plan     · Off abx; ID following       HCAP (healthcare-associated pneumonia)   Assessment & Plan     · Recently diagnosed with H  pneumonia, was hospitalized  Patient left AMA 3 days ago  · Patient does meet SIRS criteria  · Recent CT reviewed right middle and left upper lobe could suggest atelectasis and/or pneumonia  · abx d/c'd by ID  · Pending sputum cultures  · Will consult Pulmonary as patient report previous history of multiple episodes of pneumonia, pulmonary nodules, history of recent bronch by Dr Maria T Cantor from [facility]        * Chest pain   Assessment & Plan     · Chest pain with relief at this time  Likely due to pneumonia  · Noted on left chest radiating to left arm associated with numbness  · Telemetry  · Cardiology consult  · Trend troponins        Left arm numbness   Assessment & Plan     · Patient report symptoms persistent onset past few weeks  · CT of the brain reviewed no acute findings  · Patient does have history of Lyme and rheumatoid arthritis  · Neurology consult        Tobacco abuse   Assessment & Plan     Smoking cessation education done  Nicotine patch offered patient refused        UTI (urinary tract infection)   Assessment & Plan     · IV antibiotics d/c'd by ID; follow Pending cultures                VTE Pharmacologic Prophylaxis:   Pharmacologic: Heparin  Mechanical VTE Prophylaxis in Place: Yes    Patient Centered Rounds: I have performed bedside rounds with nursing staff today      Discussions with Specialists or Other Care Team Provider:     Education and Discussions with Family / Patient:     Time Spent for Care: 30 minutes  More than 50% of total time spent on counseling and coordination of care as described above  Current Length of Stay: 1 day(s)    Current Patient Status: Inpatient   Certification Statement: The patient will continue to require additional inpatient hospital stay due to chest pain    Discharge Plan: home    Code Status: Level 1 - Full Code      Subjective:   Denies chest pain at present  Objective:     Vitals:   Temp (24hrs), Av 3 °F (36 8 °C), Min:98 2 °F (36 8 °C), Max:98 3 °F (36 8 °C)    HR:  [] 74  Resp:  [17-20] 18  BP: ()/(51-76) 85/51  SpO2:  [97 %-100 %] 99 %  Body mass index is 28 17 kg/m²  Input and Output Summary (last 24 hours):     No intake or output data in the 24 hours ending 18 1121    Physical Exam:     Physical Exam   Constitutional: She appears well-developed and well-nourished  HENT:   Head: Normocephalic and atraumatic  Cardiovascular: Normal rate and regular rhythm  Pulmonary/Chest: Effort normal and breath sounds normal    Musculoskeletal: She exhibits no edema         Additional Data:     Labs:      Results from last 7 days  Lab Units 18  0601 01/10/18  1749 18  0608   WBC Thousand/uL 8 66 15 68* 22 58*   HEMOGLOBIN g/dL 10 6* 12 4 11 7   HEMATOCRIT % 32 5* 38 5 36 0   PLATELETS Thousands/uL 211 265 276   NEUTROS PCT %  --   --  72   LYMPHS PCT %  --   --  21   LYMPHO PCT %  --  9*  --    MONOS PCT %  --   --  5   MONO PCT MAN %  --  3*  --    EOS PCT %  --   --  1   EOSINO PCT MANUAL %  --  0  --        Results from last 7 days  Lab Units 18  0601 01/10/18  1749   SODIUM mmol/L 144 141   POTASSIUM mmol/L 3 2* 3 5   CHLORIDE mmol/L 112* 106   CO2 mmol/L 24 26   BUN mg/dL 8 8   CREATININE mg/dL 0 54* 0 61   CALCIUM mg/dL 8 0* 8 8   TOTAL PROTEIN g/dL  --  7 4   BILIRUBIN TOTAL mg/dL  --  0 20   ALK PHOS U/L  --  79   ALT U/L  -- 29   AST U/L  --  19   GLUCOSE RANDOM mg/dL 117 96           * I Have Reviewed All Lab Data Listed Above  * Additional Pertinent Lab Tests Reviewed: All Labs Within Last 24 Hours Reviewed    Imaging:    Imaging Reports Reviewed Today Include:   Imaging Personally Reviewed by Myself Includes:      Recent Cultures (last 7 days):       Results from last 7 days  Lab Units 01/07/18  1115 01/07/18  0732 01/07/18  0608 01/07/18  0606   BLOOD CULTURE   --  No Growth at 72 hrs  No Growth at 72 hrs   --    URINE CULTURE   --   --   --  80,000-89,000 cfu/ml Lactobacillus species*   INFLUENZA A PCR  None Detected  --   --   --    INFLUENZA B PCR  None Detected  --   --   --    RSV PCR  None Detected  --   --   --        Last 24 Hours Medication List:     amitriptyline 100 mg Oral HS   amphetamine-dextroamphetamine 20 mg Oral Daily   aspirin 81 mg Oral Daily   cholecalciferol 1,000 Units Oral Daily   divalproex sodium 250 mg Oral Q12H MIKE   gabapentin 900 mg Oral TID   heparin (porcine) 5,000 Units Subcutaneous Q8H [facility]   LORazepam      mesalamine 4 8 g Oral Daily With Breakfast   montelukast 10 mg Oral HS   nicotine 1 patch Transdermal Daily   oxyCODONE 30 mg Oral TID   pantoprazole 40 mg Oral Daily Before Breakfast   propranolol 80 mg Oral TID        Today, Patient Was Seen By: Janak Tijerina MD    ** Please Note: Dragon 360 Dictation voice to text software may have been used in the creation of this document   **

## 2018-01-11 NOTE — CONSULTS
Consultation - Neurology   Hawk Galarza 42 y o  female MRN: [id number]  Unit/Bed#: FT 03 Encounter: [id number]      Physician Requesting Consult: Rosa Bro, *  Reason for Consult:  Numbness of the left side  Hx and PE limited by:     HPI: Hawk Galarza is a right handed  42 y o  female with past medical history of bilateral pneumonia, who had left AMA 3 days ago and history of endocarditis about 10 years ago and history of Lyme disease rheumatoid arthritis who presented to the hospital with chest pain and radiating to the left arm, she was recently in the hospital and was seen by my associate for left-sided numbness and had an MRI scan of the brain that was unremarkable for acute pathology, she follows up with a neurologist at [facility], according to the patient she has numbness of the left side since November, usually this occurs with her headaches, she has history of migraine headaches, she denies having any double vision, no difficulties with speech, no difficulty in ambulating, no other neurological complaints,    Review of Systems:  10 point review of systems from admitting physician on 01/10/2018 were reviewed, exceptions are as per history of present illness      Historical Information   Past Medical History:   Diagnosis Date    [facility]     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colitis [facility]     [facility]'s [facility]     Endocarditis     Fibromyalgia     Gastritis     Hypertension     IBS (irritable bowel syndrome)     Pancreatitis     RA [facility]heumatoid arthritis) [facility]     Restrictive lung disease     Vertigo      Past Surgical History:   Procedure Laterality Date    APPENDECTOMY      BRONCHOSCOPY      multiple     Social History   History   Smoking Status    Current Every Day Smoker    Packs/day: 1 00    Types: Cigarettes   Smokeless Tobacco    Never Used     History   Alcohol Use No     History   Drug Use    Types: Marijuana       Family History:   Family History   Problem Relation Age of Onset    Colon cancer Brother 28    Crohn's disease Brother        Allergies   Allergen Reactions    Penicillins Hives    Toradol [Ketorolac Tromethamine] Rash       Meds:  All current active meds have been reviewed    Scheduled Meds:  amitriptyline 100 mg Oral HS   amphetamine-dextroamphetamine 20 mg Oral Daily   aspirin 81 mg Oral Daily   cholecalciferol 1,000 Units Oral Daily   divalproex sodium 250 mg Oral Q12H MIKE   gabapentin 900 mg Oral TID   heparin (porcine) 5,000 Units Subcutaneous Q8H [address]   levalbuterol 1 25 mg Nebulization TID   mesalamine 4 8 g Oral Daily With Breakfast   montelukast 10 mg Oral HS   nicotine 1 patch Transdermal Daily   oxyCODONE 30 mg Oral TID   pantoprazole 40 mg Oral Daily Before Breakfast   propranolol 80 mg Oral TID   sodium chloride 3 mL Nebulization TID     PRN Meds:   acetaminophen    ALPRAZolam    cyclobenzaprine    ondansetron    oxyCODONE    SUMAtriptan    Physical Exam:   Objective   Vitals:   Vitals:    01/11/18 0930   BP: (!) 85/51   Pulse: 74   Resp: 18   Temp:    SpO2: 99%   ,Body mass index is 28 17 kg/m²  Patient was examined in emergency department bed  General appearance: Cooperative in no acute distress  Head & neck head is atraumatic and normocephalic  Neck is supple with full range of motion  Cardiovascular: Carotid arteriesno carotid bruits  Neurologic:   Mental statusthe patient is awake alert and oriented without aphasia or apraxia  Other high cortical  intellectual functions are intact  CN: Visual fields are full to confrontation, disks are flat  Extraocular movements are full without nystagmus  Pupils are 3 mm and reactive  Face is symmetrical to light touch  Movements of facial expression move symmetrically  Hearing is normal to finger rub bilaterally  Soft palate lifts symmetrically  Shoulder shrug is symmetrical  Tongue is midline without atrophy  Motor: No drift is noted on arm extension   Strength is full in the upper and lower extremities with normal bulk and tone  Sensory: Intact to temperature and vibratory sensation in the upper and lower extremities bilaterally  Cortical function is intact  Coordination: Finger to nose testing is performed accurately  Romberg and gait could not be tested  Reflexes: 1/4 and symmetrical in the biceps, triceps, brachial radialis, knee jerk and ankle jerk regions  Toes are downgoing  Lab Results:Lab Results:   CBC:   Results from last 7 days  Lab Units 01/11/18  0601 01/10/18  1749 01/07/18  0608   WBC Thousand/uL 8 66 15 68* 22 58*   RBC Million/uL 3 59* 4 25 3 95   HEMOGLOBIN g/dL 10 6* 12 4 11 7   HEMATOCRIT % 32 5* 38 5 36 0   MCV fL 91 91 91   PLATELETS Thousands/uL 211 265 276   , BMP/CMP:   Results from last 7 days  Lab Units 01/11/18  0601 01/10/18  1749 01/07/18  0608   SODIUM mmol/L 144 141 142   POTASSIUM mmol/L 3 2* 3 5 3 2*   CHLORIDE mmol/L 112* 106 101   CO2 mmol/L 24 26 33*   ANION GAP mmol/L 8 9 8   BUN mg/dL 8 8 10   CREATININE mg/dL 0 54* 0 61 0 85   GLUCOSE RANDOM mg/dL 117 96 87   CALCIUM mg/dL 8 0* 8 8 9 0   AST U/L  --  19 17   ALT U/L  --  29 28   ALK PHOS U/L  --  79 84   TOTAL PROTEIN g/dL  --  7 4 7 1   ALBUMIN g/dL  --  3 4* 3 7   BILIRUBIN TOTAL mg/dL  --  0 20 0 20   EGFR ml/min/1 73sq m 119 115 87     I have personally reviewed pertinent reports  EEG, Echo, Pathology, and Other Studies: I have personally reviewed pertinent films in PACS    Family, was not present at the bedside for history and examination  Assessment:  1  Left-sided numbness since November could be secondary to a history of migraine headaches      Plan:  Patient has recently had an MRI scan of the brain that was unremarkable for acute stroke, she has had a CTA of the head and neck that is unremarkable, also had a CT scan of the brain that does not show any acute pathology, patient is on aspirin, continue with that, she will follow up with her outpatient neurologist, other management as per primary team, case discussed with primary team       Counseling / Coordination of Care  Total time spent today 45 minutes  Greater than 50% of total time was spent with the patient and / or family counseling and / or coordination of care  A description of the counseling / coordination of care:     Marivel Andino MD  1/11/2018,4:09 PM    Dictation voice to text software has been used in the creation of this document

## 2018-01-11 NOTE — PROGRESS NOTES
Received pt  Lying on stretcher  Pt states that she is willing to be transferred to [facility] if she needs to wait for a bed  Notified HerlineCHRYSTAL to come and see pt

## 2018-01-12 ENCOUNTER — APPOINTMENT (INPATIENT)
Dept: ULTRASOUND IMAGING | Facility: HOSPITAL | Age: 38
DRG: 720 | End: 2018-01-12
Payer: COMMERCIAL

## 2018-01-12 ENCOUNTER — APPOINTMENT (INPATIENT)
Dept: CT IMAGING | Facility: HOSPITAL | Age: 38
DRG: 720 | End: 2018-01-12
Payer: COMMERCIAL

## 2018-01-12 VITALS
BODY MASS INDEX: 28.34 KG/M2 | WEIGHT: 154 LBS | HEIGHT: 62 IN | SYSTOLIC BLOOD PRESSURE: 114 MMHG | RESPIRATION RATE: 18 BRPM | DIASTOLIC BLOOD PRESSURE: 55 MMHG | OXYGEN SATURATION: 95 % | TEMPERATURE: 99.3 F | HEART RATE: 89 BPM

## 2018-01-12 PROBLEM — R65.20 SEVERE SEPSIS (HCC): Status: RESOLVED | Noted: 2018-01-10 | Resolved: 2018-01-12

## 2018-01-12 PROBLEM — J18.9 HCAP (HEALTHCARE-ASSOCIATED PNEUMONIA): Status: RESOLVED | Noted: 2018-01-07 | Resolved: 2018-01-12

## 2018-01-12 PROBLEM — A41.9 SEVERE SEPSIS (HCC): Status: RESOLVED | Noted: 2018-01-10 | Resolved: 2018-01-12

## 2018-01-12 PROBLEM — R20.0 LEFT ARM NUMBNESS: Status: RESOLVED | Noted: 2018-01-07 | Resolved: 2018-01-12

## 2018-01-12 PROBLEM — N39.0 UTI (URINARY TRACT INFECTION): Status: RESOLVED | Noted: 2017-12-29 | Resolved: 2018-01-12

## 2018-01-12 PROBLEM — R07.9 CHEST PAIN: Status: RESOLVED | Noted: 2017-12-29 | Resolved: 2018-01-12

## 2018-01-12 LAB
ALBUMIN SERPL BCP-MCNC: 3.1 G/DL (ref 3.5–5)
ALP SERPL-CCNC: 62 U/L (ref 46–116)
ALT SERPL W P-5'-P-CCNC: 25 U/L (ref 12–78)
ANION GAP SERPL CALCULATED.3IONS-SCNC: 9 MMOL/L (ref 4–13)
AST SERPL W P-5'-P-CCNC: 15 U/L (ref 5–45)
BACTERIA BLD CULT: NORMAL
BACTERIA BLD CULT: NORMAL
BACTERIA UR QL AUTO: ABNORMAL /HPF
BASOPHILS # BLD AUTO: 0.08 THOUSANDS/ΜL (ref 0–0.1)
BASOPHILS NFR BLD AUTO: 1 % (ref 0–1)
BILIRUB SERPL-MCNC: 0.2 MG/DL (ref 0.2–1)
BILIRUB UR QL STRIP: NEGATIVE
BUN SERPL-MCNC: 6 MG/DL (ref 5–25)
CALCIUM SERPL-MCNC: 8.7 MG/DL (ref 8.3–10.1)
CHLORIDE SERPL-SCNC: 110 MMOL/L (ref 100–108)
CLARITY UR: CLEAR
CO2 SERPL-SCNC: 25 MMOL/L (ref 21–32)
COLOR UR: YELLOW
CREAT SERPL-MCNC: 0.65 MG/DL (ref 0.6–1.3)
EOSINOPHIL # BLD AUTO: 0.16 THOUSAND/ΜL (ref 0–0.61)
EOSINOPHIL NFR BLD AUTO: 1 % (ref 0–6)
ERYTHROCYTE [DISTWIDTH] IN BLOOD BY AUTOMATED COUNT: 15.7 % (ref 11.6–15.1)
GFR SERPL CREATININE-BSD FRML MDRD: 112 ML/MIN/1.73SQ M
GLUCOSE SERPL-MCNC: 112 MG/DL (ref 65–140)
GLUCOSE UR STRIP-MCNC: NEGATIVE MG/DL
HCT VFR BLD AUTO: 34.7 % (ref 34.8–46.1)
HGB BLD-MCNC: 11.1 G/DL (ref 11.5–15.4)
HGB UR QL STRIP.AUTO: ABNORMAL
KETONES UR STRIP-MCNC: ABNORMAL MG/DL
L PNEUMO1 AG UR QL IA.RAPID: NEGATIVE
LEUKOCYTE ESTERASE UR QL STRIP: NEGATIVE
LYMPHOCYTES # BLD AUTO: 4.81 THOUSANDS/ΜL (ref 0.6–4.47)
LYMPHOCYTES NFR BLD AUTO: 39 % (ref 14–44)
MCH RBC QN AUTO: 29.2 PG (ref 26.8–34.3)
MCHC RBC AUTO-ENTMCNC: 32 G/DL (ref 31.4–37.4)
MCV RBC AUTO: 91 FL (ref 82–98)
MONOCYTES # BLD AUTO: 0.9 THOUSAND/ΜL (ref 0.17–1.22)
MONOCYTES NFR BLD AUTO: 7 % (ref 4–12)
MRSA NOSE QL CULT: NORMAL
NEUTROPHILS # BLD AUTO: 6.37 THOUSANDS/ΜL (ref 1.85–7.62)
NEUTS SEG NFR BLD AUTO: 52 % (ref 43–75)
NITRITE UR QL STRIP: NEGATIVE
NON-SQ EPI CELLS URNS QL MICRO: ABNORMAL /HPF
NRBC BLD AUTO-RTO: 0 /100 WBCS
PH UR STRIP.AUTO: 6 [PH] (ref 4.5–8)
PLATELET # BLD AUTO: 227 THOUSANDS/UL (ref 149–390)
PLATELET # BLD AUTO: 244 THOUSANDS/UL (ref 149–390)
PMV BLD AUTO: 9.4 FL (ref 8.9–12.7)
PMV BLD AUTO: 9.9 FL (ref 8.9–12.7)
POTASSIUM SERPL-SCNC: 3.6 MMOL/L (ref 3.5–5.3)
PROT SERPL-MCNC: 6.6 G/DL (ref 6.4–8.2)
PROT UR STRIP-MCNC: NEGATIVE MG/DL
RBC # BLD AUTO: 3.8 MILLION/UL (ref 3.81–5.12)
RBC #/AREA URNS AUTO: ABNORMAL /HPF
S PNEUM AG UR QL: NEGATIVE
SODIUM SERPL-SCNC: 144 MMOL/L (ref 136–145)
SP GR UR STRIP.AUTO: 1.02 (ref 1–1.03)
TROPONIN I SERPL-MCNC: <0.02 NG/ML
UROBILINOGEN UR QL STRIP.AUTO: 0.2 E.U./DL
WBC # BLD AUTO: 12.36 THOUSAND/UL (ref 4.31–10.16)
WBC #/AREA URNS AUTO: ABNORMAL /HPF

## 2018-01-12 PROCEDURE — 76770 US EXAM ABDO BACK WALL COMP: CPT

## 2018-01-12 PROCEDURE — 99285 EMERGENCY DEPT VISIT HI MDM: CPT

## 2018-01-12 PROCEDURE — 80053 COMPREHEN METABOLIC PANEL: CPT | Performed by: GENERAL PRACTICE

## 2018-01-12 PROCEDURE — 94640 AIRWAY INHALATION TREATMENT: CPT

## 2018-01-12 PROCEDURE — 74176 CT ABD & PELVIS W/O CONTRAST: CPT

## 2018-01-12 PROCEDURE — 85025 COMPLETE CBC W/AUTO DIFF WBC: CPT | Performed by: GENERAL PRACTICE

## 2018-01-12 PROCEDURE — 81001 URINALYSIS AUTO W/SCOPE: CPT | Performed by: PHYSICIAN ASSISTANT

## 2018-01-12 PROCEDURE — 36415 COLL VENOUS BLD VENIPUNCTURE: CPT | Performed by: NURSE PRACTITIONER

## 2018-01-12 PROCEDURE — 94664 DEMO&/EVAL PT USE INHALER: CPT

## 2018-01-12 PROCEDURE — 85049 AUTOMATED PLATELET COUNT: CPT | Performed by: NURSE PRACTITIONER

## 2018-01-12 PROCEDURE — 94760 N-INVAS EAR/PLS OXIMETRY 1: CPT

## 2018-01-12 PROCEDURE — 84484 ASSAY OF TROPONIN QUANT: CPT | Performed by: INTERNAL MEDICINE

## 2018-01-12 RX ORDER — NICOTINE 21 MG/24HR
1 PATCH, TRANSDERMAL 24 HOURS TRANSDERMAL DAILY
Qty: 28 PATCH | Refills: 0 | Status: SHIPPED | OUTPATIENT
Start: 2018-01-13 | End: 2018-09-08 | Stop reason: ALTCHOICE

## 2018-01-12 RX ADMIN — LEVALBUTEROL HYDROCHLORIDE 1.25 MG: 1.25 SOLUTION, CONCENTRATE RESPIRATORY (INHALATION) at 16:07

## 2018-01-12 RX ADMIN — NICOTINE 1 PATCH: 14 PATCH TRANSDERMAL at 08:06

## 2018-01-12 RX ADMIN — LEVALBUTEROL HYDROCHLORIDE 1.25 MG: 1.25 SOLUTION, CONCENTRATE RESPIRATORY (INHALATION) at 08:05

## 2018-01-12 RX ADMIN — OXYCODONE HYDROCHLORIDE 10 MG: 10 TABLET ORAL at 12:10

## 2018-01-12 RX ADMIN — PROPRANOLOL HYDROCHLORIDE 80 MG: 20 TABLET ORAL at 10:19

## 2018-01-12 RX ADMIN — ISODIUM CHLORIDE 3 ML: 0.03 SOLUTION RESPIRATORY (INHALATION) at 08:05

## 2018-01-12 RX ADMIN — PANTOPRAZOLE SODIUM 40 MG: 40 TABLET, DELAYED RELEASE ORAL at 07:00

## 2018-01-12 RX ADMIN — OXYCODONE HYDROCHLORIDE 30 MG: 10 TABLET ORAL at 15:00

## 2018-01-12 RX ADMIN — GABAPENTIN 900 MG: 300 CAPSULE ORAL at 15:00

## 2018-01-12 RX ADMIN — SODIUM CHLORIDE 125 ML/HR: 0.9 INJECTION, SOLUTION INTRAVENOUS at 08:05

## 2018-01-12 RX ADMIN — CHOLECALCIFEROL TAB 25 MCG (1000 UNIT) 1000 UNITS: 25 TAB at 10:00

## 2018-01-12 RX ADMIN — CYCLOBENZAPRINE HYDROCHLORIDE 10 MG: 10 TABLET, FILM COATED ORAL at 12:10

## 2018-01-12 RX ADMIN — DEXTROAMPHETAMINE SACCHARATE, AMPHETAMINE ASPARTATE, DEXTROAMPHETAMINE SULFATE, AND AMPHETAMINE SULFATE 20 MG: 2.5; 2.5; 2.5; 2.5 TABLET ORAL at 08:14

## 2018-01-12 RX ADMIN — GABAPENTIN 900 MG: 300 CAPSULE ORAL at 10:07

## 2018-01-12 RX ADMIN — ISODIUM CHLORIDE 3 ML: 0.03 SOLUTION RESPIRATORY (INHALATION) at 16:08

## 2018-01-12 RX ADMIN — OXYCODONE HYDROCHLORIDE 30 MG: 10 TABLET ORAL at 10:00

## 2018-01-12 RX ADMIN — HEPARIN SODIUM 5000 UNITS: 5000 INJECTION, SOLUTION INTRAVENOUS; SUBCUTANEOUS at 14:58

## 2018-01-12 RX ADMIN — DIVALPROEX SODIUM 250 MG: 250 TABLET, DELAYED RELEASE ORAL at 09:27

## 2018-01-12 RX ADMIN — ALPRAZOLAM 2 MG: 0.5 TABLET ORAL at 15:17

## 2018-01-12 RX ADMIN — ASPIRIN 81 MG: 81 TABLET, COATED ORAL at 10:00
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
## 2018-01-12 NOTE — CONSULTS
Consultation - UROLOGY  Rc Sotomayor 42 y o  female MRN: [id number]  Unit/Bed#: [place] [address] Encounter: [id number]      Assessment/Plan   Hematuria  Incomplete emptying, voiding small amounts  Patient states that she had gross hematuria on the day of admission 01/10/2018 UA, C&S showed innumerable RBCs, large amount blood, cultures showed contaminant x3  Maxipime was given for 1 dose and then stopped by ID  Blood cultures were negative and vancomycin was also DC  Today she complained that she again had gross hematuria, this was flushed, it was not witnessed by the nurse  She complains of voiding only small amounts, and does not feel like she empties her bladder  She denies urgency, pain with urination, abdominal pain, vaginal bleeding, fever chills nausea vomiting  She also states that she coughed up blood, she has nosebleeds, and bleeding with urination  She is not on [facility], her platelets are 697  She is on aspirin daily    PLAN:  Check postvoid residual, stat UA, ultrasound bladder and kidneys  If There is no obstructive uropathy, patient can be seen as outpatient for follow-up in the urology office  If this is felt to be hemorrhagic cystitis she can be treated with an antibiotic and discharged to home today  She will follow in Urology office  Physician Requesting Consult: De Bro, *    Chief Complaint:  I urinated and it was very bloody, there was a lot of blood when I wiped on the tissue  I had on the day that I was admitted, and I have again today    HPI: Rc Sotomayor is a 42y o  year old female  consulted gross hematuria  Patient was admitted 01/10/2018 for SIRS, acute encephalopathy, suspected aspiration  She states that she has had pneumonia since August   She has been on multiple courses of antibiotics over the past several months  She was seen by infectious disease in the leukocytosis was felt to be chronic      She states that today she had bloody urine again, and blood on the tissue  She did not save the specimen, this was not witnessed by nursing  She denies burning with urination, cramping, abdominal pain  She admits to voiding small amount and not feeling like she is completely emptied  She does not have any urgency or frequency  She states that she had been coughing up blood associated with pneumonia, history of bronchoscopy, bloody nose, and bloody urine  She has a history of 25 pack year smoking  She denies kidney stones  PMH:  Restrictive lung disease, chronic leukocytosis, RA, history of pancreatitis, IVS, hypertension, gastritis, fibromyalgia, endocarditis, Crohn's disease, cervical cancer  Additional PMH from outside records:  Anxiety/depression Lyme disease, hiatal hernia, seizure, opioid dependency               ROS negative except for:  Bloody urine  Incomplete emptying, voiding small amounts  Pneumonia since August        Historical Information   Past Medical History:   Diagnosis Date    Cervical cancer ([place] [address] )     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colitis ([place] [address] )     Crohn's colitis ([place] [address] )     Endocarditis     Fibromyalgia     Gastritis     Hypertension     IBS (irritable bowel syndrome)     Pancreatitis     RA (rheumatoid arthritis) ([place] [address] )     Restrictive lung disease     Vertigo      Past Surgical History:   Procedure Laterality Date    APPENDECTOMY      BRONCHOSCOPY      multiple     Social History   History   Alcohol Use No     History   Drug Use    Types: Marijuana     History   Smoking Status    Current Every Day Smoker    Packs/day: 1 00    Types: Cigarettes   Smokeless Tobacco    Never Used     Family History: no pertinent family history  Allergies   Allergen Reactions    Penicillins Hives    Toradol [Ketorolac Tromethamine] Rash     Meds/Allergies     current meds:   Current Facility-Administered Medications   Medication Dose Route Frequency    acetaminophen (TYLENOL) tablet 650 mg  650 mg Oral Q6H PRN    ALPRAZolam (XANAX) tablet 2 mg  2 mg Oral TID PRN    amitriptyline (ELAVIL) tablet 100 mg  100 mg Oral HS    amphetamine-dextroamphetamine (ADDERALL) tablet 20 mg  20 mg Oral Daily    aspirin (ECOTRIN LOW STRENGTH) EC tablet 81 mg  81 mg Oral Daily    cholecalciferol (VITAMIN D3) tablet 1,000 Units  1,000 Units Oral Daily    cyclobenzaprine (FLEXERIL) tablet 10 mg  10 mg Oral TID PRN    divalproex sodium (DEPAKOTE) EC tablet 250 mg  250 mg Oral Q12H MIKE    gabapentin (NEURONTIN) capsule 900 mg  900 mg Oral TID    heparin (porcine) subcutaneous injection 5,000 Units  5,000 Units Subcutaneous Q8H [address]    levalbuterol (XOPENEX) inhalation solution 1 25 mg  1 25 mg Nebulization TID    mesalamine (LIALDA) EC tablet 4 8 g  4 8 g Oral Daily With Breakfast    montelukast (SINGULAIR) tablet 10 mg  10 mg Oral HS    nicotine (NICODERM CQ) 14 mg/24hr TD 24 hr patch 1 patch  1 patch Transdermal Daily    ondansetron (ZOFRAN) injection 4 mg  4 mg Intravenous Q6H PRN    oxyCODONE (ROXICODONE) immediate release tablet 10 mg  10 mg Oral Q4H PRN    oxyCODONE (ROXICODONE) immediate release tablet 30 mg  30 mg Oral TID    pantoprazole (PROTONIX) EC tablet 40 mg  40 mg Oral Daily Before Breakfast    propranolol (INDERAL) tablet 80 mg  80 mg Oral TID    sodium chloride 0 9 % inhalation solution 3 mL  3 mL Nebulization TID    SUMAtriptan (IMITREX) tablet 100 mg  100 mg Oral Daily PRN         Objective   Vitals: Blood pressure 101/55, pulse 84, temperature 98 6 °F (37 °C), temperature source Oral, resp  rate 18, height 5' 2" (1 575 m), weight 69 9 kg (154 lb), SpO2 96 %  ,Body mass index is 28 17 kg/m²      Intake/Output Summary (Last 24 hours) at 01/12/18 1235  Last data filed at 01/12/18 0805   Gross per 24 hour   Intake             1000 ml   Output              500 ml   Net              500 ml     Invasive Devices     Peripheral Intravenous Line            Peripheral IV 01/11/18 Right Hand less than 1 day                Physical Exam:    General appearance: A & O X 3  HEENT: PERRLA,EOMI, Sclera clear,anicterus,   Neurologic: CN II-XII intact, affect appropriate        Lab Results:    Coags: No results found for: PT, PTT, INR, CBC with diff:   Lab Results   Component Value Date    WBC 12 36 (H) 01/12/2018    HGB 11 1 (L) 01/12/2018    HCT 34 7 (L) 01/12/2018    MCV 91 01/12/2018     01/12/2018    MCH 29 2 01/12/2018    MCHC 32 0 01/12/2018    RDW 15 7 (H) 01/12/2018    MPV 9 4 01/12/2018    NRBC 0 01/12/2018   , BMP/CMP:   Lab Results   Component Value Date     01/12/2018    K 3 6 01/12/2018     (H) 01/12/2018    CO2 25 01/12/2018    ANIONGAP 9 01/12/2018    BUN 6 01/12/2018    CREATININE 0 65 01/12/2018    GLUCOSE 112 01/12/2018    CALCIUM 8 7 01/12/2018    AST 15 01/12/2018    ALT 25 01/12/2018    ALKPHOS 62 01/12/2018    PROT 6 6 01/12/2018    ALBUMIN 3 1 (L) 01/12/2018    BILITOT 0 20 01/12/2018    EGFR 112 01/12/2018   , Blood Culture: No results found for: BLOODCX, Urine Culture: No results found for: URINECX  Imaging Studies: Cta Head And Neck W Wo Contrast    Result Date: 1/10/2018  Impression: No evidence of occlusion, dissection, aneurysm or significant stenosis  No acute intracranial process  Groundglass opacities in the right middle and left upper lobe could suggest atelectasis and/or pneumonia  If there is persistent or worsening symptomatology, consider noncontrast brain MRI  Periodontal disease  Workstation performed: [id number]     X-ray Chest 2 Views    Result Date: 1/10/2018  Impression: No active pulmonary disease  Workstation performed: [id number]            Code Status: Level 1 - Full Code  Advance Directive and Living Will:      Power of :    POLST:      Counseling / Coordination of Care  Counseling/Coordination of Care: Total floor / unit time spent today 30 minutes  Greater than 50% of total time was spent with the patient and / or family counseling and / or coordination of care   A description of the counseling / coordination of care: plan for investigating hemturia and follow up in urology office      Mere Early PA-C

## 2018-01-12 NOTE — PROGRESS NOTES
General Cardiology   Progress Note   Angel Caba 42 y o  female MRN: [id number]  Unit/Bed#: [place] [address] Encounter: [id number]        Subjective:    No significant events since the last encounter  I  Have no more chest pain  REVIEW OF SYSTEMS:  Constitutional:  Denies fever or chills   Eyes:  Denies change in visual acuity   HENT:  Denies nasal congestion or sore throat   Respiratory:  Denies cough or shortness of breath   Cardiovascular:  Denies chest pain or edema   GI:  Denies abdominal pain, nausea, vomiting, bloody stools or diarrhea   :  Denies dysuria, frequency, difficulty in micturition and nocturia  Musculoskeletal:  Denies back pain or joint pain   Neurologic:  Denies headache, focal weakness or sensory changes   Endocrine:  Denies polyuria or polydipsia   Lymphatic:  Denies swollen glands   Psychiatric:  Denies depression or anxiety     Objective:   Vitals:  Vitals:    01/12/18 0924   BP: 101/55   Pulse:    Resp:    Temp:    SpO2:        Body mass index is 28 17 kg/m²  Systolic (90LCM), IQR:69 , Min:83 , HXN:388     Diastolic (67QXD), VHC:66, Min:49, Max:64      Intake/Output Summary (Last 24 hours) at 01/12/18 1147  Last data filed at 01/12/18 0805   Gross per 24 hour   Intake             1000 ml   Output              500 ml   Net              500 ml     Weight (last 2 days)     Date/Time   Weight    01/10/18 1658  69 9 (154)                PHYSICAL EXAMS:  General:  Patient is not in acute distress, laying in the bed comfortably, awake, alert responding to commands  Head: Normocephalic, Atraumatic  HEENT: White sclera, pink conjunctiva,  PERRLA,pharynx benign  Neck:  Supple, no neck vein distention, carotids+2/+2 no bruits, thyromegaly, adenopathy  Respiratory: clear to P/A  Cardiovascular:  PMI normal, S1-S2 normal, No  Murmurs, thrills, gallops, rubs   Regular rhythm  GI:  Abdomen soft nontender   No hepatosplenomegaly, adenopathy, ascites,or rebound tenderness  Extremities: No edema, normal pulses, no calf tenderness, no joint deformities, no venous disease   Integument:  No skin rashes or ulceration  Lymphatic:  No cervical or inguinal lymphadenopathy  Neurologic:  Patient is awake alert, responding to command, well-oriented to time and place and person moving all extremities      LABORATORY RESULTS:    Results from last 7 days  Lab Units 01/12/18  0449 01/11/18  1905 01/10/18  1749   TROPONIN I ng/mL <0 02 <0 02 <0 02     CBC with diff:   Results from last 7 days  Lab Units 01/12/18  1107 01/12/18  0449 01/11/18  0601 01/10/18  1749 01/07/18  0608   WBC Thousand/uL 12 36*  --  8 66 15 68* 22 58*   HEMOGLOBIN g/dL 11 1*  --  10 6* 12 4 11 7   HEMATOCRIT % 34 7*  --  32 5* 38 5 36 0   MCV fL 91  --  91 91 91   PLATELETS Thousands/uL 244 227 211 265 276   MCH pg 29 2  --  29 5 29 2 29 6   MCHC g/dL 32 0  --  32 6 32 2 32 5   RDW % 15 7*  --  15 3* 15 4* 14 8   MPV fL 9 4 9 9 9 5 9 9 9 7   NRBC AUTO /100 WBCs 0  --   --  0 0       CMP:  Results from last 7 days  Lab Units 01/12/18  1107 01/11/18  0601 01/10/18  1749 01/07/18  0608   SODIUM mmol/L 144 144 141 142   POTASSIUM mmol/L 3 6 3 2* 3 5 3 2*   CHLORIDE mmol/L 110* 112* 106 101   CO2 mmol/L 25 24 26 33*   ANION GAP mmol/L 9 8 9 8   BUN mg/dL 6 8 8 10   CREATININE mg/dL 0 65 0 54* 0 61 0 85   GLUCOSE RANDOM mg/dL 112 117 96 87   CALCIUM mg/dL 8 7 8 0* 8 8 9 0   AST U/L 15  --  19 17   ALT U/L 25  --  29 28   ALK PHOS U/L 62  --  79 84   TOTAL PROTEIN g/dL 6 6  --  7 4 7 1   ALBUMIN g/dL 3 1*  --  3 4* 3 7   BILIRUBIN TOTAL mg/dL 0 20  --  0 20 0 20   EGFR ml/min/1 73sq m 112 119 115 87       BMP:  Results from last 7 days  Lab Units 01/12/18  1107 01/11/18  0601 01/10/18  1749   SODIUM mmol/L 144 144 141   POTASSIUM mmol/L 3 6 3 2* 3 5   CHLORIDE mmol/L 110* 112* 106   CO2 mmol/L 25 24 26   BUN mg/dL 6 8 8   CREATININE mg/dL 0 65 0 54* 0 61   GLUCOSE RANDOM mg/dL 112 117 96   CALCIUM mg/dL 8 7 8 0* 8 8                Results from last 7 days  Lab Units 18  0601 01/10/18  1749 18  0608   MAGNESIUM mg/dL 1 9 1 7 2 1           Results from last 7 days  Lab Units 18  0608   TSH 3RD GENERATON uIU/mL 1 176           Lipid Profile:   Lab Results   Component Value Date    CHOL 127 2017     Lab Results   Component Value Date    HDL 31 (L) 2017     Lab Results   Component Value Date    LDLCALC 63 2017     Lab Results   Component Value Date    TRIG 163 (H) 2017       Cardiac testing:   Results for orders placed during the hospital encounter of 17   Echo complete with contrast if indicated    [place], [place] [address]  [phone]    Transthoracic Echocardiogram  2D, M-mode, Doppler, and Color Doppler    Study date:  29-Dec-2017    Patient: Amish Agustin  MR number: [id number]  Account number: [de-identified]  : 1980  Age: 42 years  Gender: Female  Status: Inpatient  Location: Emergency room  Height: 62 in  Weight: 145 lb  BP: 104/ 60 mmHg    Indications: Syncope, left sided numbness, rule out CVA, Evaluate for suspected cardiac source of embolism  Assess left ventricular function  Diagnoses: I63 9 - Cerebral infarction, unspecified, R55  - Syncope and collapse    Sonographer:  Juju Abarca RDCS  Interpreting Physician:  Verónica Flannery MD  Referring Physician:  Sheila Briceno PA-C  Group:  [facility]    SUMMARY    LEFT VENTRICLE:  Systolic function was normal  Ejection fraction was estimated in the range of 55 % to 65 %  There were no regional wall motion abnormalities  Doppler parameters were consistent with abnormal left ventricular relaxation (grade 1 diastolic dysfunction)  MITRAL VALVE:  There was trace regurgitation  TRICUSPID VALVE:  There was mild regurgitation  PULMONIC VALVE:  There was mild regurgitation  HISTORY: Symptoms: chest pain  Consistent with transient ischemic attack or stroke   PRIOR HISTORY: Sepsis, UTI, slurred speech Risk factors: a history of current cigarette use (within the last month)  PROCEDURE: The procedure was performed in the emergency room  This was a routine study  The transthoracic approach was used  The study included complete 2D imaging, M-mode, complete spectral Doppler, and color Doppler  The heart rate was  99 bpm, at the start of the study  Images were obtained from the parasternal, apical, subcostal, and suprasternal notch acoustic windows  Image quality was adequate  LEFT VENTRICLE: Size was normal  Systolic function was normal  Ejection fraction was estimated in the range of 55 % to 65 %  There were no regional wall motion abnormalities  Wall thickness was normal  DOPPLER: Doppler parameters were  consistent with abnormal left ventricular relaxation (grade 1 diastolic dysfunction)  RIGHT VENTRICLE: The size was normal  Systolic function was normal  Wall thickness was normal     LEFT ATRIUM: Size was normal     RIGHT ATRIUM: Size was normal     MITRAL VALVE: Valve structure was normal  There was normal leaflet separation  DOPPLER: The transmitral velocity was within the normal range  There was no evidence for stenosis  There was trace regurgitation  AORTIC VALVE: The valve was trileaflet  Leaflets exhibited normal thickness and normal cuspal separation  DOPPLER: Transaortic velocity was within the normal range  There was no evidence for stenosis  There was no regurgitation  TRICUSPID VALVE: The valve structure was normal  There was normal leaflet separation  DOPPLER: The transtricuspid velocity was within the normal range  There was no evidence for stenosis  There was mild regurgitation  PULMONIC VALVE: Leaflets exhibited normal thickness, no calcification, and normal cuspal separation  DOPPLER: The transpulmonic velocity was within the normal range  There was mild regurgitation  PERICARDIUM: There was no pericardial effusion   The pericardium was normal in appearance  AORTA: The root exhibited normal size  SYSTEMIC VEINS: IVC: The inferior vena cava was normal in size and course  Respirophasic changes were normal     SYSTEM MEASUREMENT TABLES    2D  %FS: 24 8 %  Ao Diam: 2 7 cm  EDV(Teich): 74 1 ml  EF(Teich): 49 6 %  ESV(Teich): 37 3 ml  IVSd: 0 6 cm  LA Area: 10 7 cm2  LA Diam: 2 4 cm  LVEDV MOD A4C: 109 3 ml  LVEF MOD A4C: 47 8 %  LVESV MOD A4C: 57 ml  LVIDd: 4 1 cm  LVIDs: 3 1 cm  LVLd A4C: 7 9 cm  LVLs A4C: 6 8 cm  LVPWd: 0 7 cm  RA Area: 12 9 cm2  RVIDd: 3 2 cm  SV MOD A4C: 52 3 ml  SV(Teich): 36 7 ml    CW  TR Vmax: 2 3 m/s  TR maxP 8 mmHg    MM  TAPSE: 2 2 cm    PW  E': 0 1 m/s  E/E': 5 2  MV A Cliff: 0 7 m/s  MV Dec Ransom: 3 9 m/s2  MV DecT: 163 ms  MV E Cliff: 0 6 m/s  MV E/A Ratio: 0 9  MV PHT: 47 3 ms  MVA By PHT: 4 7 cm2    [employer] Accredited Echocardi[facility]    Prepared and electronically signed by    Emelia Peterson MD  Signed 29-Dec-2017 18:00:17       No results found for this or any previous visit  No results found for this or any previous visit  No procedure found  No results found for this or any previous visit      Meds/Allergies   all current active meds have been reviewed and current meds:   Current Facility-Administered Medications   Medication Dose Route Frequency    acetaminophen (TYLENOL) tablet 650 mg  650 mg Oral Q6H PRN    ALPRAZolam (XANAX) tablet 2 mg  2 mg Oral TID PRN    amitriptyline (ELAVIL) tablet 100 mg  100 mg Oral HS    amphetamine-dextroamphetamine (ADDERALL) tablet 20 mg  20 mg Oral Daily    aspirin (ECOTRIN LOW STRENGTH) EC tablet 81 mg  81 mg Oral Daily    cholecalciferol (VITAMIN D3) tablet 1,000 Units  1,000 Units Oral Daily    cyclobenzaprine (FLEXERIL) tablet 10 mg  10 mg Oral TID PRN    divalproex sodium (DEPAKOTE) EC tablet 250 mg  250 mg Oral Q12H [facility]    gabapentin (NEURONTIN) capsule 900 mg  900 mg Oral TID    heparin (porcine) subcutaneous injection 5,000 Units  5,000 Units Subcutaneous Q8H [facility]    levalbuterol (XOPENEX) inhalation solution 1 25 mg  1 25 mg Nebulization TID    mesalamine (LIALDA) EC tablet 4 8 g  4 8 g Oral Daily With Breakfast    montelukast (SINGULAIR) tablet 10 mg  10 mg Oral HS    nicotine (NICODERM CQ) 14 mg/24hr TD 24 hr patch 1 patch  1 patch Transdermal Daily    ondansetron (ZOFRAN) injection 4 mg  4 mg Intravenous Q6H PRN    oxyCODONE (ROXICODONE) immediate release tablet 10 mg  10 mg Oral Q4H PRN    oxyCODONE (ROXICODONE) immediate release tablet 30 mg  30 mg Oral TID    pantoprazole (PROTONIX) EC tablet 40 mg  40 mg Oral Daily Before Breakfast    propranolol (INDERAL) tablet 80 mg  80 mg Oral TID    sodium chloride 0 9 % inhalation solution 3 mL  3 mL Nebulization TID    SUMAtriptan (IMITREX) tablet 100 mg  100 mg Oral Daily PRN       (Not in a hospital admission)         Assessment/Plan:  1  Chest pain: Troponins negative  EKG normal   Likely non-cardiac  Echo done 12/29/17 -- EF 55-65%, mmild MR, TR, AK       2  HTN: stable  Continue to monitor  Stable from cardiac standpoint, will sign off, call if needed  ** Please Note: Dragon 360 Dictation voice to text software may have been used in the creation of this document   **

## 2018-01-12 NOTE — PROGRESS NOTES
Progress Note - Pulmonary   Jeramy Tucker 42 y o  female MRN: [id number]  Unit/Bed#: [facility] Encounter: [id number]    Assessment:  1  Ground glass opacities on CT scan  2  Chest pain    Plan:  1  Ground glass opacities on CT scan  - Patient has been treated with multiple courses of antibiotics over the last several months for pneumonia, has undergone bronchoscopy x 2 at [facility] all with negative cultures and no endobronchial lesions found  - Concern that she may be chronically aspirating due to polypharmacy with findings on CT scan   - Would consider decreasing some of her meds, though she is reluctant to change anything, psych consult was done today  - Leukocytosis has resolved, she is afebrile  Blood cultures so far no growth, s pneumo and legionella pending, sputum culture ordered  -  Continue nebs  - Will monitor for fevers, increased leukocytosis, increased respiratory complaints  Currently without clinical pneumonia, continue to monitor off antibiotics  2  Chest pain  - This is a chronic chest pain associated with a tingling down her arm for which she has had many ER visits and hospital admissions  - Not likely pulmonary related    She is stable for discharge from a pulmonary standpoint, follow up with Dr Arturo Puga in 1 week  Subjective:   Patient feeling ok respiratory-wise  Minimal cough, no shortness of breath  She is still having tingling down her arm and now has abdominal distension and back pain  Objective:     Vitals: Blood pressure 114/55, pulse 89, temperature 99 3 °F (37 4 °C), temperature source Oral, resp  rate 18, height 5' 2" (1 575 m), weight 69 9 kg (154 lb), SpO2 97 %  ,Body mass index is 28 17 kg/m²        Intake/Output Summary (Last 24 hours) at 01/12/18 1540  Last data filed at 01/12/18 1416   Gross per 24 hour   Intake             1280 ml   Output              500 ml   Net              780 ml       Invasive Devices     Peripheral Intravenous Line            Peripheral IV 01/11/18 Right Hand 1 day                Physical Exam: General appearance: alert and oriented, in no acute distress  Lungs: clear to auscultation bilaterally  Heart: regular rate and rhythm and S1, S2 normal  Extremities: extremities normal, warm and well-perfused; no cyanosis, clubbing, or edema  Neurologic: Mental status: Alert, oriented, thought content appropriate     Labs: I have personally reviewed pertinent lab results  , CBC:   Lab Results   Component Value Date    WBC 12 36 (H) 01/12/2018    HGB 11 1 (L) 01/12/2018    HCT 34 7 (L) 01/12/2018    MCV 91 01/12/2018     01/12/2018    MCH 29 2 01/12/2018    MCHC 32 0 01/12/2018    RDW 15 7 (H) 01/12/2018    MPV 9 4 01/12/2018    NRBC 0 01/12/2018   , CMP:   Lab Results   Component Value Date     01/12/2018    K 3 6 01/12/2018     (H) 01/12/2018    CO2 25 01/12/2018    ANIONGAP 9 01/12/2018    BUN 6 01/12/2018    CREATININE 0 65 01/12/2018    GLUCOSE 112 01/12/2018    CALCIUM 8 7 01/12/2018    AST 15 01/12/2018    ALT 25 01/12/2018    ALKPHOS 62 01/12/2018    PROT 6 6 01/12/2018    ALBUMIN 3 1 (L) 01/12/2018    BILITOT 0 20 01/12/2018    EGFR 112 01/12/2018     Imaging and other studies: I have personally reviewed pertinent reports     and I have personally reviewed pertinent films in PACS

## 2018-01-12 NOTE — PROGRESS NOTES
[address] Internal Medicine Progress Note  Patient: Maryellen Alba 42 y o  female   MRN: [id number]  PCP: Viviana Duran DO  Unit/Bed#: [facility] [place] Encounter: [id number]  Date Of Visit: 01/12/18    Assessment:    Principal Problem:    Chest pain  Active Problems:    UTI (urinary tract infection)    Tobacco abuse    HCAP (healthcare-associated pneumonia)    Left arm numbness    Severe sepsis ([address] )      Plan:    Replace K           Severe sepsis ([address] )   Assessment & Plan     · Off abx; ID following; stable       HCAP (healthcare-associated pneumonia)   Assessment & Plan     · Pulmonary and ID following; off abx stable       * Chest pain   Assessment & Plan     · Chest pain with relief at this time   Likely due to pneumonia  · Noted on left chest radiating to left arm associated with numbness     · Cardiology consult-appreciated; thought to be noncardiac       Left arm numbness   Assessment & Plan     · Patient report symptoms persistent onset past few weeks  · CT of the brain reviewed no acute findings  · Patient does have history of Lyme and rheumatoid arthritis  · Neurology consult        Tobacco abuse   Assessment & Plan     Smoking cessation education done  Nicotine patch offered patient refused        UTI (urinary tract infection)   Assessment & Plan     · IV antibiotics d/c'd by ID; UC reviewed           psychiatry consult pending        VTE Pharmacologic Prophylaxis:   Pharmacologic: Heparin  Mechanical VTE Prophylaxis in Place: Yes    Patient Centered Rounds: I have performed bedside rounds with nursing staff today  Discussions with Specialists or Other Care Team Provider:     Education and Discussions with Family / Patient:     Time Spent for Care: 30 minutes  More than 50% of total time spent on counseling and coordination of care as described above      Current Length of Stay: 2 day(s)    Current Patient Status: Inpatient   Certification Statement: The patient will continue to require additional inpatient hospital stay due to possible discharge today    Discharge Plan: home    Code Status: Level 1 - Full Code      Subjective:   Breathing improved; c/o blood in urine    Objective:     Vitals:   Temp (24hrs), Av 6 °F (37 °C), Min:98 6 °F (37 °C), Max:98 6 °F (37 °C)    HR:  [80-88] 84  Resp:  [16-19] 18  BP: ()/(49-64) 101/55  SpO2:  [96 %-98 %] 96 %  Body mass index is 28 17 kg/m²  Input and Output Summary (last 24 hours): Intake/Output Summary (Last 24 hours) at 18 1040  Last data filed at 18 0805   Gross per 24 hour   Intake             1000 ml   Output              500 ml   Net              500 ml       Physical Exam:     Physical Exam   Constitutional: She appears well-developed and well-nourished  HENT:   Head: Normocephalic  Eyes: Pupils are equal, round, and reactive to light  Cardiovascular: Normal rate and regular rhythm  Pulmonary/Chest: Effort normal and breath sounds normal    Musculoskeletal: She exhibits no edema         Additional Data:     Labs:      Results from last 7 days  Lab Units 18  0449 18  0601 01/10/18  1749 18  0608   WBC Thousand/uL  --  8 66 15 68* 22 58*   HEMOGLOBIN g/dL  --  10 6* 12 4 11 7   HEMATOCRIT %  --  32 5* 38 5 36 0   PLATELETS Thousands/uL 227 211 265 276   NEUTROS PCT %  --   --   --  72   LYMPHS PCT %  --   --   --  21   LYMPHO PCT %  --   --  9*  --    MONOS PCT %  --   --   --  5   MONO PCT MAN %  --   --  3*  --    EOS PCT %  --   --   --  1   EOSINO PCT MANUAL %  --   --  0  --        Results from last 7 days  Lab Units 18  0601 01/10/18  1749   SODIUM mmol/L 144 141   POTASSIUM mmol/L 3 2* 3 5   CHLORIDE mmol/L 112* 106   CO2 mmol/L 24 26   BUN mg/dL 8 8   CREATININE mg/dL 0 54* 0 61   CALCIUM mg/dL 8 0* 8 8   TOTAL PROTEIN g/dL  --  7 4   BILIRUBIN TOTAL mg/dL  --  0 20   ALK PHOS U/L  --  79   ALT U/L  --  29   AST U/L  --  19   GLUCOSE RANDOM mg/dL 117 96           * I Have Reviewed All Lab Data Listed Above  * Additional Pertinent Lab Tests Reviewed: All Labs Within Last 24 Hours Reviewed    Imaging:    Imaging Reports Reviewed Today Include:   Imaging Personally Reviewed by Myself Includes:      Recent Cultures (last 7 days):       Results from last 7 days  Lab Units 01/11/18  2005 01/10/18  2035 01/10/18  1727 01/07/18  1115 01/07/18  0732 01/07/18  0608 01/07/18  0606   BLOOD CULTURE   --  No Growth at 24 hrs  No Growth at 24 hrs   --   --  No Growth After 4 Days  No Growth After 4 Days  --    URINE CULTURE   --   --  70,000-79,000 cfu/ml   --   --   --  80,000-89,000 cfu/ml Lactobacillus species*   INFLUENZA A PCR   --   --   --  None Detected  --   --   --    INFLUENZA B PCR   --   --   --  None Detected  --   --   --    RSV PCR   --   --   --  None Detected  --   --   --    LEGIONELLA URINARY ANTIGEN  Negative  --   --   --   --   --   --        Last 24 Hours Medication List:     amitriptyline 100 mg Oral HS   amphetamine-dextroamphetamine 20 mg Oral Daily   aspirin 81 mg Oral Daily   cholecalciferol 1,000 Units Oral Daily   divalproex sodium 250 mg Oral Q12H [facility]   gabapentin 900 mg Oral TID   heparin (porcine) 5,000 Units Subcutaneous Q8H [address]   levalbuterol 1 25 mg Nebulization TID   mesalamine 4 8 g Oral Daily With Breakfast   montelukast 10 mg Oral HS   nicotine 1 patch Transdermal Daily   oxyCODONE 30 mg Oral TID   pantoprazole 40 mg Oral Daily Before Breakfast   propranolol 80 mg Oral TID   sodium chloride 3 mL Nebulization TID        Today, Patient Was Seen By: Taiwo Jimenez MD    ** Please Note: Dragon 360 Dictation voice to text software may have been used in the creation of this document   **

## 2018-01-12 NOTE — PROGRESS NOTES
Progress Note - Infectious Disease   Chana Vallejo 42 y o  female MRN: [id number]  Unit/Bed#: [facility] [place] Encounter: [id number]      Impression/Recommendations:  1  SIRS  POA:  Tachycardia and leukocytosis  No clear evidence of sepsis  Consider secondary to # 3 versus anxiety on presentation  Blood cultures negative, urine culture consistent with contaminated specimen  Clinically stable and nontoxic  Rec:       · Continue to follow closely off antibiotic  · Follow up final blood cultures from admission but suspect these will remain negative  · Follow temperatures closely  · Supportive care as per the primary service     2  Acute encephalopathy  Likely due to #4  Improved to baseline  Rec:       · Limit sedating medications  · Follow mental status closely     3  Aspiration pneumonitis  Likely due to # 2/4  In the absence of fever or significant respiratory symptoms doubt clinical pneumonia  Rec:       · Continue to follow closely off antibiotics  · Follow respiratory status closely  · Limit sedating medications     4  Polypharmacy  Appears to be significantly contributing to recurrent hospitalization  Consider medication abuse as patient is resistant to tapering  Rec:  · Psychiatry consultation pending  · Consider tapering of some of medication    The patient is stable from an ID standpoint  We will sign off for now  Please call with new questions      Antibiotics:  Off antibiotics # 1    Subjective:  Patient seen on AM rounds  More awake and alert today  Denies any cough  Denies fevers, chills, or sweats  Denies nausea, vomiting, or diarrhea        Objective:  Vitals:  HR:  [74-88] 84  Resp:  [16-19] 18  BP: ()/(49-64) 108/57  SpO2:  [96 %-99 %] 96 %  Temp (24hrs), Av 6 °F (37 °C), Min:98 6 °F (37 °C), Max:98 6 °F (37 °C)  Current: Temperature: 98 6 °F (37 °C)    Physical Exam:   General:  No acute distress  Psychiatric:  Awake and alert  Pulmonary:  Normal respiratory excursion without accessory muscle use, clear to auscultation bilaterally  Abdomen:  Soft, nontender  Extremities:  No edema  Skin:  No rashes    Lab Results:  I have personally reviewed pertinent labs  Results from last 7 days  Lab Units 01/11/18  0601 01/10/18  1749 01/07/18  0608   SODIUM mmol/L 144 141 142   POTASSIUM mmol/L 3 2* 3 5 3 2*   CHLORIDE mmol/L 112* 106 101   CO2 mmol/L 24 26 33*   ANION GAP mmol/L 8 9 8   BUN mg/dL 8 8 10   CREATININE mg/dL 0 54* 0 61 0 85   EGFR ml/min/1 73sq m 119 115 87   GLUCOSE RANDOM mg/dL 117 96 87   CALCIUM mg/dL 8 0* 8 8 9 0   AST U/L  --  19 17   ALT U/L  --  29 28   ALK PHOS U/L  --  79 84   TOTAL PROTEIN g/dL  --  7 4 7 1   ALBUMIN g/dL  --  3 4* 3 7   BILIRUBIN TOTAL mg/dL  --  0 20 0 20       Results from last 7 days  Lab Units 01/12/18  0449 01/11/18  0601 01/10/18  1749 01/07/18  0608   WBC Thousand/uL  --  8 66 15 68* 22 58*   HEMOGLOBIN g/dL  --  10 6* 12 4 11 7   PLATELETS Thousands/uL 227 211 265 276       Results from last 7 days  Lab Units 01/10/18  2035 01/10/18  1727 01/07/18  1115 01/07/18  0732 01/07/18  0608 01/07/18  0606   BLOOD CULTURE  No Growth at 24 hrs  No Growth at 24 hrs   --   --  No Growth After 4 Days  No Growth After 4 Days  --    URINE CULTURE   --  70,000-79,000 cfu/ml   --   --   --  80,000-89,000 cfu/ml Lactobacillus species*   INFLUENZA A PCR   --   --  None Detected  --   --   --    INFLUENZA B PCR   --   --  None Detected  --   --   --    RSV PCR   --   --  None Detected  --   --   --        Imaging Studies:   I have personally reviewed pertinent imaging study reports and images in PACS  EKG, Pathology, and Other Studies:   I have personally reviewed pertinent reports

## 2018-01-12 NOTE — RESPIRATORY THERAPY NOTE
RT Protocol Note  Summer Joseph 42 y o  female MRN: [id number]  Unit/Bed#: [facility] 01 Encounter: [id number]    Assessment    Principal Problem:    Chest pain  Active Problems:    UTI (urinary tract infection)    Tobacco abuse    HCAP (healthcare-associated pneumonia)    Left arm numbness    Severe sepsis (HCC)      Home Pulmonary Medications:  None    Past Medical History:   Diagnosis Date    Cervical cancer ([facility] )     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colitis ([place] [address] )     Crohn's colitis ([place] [address] )     Endocarditis     Fibromyalgia     Gastritis     Hypertension     IBS (irritable bowel syndrome)     Pancreatitis     RA (rheumatoid arthritis) ([place] [address] )     Restrictive lung disease     Vertigo      Social History     Social History    Marital status: /Civil Union     Spouse name: N/A    Number of children: N/A    Years of education: N/A     Social History Main Topics    Smoking status: Current Every Day Smoker     Packs/day: 1 00     Types: Cigarettes    Smokeless tobacco: Never Used    Alcohol use No    Drug use: Yes     Types: Marijuana    Sexual activity: Not Asked     Other Topics Concern    None     Social History Narrative    Has a        Subjective         Objective    Physical Exam:   Assessment Type: Assess only  General Appearance: Sleeping, Drowsy  Respiratory Pattern: Normal  Chest Assessment: Chest expansion symmetrical  Bilateral Breath Sounds: Diminished    Vitals:  Blood pressure 105/64, pulse 88, temperature 98 6 °F (37 °C), temperature source Oral, resp  rate 16, height 5' 2" (1 575 m), weight 69 9 kg (154 lb), SpO2 97 %  Imaging and other studies: I have personally reviewed pertinent reports  Plan    Respiratory Plan: Mild Distress pathway        Resp Comments: Patient visited for respiratory protocol  Patient was very drowsy and sleepy  She states that she is not on any respiratory meds at home  Will keep previously ordered nebulizer meds  Will follow

## 2018-01-12 NOTE — DISCHARGE SUMMARY
Discharge Summary - [facility]    Patient Information: Katy Sherman 42 y o  female MRN: [id number]  Unit/Bed#: [place] [address] Encounter: [id number]    Discharging Physician / Practitioner: Lila Veronica MD  PCP: Isaiah Howard DO  Admission Date: 1/10/2018  Discharge Date: 01/12/18    Reason for Admission:       Severe sepsis [facility])   Assessment & Plan     · Lactate 2 7,  leukocytosis WBC 15 6  Sinus tach, Likely secondary to UTI and Hcap  · Sepsis protocol initiated with IV fluids  · Will repeat lactate and monitor  · Will treat underlying infection  · Cefepime and vancomycin was initiated in ED and will continue  · Blood cultures, sputum cultures, urine culture pending  · Continue to monitor WBC and vital signs for fever        HCAP (healthcare-associated pneumonia)   Assessment & Plan     · Recently diagnosed with H  pneumonia, was hospitalized  Patient left AMA 3 days ago  · Patient does meet SIRS criteria  · Recent CT reviewed right middle and left upper lobe could suggest atelectasis and/or pneumonia  · Continue cefepime and Vanco pending cultures  · Review of records negative for flu  · Pending sputum cultures  · Respiratory protocol with p r n  neb treatments  Incentive spirometer  · Mucinex for cough  · Will consult Pulmonary as patient report previous history of multiple episodes of pneumonia, pulmonary nodules, history of recent bronch by Dr Mohini Aguilar from [facility]        * Chest pain   Assessment & Plan     · Chest pain with relief at this time  Likely due to pneumonia  · Noted on left chest radiating to left arm associated with numbness  · Telemetry  · Cardiology consult  · Trend troponins        Left arm numbness   Assessment & Plan     · Patient report symptoms persistent onset past few weeks  · CT of the brain reviewed no acute findings  · Patient does have history of Lyme and rheumatoid arthritis  · If this is persistent will consider Neurology consult      Tobacco abuse   Assessment & Plan     Smoking cessation education done  Nicotine patch offered patient refused        UTI (urinary tract infection)   Assessment & Plan     · IV antibiotics in session  · Pending cultures  Discharge Diagnoses:     Principal Problem (Resolved):    Chest pain  Active Problems:    Tobacco abuse  Resolved Problems:    UTI (urinary tract infection)    HCAP (healthcare-associated pneumonia)    Left arm numbness    Severe sepsis ([address] )      Consultations During Hospital Stay:  · Pulmonary, urology and telepsychiatry, infectious disease, neurology, cardiology    Procedures Performed:     ·     Significant Findings:     ·     Incidental Findings:   · constipation     Test Results Pending at Discharge (will require follow up):   · Urology     Outpatient Tests Requested:  ·     Complications:      Hospital Course:     Cinthia Chew is a 42 y o  female patient who originally presented to the hospital on 1/10/2018 due to chest pain  Patient recently been admitted for pneumonia and had chest and back pain  She was initially admitted as a sepsis alert and treated for pneumonia Infectious Disease and Pulmonary were consulted it was recommended at that time that antibiotics be stopped because her pneumonia had previously been treated  She initially had leukocytosis which resolved off antibiotics  Cardiology was consulted for her chest pain as as she had had a recent echo  An outpatient stress test will be planned patient complained of hematuria and abdominal bloating she had a CT of her abdomen and pelvis performed which showed constipation  And renal gland bladder ultrasound was performed for hematuria and Urology was consulted follow-up of this will be undertaken as outpatient  Patient was also complaining of left-sided numbness and she was evaluated by Neurology and this was thought to be attributed to migraine headache      Condition at Discharge: stable     Discharge Day Visit / Exam:     * Please refer to separate progress for these details *    Discharge instructions/Information to patient and family:   See after visit summary for information provided to patient and family  Provisions for Follow-Up Care:  See after visit summary for information related to follow-up care and any pertinent home health orders  Disposition:     Home    For Discharges to [facility]:   · Not Applicable to this Patient - Not Applicable to this Patient    Planned Readmission:     Discharge Statement:  I spent 40 minutes discharging the patient  This time was spent on the day of discharge  I had direct contact with the patient on the day of discharge  Greater than 50% of the total time was spent examining patient, answering all patient questions, arranging and discussing plan of care with patient as well as directly providing post-discharge instructions  Additional time then spent on discharge activities  Discharge Medications:  See after visit summary for reconciled discharge medications provided to patient and family  ** Please Note: Dragon 360 Dictation voice to text software may have been used in the creation of this document   **

## 2018-01-13 NOTE — NURSING NOTE
PIV removed, catheter intact, discharge instructions discussed with the patient, compliance discussed, patient agreed, chose to ambulate out

## 2018-01-13 NOTE — DISCHARGE INSTRUCTIONS
Atrial Tachycardia   WHAT YOU SHOULD KNOW:   Atrial tachycardia (AT) is a condition that causes your heart to beat 100 to 300 times each minute  A normal heart rate at rest is 60 to 80 beats each minute  AT develops because of problems with your heart's electrical system  Your atria (top chambers) may send electrical signals that increase your heart rate, or the pathway of the electrical signal may be blocked  Your heart keeps sending signals to try to get past the block  CARE AGREEMENT:   You have the right to help plan your care  Learn about your health condition and how it may be treated  Discuss treatment options with your caregivers to decide what care you want to receive  You always have the right to refuse treatment  RISKS:   · Medicines to treat your AT may cause your heart to beat too slowly or your blood pressure to drop  Certain medicines may cause other types of heartbeat problems  Cardiac ablation can cause you to bleed, bruise, or get an infection where the wire was put in  Even after treatment, your AT and symptoms may return, and you may need more treatments  · Without treatment, your symptoms may get worse  Your heart may not be able to pump enough blood to supply oxygen to the rest of your body  You may get a blood clot The clot can break loose and travel to your lungs or brain  A blood clot can cause you to have a stroke  Your heart may weaken and not work properly  You are also at a higher risk for a heart attack and heart failure  WHILE YOU ARE HERE:   Informed consent  is a legal document that explains the tests, treatments, or procedures that you may need  Informed consent means you understand what will be done and can make decisions about what you want  You give your permission when you sign the consent form  You can have someone sign this form for you if you are not able to sign it  You have the right to understand your medical care in words you know   Before you sign the consent form, understand the risks and benefits of what will be done  Make sure all your questions are answered  An IV  is a small tube placed in your vein that is used to give you medicine or liquids  Heart monitor: This is also called an ECG or EKG  Sticky pads placed on your skin record your heart's electrical activity  Medicines:   · Antiarrhythmias: These help slow your heartbeat and make it more normal      · Beta blockers: These help keep your heartbeat in a regular rhythm  · Calcium channel blockers: These help slow your heartbeat  · Blood thinners: These help prevent blood clots  Clots can lead to stroke, heart attack, and death  Aspirin is a type of blood thinner  You may need to take an aspirin each day to help prevent blood clots  Do not take acetaminophen or ibuprofen instead  Do not take more or less aspirin than healthcare providers say to take  If you are on other blood thinner medicine, ask your healthcare provider before you take aspirin for any reason  · Electrolytes: You may be given electrolytes in the hospital if an electrolyte imbalance caused your AT  Tests:   · Blood tests: You may need blood taken to give caregivers information about how your body is working  The blood may be taken from your hand, arm, or IV  · Electrophysiologic study: An electrophysiologic study is used to chart the electrical pathways in your heart that control your heartbeat  Wires are guided through a blood vessel in your arm, neck, chest, or groin to your heart  Readings are taken through the wires  Your healthcare provider can also use these wires to trigger your heart rhythm problem  · An echocardiogram  is a type of ultrasound  Sound waves are used to show the structure and function of your heart  · ECG:  This is also called an EKG  An ECG is done to check for damage or problems in your heart  A short period of electrical activity in your heart is recorded   Lie as still as possible during the test      · Atrial electrograms:  Atrial electrograms are ECG recordings taken from temporary wires attached to your atria during heart surgery  The wires are connected to a monitor and show your heart's atrial activity  This test is done if healthcare providers believe you are having AT after heart surgery  · Exercise stress test:  An exercise stress test helps healthcare providers see the changes that take place in your heart during exercise  The test is done while you ride an exercise bike or walk on a treadmill  Healthcare providers will ask if you have chest pain or trouble breathing during the test   Treatment:   · Vagal maneuvers:  Vagal maneuvers (methods) can help slow the impulse from your atria to your ventricle and stop your AT  An example of a vagal maneuver is putting your face in ice cold water  Your healthcare provider may teach you other vagal maneuvers to do on your own when you have an episode of AT  · Cardioversion: This is a procedure where an electric shock is given to your heart  The shock is usually given through paddles or sticky patches placed on your chest or back  The shock helps your heart return to a normal beat  Cardioversion may be needed if medicine does not make your heart work better  You may need a cardioversion if your heart rhythm is making you sick or is dangerous  You may be given medicine to help you relax before getting the electric shock  If the shock works, your heart rate and rhythm will return to normal  Medicine may be needed to keep your heart in a normal rhythm  You may need a cardioversion more than once  · Cardiac ablation:  Cardiac ablation is a procedure that uses heat energy to stop abnormal heart impulses  A wire is guided to your heart through an artery or a vein  Your healthcare provider finds the area of the heart that is causing the problems and applies heat energy to it  This may help your heart beat in a more regular rhythm      · Pacemaker: This is a machine that helps your heart beat at a normal speed and in a regular rhythm  If your heart does not beat as it should, the pacemaker sends small electric signals to your heart  You may feel these signals  ¨ Temporary:  Large patches are placed on your chest and back  The patches are connected to a monitor  Your healthcare provider may need to put small wires through your skin and into your heart muscle instead  The wires are connected to a small pacemaker box outside of your body  ¨ Permanent:  A permanent pacemaker is about the size of a wristwatch  It is implanted under the skin of your chest     · Surgery: You may need surgery if other treatments do not work to stop your AT   © 2014 [address] is for End User's use only and may not be sold, redistributed or otherwise used for commercial purposes  All illustrations and images included in CareNotes® are the copyrighted property of A D A M , Inc  or [employer] Gonsalez  The above information is an  only  It is not intended as medical advice for individual conditions or treatments  Talk to your doctor, nurse or pharmacist before following any medical regimen to see if it is safe and effective for you  Chest Pain, Ambulatory Care   GENERAL INFORMATION:   Chest pain  can be caused by a range of conditions, from not serious to life-threatening  It may be caused by a heart attack or a blood clot in your lungs  Sometimes chest pain or pressure is caused by poor blood flow to your heart (angina)  Infection, inflammation, or a fracture in the bones or cartilage in your chest can cause pain or discomfort  Chest pain can also be a symptom of a digestive problem, such as acid reflux or a stomach ulcer     Common symptoms include the following:   · Fever or sweating     · Nausea or vomiting     · Shortness of breath     · Discomfort or pressure that spreads from your chest to your back, jaw, or arm     · A racing or slow heartbeat     · Feeling weak, tired, or faint  Seek immediate care for the following symptoms:   · Any of the following signs of a heart attack:      ¨ Squeezing, pressure, or pain in your chest that lasts longer than 5 minutes or returns    ¨ Discomfort or pain in your back, neck, jaw, stomach, or arm     ¨ Trouble breathing     ¨ Nausea or vomiting    ¨ Lightheadedness or a sudden cold sweat, especially with trouble breathing         · Chest discomfort that gets worse, even with medicine    · Coughing or vomiting blood    · Black or bloody bowel movements     · Vomiting that does not stop, or pain when you swallow  Treatment for chest pain  may include medicine to treat your symptoms while he determines the cause of your chest pain  You may also need any of the following:  · Antiplatelets , such as aspirin, help prevent blood clots  Take your antiplatelet medicine exactly as directed  These medicines make it more likely for you to bleed or bruise  If you are told to take aspirin, do not take acetaminophen or ibuprofen instead  · Prescription pain medicine  may be given  Ask how to take this medicine safely  Do not smoke: If you smoke, it is never too late to quit  Smoking increases your risk for a heart attack and other heart and lung conditions  Ask your healthcare provider for information about how to stop smoking if you need help  Follow up with your healthcare provider as directed: You may need more tests  You may be referred to a specialist, such as a cardiologist or gastroenterologist  Write down your questions so you remember to ask them during your visits  CARE AGREEMENT:   You have the right to help plan your care  Learn about your health condition and how it may be treated  Discuss treatment options with your caregivers to decide what care you want to receive  You always have the right to refuse treatment  The above information is an  only   It is not intended as medical advice for individual conditions or treatments  Talk to your doctor, nurse or pharmacist before following any medical regimen to see if it is safe and effective for you  © 2014 [address] is for End User's use only and may not be sold, redistributed or otherwise used for commercial purposes  All illustrations and images included in CareNotes® are the copyrighted property of A D A M , Inc  or Juan Gonsalez  Cigarette Smoking and Your Health, Ambulatory Care   GENERAL INFORMATION:   What are the risks to my health if I smoke? Chemicals in tobacco are addictive and damage every cell in your body  All tobacco products are dangerous to you and to nonsmokers who breathe your secondhand smoke  Even if you are a light smoker or a social smoker, you have an increased risk for cancer, heart disease, and lung disease  If you are pregnant or have diabetes, smoking increases your risk for complications  What are the benefits to my health if I stop smoking? · Lower risk of cancer, heart disease, blood clots, heart attack, and stroke    · Lower risk of diabetes complications, such as kidney, artery, or eye disease, and nerve damage that can result in amputations    · Lower risk of lung infections and diseases, such as pneumonia, asthma, chronic bronchitis, and emphysema           · Increased benefits of chemotherapy if you already have cancer, and decreased risk for cancer returning after treatment    · Improved circulation, allowing more oxygen to be delivered to your body    · Improved ability to heal and to fight infections  What are the benefits to the health of others if I stop smoking?    · Lower risk of lung cancer and heart disease in nonsmokers    · Lower risk of miscarriage, early delivery, low birth weight, and stillbirth    · Lower risk of SIDS, obesity, developmental delay, ear infections, colds, pneumonia, bronchitis, asthma, and ADHD in your child  Where can I find more information and support to stop smoking? It is never too late to stop smoking  Ask your healthcare provider for more information if you need help  · Kiosked  Phone: 2- 943 - [phone]  Web Address: www smokefree  gov  Follow up with your healthcare provider as directed:  Write down your questions so you remember to ask them during your visits  CARE AGREEMENT:   You have the right to help plan your care  Learn about your health condition and how it may be treated  Discuss treatment options with your caregivers to decide what care you want to receive  You always have the right to refuse treatment  The above information is an  only  It is not intended as medical advice for individual conditions or treatments  Talk to your doctor, nurse or pharmacist before following any medical regimen to see if it is safe and effective for you  © 2014 [address] is for End User's use only and may not be sold, redistributed or otherwise used for commercial purposes  All illustrations and images included in CareNotes® are the copyrighted property of A [employer] A M , Inc  or Juan Gonsalez  Community Acquired Pneumonia   WHAT YOU NEED TO KNOW:   Community-acquired pneumonia (CAP) is a lung infection that you get outside of a hospital or nursing home setting  Your lungs become inflamed and cannot work well  CAP may be caused by bacteria, viruses, or fungi  DISCHARGE INSTRUCTIONS:   Return to the emergency department if:   · You are confused and cannot think clearly  · You have increased trouble breathing  · Your lips or fingernails turn gray or blue  Contact your healthcare provider if:   · Your symptoms do not get better, or they get worse  · You are urinating less, or not at all  · You have questions or concerns about your condition or care  Medicines:   · Medicines  may be given to treat a bacterial, viral, or fungal infection   You may also be given medicines to dilate your bronchial tubes to help you breathe more easily  · Take your medicine as directed  Contact your healthcare provider if you think your medicine is not helping or if you have side effects  Tell him or her if you are allergic to any medicine  Keep a list of the medicines, vitamins, and herbs you take  Include the amounts, and when and why you take them  Bring the list or the pill bottles to follow-up visits  Carry your medicine list with you in case of an emergency  Follow up with your healthcare provider within 3 days or as directed: You may need another x-ray  Write down your questions so you remember to ask them during your visits  Deep breathing and coughing:  Deep breathing helps open the air passages in your lungs  Coughing helps bring up mucus from your lungs  Take a deep breath and hold the breath as long as you can  Then push the air out of your lungs with a deep, strong cough  Spit out any mucus you have coughed up  Take 10 deep breaths in a row every hour that you are awake  Remember to follow each deep breath with a cough  Do not smoke or allow others to smoke around you:  Nicotine and other chemicals in cigarettes and cigars can cause lung damage  Ask your healthcare provider for information if you currently smoke and need help to quit  E-cigarettes or smokeless tobacco still contain nicotine  Talk to your healthcare provider before you use these products  Manage CAP at home:   · Breathe warm, moist air  This helps loosen mucus  Loosely place a warm, wet washcloth over your nose and mouth  A room humidifier may also help make the air moist     · Drink liquids as directed  Ask your healthcare provider how much liquid to drink each day and which liquids to drink  Liquids help make mucus thin and easier to get out of your body  · Gently tap your chest   This helps loosen mucus so it is easier to cough   Lie with your head lower than your chest several times a day and tap your chest      · Get plenty of rest   Rest helps your body heal   Prevent CAP:   · Wash your hands often with soap and water  Carry germ-killing hand gel with you  You can use the gel to clean your hands when soap and water are not available  Do not touch your eyes, nose, or mouth unless you have washed your hands first      · Clean surfaces often  Clean doorknobs, countertops, cell phones, and other surfaces that are touched often  · Always cover your mouth when you cough  Cough into a tissue or your shirtsleeve so you do not spread germs from your hands  · Try to avoid people who have a cold or the flu  If you are sick, stay away from others as much as possible  · Ask about vaccines  You may need a vaccine to help prevent pneumonia  Get an influenza (flu) vaccine every year as soon as it becomes available  © 2017 [address] Information is for End User's use only and may not be sold, redistributed or otherwise used for commercial purposes  All illustrations and images included in CareNotes® are the copyrighted property of A D A M , Inc  or [address]  The above information is an  only  It is not intended as medical advice for individual conditions or treatments  Talk to your doctor, nurse or pharmacist before following any medical regimen to see if it is safe and effective for you

## 2018-01-13 NOTE — PROGRESS NOTES
During dc pt stated her wedding rings were missing  They were described as "white gold band with elvira"  Filed report with charge RN and nurse supervisor  Pt states the rings were taken off in the ED and placed in a plastic bag  Medications from pharmacy returned to pt: xanax, divalproex, sumatriptan

## 2018-01-15 LAB
BACTERIA BLD CULT: NORMAL
BACTERIA BLD CULT: NORMAL

## 2018-01-16 NOTE — CASE MANAGEMENT
Ref Auth #[id number]        Nicolasa Thurman PA-C Physician Assistant Attested Hospitalist Discharge Summaries Date of Service: 1/7/2018  3:03 PM      Attestation signed by Petty James MD at 1/7/2018 5:21 PM   Patient left AMA  []Hide copied text        Discharge- Tru Martinez 1980, 42 y o  female MRN: [id number]     Unit/Bed#: ED 14 Encounter: [id number]     Primary Care Provider: Lisbet Spencer DO   Date and time admitted to hospital: 1/7/2018  5:19 AM               * HCAP (healthcare-associated pneumonia)   Assessment & Plan     Patient with multiple hospitalizations, recently discharged 1/03, has been on antibiotics frequently since August, presented with continued left arm numbness  CT of the chest on previous admission showed no evidence of pneumonia  CTA of the chest 1/7 does not show clear evidence of pulmonary embolism, but does show bilateral patchy to nodular pulmonary infiltrates suspicious for bronchopneumonia   Mild reactive adenopathy in the hilar and mediastinal regions      Patient was admitted to [place] [address], initiated on HCAP pathway with vancomycin and cefepime  Unfortunately, patient reports a family emergency and left the hospital AMA  Per nursing, she had already pulled her IV site and was dressed and nearly out the door, before they could try to discuss with her stay in the hospital   I caught patient in the hallway, very briefly was able to advise her of the risks of leaving AMA including worsening of her infection and death, and she reported to she would need leave  There is no pharmacy on file, patient stated [facility], unfortunately I cannot find any local [facility] with this information and am therefore unable to prescribe this patient any antibiotics at this time      AMA paperwork signed by the patient with nurse, I signed the paper after        Tobacco abuse   Assessment & Plan     Patient presently smoking 1 pack per day, she does not appear ready to quit    I did  the patient on tobacco use and risks, hopefully she can follow up with her a provider regarding quitting        Crohn disease ([facility] [address] )   Assessment & Plan     Previously on Humira, currently on hold secondary to lack of insurance as well as discontinued after pneumonia in the fall of 2017  Continue to hold Humira, okay to continue mesalamine at this point  Patient did not have any GI complaints during brief hospitalization        Leukocytosis   Assessment & Plan     Appears chronic, on review of her labs  She was to be seen by Hematology outpatient, has not yet had the opportunity to follow-up  Presently 22,000, which is more elevated in the setting of acute pulmonary infection (was 19,000 on last discharge)  Patient will need to follow up Hematology       Left arm numbness   Assessment & Plan     Recently admitted and evaluated by Neurology, who felt symptoms were most likely related to complex migraines  Patient has a nonfocal neurologic exam, neurologic evaluation last admission was unrevealing for acute etiology  Will continue to monitor, she will continue to follow up outpatient  There is no obvious acute change, patient had full range of motion and strength  Follow-up Neurology outpatient       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)   Assessment & Plan     Patient complained of "rapidly growing vaginal lesion"  Patient is followed by an outside gynecologist, through [facility], and per review of the chart has history of LGSIL  Patient is unsure of what treatment she has had to this point (suspect colposcopy, but I do not see any results)  When asked, she tells me that she "had cervical cancer"    She will need to follow up with her gynecologist for further evaluation                 Consultations During Hospital Stay:  · None     Procedures Performed:      · CTA chest - bilateral pneumonia     Significant Findings / Test Results:      · Leukocytosis 22,000 with elevated absolute neutrophil count  · Hypokalemia  · Urine drug screen (+) benzos, opiates     Incidental Findings:   · None      Test Results Pending at Discharge (will require follow up): · Blood cultures, sputum culture, influenza swab     Outpatient Tests Requested:  · Hematology consultation for leukocytosis  · Pulmonology follow-up for pneumonia     Complications:  Patient left AMA     Reason for Admission:  Pneumonia     Hospital Course:      Leonie Madrid is a 42 y o  female patient who originally presented to the hospital on 1/7/2018 due to multiple complaints  Jack Echols was recently in the hospital and discharged 1/03 for complaints of left facial and arm numbness and tingling   Neurologic workup at that time was unrevealing, neurology felt she was having atypical migraines   She was to follow up outpatient   She reports continued numbness of the left arm without weakness  Patient also had complained of shortness of breath and cough, headache, vaginal lesion      Patient was admitted to the hospitalist service, however reported a family emergency, and left the hospital AMA despite attempts to keep patient for treatment    Very briefly I was able to discuss risks and benefits of her leaving, she was able to sign AMA paperwork      Please see above list of diagnoses and related plan for additional information       Condition at Discharge: serious      Discharge Day Visit / Exam:      * Please refer to separate progress note for these details *     Discussion with Family:  None, no family members present     Discharge instructions/Information to patient and family:   See after visit summary for information provided to patient and family        Provisions for Follow-Up Care:  See after visit summary for information related to follow-up care and any pertinent home health orders        Disposition:      Home     For Discharges to [facility] SNF:   · Not Applicable to this Patient - Not Applicable to this Patient     Planned Readmission:  Highly likely     Discharge Statement:  I spent approximately 10 minutes discharging the patient  This time was spent on the day of discharge  I had direct contact with the patient on the day of discharge  Greater than 50% of the total time was spent examining patient, answering all patient questions, arranging and discussing plan of care with patient as well as directly providing post-discharge instructions    Additional time then spent on discharge activities      Discharge Medications:  See after visit summary for reconciled discharge medications provided to patient and family        ** Please Note: This note has been constructed using a voice recognition system **                           Cosigned by: Arely Cardona MD at 1/7/2018  5:21 PM   Revision History

## 2018-01-24 NOTE — CASE MANAGEMENT
Notification of Discharge  This is a Notification of Discharge from our facility [address]  Please be advised that this patient has been discharge from our facility  Below you will find the admission and discharge date and time including the patients disposition  PRESENTATION DATE: 1/10/2018  5:17 PM  IP ADMISSION DATE: 1/10/18 2040  DISCHARGE DATE: 1/12/2018  9:42 PM  DISPOSITION: Home/Self Care    [address] [facility] in the [facility] by Juan Gonsalez for 2017  Network Utilization Review Department  Phone: [phone]; Fax [phone]  ATTENTION: The Network Utilization Review Department is now centralized for our 7 Facilities  Make a note that we have a new phone and fax numbers for our Department  Please call with any questions or concerns to [phone] and carefully follow the prompts so that you are directed to the right person  All voicemails are confidential  Fax any determinations, approvals, denials, and requests for initial or continue stay review clinical to [phone]  Due to HIGH CALL volume, it would be easier if you could please send faxed requests to expedite your requests and in part, help us provide discharge notifications faster

## 2018-09-08 ENCOUNTER — APPOINTMENT (EMERGENCY)
Dept: RADIOLOGY | Facility: HOSPITAL | Age: 38
End: 2018-09-08
Payer: COMMERCIAL

## 2018-09-08 ENCOUNTER — HOSPITAL ENCOUNTER (EMERGENCY)
Facility: HOSPITAL | Age: 38
Discharge: HOME | End: 2018-09-08
Attending: EMERGENCY MEDICINE | Admitting: EMERGENCY MEDICINE
Payer: COMMERCIAL

## 2018-09-08 VITALS
RESPIRATION RATE: 20 BRPM | TEMPERATURE: 98.1 F | HEART RATE: 86 BPM | SYSTOLIC BLOOD PRESSURE: 110 MMHG | OXYGEN SATURATION: 100 % | DIASTOLIC BLOOD PRESSURE: 62 MMHG

## 2018-09-08 DIAGNOSIS — R20.0 NUMBNESS AND TINGLING OF BOTH LEGS: ICD-10-CM

## 2018-09-08 DIAGNOSIS — M79.604 PAIN IN BOTH LOWER EXTREMITIES: ICD-10-CM

## 2018-09-08 DIAGNOSIS — R20.2 NUMBNESS AND TINGLING OF BOTH LEGS: ICD-10-CM

## 2018-09-08 DIAGNOSIS — F41.9 ACUTE ANXIETY: Primary | ICD-10-CM

## 2018-09-08 DIAGNOSIS — M79.605 PAIN IN BOTH LOWER EXTREMITIES: ICD-10-CM

## 2018-09-08 LAB
ALBUMIN SERPL BCP-MCNC: 4 G/DL (ref 3.5–5)
ALP SERPL-CCNC: 79 U/L (ref 46–116)
ALT SERPL W P-5'-P-CCNC: 27 U/L (ref 12–78)
ANION GAP SERPL CALCULATED.3IONS-SCNC: 11 MMOL/L (ref 4–13)
AST SERPL W P-5'-P-CCNC: 31 U/L (ref 5–45)
BASOPHILS # BLD AUTO: 0.11 THOUSANDS/ΜL (ref 0–0.1)
BASOPHILS NFR BLD AUTO: 1 % (ref 0–1)
BILIRUB DIRECT SERPL-MCNC: 0.18 MG/DL (ref 0–0.2)
BILIRUB SERPL-MCNC: 0.8 MG/DL (ref 0.2–1)
BUN SERPL-MCNC: 10 MG/DL (ref 5–25)
CALCIUM SERPL-MCNC: 9.1 MG/DL (ref 8.3–10.1)
CHLORIDE SERPL-SCNC: 102 MMOL/L (ref 100–108)
CO2 SERPL-SCNC: 24 MMOL/L (ref 21–32)
CREAT SERPL-MCNC: 0.67 MG/DL (ref 0.6–1.3)
EOSINOPHIL # BLD AUTO: 0.2 THOUSAND/ΜL (ref 0–0.61)
EOSINOPHIL NFR BLD AUTO: 1 % (ref 0–6)
ERYTHROCYTE [DISTWIDTH] IN BLOOD BY AUTOMATED COUNT: 13 % (ref 11.6–15.1)
GFR SERPL CREATININE-BSD FRML MDRD: 110 ML/MIN/1.73SQ M
GLUCOSE SERPL-MCNC: 102 MG/DL (ref 65–140)
GLUCOSE SERPL-MCNC: 90 MG/DL (ref 65–140)
HCG SERPL QL: NEGATIVE
HCT VFR BLD AUTO: 39.6 % (ref 34.8–46.1)
HGB BLD-MCNC: 13.6 G/DL (ref 11.5–15.4)
IMM GRANULOCYTES # BLD AUTO: 0.04 THOUSAND/UL (ref 0–0.2)
IMM GRANULOCYTES NFR BLD AUTO: 0 % (ref 0–2)
LYMPHOCYTES # BLD AUTO: 4.86 THOUSANDS/ΜL (ref 0.6–4.47)
LYMPHOCYTES NFR BLD AUTO: 35 % (ref 14–44)
MCH RBC QN AUTO: 30 PG (ref 26.8–34.3)
MCHC RBC AUTO-ENTMCNC: 34.3 G/DL (ref 31.4–37.4)
MCV RBC AUTO: 87 FL (ref 82–98)
MONOCYTES # BLD AUTO: 0.97 THOUSAND/ΜL (ref 0.17–1.22)
MONOCYTES NFR BLD AUTO: 7 % (ref 4–12)
NEUTROPHILS # BLD AUTO: 7.78 THOUSANDS/ΜL (ref 1.85–7.62)
NEUTS SEG NFR BLD AUTO: 56 % (ref 43–75)
NRBC BLD AUTO-RTO: 0 /100 WBCS
PLATELET # BLD AUTO: 269 THOUSANDS/UL (ref 149–390)
PMV BLD AUTO: 9.8 FL (ref 8.9–12.7)
POTASSIUM SERPL-SCNC: 4.2 MMOL/L (ref 3.5–5.3)
PROT SERPL-MCNC: 7.7 G/DL (ref 6.4–8.2)
RBC # BLD AUTO: 4.53 MILLION/UL (ref 3.81–5.12)
SODIUM SERPL-SCNC: 137 MMOL/L (ref 136–145)
TROPONIN I SERPL-MCNC: <0.02 NG/ML
WBC # BLD AUTO: 13.96 THOUSAND/UL (ref 4.31–10.16)

## 2018-09-08 PROCEDURE — 96374 THER/PROPH/DIAG INJ IV PUSH: CPT

## 2018-09-08 PROCEDURE — 84703 CHORIONIC GONADOTROPIN ASSAY: CPT | Performed by: EMERGENCY MEDICINE

## 2018-09-08 PROCEDURE — 96375 TX/PRO/DX INJ NEW DRUG ADDON: CPT

## 2018-09-08 PROCEDURE — 82948 REAGENT STRIP/BLOOD GLUCOSE: CPT

## 2018-09-08 PROCEDURE — 84484 ASSAY OF TROPONIN QUANT: CPT | Performed by: EMERGENCY MEDICINE

## 2018-09-08 PROCEDURE — 80048 BASIC METABOLIC PNL TOTAL CA: CPT | Performed by: EMERGENCY MEDICINE

## 2018-09-08 PROCEDURE — 99284 EMERGENCY DEPT VISIT MOD MDM: CPT

## 2018-09-08 PROCEDURE — 93005 ELECTROCARDIOGRAM TRACING: CPT

## 2018-09-08 PROCEDURE — 80076 HEPATIC FUNCTION PANEL: CPT | Performed by: EMERGENCY MEDICINE

## 2018-09-08 PROCEDURE — 36415 COLL VENOUS BLD VENIPUNCTURE: CPT | Performed by: EMERGENCY MEDICINE

## 2018-09-08 PROCEDURE — 85025 COMPLETE CBC W/AUTO DIFF WBC: CPT | Performed by: EMERGENCY MEDICINE

## 2018-09-08 PROCEDURE — 71045 X-RAY EXAM CHEST 1 VIEW: CPT

## 2018-09-08 PROCEDURE — 96361 HYDRATE IV INFUSION ADD-ON: CPT

## 2018-09-08 RX ORDER — ALPRAZOLAM 0.5 MG/1
1 TABLET ORAL ONCE
Status: COMPLETED | OUTPATIENT
Start: 2018-09-08 | End: 2018-09-08

## 2018-09-08 RX ORDER — DEXTROAMPHETAMINE SACCHARATE, AMPHETAMINE ASPARTATE, DEXTROAMPHETAMINE SULFATE AND AMPHETAMINE SULFATE 5; 5; 5; 5 MG/1; MG/1; MG/1; MG/1
20 TABLET ORAL
COMMUNITY
End: 2020-01-07

## 2018-09-08 RX ORDER — DIAZEPAM 5 MG/ML
5 INJECTION, SOLUTION INTRAMUSCULAR; INTRAVENOUS ONCE
Status: COMPLETED | OUTPATIENT
Start: 2018-09-08 | End: 2018-09-08

## 2018-09-08 RX ADMIN — ALPRAZOLAM 1 MG: 0.5 TABLET ORAL at 19:31

## 2018-09-08 RX ADMIN — SODIUM CHLORIDE 1000 ML: 0.9 INJECTION, SOLUTION INTRAVENOUS at 20:55

## 2018-09-08 RX ADMIN — Medication 5 MG: at 20:08

## 2018-09-08 RX ADMIN — HYDROMORPHONE HYDROCHLORIDE 1 MG: 1 INJECTION, SOLUTION INTRAMUSCULAR; INTRAVENOUS; SUBCUTANEOUS at 20:14

## 2018-09-08 NOTE — ED NOTES
Amber Lux, son, can be reached at [facility], [address] [place] [address]  09/08/18 1634

## 2018-09-08 NOTE — ED PROVIDER NOTES
History  Chief Complaint   Patient presents with    Dizziness     pt presents in hospital wheelchair with c/o episode of dizziness and tingling in lower extremities  pt near hysteria upon arrival, tachypneic, anxious and tearful  68-year-old female past medical history of anxiety, sensory complaints, pneumonia presents with tingling in bilateral lower extremities  She states that this just started today  She states that she has been taking her medication as prescribed  Patient had MRI of brain in 2017 for the these complaints of numbness which was unremarkable  She has been evaluated by Neurology without clear etiology of her symptoms  Was also evaluated by psychiatry for somatoform disorder, cardiology for chest pain, thought to be noncardiac  Urine culture at last hospitalization showing contaminant  Anxiety thought to be cause of multiple complaints  Patient is very anxious and crying upon my evaluation  Denies chest pain or shortness of breath  Prior to Admission Medications   Prescriptions Last Dose Informant Patient Reported? Taking?    ALPRAZolam (XANAX) 2 MG tablet   Yes No   Sig: Take 2 mg by mouth 2 (two) times a day     Calcium Carb-Ergocalciferol 250-125 MG-UNIT TABS   Yes Yes   Sig: Take 1 tablet by mouth   SUMAtriptan (IMITREX) 100 mg tablet   Yes Yes   Sig: Take 1 tablet by mouth once as needed     amitriptyline (ELAVIL) 100 mg tablet   Yes Yes   Sig: Take 150 mg by mouth     amphetamine-dextroamphetamine (ADDERALL) 20 mg tablet   Yes Yes   Sig: Take 20 mg by mouth 2 (two) times a day   aspirin (ECOTRIN LOW STRENGTH) 81 mg EC tablet   Yes Yes   Sig: Take 81 mg by mouth daily   cholecalciferol (VITAMIN D3) 1,000 units tablet   Yes Yes   Sig: Take 1,000 Units by mouth daily   cyclobenzaprine (FLEXERIL) 10 mg tablet   Yes Yes   Sig: Take 10 mg by mouth 3 (three) times a day as needed for muscle spasms   divalproex sodium (DEPAKOTE) 250 mg EC tablet   Yes Yes   Sig: Take 250 mg by mouth 2 (two) times a day     gabapentin (NEURONTIN) 300 mg capsule   Yes Yes   Sig: Take 600 mg by mouth 3 (three) times a day     meclizine (ANTIVERT) 12 5 MG tablet   Yes Yes   Sig: Take 12 5 mg by mouth every 8 (eight) hours as needed   mesalamine (LIALDA) 1 2 g EC tablet   Yes Yes   Sig: Take 1,200 mg by mouth daily with breakfast     montelukast (SINGULAIR) 10 mg tablet   Yes Yes   Sig: Take 10 mg by mouth daily at bedtime   oxyCODONE (ROXICODONE) 30 MG immediate release tablet   Yes Yes   Sig: Take 30 mg by mouth 3 (three) times a day   pantoprazole (PROTONIX) 40 mg tablet   Yes Yes   Sig: Take 40 mg by mouth daily   propranolol (INDERAL) 80 mg tablet   Yes Yes   Sig: Take 80 mg by mouth 2 (two) times a day        Facility-Administered Medications: None       Past Medical History:   Diagnosis Date    [facility]ervical cancer ([facility] [address] )     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colitis (HCC)     Crohn's colitis (HCC)     Endocarditis     Fibromyalgia     Gastritis     Hypertension     IBS (irritable bowel syndrome)     Pancreatitis     RA (rheumatoid arthritis) (HCC)     Restrictive lung disease     Vertigo        Past Surgical History:   Procedure Laterality Date    APPENDECTOMY      BRONCHOSCOPY      multiple       Family History   Problem Relation Age of Onset    Colon cancer Brother 28    Crohn's disease Brother      I have reviewed and agree with the history as documented  Social History   Substance Use Topics    Smoking status: Current Every Day Smoker     Packs/day: 1 00     Types: Cigarettes    Smokeless tobacco: Never Used    Alcohol use No        Review of Systems   Constitutional: Negative for chills and fever  HENT: Negative for dental problem and ear pain  Eyes: Negative for pain and redness  Respiratory: Negative for cough and shortness of breath  Cardiovascular: Negative for chest pain and palpitations  Gastrointestinal: Negative for abdominal pain and nausea     Endocrine: Negative for polydipsia and polyphagia  Genitourinary: Negative for dysuria and frequency  Musculoskeletal: Negative for arthralgias and joint swelling  Skin: Negative for color change and rash  Neurological: Positive for dizziness and numbness  Negative for headaches  Psychiatric/Behavioral: Negative for behavioral problems and confusion  The patient is nervous/anxious  All other systems reviewed and are negative  Physical Exam  Physical Exam   Constitutional: She is oriented to person, place, and time  She appears well-developed and well-nourished  No distress  HENT:   Head: Atraumatic  Right Ear: External ear normal    Left Ear: External ear normal    Nose: Nose normal    Eyes: Conjunctivae and EOM are normal  Pupils are equal, round, and reactive to light  Neck: Normal range of motion  Neck supple  No JVD present  Cardiovascular: Regular rhythm and normal heart sounds  No murmur heard  tachycardic   Pulmonary/Chest: Effort normal and breath sounds normal  No respiratory distress  She has no wheezes  Abdominal: Soft  Bowel sounds are normal  She exhibits no distension  There is no tenderness  Musculoskeletal: Normal range of motion  She exhibits no edema  Neurological: She is alert and oriented to person, place, and time  No cranial nerve deficit  CN II-XII intact grossly, no focal deficits  Normal strength and sensation in b/l upper and lower extremities  Normal FNF and rapid alternating hand movements   Skin: Skin is warm and dry  Capillary refill takes less than 2 seconds  She is not diaphoretic  Psychiatric:   Tearful, jerking extremities   Nursing note and vitals reviewed        Vital Signs  ED Triage Vitals   Temperature Pulse Respirations Blood Pressure SpO2   09/08/18 2055 09/08/18 1923 09/08/18 1923 09/08/18 1923 09/08/18 1923   98 1 °F (36 7 °C) (!) 122 (!) 40 137/72 98 %      Temp Source Heart Rate Source Patient Position - Orthostatic VS BP Location FiO2 (%)   09/08/18 2055 09/08/18 1923 09/08/18 2006 09/08/18 2006 --   Oral Monitor Lying Right arm       Pain Score       09/08/18 2014       Worst Possible Pain           Vitals:    09/08/18 2006 09/08/18 2022 09/08/18 2030 09/08/18 2100   BP:  (!) 89/62 118/55 110/62   Pulse: (!) 108 101 96 86   Patient Position - Orthostatic VS: Lying Lying Lying Lying       Visual Acuity      ED Medications  Medications   sodium chloride 0 9 % bolus 1,000 mL (1,000 mL Intravenous New Bag 9/8/18 2055)   ALPRAZolam (XANAX) tablet 1 mg (1 mg Oral Given 9/8/18 1931)   HYDROmorphone (DILAUDID) injection 1 mg (1 mg Intravenous Given 9/8/18 2014)   diazepam (VALIUM) injection 5 mg (5 mg Intravenous Given 9/8/18 2008)       Diagnostic Studies  Results Reviewed     Procedure Component Value Units Date/Time    Hepatic function panel [[id number]]  (Normal) Collected:  09/08/18 1942    Lab Status:  Final result Specimen:  Blood from Hand, Right Updated:  09/08/18 2027     Total Bilirubin 0 80 mg/dL      Bilirubin, Direct 0 18 mg/dL      Alkaline Phosphatase 79 U/L      AST 31 U/L      ALT 27 U/L      Total Protein 7 7 g/dL      Albumin 4 0 g/dL     Basic metabolic panel [[id number]] Collected:  09/08/18 1942    Lab Status:  Final result Specimen:  Blood from Hand, Right Updated:  09/08/18 2011     Sodium 137 mmol/L      Potassium 4 2 mmol/L      Chloride 102 mmol/L      CO2 24 mmol/L      ANION GAP 11 mmol/L      BUN 10 mg/dL      Creatinine 0 67 mg/dL      Glucose 90 mg/dL      Calcium 9 1 mg/dL      eGFR 110 ml/min/1 73sq m     Narrative:         National Kidney Disease Education Program recommendations are as follows:  GFR calculation is accurate only with a steady state creatinine  Chronic Kidney disease less than 60 ml/min/1 73 sq  meters  Kidney failure less than 15 ml/min/1 73 sq  meters      hCG, qualitative pregnancy [[id number]]  (Normal) Collected:  09/08/18 1942    Lab Status:  Final result Specimen:  Blood from Hand, Right Updated:  09/08/18 2011 Preg, Serum Negative    Troponin I [[id number]]  (Normal) Collected:  09/08/18 1942    Lab Status:  Final result Specimen:  Blood from Arm, Right Updated:  09/08/18 2009     Troponin I <0 02 ng/mL     CBC and differential [[id number]]  (Abnormal) Collected:  09/08/18 1942    Lab Status:  Final result Specimen:  Blood from Hand, Right Updated:  09/08/18 1949     WBC 13 96 (H) Thousand/uL      RBC 4 53 Million/uL      Hemoglobin 13 6 g/dL      Hematocrit 39 6 %      MCV 87 fL      MCH 30 0 pg      MCHC 34 3 g/dL      RDW 13 0 %      MPV 9 8 fL      Platelets 795 Thousands/uL      nRBC 0 /100 WBCs      Neutrophils Relative 56 %      Immat GRANS % 0 %      Lymphocytes Relative 35 %      Monocytes Relative 7 %      Eosinophils Relative 1 %      Basophils Relative 1 %      Neutrophils Absolute 7 78 (H) Thousands/µL      Immature Grans Absolute 0 04 Thousand/uL      Lymphocytes Absolute 4 86 (H) Thousands/µL      Monocytes Absolute 0 97 Thousand/µL      Eosinophils Absolute 0 20 Thousand/µL      Basophils Absolute 0 11 (H) Thousands/µL     Fingerstick Glucose (POCT) [[id number]]  (Normal) Collected:  09/08/18 1921    Lab Status:  Final result Updated:  09/08/18 1922     POC Glucose 102 mg/dl                  XR chest 1 view portable   Final Result by Gilmar Morgan MD (09/08 2114)      Hazy density overlying the entirety of the right lung  I suspect this is the patient's right breast tissue overlying the right lung (the patient is rotated to the left) however it may reflect a developing infiltrate  Recommend clinical correlation and    consideration for PA and lateral or dual energy PA and lateral exam for further evaluation        Workstation performed: [id number]                    Procedures  ECG 12 Lead Documentation  Date/Time: 9/8/2018 7:58 PM  Performed by: Flavia Cabezas  Authorized by: Flavia Cabezas     Indications / Diagnosis:  Tachycardia  ECG reviewed by me, the ED Provider: yes    Comments:      Sinus tachycardia rate of 121, left axis deviation, normal intervals, no acute ST elevation or depressions           Phone Contacts  ED Phone Contact    ED Course                               MDM  Number of Diagnoses or Management Options  Acute anxiety:   Numbness and tingling of both legs:   Pain in both lower extremities:   Diagnosis management comments: 35 yo F presents with bilateral lower extremity tingling/pain  Tachycardia improved after antianxiety and pain medications in ED  I reviewed documentation of prior hospitalization with evaluation by multiple specialists for multiple complaints  Patient's symptoms resolved after pain medications and anxiety medications  Likely anxiety large component of symptoms  Labs show chronic leukocytosis  CXR without pneumonia, no hypoxia, no SOB or cough  CritCare Time    Disposition  Final diagnoses:   Acute anxiety   Pain in both lower extremities   Numbness and tingling of both legs     Time reflects when diagnosis was documented in both MDM as applicable and the Disposition within this note     Time User Action Codes Description Comment    9/8/2018  8:36 PM Clyda Arrant [F41 9] Acute anxiety     9/8/2018  8:36 PM Clyda Arrant [Y60 019,  M79 605] Pain in both lower extremities     9/8/2018  8:37 PM Amalia Poster Add [R20 0,  R20 2] Numbness and tingling of both legs       ED Disposition     ED Disposition Condition Comment    Discharge  Tonya Blanco discharge to home/self care  Condition at discharge: Good        Follow-up Information     Follow up With Specialties Details Why [id number]  [facility] Call for psychiatry follow up [address] [facility],[address] [id number] [phone]          Patient's Medications   Discharge Prescriptions    No medications on file     No discharge procedures on file      ED Provider  Electronically Signed by           Goran Morrell MD  09/08/18 4588

## 2018-09-09 LAB
ATRIAL RATE: 121 BPM
P AXIS: 75 DEGREES
PR INTERVAL: 152 MS
QRS AXIS: 65 DEGREES
QRSD INTERVAL: 82 MS
QT INTERVAL: 294 MS
QTC INTERVAL: 417 MS
T WAVE AXIS: 68 DEGREES
VENTRICULAR RATE: 121 BPM

## 2018-09-09 PROCEDURE — 93010 ELECTROCARDIOGRAM REPORT: CPT | Performed by: INTERNAL MEDICINE

## 2018-09-09 NOTE — ED NOTES
Xray at bedside    Unable to obtain xray at this time, pt screaming "I can't do it "     Jeannie Rodriguez, TAYLOR  09/08/18 2122       Jeannie Rodriguez RN  09/08/18 2150

## 2018-09-09 NOTE — DISCHARGE INSTRUCTIONS
Follow up with your primary care doctor for recheck next week  Also gave number for psychiatry to help work through all that you are going through

## 2018-09-09 NOTE — ED NOTES
Provider at bedside       Nicole Jacome, TAYLOR  09/08/18 2424 Adequate: hears normal conversation without difficulty

## 2019-03-14 LAB — HCV AB SER-ACNC: <0.1

## 2019-10-24 NOTE — PROGRESS NOTES
Chart updated per office request  Discrete reportable data documented      *Updated:    Jeramy Bass  5-18-16    Paps, DOS  10-8-19  3-4-16    DEXA DOS  10-21-19

## 2020-01-07 ENCOUNTER — OFFICE VISIT (OUTPATIENT)
Dept: INTERNAL MEDICINE CLINIC | Facility: CLINIC | Age: 40
End: 2020-01-07
Payer: COMMERCIAL

## 2020-01-07 VITALS
WEIGHT: 184 LBS | HEIGHT: 62 IN | SYSTOLIC BLOOD PRESSURE: 140 MMHG | DIASTOLIC BLOOD PRESSURE: 80 MMHG | HEART RATE: 114 BPM | OXYGEN SATURATION: 96 % | BODY MASS INDEX: 33.86 KG/M2

## 2020-01-07 DIAGNOSIS — Z72.0 TOBACCO ABUSE: ICD-10-CM

## 2020-01-07 DIAGNOSIS — F41.9 ANXIETY: ICD-10-CM

## 2020-01-07 DIAGNOSIS — L40.9 PSORIASIS: ICD-10-CM

## 2020-01-07 DIAGNOSIS — M06.9 RHEUMATOID ARTHRITIS, INVOLVING UNSPECIFIED SITE, UNSPECIFIED RHEUMATOID FACTOR PRESENCE: ICD-10-CM

## 2020-01-07 DIAGNOSIS — K50.00 CROHN'S DISEASE OF SMALL INTESTINE WITHOUT COMPLICATION (HCC): Primary | ICD-10-CM

## 2020-01-07 DIAGNOSIS — I10 ESSENTIAL HYPERTENSION: ICD-10-CM

## 2020-01-07 PROBLEM — R42 VERTIGO: Status: ACTIVE | Noted: 2020-01-07

## 2020-01-07 PROBLEM — I38 ENDOCARDITIS: Status: RESOLVED | Noted: 2020-01-07 | Resolved: 2020-01-07

## 2020-01-07 PROBLEM — G43.001 MIGRAINE WITHOUT AURA AND WITH STATUS MIGRAINOSUS, NOT INTRACTABLE: Status: ACTIVE | Noted: 2020-01-07

## 2020-01-07 PROCEDURE — 99205 OFFICE O/P NEW HI 60 MIN: CPT | Performed by: INTERNAL MEDICINE

## 2020-01-07 RX ORDER — ALPRAZOLAM 1 MG/1
1 TABLET ORAL
Qty: 30 TABLET | Refills: 5
Start: 2020-01-07 | End: 2021-05-26

## 2020-01-07 RX ORDER — ALENDRONATE SODIUM 70 MG/1
70 TABLET ORAL WEEKLY
COMMUNITY
End: 2020-07-28

## 2020-01-07 NOTE — PROGRESS NOTES
Assessment/Plan:       Diagnoses and all orders for this visit:    Crohn's disease of small intestine without complication ([facility] [address] )    Essential hypertension    Rheumatoid arthritis, involving unspecified site, unspecified rheumatoid factor presence (HCC)    Psoriasis    Tobacco abuse    Anxiety  -     ALPRAZolam (XANAX) 1 mg tablet; Take 1 tablet (1 mg total) by mouth daily at bedtime as needed for anxiety    Other orders  -     alendronate (FOSAMAX) 70 mg tablet; Take 70 mg by mouth every 7 days                Subjective:      Patient ID: Anitha Larson is a 37 y o  female  Multi-system autoimmune illness    A 77-year-old female  20 year history of autoimmune disease  Principal manifestation is    Crohn's disease  Currently having a flare with some pain and diarrhea but not on any active treatment  Prior treatments have included Enbrel which apparently worked well for quite a while, then it stopped working  Switch to Humira  This too stop working  She had been also on mesalamine or least in equivalent sulfasalazine drug in the past but not now  Also, until recently she was taking budesonide orally but it was discontinued after recent panendoscopy by the [facility] group  Skin disease: Currently labeled as psoriasis  She has some blotchy rashes on the extremities    Joint pain inflammation: Currently labeled as rheumatoid arthritis or psoriatic arthropathy  Not on any active treatment  She has sausage shaped fingers and synovial thickening of the ankle as principal things observation     Currently taking gabapentin for chronic pain but not on any anti rheumatic medication  Lung disease? Has been told she has restrictive lung disease  She smokes a pack a day  Osteoporosis as a complication of care due to steroid use   Severe dental decay due to osteoporosis with loss of many teeth        ADHD currently not being treated    Migraine currently being treated with Depakote as a preventive and sumtriptan as a rescue; she still gets headaches at least half the days a month  Hypertension treated with Inderal 80 p o  B i d  With reasonable control  Globally the patient feels terrible  She has diarrhea, fatigue, and chronic pain  Prior history also positive for episode of bacterial endocarditis per patient provoked by dental work  At least so far no apparent sequelae          The following portions of the patient's history were reviewed and updated as appropriate:   She has a past medical history of Cervical cancer ([facility] [address] ), Endocarditis, Fibromyalgia, Gastritis, Hypertension, [facility] ), and Vertigo  ,  does not have any pertinent problems on file  ,   has a past surgical history that includes Appendectomy and Bronchoscopy  ,  family history includes Colon cancer (age of onset: 28) in her brother; Crohn's disease in her brother  ,   reports that she has been smoking cigarettes  She has been smoking about 1 00 pack per day  She has never used smokeless tobacco  She reports that she has current or past drug history  Drug: Marijuana  She reports that she does not drink alcohol ,  is allergic to ketorolac; penicillins; and ketorolac tromethamine     Current Outpatient Medications   Medication Sig Dispense Refill    alendronate (FOSAMAX) 70 mg tablet Take 70 mg by mouth every 7 days      ALPRAZolam (XANAX) 1 mg tablet Take 1 tablet (1 mg total) by mouth daily at bedtime as needed for anxiety 30 tablet 5    amitriptyline (ELAVIL) 100 mg tablet Take 150 mg by mouth        Calcium Carb-Ergocalciferol 250-125 MG-UNIT TABS Take 1 tablet by mouth      cholecalciferol (VITAMIN D3) 1,000 units tablet Take 1,000 Units by mouth daily      divalproex sodium (DEPAKOTE) 250 mg EC tablet Take 250 mg by mouth 2 (two) times a day        gabapentin (NEURONTIN) 300 mg capsule Take 600 mg by mouth 3 (three) times a day        meclizine (ANTIVERT) 12 5 MG tablet Take 12 5 mg by mouth every 8 (eight) hours as needed      montelukast (SINGULAIR) 10 mg tablet Take 10 mg by mouth daily at bedtime      pantoprazole (PROTONIX) 40 mg tablet Take 40 mg by mouth daily      propranolol (INDERAL) 80 mg tablet Take 80 mg by mouth 2 (two) times a day        SUMAtriptan (IMITREX) 100 mg tablet Take 1 tablet by mouth once as needed        HYDROmorphone (DILAUDID) 4 mg tablet Take 4 mg by mouth 3 (three) times a day as needed for moderate pain      predniSONE 10 mg tablet Take 5 tablets (50 mg total) by mouth daily for 2 days, THEN 4 tablets (40 mg total) daily for 2 days, THEN 3 tablets (30 mg total) daily for 2 days, THEN 2 tablets (20 mg total) daily for 2 days, THEN 1 tablet (10 mg total) daily for 2 days  30 tablet 0     No current facility-administered medications for this visit  Review of Systems   Constitutional: Positive for fatigue  HENT: Negative for ear pain, sinus pressure and sinus pain  Eyes: Positive for visual disturbance  Respiratory: Positive for cough and wheezing  Cardiovascular: Negative for chest pain  Gastrointestinal: Positive for abdominal pain, diarrhea and nausea  Endocrine: Negative for cold intolerance and polydipsia  Genitourinary: Negative for difficulty urinating, dysuria and hematuria  Musculoskeletal: Positive for arthralgias, back pain, gait problem and joint swelling  Skin: Positive for rash  Neurological: Positive for headaches  Psychiatric/Behavioral: Positive for dysphoric mood  The patient is nervous/anxious  Objective:  Vitals:    01/07/20 1812   BP: 140/80   Pulse: (!) 114   SpO2: 96%      Physical Exam   Constitutional: She is oriented to person, place, and time  She appears well-developed and well-nourished  HENT:   Head: Normocephalic and atraumatic  Eyes: Pupils are equal, round, and reactive to light  EOM are normal    Neck: Normal range of motion  Neck supple  No tracheal deviation present  No thyromegaly present  Cardiovascular: Normal rate, regular rhythm and normal heart sounds  Exam reveals no gallop  No murmur heard  Pulmonary/Chest: No respiratory distress  She has no wheezes  She has no rales  Abdominal: Soft  Bowel sounds are normal  There is no tenderness  Musculoskeletal: Normal range of motion  She exhibits no tenderness or deformity  Neurological: She is alert and oriented to person, place, and time  Coordination normal    Skin: Skin is warm  Psychiatric: She has a normal mood and affect  Judgment normal          Patient Instructions     A patient with multiple problems outlined above  Right now, really no active treatment for any of them  The unifying diagnosis appears to be a multi-system autoimmune illness  Needs to get into a rheumatologist to assess for high-level treatment with immune suppressant drugs

## 2020-01-08 ENCOUNTER — HOSPITAL ENCOUNTER (EMERGENCY)
Facility: HOSPITAL | Age: 40
Discharge: HOME | End: 2020-01-08
Attending: EMERGENCY MEDICINE
Payer: COMMERCIAL

## 2020-01-08 VITALS
SYSTOLIC BLOOD PRESSURE: 131 MMHG | RESPIRATION RATE: 16 BRPM | TEMPERATURE: 98.2 F | OXYGEN SATURATION: 95 % | HEIGHT: 62 IN | BODY MASS INDEX: 33.68 KG/M2 | HEART RATE: 86 BPM | WEIGHT: 183 LBS | DIASTOLIC BLOOD PRESSURE: 73 MMHG

## 2020-01-08 DIAGNOSIS — R10.9 ABDOMINAL PAIN: Primary | ICD-10-CM

## 2020-01-08 LAB
ALBUMIN SERPL BCP-MCNC: 3.7 G/DL (ref 3.5–5)
ALP SERPL-CCNC: 83 U/L (ref 46–116)
ALT SERPL W P-5'-P-CCNC: 27 U/L (ref 12–78)
ANION GAP SERPL CALCULATED.3IONS-SCNC: 9 MMOL/L (ref 4–13)
AST SERPL W P-5'-P-CCNC: 17 U/L (ref 5–45)
BASOPHILS # BLD AUTO: 0.04 THOUSANDS/ΜL (ref 0–0.1)
BASOPHILS NFR BLD AUTO: 0 % (ref 0–1)
BILIRUB SERPL-MCNC: 0.3 MG/DL (ref 0.2–1)
BILIRUB UR QL STRIP: NEGATIVE
BUN SERPL-MCNC: 10 MG/DL (ref 5–25)
CALCIUM SERPL-MCNC: 9.4 MG/DL (ref 8.3–10.1)
CHLORIDE SERPL-SCNC: 101 MMOL/L (ref 100–108)
CLARITY UR: CLEAR
CO2 SERPL-SCNC: 26 MMOL/L (ref 21–32)
COLOR UR: YELLOW
CREAT SERPL-MCNC: 0.71 MG/DL (ref 0.6–1.3)
EOSINOPHIL # BLD AUTO: 0.01 THOUSAND/ΜL (ref 0–0.61)
EOSINOPHIL NFR BLD AUTO: 0 % (ref 0–6)
ERYTHROCYTE [DISTWIDTH] IN BLOOD BY AUTOMATED COUNT: 13.4 % (ref 11.6–15.1)
GFR SERPL CREATININE-BSD FRML MDRD: 106 ML/MIN/1.73SQ M
GLUCOSE SERPL-MCNC: 109 MG/DL (ref 65–140)
GLUCOSE UR STRIP-MCNC: NEGATIVE MG/DL
HCT VFR BLD AUTO: 43.2 % (ref 34.8–46.1)
HGB BLD-MCNC: 14.2 G/DL (ref 11.5–15.4)
HGB UR QL STRIP.AUTO: NEGATIVE
IMM GRANULOCYTES # BLD AUTO: 0.13 THOUSAND/UL (ref 0–0.2)
IMM GRANULOCYTES NFR BLD AUTO: 1 % (ref 0–2)
KETONES UR STRIP-MCNC: NEGATIVE MG/DL
LEUKOCYTE ESTERASE UR QL STRIP: NEGATIVE
LIPASE SERPL-CCNC: 79 U/L (ref 73–393)
LYMPHOCYTES # BLD AUTO: 1.29 THOUSANDS/ΜL (ref 0.6–4.47)
LYMPHOCYTES NFR BLD AUTO: 6 % (ref 14–44)
MCH RBC QN AUTO: 30.4 PG (ref 26.8–34.3)
MCHC RBC AUTO-ENTMCNC: 32.9 G/DL (ref 31.4–37.4)
MCV RBC AUTO: 93 FL (ref 82–98)
MONOCYTES # BLD AUTO: 0.5 THOUSAND/ΜL (ref 0.17–1.22)
MONOCYTES NFR BLD AUTO: 2 % (ref 4–12)
NEUTROPHILS # BLD AUTO: 19.54 THOUSANDS/ΜL (ref 1.85–7.62)
NEUTS SEG NFR BLD AUTO: 91 % (ref 43–75)
NITRITE UR QL STRIP: NEGATIVE
NRBC BLD AUTO-RTO: 0 /100 WBCS
PH UR STRIP.AUTO: 7 [PH]
PLATELET # BLD AUTO: 353 THOUSANDS/UL (ref 149–390)
PMV BLD AUTO: 9.6 FL (ref 8.9–12.7)
POTASSIUM SERPL-SCNC: 4.1 MMOL/L (ref 3.5–5.3)
PROT SERPL-MCNC: 8.5 G/DL (ref 6.4–8.2)
PROT UR STRIP-MCNC: NEGATIVE MG/DL
RBC # BLD AUTO: 4.67 MILLION/UL (ref 3.81–5.12)
SODIUM SERPL-SCNC: 136 MMOL/L (ref 136–145)
SP GR UR STRIP.AUTO: 1.02 (ref 1–1.03)
UROBILINOGEN UR QL STRIP.AUTO: 0.2 E.U./DL
WBC # BLD AUTO: 21.51 THOUSAND/UL (ref 4.31–10.16)

## 2020-01-08 PROCEDURE — 85025 COMPLETE CBC W/AUTO DIFF WBC: CPT | Performed by: EMERGENCY MEDICINE

## 2020-01-08 PROCEDURE — 81003 URINALYSIS AUTO W/O SCOPE: CPT | Performed by: EMERGENCY MEDICINE

## 2020-01-08 PROCEDURE — 96361 HYDRATE IV INFUSION ADD-ON: CPT

## 2020-01-08 PROCEDURE — 99284 EMERGENCY DEPT VISIT MOD MDM: CPT

## 2020-01-08 PROCEDURE — 96375 TX/PRO/DX INJ NEW DRUG ADDON: CPT

## 2020-01-08 PROCEDURE — 96374 THER/PROPH/DIAG INJ IV PUSH: CPT

## 2020-01-08 PROCEDURE — 99285 EMERGENCY DEPT VISIT HI MDM: CPT | Performed by: EMERGENCY MEDICINE

## 2020-01-08 PROCEDURE — 80053 COMPREHEN METABOLIC PANEL: CPT | Performed by: EMERGENCY MEDICINE

## 2020-01-08 PROCEDURE — 83690 ASSAY OF LIPASE: CPT | Performed by: EMERGENCY MEDICINE

## 2020-01-08 PROCEDURE — 36415 COLL VENOUS BLD VENIPUNCTURE: CPT | Performed by: EMERGENCY MEDICINE

## 2020-01-08 RX ORDER — MORPHINE SULFATE 4 MG/ML
4 INJECTION, SOLUTION INTRAMUSCULAR; INTRAVENOUS ONCE
Status: COMPLETED | OUTPATIENT
Start: 2020-01-08 | End: 2020-01-08

## 2020-01-08 RX ORDER — OLANZAPINE 10 MG/1
10 TABLET, ORALLY DISINTEGRATING ORAL ONCE
Status: COMPLETED | OUTPATIENT
Start: 2020-01-08 | End: 2020-01-08

## 2020-01-08 RX ORDER — PREDNISONE 10 MG/1
TABLET ORAL
Qty: 30 TABLET | Refills: 0 | Status: SHIPPED | OUTPATIENT
Start: 2020-01-08 | End: 2020-01-18

## 2020-01-08 RX ORDER — METHYLPREDNISOLONE SODIUM SUCCINATE 125 MG/2ML
125 INJECTION, POWDER, LYOPHILIZED, FOR SOLUTION INTRAMUSCULAR; INTRAVENOUS ONCE
Status: COMPLETED | OUTPATIENT
Start: 2020-01-08 | End: 2020-01-08

## 2020-01-08 RX ORDER — HYDROMORPHONE HYDROCHLORIDE 4 MG/1
4 TABLET ORAL 3 TIMES DAILY PRN
COMMUNITY
End: 2021-07-13

## 2020-01-08 RX ADMIN — MORPHINE SULFATE 4 MG: 4 INJECTION INTRAVENOUS at 20:56

## 2020-01-08 RX ADMIN — METHYLPREDNISOLONE SODIUM SUCCINATE 125 MG: 125 INJECTION, POWDER, FOR SOLUTION INTRAMUSCULAR; INTRAVENOUS at 20:28

## 2020-01-08 RX ADMIN — OLANZAPINE 10 MG: 10 TABLET, ORALLY DISINTEGRATING ORAL at 20:01

## 2020-01-08 RX ADMIN — SODIUM CHLORIDE 1000 ML: 0.9 INJECTION, SOLUTION INTRAVENOUS at 20:28

## 2020-01-09 NOTE — ED NOTES
Pt states she does not need to have a bowel movement at this time, pt aware of the need for a stool sample       Mary Berrios RN  01/08/20 0285

## 2020-01-09 NOTE — PATIENT INSTRUCTIONS
A patient with multiple problems outlined above  Right now, really no active treatment for any of them  The unifying diagnosis appears to be a multi-system autoimmune illness  Needs to get into a rheumatologist to assess for high-level treatment with immune suppressant drugs

## 2020-01-20 ENCOUNTER — TELEPHONE (OUTPATIENT)
Dept: INTERNAL MEDICINE CLINIC | Facility: CLINIC | Age: 40
End: 2020-01-20

## 2020-01-21 ENCOUNTER — TELEPHONE (OUTPATIENT)
Dept: INTERNAL MEDICINE CLINIC | Facility: CLINIC | Age: 40
End: 2020-01-21

## 2020-01-21 DIAGNOSIS — M06.9 RHEUMATOID ARTHRITIS, INVOLVING UNSPECIFIED SITE, UNSPECIFIED RHEUMATOID FACTOR PRESENCE: Primary | ICD-10-CM

## 2020-01-21 DIAGNOSIS — G62.9 NEUROPATHY: ICD-10-CM

## 2020-01-21 NOTE — TELEPHONE ENCOUNTER
Patient needs a referral for the  Rheumatologist and  Neurology before she can make on appointment     [facility] Please call patient when referrals are in system @ [phone]    [facility]

## 2020-01-25 ENCOUNTER — APPOINTMENT (EMERGENCY)
Dept: CT IMAGING | Facility: HOSPITAL | Age: 40
End: 2020-01-25
Payer: COMMERCIAL

## 2020-01-25 ENCOUNTER — HOSPITAL ENCOUNTER (EMERGENCY)
Facility: HOSPITAL | Age: 40
Discharge: HOME | End: 2020-01-25
Attending: EMERGENCY MEDICINE | Admitting: EMERGENCY MEDICINE
Payer: COMMERCIAL

## 2020-01-25 VITALS
DIASTOLIC BLOOD PRESSURE: 67 MMHG | WEIGHT: 191.58 LBS | BODY MASS INDEX: 35.04 KG/M2 | OXYGEN SATURATION: 94 % | RESPIRATION RATE: 18 BRPM | SYSTOLIC BLOOD PRESSURE: 119 MMHG | HEART RATE: 85 BPM | TEMPERATURE: 97.7 F

## 2020-01-25 DIAGNOSIS — S06.0X9A CONCUSSION: Primary | ICD-10-CM

## 2020-01-25 PROCEDURE — 99284 EMERGENCY DEPT VISIT MOD MDM: CPT | Performed by: EMERGENCY MEDICINE

## 2020-01-25 PROCEDURE — 70450 CT HEAD/BRAIN W/O DYE: CPT

## 2020-01-25 PROCEDURE — 99284 EMERGENCY DEPT VISIT MOD MDM: CPT

## 2020-01-25 RX ORDER — HYDROMORPHONE HYDROCHLORIDE 2 MG/1
4 TABLET ORAL ONCE
Status: COMPLETED | OUTPATIENT
Start: 2020-01-25 | End: 2020-01-25

## 2020-01-25 RX ORDER — BUTALBITAL, ACETAMINOPHEN AND CAFFEINE 50; 325; 40 MG/1; MG/1; MG/1
1 TABLET ORAL ONCE
Status: COMPLETED | OUTPATIENT
Start: 2020-01-25 | End: 2020-01-25

## 2020-01-25 RX ADMIN — HYDROMORPHONE HYDROCHLORIDE 4 MG: 2 TABLET ORAL at 22:25

## 2020-01-25 RX ADMIN — BUTALBITAL, ACETAMINOPHEN AND CAFFEINE 1 TABLET: 50; 325; 40 TABLET ORAL at 21:24

## 2020-01-26 NOTE — ED NOTES
Discharge instructions and medications reviewed  Pt with no questions or concerns at this time  Pt ambulatory off unit with steady gait        Mariah Clark RN  01/25/20 9350

## 2020-01-26 NOTE — ED PROVIDER NOTES
History  Chief Complaint   Patient presents with    Abdominal Pain     Patient c/o abdominal pain along with n,v,d  Patient has hx of Chrons     37year old female presenting to the emergency department for eval of abdominal pain  Pt has hx of CHrohn's disease and has had worsening abd pain n/v/d for several days  [place]  Her sx are consistent with her normal flares which typically respond to steroids  She denies any fevers  No blood in the stool  No light headedness or fatigue  Prior to Admission Medications   Prescriptions Last Dose Informant Patient Reported? Taking?    ALPRAZolam (XANAX) 1 mg tablet   No No   Sig: Take 1 tablet (1 mg total) by mouth daily at bedtime as needed for anxiety   Calcium Carb-Ergocalciferol 250-125 MG-UNIT TABS   Yes No   Sig: Take 1 tablet by mouth   HYDROmorphone (DILAUDID) 4 mg tablet  Self Yes Yes   Sig: Take 4 mg by mouth 3 (three) times a day as needed for moderate pain   SUMAtriptan (IMITREX) 100 mg tablet   Yes No   Sig: Take 1 tablet by mouth once as needed     alendronate (FOSAMAX) 70 mg tablet  Self Yes No   Sig: Take 70 mg by mouth every 7 days   amitriptyline (ELAVIL) 100 mg tablet   Yes No   Sig: Take 150 mg by mouth     cholecalciferol (VITAMIN D3) 1,000 units tablet   Yes No   Sig: Take 1,000 Units by mouth daily   divalproex sodium (DEPAKOTE) 250 mg EC tablet   Yes No   Sig: Take 250 mg by mouth 2 (two) times a day     gabapentin (NEURONTIN) 300 mg capsule   Yes No   Sig: Take 600 mg by mouth 3 (three) times a day     meclizine (ANTIVERT) 12 5 MG tablet   Yes No   Sig: Take 12 5 mg by mouth every 8 (eight) hours as needed   montelukast (SINGULAIR) 10 mg tablet   Yes No   Sig: Take 10 mg by mouth daily at bedtime   pantoprazole (PROTONIX) 40 mg tablet   Yes No   Sig: Take 40 mg by mouth daily   propranolol (INDERAL) 80 mg tablet   Yes No   Sig: Take 80 mg by mouth 2 (two) times a day        Facility-Administered Medications: None       Past Medical History:   Diagnosis Date    [facility]     Endocarditis     Fibromyalgia     Gastritis     Hypertension     RA [facility]umatoid arthritis) [facility]     Vertigo        Past Surgical History:   Procedure Laterality Date    APPENDECTOMY      BRONCHOSCOPY      multiple       Family History   Problem Relation Age of Onset    Colon cancer Brother 28    Crohn's disease Brother      I have reviewed and agree with the history as documented  Social History     Tobacco Use    Smoking status: Current Every Day Smoker     Packs/day: 1 00     Types: Cigarettes    Smokeless tobacco: Never Used   Substance Use Topics    Alcohol use: No    Drug use: Not Currently     Types: Marijuana        Review of Systems   Constitutional: Negative for appetite change, chills, fatigue and fever  HENT: Negative for sneezing and sore throat  Eyes: Negative for visual disturbance  Respiratory: Negative for cough, choking, chest tightness, shortness of breath and wheezing  Cardiovascular: Negative for chest pain and palpitations  Gastrointestinal: Positive for abdominal pain, diarrhea, nausea and vomiting  Negative for constipation  Genitourinary: Negative for difficulty urinating and dysuria  Neurological: Negative for dizziness, weakness, light-headedness, numbness and headaches  All other systems reviewed and are negative  Physical Exam  Physical Exam   Constitutional: She is oriented to person, place, and time  She appears well-developed and well-nourished  No distress  HENT:   Head: Normocephalic and atraumatic  Mouth/Throat: Oropharynx is clear and moist    Eyes: Pupils are equal, round, and reactive to light  EOM are normal    Neck: No JVD present  No tracheal deviation present  Cardiovascular: Normal rate, regular rhythm, normal heart sounds and intact distal pulses  Exam reveals no gallop and no friction rub  No murmur heard    Pulmonary/Chest: Effort normal and breath sounds normal  No respiratory distress  She has no wheezes  She has no rales  Abdominal: Soft  Bowel sounds are normal  She exhibits no distension  There is generalized tenderness  There is no rebound and no guarding  Neurological: She is alert and oriented to person, place, and time  No cranial nerve deficit  She exhibits normal muscle tone  Skin: Skin is warm and dry  She is not diaphoretic  No pallor  Psychiatric: She has a normal mood and affect  Her behavior is normal    Nursing note and vitals reviewed        Vital Signs  ED Triage Vitals [01/08/20 1819]   Temperature Pulse Respirations Blood Pressure SpO2   98 2 °F (36 8 °C) 100 20 138/66 96 %      Temp Source Heart Rate Source Patient Position - Orthostatic VS BP Location FiO2 (%)   Oral Monitor Sitting Left arm --      Pain Score       Worst Possible Pain           Vitals:    01/08/20 1819 01/08/20 2015   BP: 138/66 131/73   Pulse: 100 86   Patient Position - Orthostatic VS: Sitting Lying         Visual Acuity      ED Medications  Medications   sodium chloride 0 9 % bolus 1,000 mL (0 mL Intravenous Stopped 1/8/20 2107)   OLANZapine (ZyPREXA ZYDIS) dispersible tablet 10 mg (10 mg Oral Given 1/8/20 2001)   methylPREDNISolone sodium succinate (Solu-MEDROL) injection 125 mg (125 mg Intravenous Given 1/8/20 2028)   morphine (PF) 4 mg/mL injection 4 mg (4 mg Intravenous Given 1/8/20 2056)       Diagnostic Studies  Results Reviewed     Procedure Component Value Units Date/Time    CBC and differential [[id number]]  (Abnormal) Collected:  01/08/20 2024    Lab Status:  Final result Specimen:  Blood from Arm, Right Updated:  01/08/20 2056     WBC 21 51 Thousand/uL      RBC 4 67 Million/uL      Hemoglobin 14 2 g/dL      Hematocrit 43 2 %      MCV 93 fL      MCH 30 4 pg      MCHC 32 9 g/dL      RDW 13 4 %      MPV 9 6 fL      Platelets 016 Thousands/uL      nRBC 0 /100 WBCs      Neutrophils Relative 91 %      Immat GRANS % 1 %      Lymphocytes Relative 6 %      Monocytes Relative 2 %      Eosinophils Relative 0 %      Basophils Relative 0 %      Neutrophils Absolute 19 54 Thousands/µL      Immature Grans Absolute 0 13 Thousand/uL      Lymphocytes Absolute 1 29 Thousands/µL      Monocytes Absolute 0 50 Thousand/µL      Eosinophils Absolute 0 01 Thousand/µL      Basophils Absolute 0 04 Thousands/µL     Comprehensive metabolic panel [[id number]]  (Abnormal) Collected:  01/08/20 2024    Lab Status:  Final result Specimen:  Blood from Arm, Right Updated:  01/08/20 2050     Sodium 136 mmol/L      Potassium 4 1 mmol/L      Chloride 101 mmol/L      CO2 26 mmol/L      ANION GAP 9 mmol/L      BUN 10 mg/dL      Creatinine 0 71 mg/dL      Glucose 109 mg/dL      Calcium 9 4 mg/dL      AST 17 U/L      ALT 27 U/L      Alkaline Phosphatase 83 U/L      Total Protein 8 5 g/dL      Albumin 3 7 g/dL      Total Bilirubin 0 30 mg/dL      eGFR 106 ml/min/1 73sq m     Narrative:       [place] guidelines for Chronic Kidney Disease (CKD):     Stage 1 with normal or high GFR (GFR > 90 mL/min/1 73 square meters)    Stage 2 Mild CKD (GFR = 60-89 mL/min/1 73 square meters)    Stage 3A Moderate CKD (GFR = 45-59 mL/min/1 73 square meters)    Stage 3B Moderate CKD (GFR = 30-44 mL/min/1 73 square meters)    Stage 4 Severe CKD (GFR = 15-29 mL/min/1 73 square meters)    Stage 5 End Stage CKD (GFR <15 mL/min/1 73 square meters)  Note: GFR calculation is accurate only with a steady state creatinine    Lipase [[id number]]  (Normal) Collected:  01/08/20 2024    Lab Status:  Final result Specimen:  Blood from Arm, Right Updated:  01/08/20 2050     Lipase 79 u/L     UA w Reflex to Microscopic w Reflex to Culture [[id number]] Collected:  01/08/20 2002    Lab Status:  Final result Specimen:  Urine, Clean Catch Updated:  01/08/20 2008     Color, UA Yellow     Clarity, UA Clear     Specific [place], UA 1 020     pH, UA 7 0     Leukocytes, UA Negative     Nitrite, UA Negative     Protein, UA Negative mg/dl      Glucose, UA Negative mg/dl      Ketones, UA Negative mg/dl      Urobilinogen, UA 0 2 E U /dl      Bilirubin, UA Negative     Blood, UA Negative    Stool Enteric Bacterial Panel by PCR [[id number]]     Lab Status:  No result Specimen:  Stool                  No orders to display              Procedures  Procedures         ED Course                               MDM  Number of Diagnoses or Management Options  Abdominal pain:   Diagnosis management comments: 37year old female with ahbd pain, likely chrohns flair  Will check labs, ct, give fluids, pain meds antiemetics, possibly steroids  Reassess  Disposition  Final diagnoses:   Abdominal pain     Time reflects when diagnosis was documented in both MDM as applicable and the Disposition within this note     Time User Action Codes Description Comment    1/8/2020  8:57 PM Blanco Burgos Add [R10 9] Abdominal pain       ED Disposition     ED Disposition Condition Date/Time Comment    Discharge Stable Wed Jan 8, 2020  8:57 PM Grace Vaughan discharge to home/self care  Follow-up Information     Follow up With Specialties Details Why Contact Info    Please follow up with your GI doctor asap              Discharge Medication List as of 1/8/2020  9:01 PM      START taking these medications    Details   predniSONE 10 mg tablet Multiple Dosages:Starting Wed 1/8/2020, Last dose on Thu 1/9/2020, THEN Starting Fri 1/10/2020, Last dose on Sat 1/11/2020, THEN Starting Sun 1/12/2020, Last dose on Mon 1/13/2020, THEN Starting Tue 1/14/2020, Last dose on Wed 1/15/2020, THEN Starti ng Thu 1/16/2020, Last dose on Fri 1/17/2020Take 5 tablets (50 mg total) by mouth daily for 2 days, THEN 4 tablets (40 mg total) daily for 2 days, THEN 3 tablets (30 mg total) daily for 2 days, THEN 2 tablets (20 mg total) daily for 2 days, THEN 1 table t (10 mg total) daily for 2 days  , Print         CONTINUE these medications which have NOT CHANGED Details   alendronate (FOSAMAX) 70 mg tablet Take 70 mg by mouth every 7 days, Historical Med      ALPRAZolam (XANAX) 1 mg tablet Take 1 tablet (1 mg total) by mouth daily at bedtime as needed for anxiety, Starting Tue 1/7/2020, No Print      amitriptyline (ELAVIL) 100 mg tablet Take 150 mg by mouth  , Historical Med      Calcium Carb-Ergocalciferol 250-125 MG-UNIT TABS Take 1 tablet by mouth, Historical Med      cholecalciferol (VITAMIN D3) 1,000 units tablet Take 1,000 Units by mouth daily, Historical Med      divalproex sodium (DEPAKOTE) 250 mg EC tablet Take 250 mg by mouth 2 (two) times a day  , Historical Med      gabapentin (NEURONTIN) 300 mg capsule Take 600 mg by mouth 3 (three) times a day  , Historical Med      HYDROmorphone (DILAUDID) 4 mg tablet Take 4 mg by mouth 3 (three) times a day as needed for moderate pain, Historical Med      meclizine (ANTIVERT) 12 5 MG tablet Take 12 5 mg by mouth every 8 (eight) hours as needed, Historical Med      montelukast (SINGULAIR) 10 mg tablet Take 10 mg by mouth daily at bedtime, Historical Med      pantoprazole (PROTONIX) 40 mg tablet Take 40 mg by mouth daily, Historical Med      propranolol (INDERAL) 80 mg tablet Take 80 mg by mouth 2 (two) times a day  , Historical Med      SUMAtriptan (IMITREX) 100 mg tablet Take 1 tablet by mouth once as needed  , Historical Med           No discharge procedures on file      ED Provider  Electronically Signed by           Pattie Pimentel MD  01/26/20 7853

## 2020-01-26 NOTE — DISCHARGE INSTRUCTIONS
Concussion   WHAT YOU NEED TO KNOW:   A concussion is a mild brain injury  It is usually caused by a bump or blow to the head from a fall, a motor vehicle crash, or a sports injury  Sometimes being shaken forcefully may cause a concussion  DISCHARGE INSTRUCTIONS:   Have someone else call 911 for the following:   · Someone tries to wake you and cannot do so  · You have a seizure, increasing confusion, or a change in personality  · Your speech becomes slurred, or you have new vision problems  Return to the emergency department if:   · You have a severe headache that does not go away  · You have arm or leg weakness, numbness, or new problems with coordination  · You have blood or clear fluid coming out of the ears or nose  Contact your healthcare provider if:   · You have nausea or are vomiting  · You feel more sleepy than usual     · Your symptoms get worse  · Your symptoms last longer than 6 weeks after the injury  · You have questions or concerns about your condition or care  Medicines:   · Acetaminophen  helps to decrease pain  It is available without a doctor's order  Ask how much to take and how often to take it  Follow directions  Acetaminophen can cause liver damage if not taken correctly  · NSAIDs , such as ibuprofen, help decrease swelling and pain  NSAIDs can cause stomach bleeding or kidney problems in certain people  If you take blood thinner medicine, always ask your healthcare provider if NSAIDs are safe for you  Always read the medicine label and follow directions  · Take your medicine as directed  Contact your healthcare provider if you think your medicine is not helping or if you have side effects  Tell him or her if you are allergic to any medicine  Keep a list of the medicines, vitamins, and herbs you take  Include the amounts, and when and why you take them  Bring the list or the pill bottles to follow-up visits   Carry your medicine list with you in case of an emergency  Follow up with your healthcare provider as directed:  Write down your questions so you remember to ask them during your visits  Self-care:   · Rest  from physical and mental activities as directed  Mental activities are those that require thinking, concentration, and attention  You will need to rest until your symptoms are gone  Rest will allow you to recover from your concussion  Ask your healthcare provider when you can return to work and other daily activities  · Have someone stay with you for the first 24 hours after your injury  Your healthcare provider should be contacted if your symptoms get worse, or you develop new symptoms  · Do not participate in sports and physical activities until your healthcare provider says it is okay  They could make your symptoms worse or lead to another concussion  Your healthcare provider will tell you when it is okay for you to return to sports or physical activities  Prevent another concussion:   · Wear protective sports equipment that fit properly  Helmets help decrease your risk of a serious brain injury  Talk to your healthcare provider about ways you can decrease your risk for a concussion if you play sports  · Wear your seat belt  every time you travel  This helps to decrease your risk of a head injury if you are in a car accident  © 2017 [facility] Information is for End User's use only and may not be sold, redistributed or otherwise used for commercial purposes  All illustrations and images included in CareNotes® are the copyrighted property of A D A M [employer] Inc  or Juan Gonsalez  The above information is an  only  It is not intended as medical advice for individual conditions or treatments  Talk to your doctor, nurse or pharmacist before following any medical regimen to see if it is safe and effective for you    Avoid prolonged screen time until you feel better, take motrin/tylenol as needed for headache, follow up with concussion clinic if symptoms persist in addition to your primary care doctor

## 2020-01-26 NOTE — ED PROVIDER NOTES
History  Chief Complaint   Patient presents with    Head Injury w/unknown LOC     Pt  reports slipping and falling on ice yesterday at 1600  Pt  denies blood thinning medications  Pt  reports continued headache since  Pt  states "seeing stars" after fall  HPI  15-year-old female presents head injury  States that yesterday she fell on the ice after slipping and hit the back of her head  She states she has had a headache posteriorly since then  She has mild nausea and fogginess  Also some photosensitivity  She did not lose consciousness  No other injuries  Prior to Admission Medications   Prescriptions Last Dose Informant Patient Reported? Taking?    ALPRAZolam (XANAX) 1 mg tablet   No Yes   Sig: Take 1 tablet (1 mg total) by mouth daily at bedtime as needed for anxiety   Calcium Carb-Ergocalciferol 250-125 MG-UNIT TABS   Yes Yes   Sig: Take 1 tablet by mouth   HYDROmorphone (DILAUDID) 4 mg tablet  Self Yes Yes   Sig: Take 4 mg by mouth 3 (three) times a day as needed for moderate pain   SUMAtriptan (IMITREX) 100 mg tablet   Yes Yes   Sig: Take 1 tablet by mouth once as needed     alendronate (FOSAMAX) 70 mg tablet  Self Yes Yes   Sig: Take 70 mg by mouth every 7 days   amitriptyline (ELAVIL) 100 mg tablet   Yes Yes   Sig: Take 150 mg by mouth     cholecalciferol (VITAMIN D3) 1,000 units tablet   Yes Yes   Sig: Take 1,000 Units by mouth daily   divalproex sodium (DEPAKOTE) 250 mg EC tablet   Yes Yes   Sig: Take 250 mg by mouth 2 (two) times a day     gabapentin (NEURONTIN) 300 mg capsule   Yes Yes   Sig: Take 600 mg by mouth 3 (three) times a day     meclizine (ANTIVERT) 12 5 MG tablet   Yes Yes   Sig: Take 12 5 mg by mouth every 8 (eight) hours as needed   montelukast (SINGULAIR) 10 mg tablet   Yes Yes   Sig: Take 10 mg by mouth daily at bedtime   pantoprazole (PROTONIX) 40 mg tablet   Yes Yes   Sig: Take 40 mg by mouth daily   propranolol (INDERAL) 80 mg tablet   Yes Yes   Sig: Take 80 mg by mouth 2 (two) times a day        Facility-Administered Medications: None       Past Medical History:   Diagnosis Date    [facility] )     Endocarditis     Fibromyalgia     Gastritis     Hypertension     RA (rheumatoid arthritis) ([facility] )     Vertigo        Past Surgical History:   Procedure Laterality Date    APPENDECTOMY      BRONCHOSCOPY      multiple       Family History   Problem Relation Age of Onset    Colon cancer Brother 28    Crohn's disease Brother      I have reviewed and agree with the history as documented  Social History     Tobacco Use    Smoking status: Current Every Day Smoker     Packs/day: 1 00     Types: Cigarettes    Smokeless tobacco: Never Used   Substance Use Topics    Alcohol use: No    Drug use: Not Currently     Types: Marijuana        Review of Systems   Constitutional: Negative for chills and fever  HENT: Negative for dental problem and ear pain  Eyes: Negative for pain and redness  Respiratory: Negative for cough and shortness of breath  Cardiovascular: Negative for chest pain and palpitations  Gastrointestinal: Positive for nausea  Negative for abdominal pain  Endocrine: Negative for polydipsia and polyphagia  Genitourinary: Negative for dysuria and frequency  Musculoskeletal: Negative for arthralgias and joint swelling  Skin: Negative for color change and rash  Neurological: Positive for headaches  Negative for dizziness  Psychiatric/Behavioral: Positive for decreased concentration  Negative for behavioral problems and confusion  All other systems reviewed and are negative  Physical Exam  Physical Exam   Constitutional: She is oriented to person, place, and time  She appears well-developed and well-nourished  No distress  HENT:   Head: Atraumatic  Right Ear: External ear normal    Left Ear: External ear normal    Nose: Nose normal    Eyes: Pupils are equal, round, and reactive to light   Conjunctivae and EOM are normal    Neck: Normal range of motion  Neck supple  No JVD present  Cardiovascular: Normal rate, regular rhythm and normal heart sounds  No murmur heard  Pulmonary/Chest: Effort normal and breath sounds normal  No respiratory distress  She has no wheezes  Abdominal: Soft  Bowel sounds are normal  She exhibits no distension  There is no tenderness  Musculoskeletal: Normal range of motion  She exhibits no edema  Neurological: She is alert and oriented to person, place, and time  No cranial nerve deficit  CN II-XII intact grossly, no focal deficits  Normal strength and sensation in b/l upper and lower extremities  Normal FNF and rapid alternating hand movements   Skin: Skin is warm and dry  Capillary refill takes less than 2 seconds  She is not diaphoretic  Psychiatric: She has a normal mood and affect  Her behavior is normal    Nursing note and vitals reviewed  Vital Signs  ED Triage Vitals [01/25/20 2101]   Temperature Pulse Respirations Blood Pressure SpO2   97 7 °F (36 5 °C) 103 18 148/78 98 %      Temp Source Heart Rate Source Patient Position - Orthostatic VS BP Location FiO2 (%)   Oral Monitor Lying Right arm --      Pain Score       Worst Possible Pain           Vitals:    01/25/20 2101 01/25/20 2130 01/25/20 2200 01/25/20 2230   BP: 148/78 132/77 123/70 122/72   Pulse: 103 101 94 91   Patient Position - Orthostatic VS: Lying Lying Lying Lying         Visual Acuity  Visual Acuity      Most Recent Value   L Pupil Size (mm)  4   R Pupil Size (mm)  4          ED Medications  Medications   butalbital-acetaminophen-caffeine (FIORICET,ESGIC) -40 mg per tablet 1 tablet (1 tablet Oral Given 1/25/20 2124)   HYDROmorphone (DILAUDID) tablet 4 mg (4 mg Oral Given 1/25/20 2225)       Diagnostic Studies  Results Reviewed     None                 CT head without contrast   Final Result by Aryan Norton MD (01/25 2303)      No acute intracranial abnormality                    Workstation performed: [id number] Procedures  Procedures         ED Course                               MDM  38 yo F presents with headache after fall  Clinically with concussion, however normal neuro exam, CT head no ICH, discussed limiting screen time, cognitive rest, follow up with concussion clinic  Disposition  Final diagnoses:   Concussion     Time reflects when diagnosis was documented in both MDM as applicable and the Disposition within this note     Time User Action Codes Description Comment    1/25/2020  9:42 PM Veronica Bejarano Add [S06 [id number]] Concussion       ED Disposition     ED Disposition Condition Date/Time Comment    Discharge Stable Sat Jan 25, 2020  9:42 PM Kelly Gonzalez discharge to home/self care  Follow-up Information     Follow up With Specialties Details Why [address], DO Sports Medicine Call  for concussion clinic follow up [address]      Gaby Guevara MD Internal Medicine  As needed [address]  [phone]            Patient's Medications   Discharge Prescriptions    No medications on file     No discharge procedures on file      ED Provider  Electronically Signed by           Antwan Meyer MD  01/25/20 4469

## 2020-01-27 ENCOUNTER — TELEPHONE (OUTPATIENT)
Dept: INTERNAL MEDICINE CLINIC | Facility: CLINIC | Age: 40
End: 2020-01-27

## 2020-01-27 NOTE — TELEPHONE ENCOUNTER
Patient called stating that her lab test show she has malignant neoplasm  She is not sure what that means but is asking if she needs a referral to a Hematologist  If so, can that referral be written and give her notification so that she can make an appointment  She has an appt with Dr Tom Lopez tomorrow but is worried and wants to know the next step     [phone]

## 2020-01-28 ENCOUNTER — TELEPHONE (OUTPATIENT)
Dept: NEUROLOGY | Facility: CLINIC | Age: 40
End: 2020-01-28

## 2020-01-28 ENCOUNTER — OFFICE VISIT (OUTPATIENT)
Dept: INTERNAL MEDICINE CLINIC | Facility: CLINIC | Age: 40
End: 2020-01-28
Payer: COMMERCIAL

## 2020-01-28 VITALS
DIASTOLIC BLOOD PRESSURE: 72 MMHG | HEART RATE: 100 BPM | OXYGEN SATURATION: 96 % | BODY MASS INDEX: 34.78 KG/M2 | WEIGHT: 189 LBS | HEIGHT: 62 IN | SYSTOLIC BLOOD PRESSURE: 126 MMHG

## 2020-01-28 DIAGNOSIS — K50.00 CROHN'S DISEASE OF SMALL INTESTINE WITHOUT COMPLICATION (HCC): ICD-10-CM

## 2020-01-28 DIAGNOSIS — G43.001 MIGRAINE WITHOUT AURA AND WITH STATUS MIGRAINOSUS, NOT INTRACTABLE: Primary | ICD-10-CM

## 2020-01-28 DIAGNOSIS — M06.9 RHEUMATOID ARTHRITIS, INVOLVING UNSPECIFIED SITE, UNSPECIFIED RHEUMATOID FACTOR PRESENCE: ICD-10-CM

## 2020-01-28 PROCEDURE — 3008F BODY MASS INDEX DOCD: CPT | Performed by: INTERNAL MEDICINE

## 2020-01-28 PROCEDURE — 99213 OFFICE O/P EST LOW 20 MIN: CPT | Performed by: INTERNAL MEDICINE

## 2020-01-28 NOTE — PROGRESS NOTES
BMI Counseling: Body mass index is 34 57 kg/m²  The BMI is above normal  Nutrition recommendations include moderation in carbohydrate intake

## 2020-01-28 NOTE — TELEPHONE ENCOUNTER
Called patient to see if she would be available to come in tomorrow January 29 at 830 to see Dr Ligia Wilkes  Patient is on wait list for sooner appointment  No answer  Left voicemail

## 2020-01-28 NOTE — PROGRESS NOTES
Assessment/Plan:       Diagnoses and all orders for this visit:    Migraine without aura and with status migrainosus, not intractable    Crohn's disease of small intestine without complication (HCC)    Rheumatoid arthritis, involving unspecified site, unspecified rheumatoid factor presence (HCC)                Subjective:      Patient ID: Kelly Gonzalez is a 37 y o  female  Immediate issue is a fall with closed head trauma which happened about 4 days ago  She went to the ER where she had a problem directed exam including a CT without which was negative  Referred to neurology  1 of the consequences here is that  migraine headache already noted 15/30 days a month have now become daily  Continues to have increased headache  Continues to have sensations of lightheadedness  She actually fell this morning because she had some vertigo  Multi-system autoimmune illness    A 55-year-old female  20 year history of autoimmune disease  Principal manifestation is :    Crohn's disease  Currently having a flare with some pain and diarrhea but not on any active treatment  Prior treatments have included Enbrel which apparently worked well for quite a while, then it stopped working  Switch to Humira  This too stop working  She had been also on mesalamine or least in equivalent sulfasalazine drug in the past but not now  Also, until recently she was taking budesonide orally but it was discontinued after recent panendoscopy by the [facility] group  Currently awaiting initiation of a new biological and because the Humira did not work she is on prednisone 30 mg daily! Skin disease: Currently labeled as psoriasis  She has some blotchy rashes on the extremities    Joint pain inflammation: Currently labeled as rheumatoid arthritis or psoriatic arthropathy  Not on any active treatment    She has sausage shaped fingers and synovial thickening of the ankle as principal things observation     Currently taking gabapentin for chronic pain but not on any anti rheumatic medication  Lung disease? Has been told she has restrictive lung disease  She smokes a pack a day  Osteoporosis as a complication of care due to steroid use   Severe dental decay due to osteoporosis with loss of many teeth  ADHD currently not being treated    Migraine currently being treated with Depakote as a preventive and sumtriptan as a rescue; she still gets headaches at least half the days a month  Hypertension treated with Inderal 80 p o  B i d  with reasonable control  Globally the patient feels terrible  She has diarrhea, fatigue, and chronic pain  Now with increased HA, dizzy        Prior history also positive for episode of bacterial endocarditis per patient provoked by dental work  At least so far no apparent sequelae          The following portions of the patient's history were reviewed and updated as appropriate:   She has a past medical history of Cervical cancer ([facility] [address] ), Endocarditis, Fibromyalgia, Gastritis, Hypertension, [facility] and Vertigo  ,  does not have any pertinent problems on file  ,   has a past surgical history that includes Appendectomy and Bronchoscopy  ,  family history includes Colon cancer (age of onset: 28) in her brother; Crohn's disease in her brother  ,   reports that she has been smoking cigarettes  She has been smoking about 1 00 pack per day  She has never used smokeless tobacco  She reports that she has current or past drug history  Drug: Marijuana  She reports that she does not drink alcohol ,  is allergic to ketorolac; penicillins; and ketorolac tromethamine     Current Outpatient Medications   Medication Sig Dispense Refill    alendronate (FOSAMAX) 70 mg tablet Take 70 mg by mouth every 7 days      ALPRAZolam (XANAX) 1 mg tablet Take 1 tablet (1 mg total) by mouth daily at bedtime as needed for anxiety 30 tablet 5    amitriptyline (ELAVIL) 100 mg tablet Take 150 mg by mouth        Calcium Carb-Ergocalciferol 250-125 MG-UNIT TABS Take 1 tablet by mouth      cholecalciferol (VITAMIN D3) 1,000 units tablet Take 1,000 Units by mouth daily      divalproex sodium (DEPAKOTE) 250 mg EC tablet Take 250 mg by mouth 2 (two) times a day        gabapentin (NEURONTIN) 300 mg capsule Take 600 mg by mouth 3 (three) times a day        HYDROmorphone (DILAUDID) 4 mg tablet Take 4 mg by mouth 3 (three) times a day as needed for moderate pain      meclizine (ANTIVERT) 12 5 MG tablet Take 12 5 mg by mouth every 8 (eight) hours as needed      montelukast (SINGULAIR) 10 mg tablet Take 10 mg by mouth daily at bedtime      pantoprazole (PROTONIX) 40 mg tablet Take 40 mg by mouth daily      propranolol (INDERAL) 80 mg tablet Take 80 mg by mouth 2 (two) times a day        SUMAtriptan (IMITREX) 100 mg tablet Take 1 tablet by mouth once as needed         No current facility-administered medications for this visit  Review of Systems   Constitutional: Positive for fatigue  HENT: Negative for ear pain, sinus pressure and sinus pain  Eyes: Positive for visual disturbance  Respiratory: Positive for cough and wheezing  Cardiovascular: Negative for chest pain  Gastrointestinal: Positive for abdominal pain, diarrhea and nausea  Endocrine: Negative for cold intolerance and polydipsia  Genitourinary: Negative for difficulty urinating, dysuria and hematuria  Musculoskeletal: Positive for arthralgias, back pain, gait problem and joint swelling  Skin: Positive for rash  Neurological: Positive for headaches  Psychiatric/Behavioral: Positive for dysphoric mood  The patient is nervous/anxious  Objective:  Vitals:    01/28/20 1823   BP: 126/72   Pulse: 100   SpO2: 96%      Physical Exam   Constitutional: She appears well-developed and well-nourished      Alert female patient verbalizing numerous subjective complaints who actually looks better than her complaints   HENT: Mouth/Throat: No oropharyngeal exudate  Eyes: No scleral icterus  Cardiovascular: Normal rate  Pulmonary/Chest: Effort normal and breath sounds normal    Musculoskeletal: She exhibits edema and deformity  Sausage shaped fingers   Skin: Skin is warm and dry  Patient Instructions    Multi-system autoimmune illness complicated now by closed head trauma and postconcussion syndrome  Basically needs to see specialist; will try to move that process along      The patient is requested to bring to me a list of all the disease modifying drugs she has ever been on

## 2020-02-25 ENCOUNTER — APPOINTMENT (OUTPATIENT)
Dept: RADIOLOGY | Facility: CLINIC | Age: 40
End: 2020-02-25
Payer: COMMERCIAL

## 2020-02-25 ENCOUNTER — APPOINTMENT (OUTPATIENT)
Dept: LAB | Facility: CLINIC | Age: 40
End: 2020-02-25
Payer: COMMERCIAL

## 2020-02-25 ENCOUNTER — OFFICE VISIT (OUTPATIENT)
Dept: INTERNAL MEDICINE CLINIC | Facility: CLINIC | Age: 40
End: 2020-02-25
Payer: COMMERCIAL

## 2020-02-25 VITALS
SYSTOLIC BLOOD PRESSURE: 128 MMHG | TEMPERATURE: 98.5 F | WEIGHT: 192.8 LBS | HEIGHT: 62 IN | OXYGEN SATURATION: 96 % | HEART RATE: 86 BPM | BODY MASS INDEX: 35.48 KG/M2 | DIASTOLIC BLOOD PRESSURE: 76 MMHG

## 2020-02-25 DIAGNOSIS — J40 BRONCHITIS: ICD-10-CM

## 2020-02-25 DIAGNOSIS — Z72.0 TOBACCO ABUSE: ICD-10-CM

## 2020-02-25 DIAGNOSIS — M06.9 RHEUMATOID ARTHRITIS, INVOLVING UNSPECIFIED SITE, UNSPECIFIED RHEUMATOID FACTOR PRESENCE: ICD-10-CM

## 2020-02-25 DIAGNOSIS — I10 ESSENTIAL HYPERTENSION: ICD-10-CM

## 2020-02-25 DIAGNOSIS — K50.00 CROHN'S DISEASE OF SMALL INTESTINE WITHOUT COMPLICATION (HCC): ICD-10-CM

## 2020-02-25 DIAGNOSIS — K50.00 CROHN'S DISEASE OF SMALL INTESTINE WITHOUT COMPLICATION (HCC): Primary | ICD-10-CM

## 2020-02-25 DIAGNOSIS — G43.001 MIGRAINE WITHOUT AURA AND WITH STATUS MIGRAINOSUS, NOT INTRACTABLE: ICD-10-CM

## 2020-02-25 LAB
ANION GAP SERPL CALCULATED.3IONS-SCNC: 3 MMOL/L (ref 4–13)
BASOPHILS # BLD AUTO: 0.12 THOUSANDS/ΜL (ref 0–0.1)
BASOPHILS NFR BLD AUTO: 1 % (ref 0–1)
BUN SERPL-MCNC: 9 MG/DL (ref 5–25)
CALCIUM SERPL-MCNC: 9.1 MG/DL (ref 8.3–10.1)
CHLORIDE SERPL-SCNC: 108 MMOL/L (ref 100–108)
CO2 SERPL-SCNC: 27 MMOL/L (ref 21–32)
CREAT SERPL-MCNC: 0.65 MG/DL (ref 0.6–1.3)
EOSINOPHIL # BLD AUTO: 0.12 THOUSAND/ΜL (ref 0–0.61)
EOSINOPHIL NFR BLD AUTO: 1 % (ref 0–6)
ERYTHROCYTE [DISTWIDTH] IN BLOOD BY AUTOMATED COUNT: 13.5 % (ref 11.6–15.1)
GFR SERPL CREATININE-BSD FRML MDRD: 111 ML/MIN/1.73SQ M
GLUCOSE P FAST SERPL-MCNC: 86 MG/DL (ref 65–99)
HCT VFR BLD AUTO: 38.5 % (ref 34.8–46.1)
HGB BLD-MCNC: 12.5 G/DL (ref 11.5–15.4)
IMM GRANULOCYTES # BLD AUTO: 0.06 THOUSAND/UL (ref 0–0.2)
IMM GRANULOCYTES NFR BLD AUTO: 0 % (ref 0–2)
LYMPHOCYTES # BLD AUTO: 3.81 THOUSANDS/ΜL (ref 0.6–4.47)
LYMPHOCYTES NFR BLD AUTO: 28 % (ref 14–44)
MCH RBC QN AUTO: 29.9 PG (ref 26.8–34.3)
MCHC RBC AUTO-ENTMCNC: 32.5 G/DL (ref 31.4–37.4)
MCV RBC AUTO: 92 FL (ref 82–98)
MONOCYTES # BLD AUTO: 0.56 THOUSAND/ΜL (ref 0.17–1.22)
MONOCYTES NFR BLD AUTO: 4 % (ref 4–12)
NEUTROPHILS # BLD AUTO: 8.87 THOUSANDS/ΜL (ref 1.85–7.62)
NEUTS SEG NFR BLD AUTO: 66 % (ref 43–75)
NRBC BLD AUTO-RTO: 0 /100 WBCS
PLATELET # BLD AUTO: 324 THOUSANDS/UL (ref 149–390)
PMV BLD AUTO: 10.2 FL (ref 8.9–12.7)
POTASSIUM SERPL-SCNC: 3.9 MMOL/L (ref 3.5–5.3)
RBC # BLD AUTO: 4.18 MILLION/UL (ref 3.81–5.12)
SODIUM SERPL-SCNC: 138 MMOL/L (ref 136–145)
WBC # BLD AUTO: 13.54 THOUSAND/UL (ref 4.31–10.16)

## 2020-02-25 PROCEDURE — 36415 COLL VENOUS BLD VENIPUNCTURE: CPT

## 2020-02-25 PROCEDURE — 71046 X-RAY EXAM CHEST 2 VIEWS: CPT

## 2020-02-25 PROCEDURE — 85025 COMPLETE CBC W/AUTO DIFF WBC: CPT

## 2020-02-25 PROCEDURE — 3008F BODY MASS INDEX DOCD: CPT | Performed by: PHYSICIAN ASSISTANT

## 2020-02-25 PROCEDURE — 3078F DIAST BP <80 MM HG: CPT | Performed by: PHYSICIAN ASSISTANT

## 2020-02-25 PROCEDURE — 3074F SYST BP LT 130 MM HG: CPT | Performed by: PHYSICIAN ASSISTANT

## 2020-02-25 PROCEDURE — 80048 BASIC METABOLIC PNL TOTAL CA: CPT

## 2020-02-25 PROCEDURE — 99214 OFFICE O/P EST MOD 30 MIN: CPT | Performed by: PHYSICIAN ASSISTANT

## 2020-02-25 PROCEDURE — 4004F PT TOBACCO SCREEN RCVD TLK: CPT | Performed by: PHYSICIAN ASSISTANT

## 2020-02-25 RX ORDER — AZITHROMYCIN 250 MG/1
TABLET, FILM COATED ORAL
Qty: 6 TABLET | Refills: 0 | Status: SHIPPED | OUTPATIENT
Start: 2020-02-25 | End: 2020-02-29

## 2020-02-25 RX ORDER — ALBUTEROL SULFATE 2.5 MG/3ML
2.5 SOLUTION RESPIRATORY (INHALATION) EVERY 6 HOURS PRN
Qty: 60 VIAL | Refills: 5 | Status: SHIPPED | OUTPATIENT
Start: 2020-02-25 | End: 2021-05-26

## 2020-02-25 RX ORDER — BENZONATATE 200 MG/1
200 CAPSULE ORAL 3 TIMES DAILY PRN
Qty: 20 CAPSULE | Refills: 0 | Status: SHIPPED | OUTPATIENT
Start: 2020-02-25 | End: 2021-05-26

## 2020-02-25 NOTE — PROGRESS NOTES
Assessment/Plan: a lot of coughing yellow phlegm a few rales on the right  Breathing comfortably however antibiotic nebulized getting a workup avoid steroids checking on her elevated white count she has been off of steroids for over a month       Diagnoses and all orders for this visit:    Crohn's disease of small intestine without complication ([facility] [address] )  -     CBC and differential; Future  -     Basic metabolic panel; Future  -     azithromycin (ZITHROMAX) 250 mg tablet; Take 2 tablets day 1 then  1 a day for 4 days  -     benzonatate (TESSALON) 200 MG capsule; Take 1 capsule (200 mg total) by mouth 3 (three) times a day as needed for cough  -     albuterol (2 5 mg/3 mL) 0 083 % nebulizer solution; Take 1 vial (2 5 mg total) by nebulization every 6 (six) hours as needed for wheezing or shortness of breath  -     XR chest pa & lateral; Future    Rheumatoid arthritis, involving unspecified site, unspecified rheumatoid factor presence (HCC)    Essential hypertension  -     CBC and differential; Future  -     Basic metabolic panel; Future  -     azithromycin (ZITHROMAX) 250 mg tablet; Take 2 tablets day 1 then  1 a day for 4 days  -     benzonatate (TESSALON) 200 MG capsule; Take 1 capsule (200 mg total) by mouth 3 (three) times a day as needed for cough  -     albuterol (2 5 mg/3 mL) 0 083 % nebulizer solution; Take 1 vial (2 5 mg total) by nebulization every 6 (six) hours as needed for wheezing or shortness of breath  -     XR chest pa & lateral; Future    Tobacco abuse  -     CBC and differential; Future  -     Basic metabolic panel; Future  -     azithromycin (ZITHROMAX) 250 mg tablet; Take 2 tablets day 1 then  1 a day for 4 days  -     benzonatate (TESSALON) 200 MG capsule; Take 1 capsule (200 mg total) by mouth 3 (three) times a day as needed for cough  -     albuterol (2 5 mg/3 mL) 0 083 % nebulizer solution;  Take 1 vial (2 5 mg total) by nebulization every 6 (six) hours as needed for wheezing or shortness of breath  -     XR chest pa & lateral; Future    Migraine without aura and with status migrainosus, not intractable    Bronchitis  -     CBC and differential; Future  -     Basic metabolic panel; Future  -     azithromycin (ZITHROMAX) 250 mg tablet; Take 2 tablets day 1 then  1 a day for 4 days  -     benzonatate (TESSALON) 200 MG capsule; Take 1 capsule (200 mg total) by mouth 3 (three) times a day as needed for cough  -     albuterol (2 5 mg/3 mL) 0 083 % nebulizer solution; Take 1 vial (2 5 mg total) by nebulization every 6 (six) hours as needed for wheezing or shortness of breath  -     XR chest pa & lateral; Future        No problem-specific Assessment & Plan notes found for this encounter  Tobacco Cessation Counseling: Tobacco cessation counseling was provided  The patient is sincerely urged to quit consumption of tobacco  She is not ready to quit tobacco         Subjective:      Patient ID: Gabriela Cody is a 37 y o  female  3 week history of gradually increasing coughing along with feverishness achiness scratchy throat  Sometimes she feels wheezy and short of breath coughing up yellowish phlegm in the past 3 days  No ear pain facial pain  No chills or sweats no palpitations or dizziness  Constant abdominal discomfort mucus diarrhea followed by GI Crohn's disease on a biologic she has had numerous courses of steroids in the past year but none in several months chronic pain on narcotics followed by pain management  COPD cigarette smoker followed by Pulmonary she has a nebulizer but she has run out of albuterol  Longstanding problem of migraine headaches follows with Neurology as well      The following portions of the patient's history were reviewed and updated as appropriate:   She has a past medical history of Cervical cancer ([facility] [address] ), Endocarditis, Fibromyalgia, Gastritis, Hypertension, RA (rheumatoid arthritis) ([facility] [address] ), and Vertigo  ,  does not have any pertinent problems on file  ,   has a past surgical history that includes Appendectomy and Bronchoscopy  ,  family history includes Colon cancer (age of onset: 28) in her brother; Crohn's disease in her brother  ,   reports that she has been smoking cigarettes  She has been smoking about 1 00 pack per day  She has never used smokeless tobacco  She reports that she has current or past drug history  Drug: Marijuana  She reports that she does not drink alcohol ,  is allergic to ketorolac; penicillins; and ketorolac tromethamine     Current Outpatient Medications   Medication Sig Dispense Refill    alendronate (FOSAMAX) 70 mg tablet Take 70 mg by mouth every 7 days      ALPRAZolam (XANAX) 1 mg tablet Take 1 tablet (1 mg total) by mouth daily at bedtime as needed for anxiety 30 tablet 5    amitriptyline (ELAVIL) 100 mg tablet Take 150 mg by mouth        Calcium Carb-Ergocalciferol 250-125 MG-UNIT TABS Take 1 tablet by mouth      cholecalciferol (VITAMIN D3) 1,000 units tablet Take 1,000 Units by mouth daily      divalproex sodium (DEPAKOTE) 250 mg EC tablet Take 250 mg by mouth 2 (two) times a day        gabapentin (NEURONTIN) 300 mg capsule Take 600 mg by mouth 3 (three) times a day        HYDROmorphone (DILAUDID) 4 mg tablet Take 4 mg by mouth 3 (three) times a day as needed for moderate pain      meclizine (ANTIVERT) 12 5 MG tablet Take 12 5 mg by mouth every 8 (eight) hours as needed      montelukast (SINGULAIR) 10 mg tablet Take 10 mg by mouth daily at bedtime      pantoprazole (PROTONIX) 40 mg tablet Take 40 mg by mouth daily      propranolol (INDERAL) 80 mg tablet Take 80 mg by mouth 2 (two) times a day        SUMAtriptan (IMITREX) 100 mg tablet Take 1 tablet by mouth once as needed        albuterol (2 5 mg/3 mL) 0 083 % nebulizer solution Take 1 vial (2 5 mg total) by nebulization every 6 (six) hours as needed for wheezing or shortness of breath 60 vial 5    azithromycin (ZITHROMAX) 250 mg tablet Take 2 tablets day 1 then  1 a day for 4 days 6 tablet 0    benzonatate (TESSALON) 200 MG capsule Take 1 capsule (200 mg total) by mouth 3 (three) times a day as needed for cough 20 capsule 0     No current facility-administered medications for this visit  Review of Systems   Constitutional: Negative for activity change, appetite change, chills, diaphoresis, fatigue, fever and unexpected weight change  HENT: Negative for congestion, dental problem, drooling, ear discharge, ear pain, facial swelling, hearing loss, nosebleeds, postnasal drip, rhinorrhea, sinus pressure, sinus pain, sneezing, sore throat, tinnitus, trouble swallowing and voice change  Eyes: Negative for photophobia, pain, discharge, redness, itching and visual disturbance  Respiratory: Positive for cough and chest tightness  Negative for apnea, choking, shortness of breath, wheezing and stridor  Cardiovascular: Negative for chest pain, palpitations and leg swelling  Gastrointestinal: Positive for abdominal pain  Negative for abdominal distention, anal bleeding, blood in stool, constipation, diarrhea, nausea, rectal pain and vomiting  Endocrine: Negative for cold intolerance, heat intolerance, polydipsia, polyphagia and polyuria  Genitourinary: Negative for decreased urine volume, difficulty urinating, dysuria, enuresis, flank pain, frequency, genital sores, hematuria and urgency  Musculoskeletal: Positive for back pain  Negative for arthralgias, gait problem, joint swelling, myalgias, neck pain and neck stiffness  Skin: Negative for color change, pallor, rash and wound  Neurological: Negative for dizziness, tremors, seizures, syncope, facial asymmetry, speech difficulty, weakness, light-headedness, numbness and headaches  Hematological: Negative for adenopathy  Does not bruise/bleed easily     Psychiatric/Behavioral: Negative for agitation, behavioral problems, confusion, decreased concentration, dysphoric mood, hallucinations, self-injury, sleep disturbance and suicidal ideas  The patient is not nervous/anxious and is not hyperactive  Objective:  Vitals:    02/25/20 1520   BP: 128/76   BP Location: Left arm   Patient Position: Sitting   Cuff Size: Standard   Pulse: 86   Temp: 98 5 °F (36 9 °C)   SpO2: 96%   Weight: 87 5 kg (192 lb 12 8 oz)   Height: 5' 2" (1 575 m)     Body mass index is 35 26 kg/m²  Physical Exam   Constitutional: She is oriented to person, place, and time  She appears well-developed and well-nourished  HENT:   Head: Normocephalic  Right Ear: External ear normal    Left Ear: External ear normal    Nose: Nose normal    Mouth/Throat: Oropharynx is clear and moist  No oropharyngeal exudate  Eyes: Pupils are equal, round, and reactive to light  Conjunctivae are normal    Neck: Normal range of motion  Neck supple  No JVD present  No thyromegaly present  Cardiovascular: Normal rate, regular rhythm, normal heart sounds and intact distal pulses  No murmur heard  Pulmonary/Chest: Effort normal  No stridor  No respiratory distress  She has no wheezes  She has rales ( some crackles at the right base)  She exhibits no tenderness  Coughing during the interview   Abdominal: Soft  Bowel sounds are normal  She exhibits no distension and no mass  There is tenderness ( diffuse  subjective)  There is no rebound and no guarding  No hernia  Musculoskeletal: Normal range of motion  Lymphadenopathy:     She has no cervical adenopathy  Neurological: She is alert and oriented to person, place, and time  She has normal reflexes  She displays normal reflexes  No cranial nerve deficit or sensory deficit  She exhibits normal muscle tone  Coordination normal    Skin: Skin is warm and dry  No erythema  Psychiatric: She has a normal mood and affect  Her behavior is normal  Judgment and thought content normal    Vitals reviewed

## 2020-03-09 ENCOUNTER — HOSPITAL ENCOUNTER (EMERGENCY)
Facility: HOSPITAL | Age: 40
Discharge: HOME | End: 2020-03-09
Attending: EMERGENCY MEDICINE | Admitting: EMERGENCY MEDICINE
Payer: COMMERCIAL

## 2020-03-09 ENCOUNTER — APPOINTMENT (EMERGENCY)
Dept: RADIOLOGY | Facility: HOSPITAL | Age: 40
End: 2020-03-09
Payer: COMMERCIAL

## 2020-03-09 VITALS
TEMPERATURE: 98.3 F | DIASTOLIC BLOOD PRESSURE: 60 MMHG | HEIGHT: 62 IN | WEIGHT: 179.9 LBS | OXYGEN SATURATION: 96 % | BODY MASS INDEX: 33.1 KG/M2 | RESPIRATION RATE: 22 BRPM | HEART RATE: 111 BPM | SYSTOLIC BLOOD PRESSURE: 126 MMHG

## 2020-03-09 DIAGNOSIS — J20.9 ACUTE BRONCHITIS: ICD-10-CM

## 2020-03-09 DIAGNOSIS — R50.9 ACUTE FEBRILE ILLNESS: Primary | ICD-10-CM

## 2020-03-09 DIAGNOSIS — R05.8 PRODUCTIVE COUGH: ICD-10-CM

## 2020-03-09 DIAGNOSIS — G43.909 MIGRAINE: ICD-10-CM

## 2020-03-09 DIAGNOSIS — R11.0 NAUSEA: ICD-10-CM

## 2020-03-09 DIAGNOSIS — B34.9 VIRAL SYNDROME: ICD-10-CM

## 2020-03-09 DIAGNOSIS — D72.829 LEUKOCYTOSIS: ICD-10-CM

## 2020-03-09 DIAGNOSIS — R68.89 FLU-LIKE SYMPTOMS: ICD-10-CM

## 2020-03-09 LAB
ALBUMIN SERPL BCP-MCNC: 3.4 G/DL (ref 3.5–5)
ALP SERPL-CCNC: 76 U/L (ref 46–116)
ALT SERPL W P-5'-P-CCNC: 23 U/L (ref 12–78)
APTT PPP: 33 SECONDS (ref 23–37)
AST SERPL W P-5'-P-CCNC: 22 U/L (ref 5–45)
BACTERIA UR QL AUTO: ABNORMAL /HPF
BASOPHILS # BLD AUTO: 0.09 THOUSANDS/ΜL (ref 0–0.1)
BASOPHILS NFR BLD AUTO: 1 % (ref 0–1)
BILIRUB DIRECT SERPL-MCNC: 0.08 MG/DL (ref 0–0.2)
BILIRUB SERPL-MCNC: 0.3 MG/DL (ref 0.2–1)
BILIRUB UR QL STRIP: NEGATIVE
CLARITY UR: CLEAR
COLOR UR: YELLOW
EOSINOPHIL # BLD AUTO: 0.18 THOUSAND/ΜL (ref 0–0.61)
EOSINOPHIL NFR BLD AUTO: 1 % (ref 0–6)
ERYTHROCYTE [DISTWIDTH] IN BLOOD BY AUTOMATED COUNT: 13.7 % (ref 11.6–15.1)
FLUAV RNA NPH QL NAA+PROBE: NORMAL
FLUBV RNA NPH QL NAA+PROBE: NORMAL
GLUCOSE UR STRIP-MCNC: NEGATIVE MG/DL
HCT VFR BLD AUTO: 39.6 % (ref 34.8–46.1)
HGB BLD-MCNC: 12.6 G/DL (ref 11.5–15.4)
HGB UR QL STRIP.AUTO: ABNORMAL
IMM GRANULOCYTES # BLD AUTO: 0.11 THOUSAND/UL (ref 0–0.2)
IMM GRANULOCYTES NFR BLD AUTO: 1 % (ref 0–2)
INR PPP: 0.98 (ref 0.84–1.19)
KETONES UR STRIP-MCNC: NEGATIVE MG/DL
LACTATE SERPL-SCNC: 1.6 MMOL/L (ref 0.5–2)
LEUKOCYTE ESTERASE UR QL STRIP: NEGATIVE
LYMPHOCYTES # BLD AUTO: 1.95 THOUSANDS/ΜL (ref 0.6–4.47)
LYMPHOCYTES NFR BLD AUTO: 10 % (ref 14–44)
MAGNESIUM SERPL-MCNC: 1.4 MG/DL (ref 1.6–2.6)
MCH RBC QN AUTO: 29.4 PG (ref 26.8–34.3)
MCHC RBC AUTO-ENTMCNC: 31.8 G/DL (ref 31.4–37.4)
MCV RBC AUTO: 93 FL (ref 82–98)
MONOCYTES # BLD AUTO: 1.02 THOUSAND/ΜL (ref 0.17–1.22)
MONOCYTES NFR BLD AUTO: 5 % (ref 4–12)
NEUTROPHILS # BLD AUTO: 15.7 THOUSANDS/ΜL (ref 1.85–7.62)
NEUTS SEG NFR BLD AUTO: 82 % (ref 43–75)
NITRITE UR QL STRIP: NEGATIVE
NON-SQ EPI CELLS URNS QL MICRO: ABNORMAL /HPF
NRBC BLD AUTO-RTO: 0 /100 WBCS
PH UR STRIP.AUTO: 6.5 [PH]
PLATELET # BLD AUTO: 282 THOUSANDS/UL (ref 149–390)
PMV BLD AUTO: 10 FL (ref 8.9–12.7)
PROT SERPL-MCNC: 7.5 G/DL (ref 6.4–8.2)
PROT UR STRIP-MCNC: NEGATIVE MG/DL
PROTHROMBIN TIME: 13 SECONDS (ref 11.6–14.5)
RBC # BLD AUTO: 4.28 MILLION/UL (ref 3.81–5.12)
RBC #/AREA URNS AUTO: ABNORMAL /HPF
RSV RNA NPH QL NAA+PROBE: NORMAL
SP GR UR STRIP.AUTO: 1.01 (ref 1–1.03)
TROPONIN I SERPL-MCNC: <0.02 NG/ML
UROBILINOGEN UR QL STRIP.AUTO: 0.2 E.U./DL
WBC # BLD AUTO: 19.05 THOUSAND/UL (ref 4.31–10.16)
WBC #/AREA URNS AUTO: ABNORMAL /HPF

## 2020-03-09 PROCEDURE — 99285 EMERGENCY DEPT VISIT HI MDM: CPT | Performed by: EMERGENCY MEDICINE

## 2020-03-09 PROCEDURE — 83605 ASSAY OF LACTIC ACID: CPT | Performed by: EMERGENCY MEDICINE

## 2020-03-09 PROCEDURE — 85025 COMPLETE CBC W/AUTO DIFF WBC: CPT | Performed by: EMERGENCY MEDICINE

## 2020-03-09 PROCEDURE — 84484 ASSAY OF TROPONIN QUANT: CPT | Performed by: EMERGENCY MEDICINE

## 2020-03-09 PROCEDURE — 36415 COLL VENOUS BLD VENIPUNCTURE: CPT | Performed by: EMERGENCY MEDICINE

## 2020-03-09 PROCEDURE — 96361 HYDRATE IV INFUSION ADD-ON: CPT

## 2020-03-09 PROCEDURE — 81001 URINALYSIS AUTO W/SCOPE: CPT | Performed by: EMERGENCY MEDICINE

## 2020-03-09 PROCEDURE — 93005 ELECTROCARDIOGRAM TRACING: CPT

## 2020-03-09 PROCEDURE — 99284 EMERGENCY DEPT VISIT MOD MDM: CPT

## 2020-03-09 PROCEDURE — 96365 THER/PROPH/DIAG IV INF INIT: CPT

## 2020-03-09 PROCEDURE — 94640 AIRWAY INHALATION TREATMENT: CPT | Performed by: EMERGENCY MEDICINE

## 2020-03-09 PROCEDURE — 80076 HEPATIC FUNCTION PANEL: CPT | Performed by: EMERGENCY MEDICINE

## 2020-03-09 PROCEDURE — 71046 X-RAY EXAM CHEST 2 VIEWS: CPT

## 2020-03-09 PROCEDURE — 87631 RESP VIRUS 3-5 TARGETS: CPT | Performed by: EMERGENCY MEDICINE

## 2020-03-09 PROCEDURE — 85610 PROTHROMBIN TIME: CPT | Performed by: EMERGENCY MEDICINE

## 2020-03-09 PROCEDURE — 96375 TX/PRO/DX INJ NEW DRUG ADDON: CPT

## 2020-03-09 PROCEDURE — 94640 AIRWAY INHALATION TREATMENT: CPT

## 2020-03-09 PROCEDURE — 96366 THER/PROPH/DIAG IV INF ADDON: CPT

## 2020-03-09 PROCEDURE — 85730 THROMBOPLASTIN TIME PARTIAL: CPT | Performed by: EMERGENCY MEDICINE

## 2020-03-09 PROCEDURE — 83735 ASSAY OF MAGNESIUM: CPT | Performed by: EMERGENCY MEDICINE

## 2020-03-09 PROCEDURE — 96367 TX/PROPH/DG ADDL SEQ IV INF: CPT

## 2020-03-09 RX ORDER — ALBUTEROL SULFATE 2.5 MG/3ML
2.5 SOLUTION RESPIRATORY (INHALATION) ONCE
Status: COMPLETED | OUTPATIENT
Start: 2020-03-09 | End: 2020-03-09

## 2020-03-09 RX ORDER — ALBUTEROL SULFATE 90 UG/1
2 AEROSOL, METERED RESPIRATORY (INHALATION) EVERY 4 HOURS PRN
Qty: 1 INHALER | Refills: 0 | Status: SHIPPED | OUTPATIENT
Start: 2020-03-09 | End: 2021-05-26

## 2020-03-09 RX ORDER — METHYLPREDNISOLONE SODIUM SUCCINATE 125 MG/2ML
125 INJECTION, POWDER, LYOPHILIZED, FOR SOLUTION INTRAMUSCULAR; INTRAVENOUS ONCE
Status: COMPLETED | OUTPATIENT
Start: 2020-03-09 | End: 2020-03-09

## 2020-03-09 RX ORDER — MAGNESIUM SULFATE HEPTAHYDRATE 40 MG/ML
2 INJECTION, SOLUTION INTRAVENOUS ONCE
Status: COMPLETED | OUTPATIENT
Start: 2020-03-09 | End: 2020-03-09

## 2020-03-09 RX ORDER — IBUPROFEN 600 MG/1
600 TABLET ORAL ONCE
Status: COMPLETED | OUTPATIENT
Start: 2020-03-09 | End: 2020-03-09

## 2020-03-09 RX ORDER — ALBUTEROL SULFATE 2.5 MG/3ML
2.5 SOLUTION RESPIRATORY (INHALATION) EVERY 4 HOURS PRN
Qty: 75 ML | Refills: 0 | Status: SHIPPED | OUTPATIENT
Start: 2020-03-09 | End: 2021-05-26

## 2020-03-09 RX ORDER — HYDROMORPHONE HYDROCHLORIDE 2 MG/1
4 TABLET ORAL ONCE
Status: COMPLETED | OUTPATIENT
Start: 2020-03-09 | End: 2020-03-09

## 2020-03-09 RX ORDER — METOCLOPRAMIDE HYDROCHLORIDE 5 MG/ML
10 INJECTION INTRAMUSCULAR; INTRAVENOUS ONCE
Status: COMPLETED | OUTPATIENT
Start: 2020-03-09 | End: 2020-03-09

## 2020-03-09 RX ORDER — BROMPHENIRAMINE MALEATE, PSEUDOEPHEDRINE HYDROCHLORIDE, AND DEXTROMETHORPHAN HYDROBROMIDE 2; 30; 10 MG/5ML; MG/5ML; MG/5ML
5 SYRUP ORAL 4 TIMES DAILY PRN
Qty: 120 ML | Refills: 0 | Status: SHIPPED | OUTPATIENT
Start: 2020-03-09 | End: 2021-05-26

## 2020-03-09 RX ORDER — DIPHENHYDRAMINE HCL 25 MG
25 TABLET ORAL ONCE
Status: COMPLETED | OUTPATIENT
Start: 2020-03-09 | End: 2020-03-09

## 2020-03-09 RX ORDER — ACETAMINOPHEN 325 MG/1
975 TABLET ORAL ONCE
Status: COMPLETED | OUTPATIENT
Start: 2020-03-09 | End: 2020-03-09

## 2020-03-09 RX ORDER — PREDNISONE 20 MG/1
40 TABLET ORAL DAILY
Qty: 8 TABLET | Refills: 0 | Status: SHIPPED | OUTPATIENT
Start: 2020-03-09 | End: 2020-03-13

## 2020-03-09 RX ADMIN — IPRATROPIUM BROMIDE 0.5 MG: 0.5 SOLUTION RESPIRATORY (INHALATION) at 15:53

## 2020-03-09 RX ADMIN — HYDROMORPHONE HYDROCHLORIDE 4 MG: 2 TABLET ORAL at 18:06

## 2020-03-09 RX ADMIN — SODIUM CHLORIDE 1000 ML: 0.9 INJECTION, SOLUTION INTRAVENOUS at 16:32

## 2020-03-09 RX ADMIN — VALPROATE SODIUM 1000 MG: 100 INJECTION, SOLUTION INTRAVENOUS at 18:08

## 2020-03-09 RX ADMIN — METHYLPREDNISOLONE SODIUM SUCCINATE 125 MG: 125 INJECTION, POWDER, FOR SOLUTION INTRAMUSCULAR; INTRAVENOUS at 18:01

## 2020-03-09 RX ADMIN — IPRATROPIUM BROMIDE 0.5 MG: 0.5 SOLUTION RESPIRATORY (INHALATION) at 19:29

## 2020-03-09 RX ADMIN — ALBUTEROL SULFATE 2.5 MG: 2.5 SOLUTION RESPIRATORY (INHALATION) at 15:53

## 2020-03-09 RX ADMIN — ALBUTEROL SULFATE 2.5 MG: 2.5 SOLUTION RESPIRATORY (INHALATION) at 19:29

## 2020-03-09 RX ADMIN — IBUPROFEN 600 MG: 600 TABLET ORAL at 18:18

## 2020-03-09 RX ADMIN — IPRATROPIUM BROMIDE 0.5 MG: 0.5 SOLUTION RESPIRATORY (INHALATION) at 18:01

## 2020-03-09 RX ADMIN — DIPHENHYDRAMINE HCL 25 MG: 25 TABLET, COATED ORAL at 15:57

## 2020-03-09 RX ADMIN — METOCLOPRAMIDE 10 MG: 5 INJECTION, SOLUTION INTRAMUSCULAR; INTRAVENOUS at 16:32

## 2020-03-09 RX ADMIN — MAGNESIUM SULFATE HEPTAHYDRATE 2 G: 40 INJECTION, SOLUTION INTRAVENOUS at 18:34

## 2020-03-09 RX ADMIN — ALBUTEROL SULFATE 2.5 MG: 2.5 SOLUTION RESPIRATORY (INHALATION) at 18:01

## 2020-03-09 RX ADMIN — ACETAMINOPHEN 975 MG: 325 TABLET, FILM COATED ORAL at 15:57

## 2020-03-09 NOTE — ED PROVIDER NOTES
History  Chief Complaint   Patient presents with    Medical Problem     Patient reports she is dizzy for the last week, lower back pain last 2 days, cough, increased back and lower itching last three days  Patient reports SOB and dry mouth  Patient reports 101 fever at home   Fall     Patient reports she fell down two stairs prior to coming in, reports negative BT, negative LOC, and negative head strike  HPI    Prior to Admission Medications   Prescriptions Last Dose Informant Patient Reported? Taking?    ALPRAZolam (XANAX) 1 mg tablet  Self No No   Sig: Take 1 tablet (1 mg total) by mouth daily at bedtime as needed for anxiety   Calcium Carb-Ergocalciferol 250-125 MG-UNIT TABS  Self Yes No   Sig: Take 1 tablet by mouth   HYDROmorphone (DILAUDID) 4 mg tablet  Self Yes No   Sig: Take 4 mg by mouth 3 (three) times a day as needed for moderate pain   SUMAtriptan (IMITREX) 100 mg tablet  Self Yes No   Sig: Take 1 tablet by mouth once as needed     albuterol (2 5 mg/3 mL) 0 083 % nebulizer solution   No No   Sig: Take 1 vial (2 5 mg total) by nebulization every 6 (six) hours as needed for wheezing or shortness of breath   alendronate (FOSAMAX) 70 mg tablet  Self Yes No   Sig: Take 70 mg by mouth every 7 days   amitriptyline (ELAVIL) 100 mg tablet  Self Yes No   Sig: Take 150 mg by mouth     benzonatate (TESSALON) 200 MG capsule   No No   Sig: Take 1 capsule (200 mg total) by mouth 3 (three) times a day as needed for cough   cholecalciferol (VITAMIN D3) 1,000 units tablet  Self Yes No   Sig: Take 1,000 Units by mouth daily   divalproex sodium (DEPAKOTE) 250 mg EC tablet  Self Yes No   Sig: Take 250 mg by mouth 2 (two) times a day     gabapentin (NEURONTIN) 300 mg capsule  Self Yes No   Sig: Take 600 mg by mouth 3 (three) times a day     meclizine (ANTIVERT) 12 5 MG tablet  Self Yes No   Sig: Take 12 5 mg by mouth every 8 (eight) hours as needed   montelukast (SINGULAIR) 10 mg tablet  Self Yes No   Sig: Take 10 mg by mouth daily at bedtime   pantoprazole (PROTONIX) 40 mg tablet  Self Yes No   Sig: Take 40 mg by mouth daily   propranolol (INDERAL) 80 mg tablet  Self Yes No   Sig: Take 80 mg by mouth 2 (two) times a day        Facility-Administered Medications: None       Past Medical History:   Diagnosis Date    [facility] ([facility] )     Crohn disease ([place] [address] )     Endocarditis     Fibromyalgia     Gastritis     Hypertension     RA (rheumatoid arthritis) ([place] [address] )     Vertigo        Past Surgical History:   Procedure Laterality Date    APPENDECTOMY      BRONCHOSCOPY      multiple    COLONOSCOPY         Family History   Problem Relation Age of Onset    Colon cancer Brother 28    Crohn's disease Brother      I have reviewed and agree with the history as documented  E-Cigarette/Vaping     E-Cigarette/Vaping Substances     Social History     Tobacco Use    Smoking status: Current Every Day Smoker     Packs/day: 1 00     Types: Cigarettes    Smokeless tobacco: Never Used   Substance Use Topics    Alcohol use: No    Drug use: Not Currently     Types: Marijuana       Review of Systems    Physical Exam  Physical Exam   Constitutional: She is oriented to person, place, and time  She appears well-developed and well-nourished  No distress  febrile   HENT:   Head: Normocephalic and atraumatic  Right Ear: Tympanic membrane, external ear and ear canal normal    Left Ear: Tympanic membrane, external ear and ear canal normal    Nose: Mucosal edema and rhinorrhea present  Mouth/Throat: Oropharynx is clear and moist and mucous membranes are normal  No tonsillar exudate  Eyes: Pupils are equal, round, and reactive to light  Conjunctivae are normal    Neck: Normal range of motion  Cardiovascular: Regular rhythm, normal heart sounds and intact distal pulses  Tachycardia present  Pulmonary/Chest: Effort normal  No accessory muscle usage  No respiratory distress  She has decreased breath sounds (mild)   She has wheezes (diffuse)  Speaking easily in full sentences  Coughing in response to deep breaths   Abdominal: Soft  Bowel sounds are normal  She exhibits no distension  There is no tenderness  Lymphadenopathy:     She has no cervical adenopathy  Neurological: She is alert and oriented to person, place, and time  She has normal strength  No cranial nerve deficit or sensory deficit  Skin: Skin is warm and dry  No rash noted  Psychiatric: Her behavior is normal  Her mood appears anxious  Occasionally crying when talking about symptoms   Nursing note and vitals reviewed        Vital Signs  ED Triage Vitals [03/09/20 1513]   Temperature Pulse Respirations Blood Pressure SpO2   (!) 101 1 °F (38 4 °C) (!) 133 18 168/75 94 %      Temp Source Heart Rate Source Patient Position - Orthostatic VS BP Location FiO2 (%)   Oral Monitor Sitting Left arm --      Pain Score       6           Vitals:    03/09/20 1634 03/09/20 1715 03/09/20 1730 03/09/20 1900   BP: 136/72 137/61 124/60 126/60   Pulse: (!) 121 (!) 120 (!) 123 (!) 111   Patient Position - Orthostatic VS: Lying Lying  Lying         Visual Acuity      ED Medications  Medications   magnesium sulfate 2 g/50 mL IVPB (premix) 2 g (2 g Intravenous New Bag 3/9/20 1834)   sodium chloride 0 9 % bolus 1,000 mL (1,000 mL Intravenous New Bag 3/9/20 1632)   metoclopramide (REGLAN) injection 10 mg (10 mg Intravenous Given 3/9/20 1632)   diphenhydrAMINE (BENADRYL) tablet 25 mg (25 mg Oral Given 3/9/20 1557)   albuterol inhalation solution 2 5 mg (2 5 mg Nebulization Given 3/9/20 1553)   ipratropium (ATROVENT) 0 02 % inhalation solution 0 5 mg (0 5 mg Nebulization Given 3/9/20 1553)   acetaminophen (TYLENOL) tablet 975 mg (975 mg Oral Given 3/9/20 1557)   albuterol inhalation solution 2 5 mg (2 5 mg Nebulization Given 3/9/20 1801)   ipratropium (ATROVENT) 0 02 % inhalation solution 0 5 mg (0 5 mg Nebulization Given 3/9/20 1801)   methylPREDNISolone sodium succinate (Solu-MEDROL) injection 125 mg (125 mg Intravenous Given 3/9/20 1801)   valproate (DEPACON) 1,000 mg in sodium chloride 0 9 % 50 mL for headache (0 g Intravenous Stopped 3/9/20 1836)   HYDROmorphone (DILAUDID) tablet 4 mg (4 mg Oral Given 3/9/20 1806)   ibuprofen (MOTRIN) tablet 600 mg (600 mg Oral Given 3/9/20 1818)   albuterol inhalation solution 2 5 mg (2 5 mg Nebulization Given 3/9/20 1929)   ipratropium (ATROVENT) 0 02 % inhalation solution 0 5 mg (0 5 mg Nebulization Given 3/9/20 1929)       Diagnostic Studies  Results Reviewed     Procedure Component Value Units Date/Time    Hepatic function panel [[id number]]  (Abnormal) Collected:  03/09/20 1623    Lab Status:  Final result Specimen:  Blood from Arm, Right Updated:  03/09/20 1702     Total Bilirubin 0 30 mg/dL      Bilirubin, Direct 0 08 mg/dL      Alkaline Phosphatase 76 U/L      AST 22 U/L      ALT 23 U/L      Total Protein 7 5 g/dL      Albumin 3 4 g/dL     Magnesium [[id number]]  (Abnormal) Collected:  03/09/20 1623    Lab Status:  Final result Specimen:  Blood from Arm, Right Updated:  03/09/20 1702     Magnesium 1 4 mg/dL     Troponin I [[id number]]  (Normal) Collected:  03/09/20 1623    Lab Status:  Final result Specimen:  Blood from Arm, Right Updated:  03/09/20 1657     Troponin I <0 02 ng/mL     Lactic acid, plasma x2 [[id number]]  (Normal) Collected:  03/09/20 1623    Lab Status:  Final result Specimen:  Blood from Arm, Right Updated:  03/09/20 1656     LACTIC ACID 1 6 mmol/L     Narrative:       Result may be elevated if tourniquet was used during collection      [employer] [[id number]]  (Normal) Collected:  03/09/20 1623    Lab Status:  Final result Specimen:  Blood from Arm, Right Updated:  03/09/20 1648     Protime 13 0 seconds      INR 0 98    APTT [[id number]]  (Normal) Collected:  03/09/20 1623    Lab Status:  Final result Specimen:  Blood from Arm, Right Updated:  03/09/20 1648     PTT 33 seconds     Influenza A/B and RSV PCR [[id number]]  (Normal) Collected:  03/09/20 1554    Lab Status:  Final result Specimen:  Nasopharyngeal from Nose Updated:  03/09/20 1639     INFLUENZA A PCR None Detected     INFLUENZA B PCR None Detected     RSV PCR None Detected    CBC and differential [[id number]]  (Abnormal) Collected:  03/09/20 1623    Lab Status:  Final result Specimen:  Blood from Arm, Right Updated:  03/09/20 1633     WBC 19 05 Thousand/uL      RBC 4 28 Million/uL      Hemoglobin 12 6 g/dL      Hematocrit 39 6 %      MCV 93 fL      MCH 29 4 pg      MCHC 31 8 g/dL      RDW 13 7 %      MPV 10 0 fL      Platelets 025 Thousands/uL      nRBC 0 /100 WBCs      Neutrophils Relative 82 %      Immat GRANS % 1 %      Lymphocytes Relative 10 %      Monocytes Relative 5 %      Eosinophils Relative 1 %      Basophils Relative 1 %      Neutrophils Absolute 15 70 Thousands/µL      Immature Grans Absolute 0 11 Thousand/uL      Lymphocytes Absolute 1 95 Thousands/µL      Monocytes Absolute 1 02 Thousand/µL      Eosinophils Absolute 0 18 Thousand/µL      Basophils Absolute 0 09 Thousands/µL     Urine Microscopic [[id number]]  (Abnormal) Collected:  03/09/20 1554    Lab Status:  Final result Specimen:  Urine, Clean Catch Updated:  03/09/20 1614     RBC, UA 4-10 /hpf      WBC, UA None Seen /hpf      Epithelial Cells Occasional /hpf      Bacteria, UA None Seen /hpf     UA w Reflex to Microscopic w Reflex to Culture [[id number]]  (Abnormal) Collected:  03/09/20 1554    Lab Status:  Final result Specimen:  Urine, Clean Catch Updated:  03/09/20 1602     Color, UA Yellow     Clarity, UA Clear     Specific Gravity, UA 1 010     pH, UA 6 5     Leukocytes, UA Negative     Nitrite, UA Negative     Protein, UA Negative mg/dl      Glucose, UA Negative mg/dl      Ketones, UA Negative mg/dl      Urobilinogen, UA 0 2 E U /dl      Bilirubin, UA Negative     Blood, UA Trace-Intact                 XR chest 2 views    (Results Pending)              Procedures  ECG 12 Lead Documentation Only  Date/Time: 3/9/2020 6:58 PM  Performed by: Sharmaine Segovia MD  Authorized by: Sharmaine Segovia MD     Indications / Diagnosis:  SOB, tachycardia  Patient location:  ED  Previous ECG:     Previous ECG:  Compared to current    Comparison ECG info:  1/10/18    Similarity:  No change  Interpretation:     Interpretation: non-specific    Rate:     ECG rate:  126    ECG rate assessment: tachycardic    Rhythm:     Rhythm: sinus tachycardia    Ectopy:     Ectopy: none    QRS:     QRS axis:  Normal    QRS intervals:  Normal  Conduction:     Conduction: normal    ST segments:     ST segments:  Normal  T waves:     T waves: normal               ED Course                               MDM  Number of Diagnoses or Management Options  Acute bronchitis: new and requires workup  Acute febrile illness: new and requires workup  Flu-like symptoms: new and requires workup  Leukocytosis: new and requires workup  Migraine: new and requires workup  Nausea: new and requires workup  Productive cough: new and requires workup  Viral syndrome: new and requires workup  Diagnosis management comments: This is a 68-year-old female who presents here today with multiple complaints  She came in today because of fevers up to 102 starting two days ago, worsening cough, shortness of breath, generalized myalgias  She states she has been coughing since the end of January but it has been mild  On the sixth, she got her Stelara injection for her Crohn's disease, and since then feels like the cough is getting worse, is having more frequent migraines, has intermittent lightheadedness and has overall not been feeling well  She was seen by her PCP several weeks ago (on 02/25), had a normal x-ray and was given azithromycin  She did have mild improvement of symptoms initially, but feels like they have been getting acutely worse over the past couple of days  Her son had influenza prior to onset of her symptoms, and was diagnosed with bronchitis last week  She denies any other known specific sick contacts  She says she has been worked up for possible restrictive lung disease before, but says the testing was negative  She has been using albuterol occasionally but does not feel like it has been helping  She has chronic pain and does feel like it has been worse the past couple of days  She does have some nausea, especially after coughing, but denies any abdominal pain, vomiting, diarrhea  She has been taking Zofran for this, able tolerate fluids without difficulties  She has not had any symptoms of her Crohn's acting up, and states it has been doing well since she had the Stelara injection  She denies any palpitations, chest pain, dysuria, changes in her urine  She has been taking Tylenol for her fevers, the Tessalon that she was prescribed when she was last sick with minimal improvement of symptoms  She has taken her Imitrex 5 times over the past several weeks due to headaches, and feels like she is having a migraine starting currently  She does endorse itching, and feeling like her psoriasis is acting up  She did slip on the stairs leaving the house today to come here but denies any acute injuries from this  When she was seen by her PCP, chest x-ray showed no acute abnormalities  She had a leukocytosis to 13 54, with a history of chronic leukocytosis  Review of systems:  Otherwise negative unless stated as above    She is well-appearing, in no acute distress  She appears very anxious  She is febrile here with associated tachycardia, does have diffuse wheezing is coughing response to deep breaths  Exam is otherwise unremarkable  Many of her chronic symptoms are potential side effects of the Stelara, verses several consecutive viral illnesses over the course of the month  Concern today is for possible influenza versus pneumonia the etiology of her fever and shortness of breath, triggering her underlying lung problems    We will get a chest x-ray, influenza, check lab work per sepsis protocol, treat her migraine and wheezing here  She adamantly refuses steroids at this point time  Chest x-ray was reviewed by myself, and shows no acute abnormalities  Lab work does show leukocytosis of 19 05, without immature forms  She does have mild hypomagnesemia of 1 4  Flu is negative  Labs are otherwise unremarkable  Her leukocytosis is elevated from prior, but not significantly different from prior values  This though she does meet SIRS criteria fever, tachycardia, and leukocytosis, she has no findings suggestive of severe sepsis or septic shock  After several rounds of medications, her migraine has resolved  After no improvement after first round of nebulizers, discussed again with the patient and she is now in agreement steroids to try and help with breathing  She endorses some mild continued coughing but significantly improved  She does have improved air movement and still has mild wheezing on exam but significantly improved from arrival, is no longer coughing with deep breaths  I discussed with the patient findings, continued symptomatic treatment at home, follow-up with her primary care doctor, and indications for return, and she expresses understanding with this plan         Amount and/or Complexity of Data Reviewed  Clinical lab tests: ordered and reviewed  Tests in the radiology section of CPT®: ordered and reviewed  Decide to obtain previous medical records or to obtain history from someone other than the patient: yes  Review and summarize past medical records: yes  Independent visualization of images, tracings, or specimens: yes          Disposition  Final diagnoses:   Acute febrile illness   Acute bronchitis   Migraine   Flu-like symptoms   Nausea   Productive cough   Viral syndrome   Leukocytosis     Time reflects when diagnosis was documented in both MDM as applicable and the Disposition within this note     Time User Action Codes Description Comment    3/9/2020  7:45 PM Vital-Tesoriero, Peri [place] Add [R50 9] Acute febrile illness     3/9/2020  7:45 PM Vital-Tesoriero, Peri [place] Add [J20 9] Acute bronchitis     3/9/2020  7:45 PM Vital-Tesoriero, Peri [place] Add [T08 840] Migraine     3/9/2020  7:51 PM Vital-Tesoriero, Peri [place] Add [R68 89] Flu-like symptoms     3/9/2020  7:51 PM Vital-Tesoriero, Peri [place] Add [R11 0] Nausea     3/9/2020  7:51 PM Vital-Tesoriero, Peri [place] Add [R05] Productive cough     3/9/2020  7:55 PM Vital-Tesoriero, Peri [place] Add [B34 9] Viral syndrome     3/9/2020  7:56 PM Vital-Tesoriero, Peri [place] Add [D72 829] Leukocytosis       ED Disposition     ED Disposition Condition Date/Time Comment    Discharge Good Mon Mar 9, 2020  7:51 PM Edna Simmonds discharge to home/self care        Follow-up Information     Follow up With Specialties Details Why Laqutia Nguyen MD Internal Medicine Schedule an appointment as soon as possible for a visit in 2 days to follow up on your symptoms [address] [place] [address] [address] [facility]            Patient's Medications   Discharge Prescriptions    ALBUTEROL (2 5 MG/3 ML) 0 083 % NEBULIZER SOLUTION    Take 1 vial (2 5 mg total) by nebulization every 4 (four) hours as needed for wheezing or shortness of breath (coughing spells)       Start Date: 3/9/2020  End Date: --       Order Dose: 2 5 mg       Quantity: 75 mL    Refills: 0    ALBUTEROL (PROVENTIL HFA,VENTOLIN HFA) 90 MCG/ACT INHALER    Inhale 2 puffs every 4 (four) hours as needed for wheezing or shortness of breath       Start Date: 3/9/2020  End Date: --       Order Dose: 2 puffs       Quantity: 1 Inhaler    Refills: 0    BROMPHENIRAMINE-PSEUDOEPHEDRINE-DM 30-2-10 MG/5ML SYRUP    Take 5 mL by mouth 4 (four) times a day as needed (coughing)       Start Date: 3/9/2020  End Date: --       Order Dose: 5 mL       Quantity: 120 mL    Refills: 0    PREDNISONE 20 MG TABLET    Take 2 tablets (40 mg total) by mouth daily for 4 days       Start Date: 3/9/2020  End Date: 3/13/2020       Order Dose: 40 mg       Quantity: 8 tablet    Refills: 0     No discharge procedures on file      PDMP Review     None          ED Provider  Electronically Signed by           Ovi Larsen MD  03/09/20 4156

## 2020-03-09 NOTE — DISCHARGE INSTRUCTIONS
Take the medications as per the instructions  Use acetaminophen (Tylenol), and cautious use of ibuprofen (Motrin, Advil) as needed for fevers and pain, as per the instructions  Continue to take the Zofran you have at home as needed  Drink plenty of fluids, and eat as you are able to tolerate  Follow up closely with your primary care doctor to make sure that you are doing better  Return if you have worsening trouble breathing that does not improve with albuterol, persistent vomiting and inability to tolerate fluids, or for any other concerns

## 2020-03-10 LAB
ATRIAL RATE: 126 BPM
ATRIAL RATE: 53 BPM
P AXIS: 49 DEGREES
P AXIS: 79 DEGREES
PR INTERVAL: 156 MS
PR INTERVAL: 160 MS
QRS AXIS: 0 DEGREES
QRS AXIS: 72 DEGREES
QRSD INTERVAL: 84 MS
QRSD INTERVAL: 94 MS
QT INTERVAL: 292 MS
QT INTERVAL: 464 MS
QTC INTERVAL: 422 MS
QTC INTERVAL: 435 MS
T WAVE AXIS: 47 DEGREES
T WAVE AXIS: 75 DEGREES
VENTRICULAR RATE: 126 BPM
VENTRICULAR RATE: 53 BPM

## 2020-03-10 PROCEDURE — 93010 ELECTROCARDIOGRAM REPORT: CPT | Performed by: INTERNAL MEDICINE

## 2020-03-17 ENCOUNTER — APPOINTMENT (OUTPATIENT)
Dept: RADIOLOGY | Facility: CLINIC | Age: 40
End: 2020-03-17
Payer: COMMERCIAL

## 2020-03-17 ENCOUNTER — TRANSCRIBE ORDERS (OUTPATIENT)
Dept: MRI IMAGING | Facility: CLINIC | Age: 40
End: 2020-03-17

## 2020-03-17 DIAGNOSIS — R05.9 COUGH: ICD-10-CM

## 2020-03-17 DIAGNOSIS — R05.9 COUGH: Primary | ICD-10-CM

## 2020-03-17 PROCEDURE — 71046 X-RAY EXAM CHEST 2 VIEWS: CPT

## 2020-04-01 ENCOUNTER — TELEPHONE (OUTPATIENT)
Dept: INTERNAL MEDICINE CLINIC | Facility: CLINIC | Age: 40
End: 2020-04-01

## 2020-04-14 ENCOUNTER — TELEMEDICINE (OUTPATIENT)
Dept: RHEUMATOLOGY | Facility: CLINIC | Age: 40
End: 2020-04-14
Payer: COMMERCIAL

## 2020-04-14 ENCOUNTER — TELEPHONE (OUTPATIENT)
Dept: RHEUMATOLOGY | Facility: CLINIC | Age: 40
End: 2020-04-14

## 2020-04-14 ENCOUNTER — TELEPHONE (OUTPATIENT)
Dept: OBGYN CLINIC | Facility: CLINIC | Age: 40
End: 2020-04-14

## 2020-04-14 DIAGNOSIS — Z92.241 HISTORY OF RECENT STEROID USE: ICD-10-CM

## 2020-04-14 DIAGNOSIS — Z79.83 LONG TERM USE OF BISPHOSPHONATES: ICD-10-CM

## 2020-04-14 DIAGNOSIS — Z79.899 LONG-TERM USE OF IMMUNOSUPPRESSANT MEDICATION: ICD-10-CM

## 2020-04-14 DIAGNOSIS — M06.9 RHEUMATOID ARTHRITIS, INVOLVING UNSPECIFIED SITE, UNSPECIFIED RHEUMATOID FACTOR PRESENCE: ICD-10-CM

## 2020-04-14 DIAGNOSIS — M79.7 FIBROMYALGIA: ICD-10-CM

## 2020-04-14 DIAGNOSIS — K50.10 CROHN'S DISEASE OF COLON WITHOUT COMPLICATION (HCC): ICD-10-CM

## 2020-04-14 DIAGNOSIS — F11.90 OPIOID USE: ICD-10-CM

## 2020-04-14 DIAGNOSIS — M25.50 DIFFUSE ARTHRALGIA: Primary | ICD-10-CM

## 2020-04-14 DIAGNOSIS — M85.80 OSTEOPENIA, UNSPECIFIED LOCATION: ICD-10-CM

## 2020-04-14 DIAGNOSIS — L40.9 PSORIASIS: ICD-10-CM

## 2020-04-14 PROCEDURE — 99215 OFFICE O/P EST HI 40 MIN: CPT | Performed by: INTERNAL MEDICINE

## 2020-04-14 RX ORDER — GABAPENTIN 300 MG/1
300 CAPSULE ORAL 3 TIMES DAILY
Qty: 90 CAPSULE | Refills: 1 | Status: SHIPPED | OUTPATIENT
Start: 2020-04-14 | End: 2020-05-07 | Stop reason: SDUPTHER

## 2020-05-05 ENCOUNTER — TELEPHONE (OUTPATIENT)
Dept: NEUROLOGY | Facility: CLINIC | Age: 40
End: 2020-05-05

## 2020-05-07 DIAGNOSIS — M79.7 FIBROMYALGIA: ICD-10-CM

## 2020-05-07 RX ORDER — GABAPENTIN 300 MG/1
CAPSULE ORAL
Qty: 90 CAPSULE | Refills: 1 | Status: SHIPPED | OUTPATIENT
Start: 2020-05-07 | End: 2020-06-05 | Stop reason: SDUPTHER

## 2020-05-11 ENCOUNTER — TELEPHONE (OUTPATIENT)
Dept: INTERNAL MEDICINE CLINIC | Facility: CLINIC | Age: 40
End: 2020-05-11

## 2020-05-11 ENCOUNTER — TELEPHONE (OUTPATIENT)
Dept: NEUROLOGY | Facility: CLINIC | Age: 40
End: 2020-05-11

## 2020-05-13 ENCOUNTER — TELEPHONE (OUTPATIENT)
Dept: NEUROLOGY | Facility: CLINIC | Age: 40
End: 2020-05-13

## 2020-05-14 ENCOUNTER — CONSULT (OUTPATIENT)
Dept: NEUROLOGY | Facility: CLINIC | Age: 40
End: 2020-05-14
Payer: COMMERCIAL

## 2020-05-14 VITALS
DIASTOLIC BLOOD PRESSURE: 70 MMHG | HEIGHT: 61 IN | WEIGHT: 184 LBS | SYSTOLIC BLOOD PRESSURE: 123 MMHG | BODY MASS INDEX: 34.74 KG/M2 | HEART RATE: 89 BPM

## 2020-05-14 DIAGNOSIS — G43.111 INTRACTABLE MIGRAINE WITH AURA WITH STATUS MIGRAINOSUS: ICD-10-CM

## 2020-05-14 DIAGNOSIS — Z72.0 TOBACCO ABUSE: ICD-10-CM

## 2020-05-14 DIAGNOSIS — G43.001 MIGRAINE WITHOUT AURA AND WITH STATUS MIGRAINOSUS, NOT INTRACTABLE: ICD-10-CM

## 2020-05-14 PROCEDURE — 3078F DIAST BP <80 MM HG: CPT | Performed by: PSYCHIATRY & NEUROLOGY

## 2020-05-14 PROCEDURE — 99244 OFF/OP CNSLTJ NEW/EST MOD 40: CPT | Performed by: PSYCHIATRY & NEUROLOGY

## 2020-05-14 PROCEDURE — 3008F BODY MASS INDEX DOCD: CPT | Performed by: INTERNAL MEDICINE

## 2020-05-14 PROCEDURE — 3074F SYST BP LT 130 MM HG: CPT | Performed by: PSYCHIATRY & NEUROLOGY

## 2020-05-14 RX ORDER — ERGOCALCIFEROL 1.25 MG/1
1 CAPSULE ORAL WEEKLY
COMMUNITY
Start: 2020-05-13 | End: 2021-05-26

## 2020-05-18 ENCOUNTER — TRANSCRIBE ORDERS (OUTPATIENT)
Dept: RHEUMATOLOGY | Facility: CLINIC | Age: 40
End: 2020-05-18

## 2020-06-05 DIAGNOSIS — M79.7 FIBROMYALGIA: ICD-10-CM

## 2020-06-05 RX ORDER — GABAPENTIN 300 MG/1
CAPSULE ORAL
Qty: 90 CAPSULE | Refills: 1 | Status: SHIPPED | OUTPATIENT
Start: 2020-06-05 | End: 2020-07-08

## 2020-07-08 DIAGNOSIS — M79.7 FIBROMYALGIA: ICD-10-CM

## 2020-07-08 RX ORDER — GABAPENTIN 300 MG/1
CAPSULE ORAL
Qty: 90 CAPSULE | Refills: 1 | Status: SHIPPED | OUTPATIENT
Start: 2020-07-08 | End: 2020-07-28

## 2020-07-14 ENCOUNTER — TELEPHONE (OUTPATIENT)
Dept: NEUROLOGY | Facility: CLINIC | Age: 40
End: 2020-07-14

## 2020-07-14 NOTE — TELEPHONE ENCOUNTER
Attempted to call Eduardo Caldwell at [phone] and [facility] letting her know we are calling because we see she has an appointment tomorrow with Dr Oscar Valentine and we don't see she had her Blood work or MRI of the Brain done and wanted to see if she did get it done and where  If she can give us a call back at  we would greatly appreciate it

## 2020-07-28 ENCOUNTER — TELEMEDICINE (OUTPATIENT)
Dept: RHEUMATOLOGY | Facility: CLINIC | Age: 40
End: 2020-07-28
Payer: COMMERCIAL

## 2020-07-28 DIAGNOSIS — Z79.899 LONG-TERM USE OF IMMUNOSUPPRESSANT MEDICATION: ICD-10-CM

## 2020-07-28 DIAGNOSIS — M19.90 INFLAMMATORY ARTHRITIS: ICD-10-CM

## 2020-07-28 DIAGNOSIS — M85.80 OSTEOPENIA, UNSPECIFIED LOCATION: ICD-10-CM

## 2020-07-28 DIAGNOSIS — K50.10 CROHN'S DISEASE OF COLON WITHOUT COMPLICATION (HCC): ICD-10-CM

## 2020-07-28 DIAGNOSIS — M79.7 FIBROMYALGIA: Primary | ICD-10-CM

## 2020-07-28 DIAGNOSIS — Z79.83 LONG TERM USE OF BISPHOSPHONATES: ICD-10-CM

## 2020-07-28 DIAGNOSIS — F11.90 OPIOID USE: ICD-10-CM

## 2020-07-28 DIAGNOSIS — L40.9 PSORIASIS: ICD-10-CM

## 2020-07-28 PROCEDURE — 99214 OFFICE O/P EST MOD 30 MIN: CPT | Performed by: INTERNAL MEDICINE

## 2020-07-28 RX ORDER — FOLIC ACID 1 MG/1
1 TABLET ORAL DAILY
Qty: 30 TABLET | Refills: 5 | Status: SHIPPED | OUTPATIENT
Start: 2020-07-28 | End: 2021-03-10 | Stop reason: SDUPTHER

## 2020-07-28 RX ORDER — GABAPENTIN 600 MG/1
600 TABLET ORAL 3 TIMES DAILY
Qty: 90 TABLET | Refills: 2 | Status: SHIPPED | OUTPATIENT
Start: 2020-07-28 | End: 2020-11-24 | Stop reason: SDUPTHER

## 2020-07-28 NOTE — PROGRESS NOTES
Virtual Brief Visit    Assessment/Plan:    Problem List Items Addressed This Visit        Musculoskeletal and Integument    Psoriasis    Relevant Medications    methotrexate (RHEUMATREX) 2 5 MG tablet      Other Visit Diagnoses     Fibromyalgia    -  Primary    Relevant Medications    gabapentin (NEURONTIN) 600 MG tablet    Inflammatory arthritis        Relevant Medications    methotrexate (RHEUMATREX) 2 5 MG tablet    folic acid (FOLVITE) 1 mg tablet    Crohn's disease of colon without complication (HCC)        Long-term use of immunosuppressant medication        Relevant Orders    CBC and differential    Comprehensive metabolic panel    Osteopenia, unspecified location        Long term use of bisphosphonates        Opioid use                    Reason for visit is follow up for diffuse arthralgias  Chief Complaint   Patient presents with    Virtual Brief Visit        Encounter provider Paco Martinez MD    Provider located at [facility] [employer] [address] [id number]    Recent Visits  No visits were found meeting these conditions  Showing recent visits within past 7 days and meeting all other requirements     Today's Visits  Date Type Provider Dept   07/28/20 Telemedicine Paco Martinez MD [facility] today's visits and meeting all other requirements     Future Appointments  No visits were found meeting these conditions  Showing future appointments within next 150 days and meeting all other requirements        After connecting through telephone, the patient was identified by name and date of birth  Coco Tucker was informed that this is a telemedicine visit and that the visit is being conducted through telephone  My office door was closed  No one else was in the room  She acknowledged consent and understanding of privacy and security of the platform   The patient has agreed to participate and understands she can discontinue the visit at any time  Patient is aware this is a billable service  Subjective    HPI     INITIAL VISIT NOTE (4/2020):  Ms Sudha Yung is a 24-year-old female with history significant for Crohn's disease diagnosed in 1996, fibromyalgia, 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 (currently on oral bisphosphonate therapy) and psoriasis, who presents for further evaluation of diffuse joint pains  She is referred by Dr Mateo Duatre for a rheumatology consult      Patient reports she was initially evaluated by Gastroenterology in [address] [place] [address] when she was diagnosed with Crohn's disease  She mentions at that time she was started on a combination of Enbrel (possibly for the joint pains), mesalamine and 6 mercaptopurine in association with steroids  She reports being on this regimen for approximately 8 years following which it was discontinued due to a decline in efficacy  She reports overall since her diagnosis in 1996 she has been on chronic intermittent doses of prednisone, with her last course about 1 month ago  She will typically start off at doses of 40 mg once daily and taper down, with the lower doses causing a flare-up of the colitis  Of note she mentions that even the higher doses of prednisone have never helped with her joint pains  She states following her initial regimen she was then switched to Humira which she was on for about 5 years, and again it was discontinued in August 2019 due to a decline in efficacy  She has since started Stelara on February 6, 2020 and has thus far completed the initial dosing  She is now on a maintenance dosing of 1 injection every 8 weeks  She has not yet noticed if the Stelara is helping with her joint pains  She mentions her last colonoscopy was done in December 2019 and this was consistent with active pancolitis  She follows with [facility]    She reports she is continuing to have loose stools with occasional mucus, but denies bloody diarrhea or abdominal pain      She was previously seen by Rheumatology in [address] [place] [address] and continued follow-up with them until she relocated to [place] approximately 7 years ago  She states since then her medications have mostly been managed by her primary care doctor and Gastroenterology  She has previously been on gabapentin for a diagnosis of fibromyalgia, but states that this was recently discontinued and she was advised to follow up with pain management  She is receiving hydromorphone 4 mg twice daily from pain management at this time, and states that it only helps to a mild degree with her body pain      She reports her joint pains have been ongoing for the past 15 years and have been progressively worsening over time to the point that she is noticing them on a daily basis in a constant manner, including during her sleep at night  As mentioned she has previously been on gabapentin up to 600 mg 3 times daily and amitriptyline 100 mg at bedtime which would help her, but this regimen was discontinued by her primary care doctor and she was advised to follow-up with Rheumatology and Pain Management  She states that the joint pains affect her all over but mostly with her knees, ankles, hands and low back  She reports the low back pain will wake her up from sleep at night and is also associated with morning stiffness of up to 2 hours  The back pain worsens with activities and is also worsened at rest   She also describes pain affecting her diffusely throughout her muscles  She has noticed occasional swelling affecting her fingers and knees  She experiences morning stiffness which affects her diffusely and takes about 3-4 hours to improve  She refrains from NSAID use due to her history of colitis      She reports as a result of being on chronic steroids she has developed osteopenia/osteoporosis  Her last DEXA scan was done approximately 1 year ago, and I do not have the records available for review  She does not take daily calcium supplements but does take vitamin-D replacement therapy  She is also on Fosamax once weekly      She reports a history of blurred vision but denies eye pain or redness  She denies a history of inflammatory eye disease  She has been diagnosed with psoriasis at the age of 4 which will typically affect her arms and legs  She reports a family history of psoriasis in her father as well as multiple family members on her mother's side with inflammatory bowel disease  She denies current fevers, unintentional weight loss, mouth/nose ulcers, swollen glands, blood clots or Raynaud's      Labs in our system show a negative rheumatoid factor        7/28/2020:  Patient presents for a follow-up today  We had to convert to a virtual visit as she had a temperature on arrival   She did not have a chance to get the blood work and x-rays done following the last office visit, as she states that she did not receive a copy in the mail  She has now been on the [address] since February 2020 and does not feel like it is helping with the bowel disease as she continues to have multiple episodes of diarrhea and mucus in her stool  She has also not noticed any change in her joint pains, but states that it has helped with the psoriasis  I did start her on gabapentin at 300 mg 3 times daily at the last office visit for fibromyalgia, and she feels like this helped to a certain degree  She is continuing to experience pain mostly in her neck, upper back region/shoulders, elbows, hands and knees  She has noticed swelling of her hands, knees, ankles and feet  She does experience morning stiffness which affects her diffusely mostly in her back region and can persist throughout the day when it occurs  She reports usually 4 days out of the week will be severe for her and may be weather dependent  She is not taking any over-the-counter pain medications at this time      She did try to send me pictures of her hand swelling following the last visit, but unfortunately this got scanned in as black and white, and it is challenging to appreciate any abnormalities  I did also receive her DEXA scan report that was done in October 2019 and appears to be normal for her age        Past Medical History:   Diagnosis Date    [facility]     Crohn disease ([facility] )     Endocarditis     Fibromyalgia     Gastritis     Hypertension     RA (rheumatoid arthritis) ([facility] )     Vertigo        Past Surgical History:   Procedure Laterality Date    APPENDECTOMY      BRONCHOSCOPY      multiple    COLONOSCOPY         Current Outpatient Medications   Medication Sig Dispense Refill    albuterol (2 5 mg/3 mL) 0 083 % nebulizer solution Take 1 vial (2 5 mg total) by nebulization every 6 (six) hours as needed for wheezing or shortness of breath 60 vial 5    albuterol (2 5 mg/3 mL) 0 083 % nebulizer solution Take 1 vial (2 5 mg total) by nebulization every 4 (four) hours as needed for wheezing or shortness of breath (coughing spells) 75 mL 0    albuterol (PROVENTIL HFA,VENTOLIN HFA) 90 mcg/act inhaler Inhale 2 puffs every 4 (four) hours as needed for wheezing or shortness of breath 1 Inhaler 0    ALPRAZolam (XANAX) 1 mg tablet Take 1 tablet (1 mg total) by mouth daily at bedtime as needed for anxiety 30 tablet 5    amitriptyline (ELAVIL) 100 mg tablet Take 100 mg by mouth daily at bedtime       benzonatate (TESSALON) 200 MG capsule Take 1 capsule (200 mg total) by mouth 3 (three) times a day as needed for cough 20 capsule 0    brompheniramine-pseudoephedrine-DM 30-2-10 MG/5ML syrup Take 5 mL by mouth 4 (four) times a day as needed (coughing) 120 mL 0    Calcium Carb-Ergocalciferol 250-125 MG-UNIT TABS Take 1 tablet by mouth      divalproex sodium (DEPAKOTE) 250 mg EC tablet Take 250 mg by mouth 2 (two) times a day        ergocalciferol (VITAMIN D2) 50,000 units Take 1 capsule by mouth once a week      folic acid (FOLVITE) 1 mg tablet Take 1 tablet (1 mg total) by mouth daily 30 tablet 5    gabapentin (NEURONTIN) 600 MG tablet Take 1 tablet (600 mg total) by mouth 3 (three) times a day 90 tablet 2    HYDROmorphone (DILAUDID) 4 mg tablet Take 4 mg by mouth 3 (three) times a day as needed for moderate pain      meclizine (ANTIVERT) 12 5 MG tablet Take 12 5 mg by mouth every 8 (eight) hours as needed      methotrexate (RHEUMATREX) 2 5 MG tablet Take 4 tabs ONCE WEEKLY x 2 weeks, then increase to 6 tabs ONCE WEEKLY and continue  24 tablet 2    montelukast (SINGULAIR) 10 mg tablet Take 10 mg by mouth daily at bedtime      pantoprazole (PROTONIX) 40 mg tablet Take 40 mg by mouth daily      propranolol (INDERAL) 80 mg tablet Take 80 mg by mouth 2 (two) times a day        SUMAtriptan (IMITREX) 100 mg tablet Take 1 tablet by mouth once as needed        ustekinumab (Stelara) 90 mg/mL subcutaneous injection Inject 45 mL under the skin Every 6 weeks      VITAMIN D PO Take by mouth daily        No current facility-administered medications for this visit  Allergies   Allergen Reactions    Ketorolac Hives    Penicillins Hives    Ketorolac Tromethamine Rash       Review of Systems  Constitutional: Negative for weight change, fevers, chills, night sweats, fatigue  ENT/Mouth: Negative for hearing changes, ear pain, nasal congestion, sinus pain, hoarseness, sore throat, rhinorrhea, swallowing difficulty  Eyes: Negative for pain, redness, discharge, vision changes  Cardiovascular: Negative for chest pain, SOB, palpitations  Respiratory: Negative for cough, sputum, wheezing, dyspnea  Gastrointestinal: Negative for nausea, vomiting, constipation, heartburn  Positive for pain and diarrhea  Genitourinary: Negative for dysuria, urinary frequency, hematuria  Musculoskeletal: As per HPI  Skin: Negative for skin rash, color changes  Neuro: Negative for weakness, numbness, tingling, loss of consciousness     Psych: Negative for anxiety, depression  Heme/Lymph: Negative for easy bruising, bleeding, lymphadenopathy  There were no vitals filed for this visit  Assessment and Plan:  Ms Cinda Bradford is a 42-year-old female with history significant for Crohn's disease diagnosed in 1996, fibromyalgia, 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 (currently on oral bisphosphonate therapy) and psoriasis, who presents for follow up of diffuse joint pains        - Nolvia Nguyen presents today for follow-up of diffuse arthralgias/myalgias which have been gradually progressive over the past 15 years  She does describe additional features such as intermittent joint swelling as well as prolonged morning stiffness, but I suspect due to such widespread complaints along with a lack of benefit with prior medications including high doses of prednisone, etanercept and adalimumab (and now Stelara since February 2020), her presentation is most likely consistent with fibromyalgia  Following our initial office visit in April, I had requested she obtain some blood work and x-rays but she did not receive a copy in the mail  Unfortunately we had to convert today's visit to a virtual due to an elevated temperature on arrival, and I was again unable to perform a detailed joint examination     - Just based on her symptoms her presentation appears to be consistent with fibromyalgia and I did advise her that it would be challenging to accurately diagnose an underlying inflammatory arthritis without performing a joint exam   As she did achieve some response with gabapentin 300 mg 3 times daily, I would like to increase this further to 600 mg 3 times daily for the fibromyalgia  Given her history of Crohn's disease with evidence of subjective joint swelling, we discussed a possible diagnosis of an inflammatory arthritis and will proceed with treatment as she describes her symptoms as severe        - I will plan to start her on methotrexate at 4 tablets once weekly for the first 2 weeks with an increase to 6 tablets once weekly  I requested she obtain a baseline hepatitis test and repeat a CBC and CMP in 4 weeks after she starts the methotrexate  She will also start folic acid 1 mg once daily  I reviewed the side effects of methotrexate including but not limited to, risk of infections, GI upset, mucositis and need for bone marrow/liver/kidney monitoring  She is aware not to combine methotrexate with alcohol due to concerns for hepatotoxicity  We will assess over the next few months if this has a positive impact on her joint pains  At this time she would like to start the methotrexate in combination with the Stelara, as there is uncertainty if this biologic medication is going to be changed in the near future  I advised her following her upcoming appointment with Gastroenterology to let me know if there are any plans to switch her to an alternate biologic, so we can discuss the possibility in trying to choose a medication that may be effective for both the joints and gut      - I advised her to continue following up with pain management, but I do not think it is appropriate to continue treating either the fibromyalgia or concerns for an inflammatory arthritis with opioid medications  She can continue to take Tylenol as needed, but will avoid NSAID use given the active colitis        - I reviewed her recently done DEXA scan in October 2019 which did not appear to show any major concerns for osteopenia or osteoporosis  I advised her to continue with the daily calcium and vitamin-D supplements, but she can discontinue the Fosamax      - I will plan to see her back in the office in 3 months to assess her response to the increased dose of gabapentin and methotrexate, as well as perform a complete joint examination  I asked her to contact the office in the interim with any concerns  I spent 30 minutes directly with the patient during this visit  Follow up in 3 months         VIRTUAL VISIT DISCLAIMER    Roland Carrascopernell acknowledges that she has consented to an online visit or consultation  She understands that the online visit is based solely on information provided by her, and that, in the absence of a face-to-face physical evaluation by the physician, the diagnosis she receives is both limited and provisional in terms of accuracy and completeness  This is not intended to replace a full medical face-to-face evaluation by the physician  Roland Portillo understands and accepts these terms

## 2020-07-28 NOTE — PATIENT INSTRUCTIONS
Methotrexate (By mouth)   Methotrexate (meth-oh-TREX-ate)  Treats several kinds of cancer, including cancer of the blood, bone, lung, breast, head, or neck  Also treats rheumatoid arthritis and psoriasis (a skin disease)  Brand Name(s): Trexall   There may be other brand names for this medicine  When This Medicine Should Not Be Used: You should not use this medicine if you have had an allergic reaction to methotrexate or if you are breastfeeding  You should not use this medicine for arthritis or psoriasis if you are pregnant, or if you have liver disease or certain problems with your blood or immune system  Make sure your doctor knows if you drink alcohol of any kind on a regular basis  How to Use This Medicine:   Tablet  · Medicines used to treat cancer are very strong and can have many side effects  Before receiving this medicine, make sure you understand all the risks and benefits  It is important for you to work closely with your doctor during your treatment  · Your doctor will tell you how much medicine to use  Do not use more than directed  The correct schedule for this medicine is different for arthritis, psoriasis, and different kinds of cancer  For example, people who have arthritis or psoriasis often take this medicine only once a week  · Make sure you understand your personal dosing schedule  Ask your doctor and pharmacist if you are not sure when or how often to take your medicine  · If you take the medicine only once a week, it is best to take it on the same day each week  · Write down on a calendar or piece of paper when you are supposed to use your medicine  Keep the calendar or paper where you can see it  After you take your dose of medicine, cross off that date on your calendar or paper  This will help you remember if you took the medicine or if you still need to take it  If you need other ideas to help you keep track of your schedule, ask your pharmacist or other healthcare provider    If a dose is missed:   · Take a dose as soon as you remember  If it is almost time for your next dose, wait until then and take a regular dose  Do not take extra medicine to make up for a missed dose  · If you use this medicine only once a week and you miss a dose or forget to use your medicine, skip the missed dose and use your medicine as soon as possible  Return to your regular schedule the following week  Do not use extra medicine to make up for a missed dose  How to Store and Dispose of This Medicine:   · Store the medicine in a closed container at room temperature, away from heat, moisture, and direct light  · Ask your pharmacist, doctor, or health caregiver about the best way to dispose of any outdated medicine or medicine no longer needed  · Keep all medicine out of the reach of children  Never share your medicine with anyone  Drugs and Foods to Avoid:   Ask your doctor or pharmacist before using any other medicine, including over-the-counter medicines, vitamins, and herbal products  · There are many drugs that can interact with methotrexate  Some of these drugs are aspirin, phenytoin (Dilantin®), theophylline (Sami-Dur®), antibiotics (such as penicillin, tetracycline, Bactrim®, Septra®), and vitamin supplements that contain folic acid  Make sure your doctor knows about ALL other medicines you are using  · If you have bone cancer, ask your doctor if you need to avoid using pain or arthritis medicine (such as aspirin, diclofenac, ibuprofen, indomethacin, Advil®, Aleve®, Bextra®, Celebrex®) or steroid medicines (such as prednisone)  · If you have arthritis, make sure your doctor knows about all other medicines you are using to treat arthritis (such as aspirin, gold, ibuprofen, prednisone, sulfasalazine, Azulfidine®, Celebrex®, Plaquenil®)  · Make sure your doctor knows about any cancer treatments you are using, including cisplatin ([address]) or radiation    · Tell your doctor if you have used methotrexate before for any reason  · Talk to your doctor before getting flu shots or other vaccines while you are receiving methotrexate  Vaccines may not work as well while you are using this medicine  Warnings While Using This Medicine:   · Using this medicine while you are pregnant can harm your unborn baby  The medicine may also cause birth defects if the father is using it when his sexual partner becomes pregnant  Use an effective form of birth control during treatment with methotrexate  For a man, continue birth control for at least 3 months after stopping treatment  For a woman, continue birth control until you have had two menstrual periods after stopping treatment  If a pregnancy occurs while you are using this medicine, tell your doctor right away  · Make sure your doctor knows if you have kidney disease, liver disease, a stomach ulcer, colitis, diabetes, hepatitis, HIV or AIDS, problems with your immune system, or any kind of problem with your blood (such as anemia)  Tell your doctor if you get an infection of any kind  · This medicine may make your skin more sensitive to sunlight  Wear sunscreen  Do not use sunlamps or tanning beds  Tell your doctor if you have increased skin redness or other problems  · You may get infections more easily while you are using this medicine  Stay away from people with colds, flu, or other infections  Wash your hands often  · Your doctor will need to check your progress at regular visits while you are using this medicine  You may need to have blood tests or other kinds of medical tests  Be sure to keep all appointments  · Call your doctor right away if you think you have taken too much of this medicine    Possible Side Effects While Using This Medicine:   Call your doctor right away if you notice any of these side effects:  · Allergic reaction: Itching or hives, swelling in your face or hands, swelling or tingling in your mouth or throat, chest tightness, trouble breathing  · Blistering, peeling, red skin rash  · Cough, fever, chest pain, trouble breathing, blue lips or fingers  · Eyes or skin turn yellow, or dark-colored urine or pale stools  · Fever  · Nausea, vomiting, diarrhea, loss of appetite, stomach pain  · Sores or white patches on your lips, mouth, or throat  · Unusual bleeding, bruising, or weakness  If you notice these less serious side effects, talk with your doctor:   · Hair loss, headache, dizziness  If you notice other side effects that you think are caused by this medicine, tell your doctor  Call your doctor for medical advice about side effects  You may report side effects to FDA at 2-905-FDA-3361  © 2017 [address] [employer] Information is for End User's use only and may not be sold, redistributed or otherwise used for commercial purposes  The above information is an  only  It is not intended as medical advice for individual conditions or treatments  Talk to your doctor, nurse or pharmacist before following any medical regimen to see if it is safe and effective for you

## 2020-07-31 DIAGNOSIS — M79.7 FIBROMYALGIA: ICD-10-CM

## 2020-07-31 RX ORDER — GABAPENTIN 300 MG/1
CAPSULE ORAL
Qty: 90 CAPSULE | Refills: 1 | OUTPATIENT
Start: 2020-07-31

## 2020-08-18 ENCOUNTER — TELEPHONE (OUTPATIENT)
Dept: OBGYN CLINIC | Facility: HOSPITAL | Age: 40
End: 2020-08-18

## 2020-08-18 DIAGNOSIS — M25.50 DIFFUSE ARTHRALGIA: Primary | ICD-10-CM

## 2020-08-18 NOTE — TELEPHONE ENCOUNTER
Dr Sophie Oscar  RE: new RX for bilateral knee  [id number] : Danielle Moscoso from [facility] called regarding xrays ordered by DR Sophie Oscar   Per [facility] can not do a "3 view" of bilateral knee, they can only do a "two view"    [facility] needs a new rx for Bilateral knee xray stating "two view" to be faxed

## 2020-08-20 LAB
CCP IGA+IGG SERPL IA-ACNC: 20 UNITS (ref 0–19)
CRP SERPL-MCNC: 11 MG/L (ref 0–10)
ERYTHROCYTE [SEDIMENTATION RATE] IN BLOOD BY WESTERGREN METHOD: 18 MM/HR (ref 0–32)
HAV AB SER QL IA: NEGATIVE
HAV IGM SERPL QL IA: NEGATIVE
HBV CORE AB SERPL QL IA: NEGATIVE
HBV CORE IGM SERPL QL IA: NEGATIVE
HBV E AB SERPL QL IA: NEGATIVE
HBV E AG SERPL QL IA: NEGATIVE
HBV SURFACE AB SER QL: NON REACTIVE
HBV SURFACE AG SERPL QL IA: NEGATIVE
HCV AB S/CO SERPL IA: 0.1 S/CO RATIO (ref 0–0.9)
SL AMB COMMENTS: NORMAL

## 2020-08-24 ENCOUNTER — TELEPHONE (OUTPATIENT)
Dept: RHEUMATOLOGY | Facility: CLINIC | Age: 40
End: 2020-08-24

## 2020-08-24 NOTE — TELEPHONE ENCOUNTER
Please let patient know I received her e-mail  The x-rays were normal   The inflammation markers were also normal   One of the rheumatoid antibodies was 1 point above normal, which I think is nonspecific  Overall we will see how she does with the methotrexate regimen and the fibromyalgia treatment  Thanks

## 2020-11-24 ENCOUNTER — TELEPHONE (OUTPATIENT)
Dept: OBGYN CLINIC | Facility: HOSPITAL | Age: 40
End: 2020-11-24

## 2020-11-24 DIAGNOSIS — M79.7 FIBROMYALGIA: ICD-10-CM

## 2020-11-24 DIAGNOSIS — M19.90 INFLAMMATORY ARTHRITIS: ICD-10-CM

## 2020-11-24 RX ORDER — GABAPENTIN 600 MG/1
600 TABLET ORAL 3 TIMES DAILY
Qty: 90 TABLET | Refills: 2 | Status: SHIPPED | OUTPATIENT
Start: 2020-11-24 | End: 2021-03-10 | Stop reason: SDUPTHER

## 2021-01-27 ENCOUNTER — TELEPHONE (OUTPATIENT)
Dept: OBGYN CLINIC | Facility: HOSPITAL | Age: 41
End: 2021-01-27

## 2021-01-27 NOTE — TELEPHONE ENCOUNTER
Dr Tracey Mendoza    Patient is asking if she can possibly be seen virtually 3/10, her temperature is always higher because of Crohns disease and she drives from [facility] [place] # [phone]

## 2021-03-01 ENCOUNTER — OFFICE VISIT (OUTPATIENT)
Dept: GASTROENTEROLOGY | Facility: CLINIC | Age: 41
End: 2021-03-01
Payer: COMMERCIAL

## 2021-03-01 ENCOUNTER — TELEPHONE (OUTPATIENT)
Dept: GASTROENTEROLOGY | Facility: CLINIC | Age: 41
End: 2021-03-01

## 2021-03-01 VITALS
HEART RATE: 90 BPM | DIASTOLIC BLOOD PRESSURE: 77 MMHG | BODY MASS INDEX: 27.4 KG/M2 | WEIGHT: 145 LBS | SYSTOLIC BLOOD PRESSURE: 124 MMHG

## 2021-03-01 DIAGNOSIS — R10.13 EPIGASTRIC PAIN: Primary | ICD-10-CM

## 2021-03-01 DIAGNOSIS — K50.80 CROHN'S DISEASE OF BOTH SMALL AND LARGE INTESTINE WITHOUT COMPLICATION (HCC): ICD-10-CM

## 2021-03-01 DIAGNOSIS — R63.0 ANOREXIA: ICD-10-CM

## 2021-03-01 PROCEDURE — 99214 OFFICE O/P EST MOD 30 MIN: CPT | Performed by: INTERNAL MEDICINE

## 2021-03-01 RX ORDER — DRONABINOL 5 MG/1
5 CAPSULE ORAL
Qty: 30 CAPSULE | Refills: 1 | Status: SHIPPED | OUTPATIENT
Start: 2021-03-01 | End: 2021-05-07 | Stop reason: SDUPTHER

## 2021-03-01 NOTE — TELEPHONE ENCOUNTER
Pt lmom informing that Dr Gene Brunner no longer prescribes medical marijuana  Is there someone else she can be referred to? Thank  You!

## 2021-03-01 NOTE — PROGRESS NOTES
[facility] - Outpatient Follow-up Note  Anita Crowley 41 y o  female MRN: [id number]  Encounter: [id number]          ASSESSMENT AND PLAN:      1  Epigastric pain  Could be related to peptic ulcer disease or gastroparesis, she is taking Dilaudid 3 times a day, advised her to reduce narcotic does and continue with pantoprazole 40 mg p o  q day, [facility] HIDA scan is negative, she had a CT scan  Abdomen and pelvis in September 2020 which shows mild colitis related to Crohn's disease, subsequently colonoscopy was done and started on Stelara infusion which is helping with the Crohn's disease  Will schedule for EGD and then discuss about further treatment option- EGD; Future    2  Crohn's disease of both small and large intestine without complication (HCC)   refill for Stelara 90 mg subcu Q 6 weekly was given, she is tolerating medication very well without any major side effect, she was on Humira which she failed and then subsequently Stelara was started  3  Anorexia   advised to reduce hydrocodone dose to 4 mg twice a day, it had a long discussion with the patient regarding referral to medical marijuana  Will refer to Dr Galindo Gonzalez  I thing lot of her symptoms including abdominal pain, poor appetite and nausea are related to high dose of narcotics  - dronabinol (MARINOL) 5 MG capsule; Take 1 capsule (5 mg total) by mouth 2 (two) times a day before meals  Dispense: 30 capsule; Refill: 1    ______________________________________________________________________    SUBJECTIVE:   Patient seen and examined, she is complaining worsening of epigastric pain, [facility] HIDA scan was done which came back negative, CT scan also shows evidence of mild colitis which was related to Crohn's disease, she is taking Stelara subcu every 6 weekly    She denies any constipation or diarrhea, she is still on large dose of narcotics, Dilaudid 4 mg 3 times a day, her appetite is poor and she is losing weight    REVIEW OF SYSTEMS IS OTHERWISE NEGATIVE     no fever, no chills, no melena, no rectal bleeding, rest of review of system are unremarkable    Historical Information   Past Medical History:   Diagnosis Date    [facility]ervical cancer ([facility] )     Crohn disease ([place] [address] )     Endocarditis     Fibromyalgia     Gastritis     Hypertension     RA (rheumatoid arthritis) ([place] [address] )     Vertigo      Past Surgical History:   Procedure Laterality Date    APPENDECTOMY      BRONCHOSCOPY      multiple    COLONOSCOPY       Social History   Social History     Substance and Sexual Activity   Alcohol Use No     Social History     Substance and Sexual Activity   Drug Use Not Currently    Types: Marijuana     Social History     Tobacco Use   Smoking Status Current Every Day Smoker    Packs/day: 1 00    Types: Cigarettes   Smokeless Tobacco Never Used     Family History   Problem Relation Age of Onset    Colon cancer Brother 28    Crohn's disease Brother        Meds/Allergies       Current Outpatient Medications:     amitriptyline (ELAVIL) 100 mg tablet    divalproex sodium (DEPAKOTE) 250 mg EC tablet    gabapentin (NEURONTIN) 600 MG tablet    HYDROmorphone (DILAUDID) 4 mg tablet    methotrexate (RHEUMATREX) 2 5 MG tablet    montelukast (SINGULAIR) 10 mg tablet    pantoprazole (PROTONIX) 40 mg tablet    propranolol (INDERAL) 80 mg tablet    SUMAtriptan (IMITREX) 100 mg tablet    ustekinumab (Stelara) 90 mg/mL subcutaneous injection    albuterol (2 5 mg/3 mL) 0 083 % nebulizer solution    albuterol (2 5 mg/3 mL) 0 083 % nebulizer solution    albuterol (PROVENTIL HFA,VENTOLIN HFA) 90 mcg/act inhaler    ALPRAZolam (XANAX) 1 mg tablet    benzonatate (TESSALON) 200 MG capsule    brompheniramine-pseudoephedrine-DM 30-2-10 MG/5ML syrup    Calcium Carb-Ergocalciferol 250-125 MG-UNIT TABS    dronabinol (MARINOL) 5 MG capsule    ergocalciferol (VITAMIN D2) 51,935 units    folic acid (FOLVITE) 1 mg tablet    meclizine (ANTIVERT) 12 5 MG tablet   VITAMIN D PO    Allergies   Allergen Reactions    Ketorolac Hives    Penicillins Hives    Ketorolac Tromethamine Rash           Objective     Blood pressure 124/77, pulse 90, weight 65 8 kg (145 lb)  Body mass index is 27 4 kg/m²  PHYSICAL EXAM:      General Appearance:   Alert, cooperative, no distress   HEENT:   Normocephalic, atraumatic, anicteric      Neck:  Supple, symmetrical, trachea midline   Lungs:   Clear to auscultation bilaterally; no rales, rhonchi or wheezing; respirations unlabored    Heart[de-identified]   Regular rate and rhythm; no murmur, rub, or gallop  Abdomen:   Soft, non-tender, non-distended; normal bowel sounds; no masses, no organomegaly    Genitalia:   Deferred    Rectal:   Deferred    Extremities:  No cyanosis, clubbing or edema    Pulses:  2+ and symmetric    Skin:  No jaundice, rashes, or lesions    Lymph nodes:  No palpable cervical lymphadenopathy        Lab Results:   No visits with results within 1 Day(s) from this visit  Latest known visit with results is:   Orders Only on 08/18/2020   Component Date Value    Hep A Ab, IgM 08/18/2020 Negative     Hep A Ab, Total 08/18/2020 Negative     HBsAg Screen 08/18/2020 Negative     Hep B E Ag 08/18/2020 Negative     Hep B Core Ab, IgM 08/18/2020 Negative     Hep B Core Total Ab 08/18/2020 Negative     Hep B E Ab 08/18/2020 Negative     Hepatitis B Surface Ab Q* 08/18/2020 Non Reactive     HEP C AB 08/18/2020 0 1     Comments 08/18/2020 Comment     Cyclic Citrullin Peptide* 08/18/2020 20*    Sedimentation Rate 08/18/2020 18     C-Reactive Protein, Quant 08/18/2020 11*         Radiology Results:   No results found

## 2021-03-02 ENCOUNTER — TELEPHONE (OUTPATIENT)
Dept: GASTROENTEROLOGY | Facility: CLINIC | Age: 41
End: 2021-03-02

## 2021-03-02 DIAGNOSIS — K50.812 CROHN'S DISEASE OF BOTH SMALL AND LARGE INTESTINE WITH INTESTINAL OBSTRUCTION (HCC): Primary | ICD-10-CM

## 2021-03-02 RX ORDER — AMITRIPTYLINE HYDROCHLORIDE 100 MG/1
100 TABLET, FILM COATED ORAL
Qty: 30 TABLET | Refills: 1 | Status: SHIPPED | OUTPATIENT
Start: 2021-03-02 | End: 2021-05-07 | Stop reason: SDUPTHER

## 2021-03-02 NOTE — TELEPHONE ENCOUNTER
Called and lmom informing pt that a script for the Amtriptiline is being sent in by Dr Ayesha Donahue and that she was given script for [facility] and she can fax that

## 2021-03-02 NOTE — TELEPHONE ENCOUNTER
----- Message from Gelacio Moreno MD sent at 3/2/2021 12:37 PM EST -----  I gave her stelera prescription  She can fax it  I will send prescription for amytriptiline    ----- Message -----  From: Radha Roque  Sent: 3/2/2021  11:57 AM EST  To: Gelacio Moreno MD    Called and spoke to pt and gave her the phone number for Dr Jer Bronson in [place] [address]  However, pt is asking that a Stelara script be faxed to [phone] and she is also asking for a script for Amitriptoline to be called into her pharmacy  Thank you!   ----- Message -----  From: Gelacio Moreno MD  Sent: 3/2/2021  11:10 AM EST  To: Radha Bronson in [facility] prescribe medical marijuana   Please tell her to make appointment with him

## 2021-03-09 NOTE — PROGRESS NOTES
Assessment and Plan:   Ms Glory Reynolds a 15-year-old female with history significant for Crohn's disease diagnosed in 1996, fibromyalgia, 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 and psoriasis, who presents for follow up of diffuse joint pains/possible inflammatory arthritis        - Deb presents today for follow-up of diffuse arthralgias/myalgias which have been gradually progressive over the past 15 years  Matty Beth does describe additional features such as intermittent joint swelling as well as prolonged morning stiffness, but I suspect due to such widespread complaints along with a lack of benefit with prior medications including high doses of prednisone, etanercept and adalimumab (and now Stelara since February 2020), her presentation is most likely consistent with fibromyalgia  - Given the underlying Crohn's disease, I did discuss the possibility of inflammatory arthropathy which seems less likely based on her physical examination today  Her diagnosis does appear to be consistent with fibromyalgia for which she will continue the gabapentin 600 mg 4 times a day along with the Dilaudid as prescribed by her pain management specialist   For considerations of an inflammatory arthritis she is willing to retry methotrexate but we will maintain the dose at 10 mg once weekly and she will restart folic acid 1 mg daily  High risk medication lab monitoring will be repeated in 4 weeks after she starts the methotrexate      - For the Crohn's management she will continue with the Stelara as prescribed by Gastroenterology        Plan:  Diagnoses and all orders for this visit:    Fibromyalgia  -     gabapentin (NEURONTIN) 600 MG tablet; Take 1 tablet (600 mg total) by mouth 4 (four) times a day    Crohn's disease of colon without complication (HCC)    Psoriasis    Inflammatory arthritis  -     methotrexate 2 5 mg tablet; Take 4 tablets (10 mg total) by mouth once a week  -     folic acid (FOLVITE) 1 mg tablet;  Take 1 tablet (1 mg total) by mouth daily    Long-term use of immunosuppressant medication    Methotrexate, long term, current use  -     CBC and differential; Future  -     Comprehensive metabolic panel; Future  -     C-reactive protein; Future  -     Sedimentation rate, automated; Future    Opioid use    Other orders  -     clindamycin (CLEOCIN) 150 mg capsule; Take 600 mg by mouth as needed 1 hour prior to dental appointment  -     dicyclomine (BENTYL) 20 mg tablet; Take 20 mg by mouth 2 (two) times a day  -     medroxyPROGESTERone (PROVERA) 10 mg tablet; Take 10 mg by mouth daily  -     Discontinue: methotrexate 2 5 mg tablet; take 6 tablets by mouth ONCE A WEEK  -     metroNIDAZOLE (FLAGYL) 500 mg tablet  -     HYDROmorphone HCl ER 32 MG TB24; Take by mouth      Activities as tolerated  Exercise: try to maintain a low impact exercise regimen as much as possible  Walk for 30 minutes a day for at least 3 days a week  Continue other medications as prescribed by PCP and other specialists  RTC in 4 months  HPI    INITIAL VISIT NOTE (4/2020):  Ms Vinay Breaux a 47-year-old female with history significant for Crohn's disease diagnosed in 1996, fibromyalgia, 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 (currently on oral bisphosphonate therapy) and psoriasis, who presents for further evaluation of diffuse joint pains   She is referred by Dr Arturo Corrigan a rheumatology consult      Patient reports she was initially evaluated by Gastroenterology in [address] [place] [address] when she was diagnosed with Crohn's disease   She mentions at that time she was started on a combination of Enbrel (possibly for the joint pains), mesalamine and 6 mercaptopurine in association with steroids   She reports being on this regimen for approximately 8 years following which it was discontinued due to a decline in efficacy   She reports overall since her diagnosis in 1996 she has been on chronic intermittent doses of prednisone, with her last course about 1 month ago   She will typically start off at doses of 40 mg once daily and taper down, with the lower doses causing a flare-up of the colitis   Of note she mentions that even the higher doses of prednisone have never helped with her joint pains   She states following her initial regimen she was then switched to Humira which she was on for about 5 years, and again it was discontinued in August 2019 due to a decline in efficacy   She has since started [place] on February 6, 2020 and has thus far completed the initial dosing  Corinne Lake is now on a maintenance dosing of 1 injection every 8 weeks  Corinne Lake has not yet noticed if the [place] is helping with her joint pains  Corinne Lake mentions her last colonoscopy was done in December 2019 and this was consistent with active pancolitis   She follows with [facility]  Corinne Lake reports she is continuing to have loose stools with occasional mucus, but denies bloody diarrhea or abdominal pain      She was previously seen by Rheumatology in [address] [place] [address] and continued follow-up with them until she relocated to [facility] approximately 7 years ago  Corinne Lake states since then her medications have mostly been managed by her primary care doctor and Gastroenterology  Corinne Lake has previously been on gabapentin for a diagnosis of fibromyalgia, but states that this was recently discontinued and she was advised to follow up with pain management   She is receiving hydromorphone 4 mg twice daily from pain management at this time, and states that it only helps to a mild degree with her body pain      She reports her joint pains have been ongoing for the past 15 years and have been progressively worsening over time to the point that she is noticing them on a daily basis in a constant manner, including during her sleep at night   As mentioned she has previously been on gabapentin up to 600 mg 3 times daily and amitriptyline 100 mg at bedtime which would help her, but this regimen was discontinued by her primary care doctor and she was advised to follow-up with Rheumatology and Pain Management   She states that the joint pains affect her all over but mostly with her knees, ankles, hands and low back   She reports the low back pain will wake her up from sleep at night and is also associated with morning stiffness of up to 2 hours   The back pain worsens with activities and is also worsened at rest   She also describes pain affecting her diffusely throughout her muscles   She has noticed occasional swelling affecting her fingers and knees   She experiences morning stiffness which affects her diffusely and takes about 3-4 hours to improve   She refrains from NSAID use due to her history of colitis      She reports as a result of being on chronic steroids she has developed osteopenia/osteoporosis  Anahi Bhat last DEXA scan was done approximately 1 year ago, and I do not have the records available for review   She does not take daily calcium supplements but does take vitamin-D replacement therapy  Petra Serna is also on Fosamax once weekly      She reports a history of blurred vision but denies eye pain or redness   She denies a history of inflammatory eye disease  Petra Serna has been diagnosed with psoriasis at the age of 4 which will typically affect her arms and legs   She reports a family history of psoriasis in her father as well as multiple family members on her mother's side with inflammatory bowel disease   She denies current fevers, unintentional weight loss, mouth/nose ulcers, swollen glands, blood clots or Raynaud's      Labs in our system show a negative rheumatoid factor         7/28/2020:  Patient presents for a follow-up today    We had to convert to a virtual visit as she had a temperature on arrival   She did not have a chance to get the blood work and x-rays done following the last office visit, as she states that she did not receive a copy in the mail      She has now been on the [place] since February 2020 and does not feel like it is helping with the bowel disease as she continues to have multiple episodes of diarrhea and mucus in her stool  She has also not noticed any change in her joint pains, but states that it has helped with the psoriasis  I did start her on gabapentin at 300 mg 3 times daily at the last office visit for fibromyalgia, and she feels like this helped to a certain degree  She is continuing to experience pain mostly in her neck, upper back region/shoulders, elbows, hands and knees  She has noticed swelling of her hands, knees, ankles and feet  She does experience morning stiffness which affects her diffusely mostly in her back region and can persist throughout the day when it occurs  She reports usually 4 days out of the week will be severe for her and may be weather dependent  She is not taking any over-the-counter pain medications at this time      She did try to send me pictures of her hand swelling following the last visit, but unfortunately this got scanned in as black and white, and it is challenging to appreciate any abnormalities  I did also receive her DEXA scan report that was done in October 2019 and appears to be normal for her age  3/10/2021:  Patient presents for a follow-up today  I did review her labs done after the last office visit which showed a borderline elevated anti CCP antibody of 20  An ESR, CRP and hepatitis panel were unremarkable  At the last office visit we had discussed starting methotrexate, which she took for approximately 3 weeks but as it was causing gastrointestinal side effects she discontinued it  She thinks this may have been related to significant personal stressors as well and would like to give the methotrexate a retry  She is still on Stelara for the Crohn's disease through her gastroenterologist   She is prescribed Dilaudid by her pain management specialist   She has been taking the gabapentin 600 mg 4 times daily which does help    She reports generally during the cooler weather she feels better and her symptoms may flare in the summer  She is continuing to experience pain mostly in her neck, upper back region/shoulders, elbows, hands and knees  She has noticed swelling of her hands, knees, ankles and feet  She does experience morning stiffness which affects her diffusely mostly in her back region and can persist throughout the day when it occurs  The following portions of the patient's history were reviewed and updated as appropriate: allergies, current medications, past family history, past medical history, past social history, past surgical history and problem list       Review of Systems  Constitutional: Negative for weight change, fevers, chills, night sweats, fatigue  ENT/Mouth: Negative for hearing changes, ear pain, nasal congestion, sinus pain, hoarseness, sore throat, rhinorrhea, swallowing difficulty  Eyes: Negative for pain, redness, discharge, vision changes  Cardiovascular: Negative for chest pain, SOB, palpitations  Respiratory: Negative for cough, sputum, wheezing, dyspnea  Gastrointestinal: Negative for nausea, vomiting, diarrhea, constipation, pain, heartburn  Genitourinary: Negative for dysuria, urinary frequency, hematuria  Musculoskeletal: As per HPI  Skin: Negative for skin rash, color changes  Neuro: Negative for weakness, numbness, tingling, loss of consciousness  Psych: Negative for anxiety, depression  Heme/Lymph: Negative for easy bruising, bleeding, lymphadenopathy          Past Medical History:   Diagnosis Date    Cervical cancer ([facility] )     Crohn disease ([facility] )     Endocarditis     Fibromyalgia     Gastritis     Hypertension     RA (rheumatoid arthritis) ([facility] )     Vertigo        Past Surgical History:   Procedure Laterality Date    APPENDECTOMY      BRONCHOSCOPY      multiple    COLONOSCOPY         Social History     Socioeconomic History    Marital status: /Civil Union     Spouse name: Not on file    Number of children: Not on file    Years of education: Not on file    Highest education level: Not on file   Occupational History    Not on file   Social Needs    Financial resource strain: Not on file    Food insecurity     Worry: Not on file     Inability: Not on file    Transportation needs     Medical: Not on file     Non-medical: Not on file   Tobacco Use    Smoking status: Current Every Day Smoker     Packs/day: 1 00     Types: Cigarettes    Smokeless tobacco: Never Used   Substance and Sexual Activity    Alcohol use: No    Drug use: Not Currently     Types: Marijuana    Sexual activity: Not Currently   Lifestyle    Physical activity     Days per week: 0 days     Minutes per session: 0 min    Stress: Very much   Relationships    Social connections     Talks on phone: Not on file     Gets together: Not on file     Attends Yazidi service: Not on file     Active member of club or organization: Not on file     Attends meetings of clubs or organizations: Not on file     Relationship status: Not on file    Intimate partner violence     Fear of current or ex partner: Not on file     Emotionally abused: Not on file     Physically abused: Not on file     Forced sexual activity: Not on file   Other Topics Concern    Not on file   Social History Narrative    Has a        Family History   Problem Relation Age of Onset    Colon cancer Brother 28    Crohn's disease Brother        Allergies   Allergen Reactions    Ketorolac Hives    Penicillins Hives    Ketorolac Tromethamine Rash       Current Outpatient Medications:     dicyclomine (BENTYL) 20 mg tablet, Take 20 mg by mouth 2 (two) times a day, Disp: , Rfl:     medroxyPROGESTERone (PROVERA) 10 mg tablet, Take 10 mg by mouth daily, Disp: , Rfl:     albuterol (2 5 mg/3 mL) 0 083 % nebulizer solution, Take 1 vial (2 5 mg total) by nebulization every 6 (six) hours as needed for wheezing or shortness of breath (Patient not taking: Reported on 3/1/2021), Disp: 60 vial, Rfl: 5    albuterol (2 5 mg/3 mL) 0 083 % nebulizer solution, Take 1 vial (2 5 mg total) by nebulization every 4 (four) hours as needed for wheezing or shortness of breath (coughing spells) (Patient not taking: Reported on 3/1/2021), Disp: 75 mL, Rfl: 0    albuterol (PROVENTIL HFA,VENTOLIN HFA) 90 mcg/act inhaler, Inhale 2 puffs every 4 (four) hours as needed for wheezing or shortness of breath (Patient not taking: Reported on 3/1/2021), Disp: 1 Inhaler, Rfl: 0    ALPRAZolam (XANAX) 1 mg tablet, Take 1 tablet (1 mg total) by mouth daily at bedtime as needed for anxiety (Patient not taking: Reported on 3/1/2021), Disp: 30 tablet, Rfl: 5    amitriptyline (ELAVIL) 100 mg tablet, Take 1 tablet (100 mg total) by mouth daily at bedtime, Disp: 30 tablet, Rfl: 1    benzonatate (TESSALON) 200 MG capsule, Take 1 capsule (200 mg total) by mouth 3 (three) times a day as needed for cough (Patient not taking: Reported on 3/1/2021), Disp: 20 capsule, Rfl: 0    brompheniramine-pseudoephedrine-DM 30-2-10 MG/5ML syrup, Take 5 mL by mouth 4 (four) times a day as needed (coughing) (Patient not taking: Reported on 3/1/2021), Disp: 120 mL, Rfl: 0    Calcium Carb-Ergocalciferol 250-125 MG-UNIT TABS, Take 1 tablet by mouth, Disp: , Rfl:     clindamycin (CLEOCIN) 150 mg capsule, Take 600 mg by mouth as needed 1 hour prior to dental appointment, Disp: , Rfl:     divalproex sodium (DEPAKOTE) 250 mg EC tablet, Take 250 mg by mouth 2 (two) times a day  , Disp: , Rfl:     dronabinol (MARINOL) 5 MG capsule, Take 1 capsule (5 mg total) by mouth 2 (two) times a day before meals, Disp: 30 capsule, Rfl: 1    ergocalciferol (VITAMIN D2) 50,000 units, Take 1 capsule by mouth once a week, Disp: , Rfl:     folic acid (FOLVITE) 1 mg tablet, Take 1 tablet (1 mg total) by mouth daily, Disp: 30 tablet, Rfl: 5    gabapentin (NEURONTIN) 600 MG tablet, Take 1 tablet (600 mg total) by mouth 4 (four) times a day, Disp: 120 tablet, Rfl: 2    HYDROmorphone (DILAUDID) 4 mg tablet, Take 4 mg by mouth 3 (three) times a day as needed for moderate pain, Disp: , Rfl:     HYDROmorphone HCl ER 32 MG TB24, Take by mouth, Disp: , Rfl:     meclizine (ANTIVERT) 12 5 MG tablet, Take 12 5 mg by mouth every 8 (eight) hours as needed, Disp: , Rfl:     methotrexate 2 5 mg tablet, Take 4 tablets (10 mg total) by mouth once a week, Disp: 16 tablet, Rfl: 2    metroNIDAZOLE (FLAGYL) 500 mg tablet, , Disp: , Rfl:     montelukast (SINGULAIR) 10 mg tablet, Take 10 mg by mouth daily at bedtime, Disp: , Rfl:     pantoprazole (PROTONIX) 40 mg tablet, Take 40 mg by mouth daily, Disp: , Rfl:     propranolol (INDERAL) 80 mg tablet, Take 80 mg by mouth 2 (two) times a day  , Disp: , Rfl:     SUMAtriptan (IMITREX) 100 mg tablet, Take 1 tablet by mouth once as needed  , Disp: , Rfl:     ustekinumab (Stelara) 90 mg/mL subcutaneous injection, Inject 45 mL under the skin Every 6 weeks, Disp: , Rfl:     VITAMIN D PO, Take by mouth daily , Disp: , Rfl:       Objective:    Vitals:    03/10/21 1516   BP: 132/82   Pulse: 100       Physical Exam  General: Well appearing, well nourished, in no distress  Oriented x 3, normal mood and affect  Ambulating without difficulty  Skin: Good turgor, no rash, unusual bruising or prominent lesions  Hair: Normal texture and distribution  Nails: Normal color, no deformities  HEENT:  Head: Normocephalic, atraumatic  Eyes: Conjunctiva clear, sclera non-icteric, EOM intact  Extremities: No amputations or deformities, cyanosis, edema  Musculoskeletal:   There is no peripheral joint soft tissue swelling noted  She does have 18/18 fibromyalgia tender points noted  Neurologic: Alert and oriented  No focal neurological deficits appreciated  Psychiatric: Normal mood and affect  RONEL Serra    Rheumatology

## 2021-03-10 ENCOUNTER — OFFICE VISIT (OUTPATIENT)
Dept: RHEUMATOLOGY | Facility: CLINIC | Age: 41
End: 2021-03-10
Payer: COMMERCIAL

## 2021-03-10 VITALS — HEART RATE: 100 BPM | DIASTOLIC BLOOD PRESSURE: 82 MMHG | SYSTOLIC BLOOD PRESSURE: 132 MMHG

## 2021-03-10 DIAGNOSIS — K50.10 CROHN'S DISEASE OF COLON WITHOUT COMPLICATION (HCC): ICD-10-CM

## 2021-03-10 DIAGNOSIS — Z79.899 METHOTREXATE, LONG TERM, CURRENT USE: ICD-10-CM

## 2021-03-10 DIAGNOSIS — M19.90 INFLAMMATORY ARTHRITIS: ICD-10-CM

## 2021-03-10 DIAGNOSIS — Z79.899 LONG-TERM USE OF IMMUNOSUPPRESSANT MEDICATION: ICD-10-CM

## 2021-03-10 DIAGNOSIS — F11.90 OPIOID USE: ICD-10-CM

## 2021-03-10 DIAGNOSIS — M79.7 FIBROMYALGIA: Primary | ICD-10-CM

## 2021-03-10 DIAGNOSIS — L40.9 PSORIASIS: ICD-10-CM

## 2021-03-10 PROCEDURE — 99214 OFFICE O/P EST MOD 30 MIN: CPT | Performed by: INTERNAL MEDICINE

## 2021-03-10 PROCEDURE — 4004F PT TOBACCO SCREEN RCVD TLK: CPT | Performed by: INTERNAL MEDICINE

## 2021-03-10 RX ORDER — METRONIDAZOLE 500 MG/1
TABLET ORAL
COMMUNITY
Start: 2020-12-08 | End: 2021-05-26

## 2021-03-10 RX ORDER — DICYCLOMINE HCL 20 MG
20 TABLET ORAL 2 TIMES DAILY
COMMUNITY
Start: 2020-12-24 | End: 2021-05-26

## 2021-03-10 RX ORDER — GABAPENTIN 600 MG/1
600 TABLET ORAL 4 TIMES DAILY
Qty: 120 TABLET | Refills: 2 | Status: SHIPPED | OUTPATIENT
Start: 2021-03-10 | End: 2021-07-21 | Stop reason: SDUPTHER

## 2021-03-10 RX ORDER — MEDROXYPROGESTERONE ACETATE 10 MG/1
10 TABLET ORAL DAILY
COMMUNITY
Start: 2020-12-08 | End: 2021-05-26

## 2021-03-10 RX ORDER — HYDROMORPHONE HYDROCHLORIDE 32 MG/1
TABLET, EXTENDED RELEASE ORAL
COMMUNITY
End: 2021-05-26

## 2021-03-10 RX ORDER — CLINDAMYCIN HYDROCHLORIDE 150 MG/1
600 CAPSULE ORAL AS NEEDED
COMMUNITY
Start: 2021-01-16 | End: 2021-05-26

## 2021-03-10 RX ORDER — FOLIC ACID 1 MG/1
1 TABLET ORAL DAILY
Qty: 30 TABLET | Refills: 5 | Status: SHIPPED | OUTPATIENT
Start: 2021-03-10 | End: 2021-07-13 | Stop reason: SDUPTHER

## 2021-03-15 ENCOUNTER — TELEPHONE (OUTPATIENT)
Dept: GASTROENTEROLOGY | Facility: AMBULATORY SURGERY CENTER | Age: 41
End: 2021-03-15

## 2021-03-15 NOTE — TELEPHONE ENCOUNTER
I left a message for pt    simba is stating they are primary  But regardless we don't accept medicaid  As secondary only with medicare  I left that in a message for pt  So please reschedule her at different facility when she calls to schedule  Thanks cg

## 2021-03-15 NOTE — TELEPHONE ENCOUNTER
Spoke to the patient who states that [employer] is her Primary insurance through her husbands insurance  She stated that she will call us back she was in the middle of something and could not talk   Thanks [place] allis

## 2021-03-18 NOTE — TELEPHONE ENCOUNTER
Left message for the patient to call the office back to schedule her procedure at [facility]

## 2021-03-24 NOTE — TELEPHONE ENCOUNTER
Called left message for the patient to call the office to schedule her procedure for [facility]   Thanks Patric Buerger

## 2021-04-19 DIAGNOSIS — R63.0 ANOREXIA: ICD-10-CM

## 2021-04-23 RX ORDER — DRONABINOL 5 MG/1
CAPSULE ORAL
Qty: 30 CAPSULE | Refills: 0 | OUTPATIENT
Start: 2021-04-23

## 2021-05-04 ENCOUNTER — TELEPHONE (OUTPATIENT)
Dept: INTERNAL MEDICINE CLINIC | Facility: CLINIC | Age: 41
End: 2021-05-04

## 2021-05-04 DIAGNOSIS — K50.00 CROHN'S DISEASE OF SMALL INTESTINE WITHOUT COMPLICATION (HCC): ICD-10-CM

## 2021-05-04 DIAGNOSIS — Z11.4 ENCOUNTER FOR SCREENING FOR HIV: ICD-10-CM

## 2021-05-04 DIAGNOSIS — I10 ESSENTIAL HYPERTENSION: Primary | ICD-10-CM

## 2021-05-04 NOTE — TELEPHONE ENCOUNTER
She has had multiple blood test done this year    I have put in orders for the ones that were not done by other doctors

## 2021-05-04 NOTE — TELEPHONE ENCOUNTER
Patient called asking for refills of amitriptyline 50mg and 100mg  She states she gets both doses and also a refill of her marinol   Thank you

## 2021-05-07 ENCOUNTER — TELEPHONE (OUTPATIENT)
Dept: GASTROENTEROLOGY | Facility: CLINIC | Age: 41
End: 2021-05-07

## 2021-05-07 DIAGNOSIS — K50.812 CROHN'S DISEASE OF BOTH SMALL AND LARGE INTESTINE WITH INTESTINAL OBSTRUCTION (HCC): ICD-10-CM

## 2021-05-07 DIAGNOSIS — R63.0 ANOREXIA: ICD-10-CM

## 2021-05-07 RX ORDER — DRONABINOL 5 MG/1
5 CAPSULE ORAL
Qty: 30 CAPSULE | Refills: 1 | Status: SHIPPED | OUTPATIENT
Start: 2021-05-07 | End: 2021-06-21 | Stop reason: SDUPTHER

## 2021-05-07 RX ORDER — AMITRIPTYLINE HYDROCHLORIDE 100 MG/1
100 TABLET, FILM COATED ORAL
Qty: 30 TABLET | Refills: 1 | Status: SHIPPED | OUTPATIENT
Start: 2021-05-07 | End: 2021-05-26

## 2021-05-07 NOTE — TELEPHONE ENCOUNTER
PT LMOM, STATES SHE CALLED EARLIER THIS WEEK REQUESTING REFILL AMITRIPTYLINE AND MARINOL  PLEASE ADVISE

## 2021-05-26 ENCOUNTER — OFFICE VISIT (OUTPATIENT)
Dept: INTERNAL MEDICINE CLINIC | Facility: CLINIC | Age: 41
End: 2021-05-26
Payer: COMMERCIAL

## 2021-05-26 VITALS
BODY MASS INDEX: 25.92 KG/M2 | DIASTOLIC BLOOD PRESSURE: 76 MMHG | WEIGHT: 151.8 LBS | SYSTOLIC BLOOD PRESSURE: 120 MMHG | TEMPERATURE: 97.6 F | OXYGEN SATURATION: 95 % | HEIGHT: 64 IN | HEART RATE: 91 BPM

## 2021-05-26 DIAGNOSIS — K50.00 CROHN'S DISEASE OF SMALL INTESTINE WITHOUT COMPLICATION (HCC): Primary | ICD-10-CM

## 2021-05-26 DIAGNOSIS — Z72.0 TOBACCO ABUSE: ICD-10-CM

## 2021-05-26 DIAGNOSIS — M05.79 RHEUMATOID ARTHRITIS INVOLVING MULTIPLE SITES WITH POSITIVE RHEUMATOID FACTOR (HCC): ICD-10-CM

## 2021-05-26 DIAGNOSIS — G56.03 BILATERAL CARPAL TUNNEL SYNDROME: ICD-10-CM

## 2021-05-26 DIAGNOSIS — I10 ESSENTIAL HYPERTENSION: ICD-10-CM

## 2021-05-26 PROCEDURE — 4004F PT TOBACCO SCREEN RCVD TLK: CPT | Performed by: INTERNAL MEDICINE

## 2021-05-26 PROCEDURE — 3008F BODY MASS INDEX DOCD: CPT | Performed by: INTERNAL MEDICINE

## 2021-05-26 PROCEDURE — 3725F SCREEN DEPRESSION PERFORMED: CPT | Performed by: INTERNAL MEDICINE

## 2021-05-26 PROCEDURE — 99214 OFFICE O/P EST MOD 30 MIN: CPT | Performed by: INTERNAL MEDICINE

## 2021-05-26 RX ORDER — AMITRIPTYLINE HYDROCHLORIDE 100 MG/1
100 TABLET, FILM COATED ORAL
Qty: 30 TABLET | Refills: 1
Start: 2021-05-26 | End: 2021-05-26 | Stop reason: SDUPTHER

## 2021-05-26 RX ORDER — AMITRIPTYLINE HYDROCHLORIDE 100 MG/1
100 TABLET, FILM COATED ORAL
Qty: 30 TABLET | Refills: 1
Start: 2021-05-26 | End: 2021-06-21 | Stop reason: SDUPTHER

## 2021-05-26 NOTE — PATIENT INSTRUCTIONS
A patient with a constellation of symptoms and signs  I do not think we need to hunt for a new illness to explain them  Multi-system autoimmune disease   Cigarette smoking   Carpal tunnel syndrome     My recommendation is that she try the methotrexate prescribed by her rheumatologist   She should continues to follow-up with her      She really needs to stop smoking!      See me again here in about 4 months

## 2021-05-26 NOTE — PROGRESS NOTES
Assessment/Plan:       Diagnoses and all orders for this visit:    Crohn's disease of small intestine without complication (HCC)  -     amitriptyline (ELAVIL) 100 mg tablet; Take 1 tablet (100 mg total) by mouth daily at bedtime    Essential hypertension    Rheumatoid arthritis involving multiple sites with positive rheumatoid factor (HCC)    Bilateral carpal tunnel syndrome    Tobacco abuse    Other orders  -     Discontinue: amitriptyline (ELAVIL) 100 mg tablet; Take 1 tablet (100 mg total) by mouth daily at bedtime                Subjective:      Patient ID: Stanislav Mendez is a 35 y o  female  A 35year-old with a constellation of symptoms referable to what appears to be an increasingly inclusive autoimmune illness  Diagnosed with Crohn's disease on Stelara  Complaint of joint pain, fatigue, easy bruisability, loss of stamina, abdominal cramps and diarrhea,    Occasional coughing and shortness of breath- here her smoking plays a role  Paresthesias of fingers 1, 2, 3, and 4 of both hands indicative of carpal tunnel syndrome  Tinel sign positive      The following portions of the patient's history were reviewed and updated as appropriate:   She has a past medical history of Cervical cancer ([facility] [address] ), Endocarditis, Fibromyalgia, Gastritis, Hypertension, RA (rheumatoid arthritis) ([facility] [address] ), and Vertigo  ,  does not have any pertinent problems on file  ,   has a past surgical history that includes Appendectomy; Bronchoscopy; and Colonoscopy  ,  family history includes Colon cancer (age of onset: 28) in her brother; Crohn's disease in her brother  ,   reports that she has been smoking cigarettes  She has been smoking about 1 00 pack per day  She has never used smokeless tobacco  She reports previous drug use  Drug: Marijuana  She reports that she does not drink alcohol ,  is allergic to ketorolac; penicillins; and ketorolac tromethamine     Current Outpatient Medications   Medication Sig Dispense Refill    divalproex sodium (DEPAKOTE) 250 mg EC tablet Take 250 mg by mouth 2 (two) times a day        dronabinol (MARINOL) 5 MG capsule Take 1 capsule (5 mg total) by mouth 2 (two) times a day before meals 30 capsule 1    HYDROmorphone (DILAUDID) 4 mg tablet Take 4 mg by mouth 3 (three) times a day as needed for moderate pain      montelukast (SINGULAIR) 10 mg tablet Take 10 mg by mouth daily at bedtime      pantoprazole (PROTONIX) 40 mg tablet Take 40 mg by mouth daily      SUMAtriptan (IMITREX) 100 mg tablet Take 1 tablet by mouth once as needed        ustekinumab (Stelara) 90 mg/mL subcutaneous injection Inject 45 mL under the skin Every 6 weeks      VITAMIN D PO Take by mouth daily       amitriptyline (ELAVIL) 100 mg tablet Take 1 tablet (100 mg total) by mouth daily at bedtime 30 tablet 1    folic acid (FOLVITE) 1 mg tablet Take 1 tablet (1 mg total) by mouth daily (Patient not taking: Reported on 5/26/2021) 30 tablet 5    gabapentin (NEURONTIN) 600 MG tablet Take 1 tablet (600 mg total) by mouth 4 (four) times a day (Patient not taking: Reported on 5/26/2021) 120 tablet 2    methotrexate 2 5 mg tablet Take 4 tablets (10 mg total) by mouth once a week (Patient not taking: Reported on 5/26/2021) 16 tablet 2     No current facility-administered medications for this visit  Review of Systems   Constitutional: Positive for fatigue  Respiratory: Positive for cough and shortness of breath  Gastrointestinal: Positive for abdominal pain and diarrhea  Endocrine: Positive for cold intolerance  Musculoskeletal: Positive for arthralgias, joint swelling and myalgias  Skin: Positive for rash  Allergic/Immunologic: Positive for immunocompromised state  Neurological: Positive for numbness and headaches  Hematological: Bruises/bleeds easily  Psychiatric/Behavioral: The patient is nervous/anxious            Objective:  Vitals:    05/26/21 1805   BP: 120/76   Pulse: 91   Temp: 97 6 °F (36 4 °C)   SpO2: 95% Physical Exam  Constitutional:       Appearance: She is well-developed  Comments:   A moderately overweight female patient who appears to be the stated age   HENT:      Head: Normocephalic and atraumatic  Eyes:      Pupils: Pupils are equal, round, and reactive to light  Neck:      Musculoskeletal: Normal range of motion and neck supple  Thyroid: No thyromegaly  Trachea: No tracheal deviation  Cardiovascular:      Rate and Rhythm: Normal rate and regular rhythm  Heart sounds: Normal heart sounds  No murmur  No gallop  Pulmonary:      Effort: Pulmonary effort is normal  No respiratory distress  Breath sounds: No stridor  Examination of the right-middle field reveals rhonchi  Examination of the left-middle field reveals rhonchi  Decreased breath sounds and rhonchi present  No wheezing or rales  Comments:  Minimal bilateral rhonchi mid lung fields  Abdominal:      General: Bowel sounds are normal       Palpations: Abdomen is soft  Tenderness: There is no abdominal tenderness  Musculoskeletal: Normal range of motion  General: Swelling, tenderness and deformity present  Comments:  Bilateral sausage shaped fingers with tenderness to palpation of the interphalangeal joints   Skin:     General: Skin is warm  Findings: Rash present  Comments:  Livedo reticularis of both legs   Neurological:      Mental Status: She is alert and oriented to person, place, and time  Coordination: Coordination normal    Psychiatric:         Judgment: Judgment normal            Patient Instructions    A patient with a constellation of symptoms and signs  I do not think we need to hunt for a new illness to explain them  Multi-system autoimmune disease   Cigarette smoking   Carpal tunnel syndrome     My recommendation is that she try the methotrexate prescribed by her rheumatologist   She should continues to follow-up with her      She really needs to stop smoking! See me again here in about 4 months

## 2021-06-21 ENCOUNTER — TELEPHONE (OUTPATIENT)
Dept: GASTROENTEROLOGY | Facility: CLINIC | Age: 41
End: 2021-06-21

## 2021-06-21 DIAGNOSIS — K50.00 CROHN'S DISEASE OF SMALL INTESTINE WITHOUT COMPLICATION (HCC): ICD-10-CM

## 2021-06-21 DIAGNOSIS — R63.0 ANOREXIA: ICD-10-CM

## 2021-06-21 RX ORDER — DRONABINOL 5 MG/1
5 CAPSULE ORAL
Qty: 30 CAPSULE | Refills: 1 | Status: SHIPPED | OUTPATIENT
Start: 2021-06-21 | End: 2021-07-29 | Stop reason: SDUPTHER

## 2021-06-21 RX ORDER — AMITRIPTYLINE HYDROCHLORIDE 100 MG/1
100 TABLET, FILM COATED ORAL
Qty: 30 TABLET | Refills: 1
Start: 2021-06-21 | End: 2021-06-29

## 2021-06-29 RX ORDER — AMITRIPTYLINE HYDROCHLORIDE 100 MG/1
100 TABLET, FILM COATED ORAL
Qty: 30 TABLET | Refills: 1 | Status: SHIPPED | OUTPATIENT
Start: 2021-06-29 | End: 2021-07-28 | Stop reason: SDUPTHER

## 2021-06-29 NOTE — TELEPHONE ENCOUNTER
Called patient on phone no answer, left message on machine that her prescription should be available to   I double checked with the pharmacy they told me she picked up her Marinol but her other prescription was never received  Elavil was resent and I do have confirmation so she should be able to pick it up today  I left a message that her prescriptions are at the pharmacy and can be picked up  Thank you

## 2021-06-29 NOTE — TELEPHONE ENCOUNTER
Pt called asking for a call back in regards to her refills  She said she has not received any of them yet  Can you call her? Thank you

## 2021-07-02 DIAGNOSIS — K50.00 CROHN'S DISEASE OF SMALL INTESTINE WITHOUT COMPLICATION (HCC): Primary | ICD-10-CM

## 2021-07-02 RX ORDER — USTEKINUMAB 90 MG/ML
INJECTION, SOLUTION SUBCUTANEOUS
Qty: 1 ML | Refills: 3 | Status: SHIPPED | OUTPATIENT
Start: 2021-07-02 | End: 2021-08-27 | Stop reason: SDUPTHER

## 2021-07-02 NOTE — TELEPHONE ENCOUNTER
Pt called requesting a refill of her Stelera  Per Lesly no prior Jayashree Schmidt is required  Pt is aware

## 2021-07-12 NOTE — PROGRESS NOTES
Assessment and Plan:   Ms Gera Conti a 47-year-old female with history significant for Crohn's disease diagnosed in 1996, psoriasis, fibromyalgia/chronic pain syndrome and possible peripheral spondyloarthropathy related to psoriasis/IBD who presents for a follow up  She is currently on Stelara injections every 3 months through her gastroenterologist       Rheumatic Summary:  1) Diagnosed with Crohn's disease in 1996 - started on Enbrel+mesalamine+6-MP+steroids x 8 years - d/c'ed due to inefficacy  - Managed with chronic steroids until 2014 then started on Humira till 8/2019 - d/c'ed due to decline in efficacy  - Started Stelara 2/2020-present - IBD stable  2) ? Inflammatory arthritis (negative RF, borderline CCP of 20) - arthralgias since 2000 - no improvement with high doses steroids, Enbrel, Humira or Stelara  - 7/13/21: continue Stelara and add on MTX, prednisone 20 mg daily x 10 days  3) Fibromyalgia - currently gabapentin 600 mg QID, oxycodone 5 mg TID PRN - minimal improvement  - Previously been on amitriptyline and Dilaudid - no significant improvement  4) Psoriasis diagnosed at the age of 4  # Inflammatory arthritis likely secondary to psoriasis vs IBD  - [place] presents today for a follow up of diffuse arthralgias and myalgias which have been gradually progressive over the past 15 years +  As there has previously been no synovitis appreciated on her examination and given the widespread and chronic complaints, as well as a lack of benefit with prior medications including high doses of prednisone, etanercept and adalimumab (and now Stelara since February 2020), I had considered her diagnosis to be secondary to fibromyalgia  - Today she does present with a flare up in symptoms as well as synovitis vs edema present at her ankles and feet, which would concern me for a possible inflammatory arthritis that may be secondary to the underlying psoriasis vs IBD    As the next steps in treatment I offered her prednisone 20 mg once daily x 10 days to see if this helps with her symptoms  If the arthralgias improve this may support an inflammatory arthritis diagnosis, otherwise I requested she contact the office and I will trial her on a course of diuretics  - Simultaneously I would like to add on methotrexate titrated up to 20 mg once weekly and assess by the follow up visit if there is any improvement in her arthritic complaints  I reviewed the side effects of methotrexate including but not limited to, risk of infections, GI upset, mucositis and need for bone marrow/liver/kidney monitoring  She is aware not to combine methotrexate with alcohol due to concerns for hepatotoxicity  I requested she repeat a CBC and CMP in 4 weeks after initiation of methotrexate  She will also be started on folic acid 1 mg once daily  The Stelara prescribed by her gastroenterologist will be continued  I advised her while she is on the Stelara other biologic medications cannot be considered and we will have to see how she responds to a combination of the Stelara with non-biologic DMARDs  - For management of the chronic pain syndrome she will follow up with Pain Management and is currently on gabapentin 600 mg 4 times a day along with oxycodone 5 mg TID PRN  Plan:  Diagnoses and all orders for this visit:    Inflammatory arthritis  -     methotrexate 2 5 mg tablet; Take 4 tabs once weekly x 2 weeks, then increase to 6 tabs once weekly x 2 weeks, then increase to 8 tabs once weekly and continue  -     predniSONE 20 mg tablet; Take 1 tablet (20 mg total) by mouth daily    Crohn's disease of colon without complication (HCC)    Psoriasis    Long-term use of immunosuppressant medication    Methotrexate, long term, current use  -     CBC and differential; Future  -     Comprehensive metabolic panel; Future  -     C-reactive protein; Future  -     Sedimentation rate, automated;  Future  -     folic acid (FOLVITE) 1 mg tablet; Take 1 tablet (1 mg total) by mouth daily    Fibromyalgia    Opioid use    Other orders  -     oxyCODONE (ROXICODONE) 5 mg immediate release tablet  -     Discontinue: oxyCODONE (ROXICODONE) 30 MG immediate release tablet; Take 30 mg by mouth every 6 (six) hours as needed      Activities as tolerated  Exercise: try to maintain a low impact exercise regimen as much as possible  Walk for 30 minutes a day for at least 3 days a week  Continue other medications as prescribed by PCP and other specialists  RTC in 3 months with any physician          HPI    INITIAL VISIT NOTE (4/2020):  Ms Zenia Santana a 52-year-old female with history significant for Crohn's disease diagnosed in 1996, fibromyalgia, 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 (currently on oral bisphosphonate therapy) and psoriasis, who presents for further evaluation of diffuse joint pains   She is referred by Dr Sonal Petty a rheumatology consult      Patient reports she was initially evaluated by Gastroenterology in [address] [place] [address] when she was diagnosed with Crohn's disease   She mentions at that time she was started on a combination of Enbrel (possibly for the joint pains), mesalamine and 6 mercaptopurine in association with steroids   She reports being on this regimen for approximately 8 years following which it was discontinued due to a decline in efficacy   She reports overall since her diagnosis in 1996 she has been on chronic intermittent doses of prednisone, with her last course about 1 month ago  Katiuska Feliciano will typically start off at doses of 40 mg once daily and taper down, with the lower doses causing a flare-up of the colitis   Of note she mentions that even the higher doses of prednisone have never helped with her joint pains   She states following her initial regimen she was then switched to Humira which she was on for about 5 years, and again it was discontinued in August 2019 due to a decline in efficacy   She has since started [place] on February 6, 2020 and has thus far completed the initial dosing  Tara Montalvo is now on a maintenance dosing of 1 injection every 8 weeks  Tara Montalvo has not yet noticed if the [place] is helping with her joint pains  Tara Montalvo mentions her last colonoscopy was done in December 2019 and this was consistent with active pancolitis   She follows with  Gastroenterology  Tara Montalvo reports she is continuing to have loose stools with occasional mucus, but denies bloody diarrhea or abdominal pain      She was previously seen by Rheumatology in [address] [place] [address] and continued follow-up with them until she relocated to [place] approximately 7 years ago  Tara Montalvo states since then her medications have mostly been managed by her primary care doctor and Gastroenterology  Tara Montalvo has previously been on gabapentin for a diagnosis of fibromyalgia, but states that this was recently discontinued and she was advised to follow up with pain management   She is receiving hydromorphone 4 mg twice daily from pain management at this time, and states that it only helps to a mild degree with her body pain      She reports her joint pains have been ongoing for the past 15 years and have been progressively worsening over time to the point that she is noticing them on a daily basis in a constant manner, including during her sleep at night   As mentioned she has previously been on gabapentin up to 600 mg 3 times daily and amitriptyline 100 mg at bedtime which would help her, but this regimen was discontinued by her primary care doctor and she was advised to follow-up with Rheumatology and Pain Management   She states that the joint pains affect her all over but mostly with her knees, ankles, hands and low back   She reports the low back pain will wake her up from sleep at night and is also associated with morning stiffness of up to 2 hours   The back pain worsens with activities and is also worsened at rest   She also describes pain affecting her diffusely throughout her muscles   She has noticed occasional swelling affecting her fingers and knees   She experiences morning stiffness which affects her diffusely and takes about 3-4 hours to improve   She refrains from NSAID use due to her history of colitis      She reports as a result of being on chronic steroids she has developed osteopenia/osteoporosis   Her last DEXA scan was done approximately 1 year ago, and I do not have the records available for review   She does not take daily calcium supplements but does take vitamin-D replacement therapy  John Cox is also on Fosamax once weekly      She reports a history of blurred vision but denies eye pain or redness   She denies a history of inflammatory eye disease   She has been diagnosed with psoriasis at the age of 4 which will typically affect her arms and legs   She reports a family history of psoriasis in her father as well as multiple family members on her mother's side with inflammatory bowel disease   She denies current fevers, unintentional weight loss, mouth/nose ulcers, swollen glands, blood clots or Raynaud's      Labs in our system show a negative rheumatoid factor         7/28/2020:  Patient presents for a follow-up today  Ashley Rowland had to convert to a virtual visit as she had a temperature on arrival   She did not have a chance to get the blood work and x-rays done following the last office visit, as she states that she did not receive a copy in the mail      She has now been on the [place] since February 2020 and does not feel like it is helping with the bowel disease as she continues to have multiple episodes of diarrhea and mucus in her stool  John Cox has also not noticed any change in her joint pains, but states that it has helped with the psoriasis   I did start her on gabapentin at 300 mg 3 times daily at the last office visit for fibromyalgia, and she feels like this helped to a certain degree   She is continuing to experience pain mostly in her neck, upper back region/shoulders, elbows, hands and knees   She has noticed swelling of her hands, knees, ankles and feet   She does experience morning stiffness which affects her diffusely mostly in her back region and can persist throughout the day when it occurs   She reports usually 4 days out of the week will be severe for her and may be weather dependent   She is not taking any over-the-counter pain medications at this time      She did try to send me pictures of her hand swelling following the last visit, but unfortunately this got scanned in as black and white, and it is challenging to appreciate any abnormalities   I did also receive her DEXA scan report that was done in October 2019 and appears to be normal for her age         3/10/2021:  Patient presents for a follow-up today  I did review her labs done after the last office visit which showed a borderline elevated anti CCP antibody of 20  An ESR, CRP and hepatitis panel were unremarkable      At the last office visit we had discussed starting methotrexate, which she took for approximately 3 weeks but as it was causing gastrointestinal side effects she discontinued it  She thinks this may have been related to significant personal stressors as well and would like to give the methotrexate a retry  She is still on Stelara for the Crohn's disease through her gastroenterologist   She is prescribed Dilaudid by her pain management specialist   She has been taking the gabapentin 600 mg 4 times daily which does help  She reports generally during the cooler weather she feels better and her symptoms may flare in the summer    She is continuing to experience pain mostly in her neck, upper back region/shoulders, elbows, hands and knees   She has noticed swelling of her hands, knees, ankles and feet   She does experience morning stiffness which affects her diffusely mostly in her back region and can persist throughout the day when it occurs         7/13/2021:  Patient presents for a follow up today  At the last office visit to address the possibility of an inflammatory arthritis secondary to the psoriasis/IBD I had discussed starting her on methotrexate 10 mg once weekly, but patient states she only took one dose and then discontinued it due to concerns for potential side effects  She is currently receiving Stelara injections every 3 months primarily for the IBD  She has been experiencing a flare up of joint pains over the past few weeks which is effecting her hands, low back, knees, ankles and feet  She has noticed swelling of her hands (her primary care physician described this to her as "sausage digits", but this is not evident on her exam today), ankles and feet  She experiences morning stiffness primarily of her lower body that can take hours to improve  She is following with pain management and is currently on gabapentin 600 mg four times daily and oxycodone 5 mg three times daily as needed  The Dilaudid was discontinued  These medications are not helping with her pain  She denies psoriasis or a flare up of the inflammatory bowel disease  The following portions of the patient's history were reviewed and updated as appropriate: allergies, current medications, past family history, past medical history, past social history, past surgical history and problem list       Review of Systems  Constitutional: Negative for weight change, fevers, chills, night sweats, fatigue  ENT/Mouth: Negative for hearing changes, ear pain, nasal congestion, sinus pain, hoarseness, sore throat, rhinorrhea, swallowing difficulty  Eyes: Negative for pain, redness, discharge, vision changes  Cardiovascular: Negative for chest pain, SOB, palpitations  Respiratory: Negative for cough, sputum, wheezing, dyspnea  Gastrointestinal: Negative for nausea, vomiting, diarrhea, constipation, pain, heartburn  Genitourinary: Negative for dysuria, urinary frequency, hematuria     Musculoskeletal: As per HPI   Skin: Negative for skin rash, color changes  Neuro: Negative for weakness, numbness, tingling, loss of consciousness  Psych: Negative for anxiety, depression  Heme/Lymph: Negative for easy bruising, bleeding, lymphadenopathy  Past Medical History:   Diagnosis Date    Cervical cancer ([facility] )     Endocarditis     Fibromyalgia     Gastritis     Hypertension     RA (rheumatoid arthritis) ([facility] )     Vertigo        Past Surgical History:   Procedure Laterality Date    APPENDECTOMY      BRONCHOSCOPY      multiple    COLONOSCOPY         Social History     Socioeconomic History    Marital status: /Civil Union     Spouse name: Not on file    Number of children: Not on file    Years of education: Not on file    Highest education level: Not on file   Occupational History    Not on file   Tobacco Use    Smoking status: Current Every Day Smoker     Packs/day: 1 00     Types: Cigarettes    Smokeless tobacco: Never Used   Vaping Use    Vaping Use: Never used   Substance and Sexual Activity    Alcohol use: No    Drug use: Not Currently     Types: Marijuana    Sexual activity: Not Currently   Other Topics Concern    Not on file   Social History Narrative    Has a      Social Determinants of Health     Financial Resource Strain:     Difficulty of Paying Living Expenses:    Food Insecurity:     Worried About Running Out of Food in the Last Year:     Ran Out of Food in the Last Year:    Transportation Needs:     Lack of Transportation (Medical):      Lack of Transportation (Non-Medical):    Physical Activity: Inactive    Days of Exercise per Week: 0 days    Minutes of Exercise per Session: 0 min   Stress: Stress Concern Present    Feeling of Stress : Very much   Social Connections:     Frequency of Communication with Friends and Family:     Frequency of Social Gatherings with Friends and Family:     Attends [employer] Services:     Active Member of Clubs or Organizations:  Attends Club or Organization Meetings:     Marital Status:    Intimate Partner Violence:     Fear of Current or Ex-Partner:     Emotionally Abused:     Physically Abused:     Sexually Abused:        Family History   Problem Relation Age of Onset    Colon cancer Brother 28    Crohn's disease Brother        Allergies   Allergen Reactions    Ketorolac Hives    Penicillins Hives    Ketorolac Tromethamine Rash       Current Outpatient Medications:     amitriptyline (ELAVIL) 100 mg tablet, Take 1 tablet (100 mg total) by mouth daily at bedtime, Disp: 30 tablet, Rfl: 1    divalproex sodium (DEPAKOTE) 250 mg EC tablet, Take 250 mg by mouth 2 (two) times a day  , Disp: , Rfl:     dronabinol (MARINOL) 5 MG capsule, Take 1 capsule (5 mg total) by mouth 2 (two) times a day before meals, Disp: 30 capsule, Rfl: 1    folic acid (FOLVITE) 1 mg tablet, Take 1 tablet (1 mg total) by mouth daily, Disp: 90 tablet, Rfl: 3    gabapentin (NEURONTIN) 600 MG tablet, Take 1 tablet (600 mg total) by mouth 4 (four) times a day (Patient not taking: Reported on 5/26/2021), Disp: 120 tablet, Rfl: 2    methotrexate 2 5 mg tablet, Take 4 tabs once weekly x 2 weeks, then increase to 6 tabs once weekly x 2 weeks, then increase to 8 tabs once weekly and continue , Disp: 32 tablet, Rfl: 2    montelukast (SINGULAIR) 10 mg tablet, Take 10 mg by mouth daily at bedtime, Disp: , Rfl:     oxyCODONE (ROXICODONE) 5 mg immediate release tablet, , Disp: , Rfl:     pantoprazole (PROTONIX) 40 mg tablet, Take 40 mg by mouth daily, Disp: , Rfl:     predniSONE 20 mg tablet, Take 1 tablet (20 mg total) by mouth daily, Disp: 10 tablet, Rfl: 0    SUMAtriptan (IMITREX) 100 mg tablet, Take 1 tablet by mouth once as needed  , Disp: , Rfl:     ustekinumab (Stelara) 90 mg/mL subcutaneous injection, Inject 1 syringe subcutaneously every 8 weeks, Disp: 1 mL, Rfl: 3    VITAMIN D PO, Take by mouth daily , Disp: , Rfl:       Objective:    Vitals: 07/13/21 1541   BP: 124/74   Pulse: 92       Physical Exam  General: Well appearing, well nourished, in no distress  Oriented x 3, normal mood and affect  Ambulating without difficulty  Skin: Good turgor, no rash, unusual bruising or prominent lesions  Hair: Normal texture and distribution  Nails: Normal color, no deformities  HEENT:  Head: Normocephalic, atraumatic  Eyes: Conjunctiva clear, sclera non-icteric, EOM intact  Extremities: No amputations or deformities, cyanosis  Musculoskeletal:   Hands - mild fullness at the left hand 3rd MCP otherwise the bilateral DIP, PIP and MCP joints are unremarkable  No significant tenderness noted  Wrists, elbows and shoulders - no tenderness or swelling  Knees - no tenderness or swelling  Ankles and feet - significant soft tissue swelling noted bilaterally with tenderness - unclear if synovitis vs edema? No enthesitis or dactylitis  Neurologic: Alert and oriented  No focal neurological deficits appreciated  Psychiatric: Normal mood and affect  Tearful at times  RONEL Nazario    Rheumatology

## 2021-07-13 ENCOUNTER — OFFICE VISIT (OUTPATIENT)
Dept: RHEUMATOLOGY | Facility: CLINIC | Age: 41
End: 2021-07-13
Payer: COMMERCIAL

## 2021-07-13 VITALS — DIASTOLIC BLOOD PRESSURE: 74 MMHG | SYSTOLIC BLOOD PRESSURE: 124 MMHG | HEART RATE: 92 BPM

## 2021-07-13 DIAGNOSIS — M79.7 FIBROMYALGIA: ICD-10-CM

## 2021-07-13 DIAGNOSIS — Z79.899 LONG-TERM USE OF IMMUNOSUPPRESSANT MEDICATION: ICD-10-CM

## 2021-07-13 DIAGNOSIS — F11.90 OPIOID USE: ICD-10-CM

## 2021-07-13 DIAGNOSIS — L40.9 PSORIASIS: ICD-10-CM

## 2021-07-13 DIAGNOSIS — M19.90 INFLAMMATORY ARTHRITIS: Primary | ICD-10-CM

## 2021-07-13 DIAGNOSIS — K50.10 CROHN'S DISEASE OF COLON WITHOUT COMPLICATION (HCC): ICD-10-CM

## 2021-07-13 DIAGNOSIS — Z79.899 METHOTREXATE, LONG TERM, CURRENT USE: ICD-10-CM

## 2021-07-13 PROCEDURE — 99215 OFFICE O/P EST HI 40 MIN: CPT | Performed by: INTERNAL MEDICINE

## 2021-07-13 PROCEDURE — 4004F PT TOBACCO SCREEN RCVD TLK: CPT | Performed by: INTERNAL MEDICINE

## 2021-07-13 RX ORDER — OXYCODONE HYDROCHLORIDE 5 MG/1
5 TABLET ORAL 3 TIMES DAILY
COMMUNITY
Start: 2021-06-30 | End: 2021-10-29 | Stop reason: SDUPTHER

## 2021-07-13 RX ORDER — PREDNISONE 20 MG/1
20 TABLET ORAL DAILY
Qty: 10 TABLET | Refills: 0 | Status: SHIPPED | OUTPATIENT
Start: 2021-07-13 | End: 2021-08-18

## 2021-07-13 RX ORDER — OXYCODONE HYDROCHLORIDE 30 MG/1
30 TABLET ORAL EVERY 6 HOURS PRN
COMMUNITY
End: 2021-07-13

## 2021-07-13 RX ORDER — FOLIC ACID 1 MG/1
1 TABLET ORAL DAILY
Qty: 90 TABLET | Refills: 3 | Status: SHIPPED | OUTPATIENT
Start: 2021-07-13 | End: 2022-03-09 | Stop reason: SDUPTHER

## 2021-07-21 DIAGNOSIS — M79.7 FIBROMYALGIA: ICD-10-CM

## 2021-07-21 RX ORDER — GABAPENTIN 600 MG/1
TABLET ORAL
Qty: 120 TABLET | Refills: 2 | Status: SHIPPED | OUTPATIENT
Start: 2021-07-21 | End: 2021-10-26

## 2021-07-25 ENCOUNTER — APPOINTMENT (EMERGENCY)
Dept: RADIOLOGY | Facility: HOSPITAL | Age: 41
End: 2021-07-25
Payer: COMMERCIAL

## 2021-07-25 ENCOUNTER — HOSPITAL ENCOUNTER (EMERGENCY)
Facility: HOSPITAL | Age: 41
Discharge: HOME | End: 2021-07-26
Attending: EMERGENCY MEDICINE | Admitting: EMERGENCY MEDICINE
Payer: COMMERCIAL

## 2021-07-25 ENCOUNTER — APPOINTMENT (EMERGENCY)
Dept: CT IMAGING | Facility: HOSPITAL | Age: 41
End: 2021-07-25
Payer: COMMERCIAL

## 2021-07-25 DIAGNOSIS — R04.0 NOSEBLEED: ICD-10-CM

## 2021-07-25 DIAGNOSIS — R04.2 HEMOPTYSIS: ICD-10-CM

## 2021-07-25 DIAGNOSIS — J18.9 RIGHT LOWER LOBE PNEUMONIA: Primary | ICD-10-CM

## 2021-07-25 DIAGNOSIS — R05.9 COUGH: ICD-10-CM

## 2021-07-25 LAB
ALBUMIN SERPL BCP-MCNC: 4 G/DL (ref 3.5–5)
ALP SERPL-CCNC: 82 U/L (ref 46–116)
ALT SERPL W P-5'-P-CCNC: 67 U/L (ref 12–78)
ANION GAP SERPL CALCULATED.3IONS-SCNC: 11 MMOL/L (ref 4–13)
APTT PPP: 33 SECONDS (ref 23–37)
AST SERPL W P-5'-P-CCNC: 34 U/L (ref 5–45)
BASOPHILS # BLD AUTO: 0.06 THOUSANDS/ΜL (ref 0–0.1)
BASOPHILS NFR BLD AUTO: 1 % (ref 0–1)
BILIRUB SERPL-MCNC: 0.33 MG/DL (ref 0.2–1)
BUN SERPL-MCNC: 11 MG/DL (ref 5–25)
CALCIUM SERPL-MCNC: 9.4 MG/DL (ref 8.3–10.1)
CHLORIDE SERPL-SCNC: 101 MMOL/L (ref 100–108)
CO2 SERPL-SCNC: 28 MMOL/L (ref 21–32)
CREAT SERPL-MCNC: 0.64 MG/DL (ref 0.6–1.3)
D DIMER PPP FEU-MCNC: <0.27 UG/ML FEU
EOSINOPHIL # BLD AUTO: 0.04 THOUSAND/ΜL (ref 0–0.61)
EOSINOPHIL NFR BLD AUTO: 0 % (ref 0–6)
ERYTHROCYTE [DISTWIDTH] IN BLOOD BY AUTOMATED COUNT: 15.3 % (ref 11.6–15.1)
ERYTHROCYTE [SEDIMENTATION RATE] IN BLOOD: 45 MM/HOUR (ref 0–19)
GFR SERPL CREATININE-BSD FRML MDRD: 110 ML/MIN/1.73SQ M
GLUCOSE SERPL-MCNC: 95 MG/DL (ref 65–140)
HCG SERPL QL: NEGATIVE
HCT VFR BLD AUTO: 41.2 % (ref 34.8–46.1)
HGB BLD-MCNC: 13.7 G/DL (ref 11.5–15.4)
IMM GRANULOCYTES # BLD AUTO: 0.06 THOUSAND/UL (ref 0–0.2)
IMM GRANULOCYTES NFR BLD AUTO: 1 % (ref 0–2)
INR PPP: 0.85 (ref 0.84–1.19)
LIPASE SERPL-CCNC: 147 U/L (ref 73–393)
LYMPHOCYTES # BLD AUTO: 2.85 THOUSANDS/ΜL (ref 0.6–4.47)
LYMPHOCYTES NFR BLD AUTO: 22 % (ref 14–44)
MCH RBC QN AUTO: 29.3 PG (ref 26.8–34.3)
MCHC RBC AUTO-ENTMCNC: 33.3 G/DL (ref 31.4–37.4)
MCV RBC AUTO: 88 FL (ref 82–98)
MONOCYTES # BLD AUTO: 0.77 THOUSAND/ΜL (ref 0.17–1.22)
MONOCYTES NFR BLD AUTO: 6 % (ref 4–12)
NEUTROPHILS # BLD AUTO: 9.35 THOUSANDS/ΜL (ref 1.85–7.62)
NEUTS SEG NFR BLD AUTO: 70 % (ref 43–75)
NRBC BLD AUTO-RTO: 0 /100 WBCS
NT-PROBNP SERPL-MCNC: 42 PG/ML
PLATELET # BLD AUTO: 352 THOUSANDS/UL (ref 149–390)
PMV BLD AUTO: 9.4 FL (ref 8.9–12.7)
POTASSIUM SERPL-SCNC: 4 MMOL/L (ref 3.5–5.3)
PROT SERPL-MCNC: 8.2 G/DL (ref 6.4–8.2)
PROTHROMBIN TIME: 11.8 SECONDS (ref 11.6–14.5)
RBC # BLD AUTO: 4.68 MILLION/UL (ref 3.81–5.12)
SARS-COV-2 RNA RESP QL NAA+PROBE: NEGATIVE
SODIUM SERPL-SCNC: 140 MMOL/L (ref 136–145)
TROPONIN I SERPL-MCNC: <0.02 NG/ML
WBC # BLD AUTO: 13.13 THOUSAND/UL (ref 4.31–10.16)

## 2021-07-25 PROCEDURE — U0005 INFEC AGEN DETEC AMPLI PROBE: HCPCS | Performed by: EMERGENCY MEDICINE

## 2021-07-25 PROCEDURE — 83880 ASSAY OF NATRIURETIC PEPTIDE: CPT | Performed by: EMERGENCY MEDICINE

## 2021-07-25 PROCEDURE — 85025 COMPLETE CBC W/AUTO DIFF WBC: CPT | Performed by: EMERGENCY MEDICINE

## 2021-07-25 PROCEDURE — 96361 HYDRATE IV INFUSION ADD-ON: CPT

## 2021-07-25 PROCEDURE — 80053 COMPREHEN METABOLIC PANEL: CPT | Performed by: EMERGENCY MEDICINE

## 2021-07-25 PROCEDURE — 36415 COLL VENOUS BLD VENIPUNCTURE: CPT | Performed by: EMERGENCY MEDICINE

## 2021-07-25 PROCEDURE — 85379 FIBRIN DEGRADATION QUANT: CPT | Performed by: EMERGENCY MEDICINE

## 2021-07-25 PROCEDURE — 85610 PROTHROMBIN TIME: CPT | Performed by: EMERGENCY MEDICINE

## 2021-07-25 PROCEDURE — 85652 RBC SED RATE AUTOMATED: CPT | Performed by: EMERGENCY MEDICINE

## 2021-07-25 PROCEDURE — U0003 INFECTIOUS AGENT DETECTION BY NUCLEIC ACID (DNA OR RNA); SEVERE ACUTE RESPIRATORY SYNDROME CORONAVIRUS 2 (SARS-COV-2) (CORONAVIRUS DISEASE [COVID-19]), AMPLIFIED PROBE TECHNIQUE, MAKING USE OF HIGH THROUGHPUT TECHNOLOGIES AS DESCRIBED BY CMS-2020-01-R: HCPCS | Performed by: EMERGENCY MEDICINE

## 2021-07-25 PROCEDURE — 96374 THER/PROPH/DIAG INJ IV PUSH: CPT

## 2021-07-25 PROCEDURE — 71260 CT THORAX DX C+: CPT

## 2021-07-25 PROCEDURE — 84484 ASSAY OF TROPONIN QUANT: CPT | Performed by: EMERGENCY MEDICINE

## 2021-07-25 PROCEDURE — 83690 ASSAY OF LIPASE: CPT | Performed by: EMERGENCY MEDICINE

## 2021-07-25 PROCEDURE — 99284 EMERGENCY DEPT VISIT MOD MDM: CPT

## 2021-07-25 PROCEDURE — 84703 CHORIONIC GONADOTROPIN ASSAY: CPT | Performed by: EMERGENCY MEDICINE

## 2021-07-25 PROCEDURE — 71045 X-RAY EXAM CHEST 1 VIEW: CPT

## 2021-07-25 PROCEDURE — 85730 THROMBOPLASTIN TIME PARTIAL: CPT | Performed by: EMERGENCY MEDICINE

## 2021-07-25 PROCEDURE — 99285 EMERGENCY DEPT VISIT HI MDM: CPT | Performed by: EMERGENCY MEDICINE

## 2021-07-25 PROCEDURE — 93005 ELECTROCARDIOGRAM TRACING: CPT

## 2021-07-25 RX ORDER — LORAZEPAM 2 MG/ML
1 INJECTION INTRAMUSCULAR ONCE
Status: COMPLETED | OUTPATIENT
Start: 2021-07-25 | End: 2021-07-25

## 2021-07-25 RX ADMIN — LORAZEPAM 1 MG: 2 INJECTION INTRAMUSCULAR; INTRAVENOUS at 21:30

## 2021-07-25 RX ADMIN — IOHEXOL 85 ML: 350 INJECTION, SOLUTION INTRAVENOUS at 22:50

## 2021-07-25 RX ADMIN — SODIUM CHLORIDE 1000 ML: 0.9 INJECTION, SOLUTION INTRAVENOUS at 21:29

## 2021-07-25 NOTE — Clinical Note
Radha Guerrier was seen and treated in our emergency department on 7/25/2021  Diagnosis:     Jessica Mccarty  may return to work on return date  She may return on this date: 07/28/2021         If you have any questions or concerns, please don't hesitate to call        Fritz Lefort, RN    ______________________________           _______________          _______________  Hospital Representative                              Date                                Time

## 2021-07-26 VITALS
TEMPERATURE: 97.2 F | SYSTOLIC BLOOD PRESSURE: 117 MMHG | RESPIRATION RATE: 18 BRPM | OXYGEN SATURATION: 97 % | HEART RATE: 82 BPM | DIASTOLIC BLOOD PRESSURE: 61 MMHG

## 2021-07-26 LAB — TROPONIN I SERPL-MCNC: <0.02 NG/ML

## 2021-07-26 PROCEDURE — 84484 ASSAY OF TROPONIN QUANT: CPT | Performed by: EMERGENCY MEDICINE

## 2021-07-26 PROCEDURE — 36415 COLL VENOUS BLD VENIPUNCTURE: CPT | Performed by: EMERGENCY MEDICINE

## 2021-07-26 PROCEDURE — 93005 ELECTROCARDIOGRAM TRACING: CPT

## 2021-07-26 RX ORDER — DOXYCYCLINE HYCLATE 100 MG/1
100 CAPSULE ORAL 2 TIMES DAILY
Qty: 14 CAPSULE | Refills: 0 | Status: SHIPPED | OUTPATIENT
Start: 2021-07-26 | End: 2021-08-02

## 2021-07-26 RX ORDER — DOXYCYCLINE HYCLATE 100 MG/1
100 CAPSULE ORAL ONCE
Status: COMPLETED | OUTPATIENT
Start: 2021-07-26 | End: 2021-07-26

## 2021-07-26 RX ADMIN — DOXYCYCLINE 100 MG: 100 CAPSULE ORAL at 01:30

## 2021-07-26 NOTE — ED PROVIDER NOTES
Pt Name: Maddy Mancuso  MRN: [id number]  [place] 1980  Age/Sex: 35 y o  female  Date of evaluation: 7/25/2021  PCP: Saqib Valero MD    [address] [place] [address]    Chief Complaint   Patient presents with    Cough     Patient reports coughing with blood tinged mucous  Patient reports saw PCP and oncologist for it and they upped her medication  HPI    35 y o  female presenting with acute worsening of a chronic cough as well as blood-tinged mucus  Patient states she has had this cough for several weeks, recently seen by her primary care doctor with medications changed  She states that she has also started coughing up some blood, she thinks it may be coming down from the nose and states she is certain it is not coming from the stomach and that she is not vomiting blood  She notes mild shortness of breath  She is not sure if it might possibly be coming from the lungs  She denies fever, trauma, nausea, vomiting, chest pain, abdominal pain, diarrhea, other symptoms        HPI      Past Medical and Surgical History    Past Medical History:   Diagnosis Date    [facility]ical cancer ([facility] )     Crohn disease ([place] [address] )     Endocarditis     Fibromyalgia     Gastritis     Hypertension     RA (rheumatoid arthritis) ([place] [address] )     Vertigo        Past Surgical History:   Procedure Laterality Date    APPENDECTOMY      BRONCHOSCOPY      multiple    COLONOSCOPY         Family History   Problem Relation Age of Onset    Colon cancer Brother 28    Crohn's disease Brother        Social History     Tobacco Use    Smoking status: Current Every Day Smoker     Packs/day: 1 00     Types: Cigarettes    Smokeless tobacco: Never Used   Vaping Use    Vaping Use: Never used   Substance Use Topics    Alcohol use: No    Drug use: Not Currently     Types: Marijuana           Allergies    Allergies   Allergen Reactions    Ketorolac Hives    Penicillins Hives    Ketorolac Tromethamine Rash       Home Medications    Prior to Admission medications    Medication Sig Start Date End Date Taking? Authorizing Provider   amitriptyline (ELAVIL) 100 mg tablet Take 1 tablet (100 mg total) by mouth daily at bedtime 6/29/21 7/29/21  CHRYSTAL Erickson   divalproex sodium (DEPAKOTE) 250 mg EC tablet Take 250 mg by mouth 2 (two) times a day      Historical Provider, MD   dronabinol (MARINOL) 5 MG capsule Take 1 capsule (5 mg total) by mouth 2 (two) times a day before meals 6/21/21   Loreto Holden MD   folic acid (FOLVITE) 1 mg tablet Take 1 tablet (1 mg total) by mouth daily 7/13/21   Thomas Vargas MD   gabapentin (NEURONTIN) 600 MG tablet take 1 tablet by mouth four times a day 7/21/21   Thomas Vargas MD   methotrexate 2 5 mg tablet Take 4 tabs once weekly x 2 weeks, then increase to 6 tabs once weekly x 2 weeks, then increase to 8 tabs once weekly and continue  7/13/21   Thomas Vargas MD   montelukast (SINGULAIR) 10 mg tablet Take 10 mg by mouth daily at bedtime    Historical Provider, MD   oxyCODONE (ROXICODONE) 5 mg immediate release tablet  6/30/21   Historical Provider, MD   pantoprazole (PROTONIX) 40 mg tablet Take 40 mg by mouth daily    Historical Provider, MD   predniSONE 20 mg tablet Take 1 tablet (20 mg total) by mouth daily 7/13/21   Thomas Vargas MD   SUMAtriptan (IMITREX) 100 mg tablet Take 1 tablet by mouth once as needed      Historical Provider, MD   ustekinumab (Stelara) 90 mg/mL subcutaneous injection Inject 1 syringe subcutaneously every 8 weeks 7/2/21   CHRYSTAL Erickson   VITAMIN D PO Take by mouth daily     Historical Provider, MD   gabapentin (NEURONTIN) 600 MG tablet Take 1 tablet (600 mg total) by mouth 4 (four) times a day  Patient not taking: Reported on 5/26/2021 3/10/21   Thomas Vargas MD           Review of Systems    Review of Systems   Constitutional: Negative for activity change, chills and fever  HENT: Positive for nosebleeds  Negative for congestion, drooling and facial swelling      Eyes: Negative for pain, discharge and visual disturbance  Respiratory: Positive for cough  Negative for apnea, chest tightness, shortness of breath and wheezing  Cardiovascular: Negative for chest pain and leg swelling  Gastrointestinal: Negative for abdominal pain, constipation, diarrhea, nausea and vomiting  Genitourinary: Negative for difficulty urinating, dysuria and urgency  Musculoskeletal: Negative for arthralgias, back pain and gait problem  Skin: Negative for color change and rash  Neurological: Negative for dizziness, speech difficulty, weakness and headaches  Psychiatric/Behavioral: Negative for agitation, behavioral problems and confusion  The patient is nervous/anxious  All other systems reviewed and negative  Physical Exam      ED Triage Vitals   Temperature Pulse Respirations Blood Pressure SpO2   07/25/21 2045 07/25/21 2046 07/25/21 2046 07/25/21 2046 07/25/21 2046   (!) 97 2 °F (36 2 °C) (!) 112 18 149/74 97 %      Temp Source Heart Rate Source Patient Position - Orthostatic VS BP Location FiO2 (%)   07/25/21 2045 07/25/21 2046 07/25/21 2046 07/25/21 2046 --   Temporal Monitor Sitting Left arm       Pain Score       --                      Physical Exam  Vitals and nursing note reviewed  Constitutional:       Appearance: Normal appearance  She is well-developed  Comments: Anxious appearing   HENT:      Head: Normocephalic and atraumatic  Right Ear: External ear normal       Left Ear: External ear normal       Nose: Nose normal  No congestion or rhinorrhea  Mouth/Throat:      Mouth: Mucous membranes are moist       Pharynx: Oropharynx is clear  Eyes:      Conjunctiva/sclera: Conjunctivae normal       Pupils: Pupils are equal, round, and reactive to light  Cardiovascular:      Rate and Rhythm: Regular rhythm  Tachycardia present  Heart sounds: Normal heart sounds  Pulmonary:      Effort: Pulmonary effort is normal  No respiratory distress        Breath sounds: Normal breath sounds  No wheezing or rales  Abdominal:      General: There is no distension  Palpations: Abdomen is soft  Tenderness: There is no abdominal tenderness  There is no guarding or rebound  Musculoskeletal:         General: No deformity  Normal range of motion  Cervical back: Normal range of motion and neck supple  Skin:     General: Skin is warm and dry  Findings: No erythema or rash  Neurological:      Mental Status: She is alert and oriented to person, place, and time  Psychiatric:         Behavior: Behavior normal          Thought Content: Thought content normal          Judgment: Judgment normal               Diagnostic Results    EKG Interpretation 2139    Rate:  103  BPM  Rhythm:  Sinus tachycardia   Axis:  Right atrial enlargement  Intervals: Normal, no blocks, QTc  461 ms  Q waves:  No pathologic Q waves   T waves:  Normal   ST segments:  No significant elevations or depressions     Impression:  Sinus tachycardia with right atrial enlargement without evidence of acute ischemia or significant arrhythmia      EKG for comparison:  None available    EKG interpreted by me  EKG Interpretation 0101    Rate:  86  BPM  Rhythm:  Normal Sinus Rhythm   Axis:  Normal   Intervals: Normal, no blocks, QTc  459 ms  Q waves:  No pathologic Q waves   T waves:  Normal   ST segments:  No significant elevations or depressions     Impression:  Normal sinus rhythm without evidence of acute ischemia or significant arrhythmia      EKG for comparison:  No significant changes from prior EKG this visit    EKG interpreted by me       Labs:    Results Reviewed     Procedure Component Value Units Date/Time    Troponin I repeat in 3hrs [[id number]]  (Normal) Collected: 07/26/21 0019    Lab Status: Final result Specimen: Blood from Arm, Right Updated: 07/26/21 0052     Troponin I <0 02 ng/mL     Novel Coronavirus (Covid-19),PCR [facility] [[id number]]  (Normal) Collected: 07/25/21 2218    Lab Status: Final result Specimen: Nares from Nasopharyngeal Swab Updated: 07/25/21 2316     SARS-CoV-2 Negative    Narrative: The specimen collection materials, transport medium, and/or testing methodology utilized in the production of these test results have been proven to be reliable in a limited validation with an abbreviated program under the Emergency Utilization Authorization provided by the FDA  Testing reported as "Presumptive positive" will be confirmed with secondary testing to ensure result accuracy  Clinical caution and judgement should be used with the interpretation of these results with consideration of the clinical impression and other laboratory testing  Testing reported as "Positive" or "Negative" has been proven to be accurate according to standard laboratory validation requirements  All testing is performed with control materials showing appropriate reactivity at standard intervals        hCG, qualitative pregnancy [[id number]]  (Normal) Collected: 07/25/21 2127    Lab Status: Final result Specimen: Blood from Arm, Right Updated: 07/25/21 2302     Preg, Serum Negative    Troponin I [[id number]]  (Normal) Collected: 07/25/21 2127    Lab Status: Final result Specimen: Blood from Arm, Right Updated: 07/25/21 2228     Troponin I <0 02 ng/mL     CBC and differential [[id number]]  (Abnormal) Collected: 07/25/21 2127    Lab Status: Final result Specimen: Blood from Arm, Right Updated: 07/25/21 2203     WBC 13 13 Thousand/uL      RBC 4 68 Million/uL      Hemoglobin 13 7 g/dL      Hematocrit 41 2 %      MCV 88 fL      MCH 29 3 pg      MCHC 33 3 g/dL      RDW 15 3 %      MPV 9 4 fL      Platelets 084 Thousands/uL      nRBC 0 /100 WBCs      Neutrophils Relative 70 %      Immat GRANS % 1 %      Lymphocytes Relative 22 %      Monocytes Relative 6 %      Eosinophils Relative 0 %      Basophils Relative 1 %      Neutrophils Absolute 9 35 Thousands/µL      Immature Grans Absolute 0 06 Thousand/uL      Lymphocytes Absolute 2 85 Thousands/µL      Monocytes Absolute 0 77 Thousand/µL      Eosinophils Absolute 0 04 Thousand/µL      Basophils Absolute 0 06 Thousands/µL     Lipase [[id number]]  (Normal) Collected: 07/25/21 2127    Lab Status: Final result Specimen: Blood from Arm, Right Updated: 07/25/21 2202     Lipase 147 u/L     NT-BNP PRO [[id number]]  (Normal) Collected: 07/25/21 2127    Lab Status: Final result Specimen: Blood from Arm, Right Updated: 07/25/21 2202     NT-proBNP 42 pg/mL     Comprehensive metabolic panel [[id number]] Collected: 07/25/21 2127    Lab Status: Final result Specimen: Blood from Arm, Right Updated: 07/25/21 2156     Sodium 140 mmol/L      Potassium 4 0 mmol/L      Chloride 101 mmol/L      CO2 28 mmol/L      ANION GAP 11 mmol/L      BUN 11 mg/dL      Creatinine 0 64 mg/dL      Glucose 95 mg/dL      Calcium 9 4 mg/dL      AST 34 U/L      ALT 67 U/L      Alkaline Phosphatase 82 U/L      Total Protein 8 2 g/dL      Albumin 4 0 g/dL      Total Bilirubin 0 33 mg/dL      eGFR 110 ml/min/1 73sq m     Narrative:      [place] guidelines for Chronic Kidney Disease (CKD):     Stage 1 with normal or high GFR (GFR > 90 mL/min/1 73 square meters)    Stage 2 Mild CKD (GFR = 60-89 mL/min/1 73 square meters)    Stage 3A Moderate CKD (GFR = 45-59 mL/min/1 73 square meters)    Stage 3B Moderate CKD (GFR = 30-44 mL/min/1 73 square meters)    Stage 4 Severe CKD (GFR = 15-29 mL/min/1 73 square meters)    Stage 5 End Stage CKD (GFR <15 mL/min/1 73 square meters)  Note: GFR calculation is accurate only with a steady state creatinine    D-Dimer [[id number]]  (Normal) Collected: 07/25/21 2127    Lab Status: Final result Specimen: Blood from Arm, Right Updated: 07/25/21 2154     D-Dimer, Quant <0 27 ug/ml FEU     Sedimentation rate, automated [[id number]]  (Abnormal) Collected: 07/25/21 2127    Lab Status: Final result Specimen: Blood from Arm, Right Updated: 07/25/21 2154     Sed Rate 45 mm/hour Protime-INR [[id number]]  (Normal) Collected: 07/25/21 2127    Lab Status: Final result Specimen: Blood from Arm, Right Updated: 07/25/21 2151     Protime 11 8 seconds      INR 0 85    APTT [[id number]]  (Normal) Collected: 07/25/21 2127    Lab Status: Final result Specimen: Blood from Arm, Right Updated: 07/25/21 2151     PTT 33 seconds           All labs reviewed and utilized in the medical decision making process    Radiology:    CT chest with contrast   Final Result      Minimal patchy groundglass airspace disease at the right lower lobe consistent with acute infiltrate of infectious or inflammatory etiology               Workstation performed: [id number]         XR chest 1 view portable    (Results Pending)       All radiology studies independently viewed by me and interpreted by the radiologist     Procedure    Procedures        ED Course of Care and Re-Assessments      Patient very anxious, requesting some to the upper back, given lorazepam and resting peacefully afterwards  Tachycardia resolved with fluids  Medications   LORazepam (ATIVAN) injection 1 mg (1 mg Intravenous Given 7/25/21 2130)   sodium chloride 0 9 % bolus 1,000 mL (0 mL Intravenous Stopped 7/25/21 2300)   iohexol (OMNIPAQUE) 350 MG/ML injection (MULTI-DOSE) 85 mL (85 mL Intravenous Given 7/25/21 2250)   doxycycline hyclate (VIBRAMYCIN) capsule 100 mg (100 mg Oral Given 7/26/21 0130)           FINAL IMPRESSION    Final diagnoses:   Cough   Nosebleed   Hemoptysis   Right lower lobe pneumonia         DISPOSITION/PLAN    Reported hemoptysis as well as cough as above  Mild nose bleed noted at time of COVID swab  Overall, suspect blood the patient is coughing of the secondary to epistaxis rather than pulmonary or GI cause  CT obtained and concern for possible inflammation versus infection, started on doxycycline based on worsening cough and that finding    Low suspicion for PE, sepsis, meningitis, encephalitis massive hemoptysis ACS, other acute life threat at this time  Discharged with strict return precautions, follow up with primary care doctor  Also referred to a pulmonologist at patient's request as she has not seen her own pulmonologist for about 3 years due to that providers half-way  Time reflects when diagnosis was documented in both MDM as applicable and the Disposition within this note     Time User Action Codes Description Comment    7/26/2021  1:08 AM Earney Guardian T Add [R05] Cough     7/26/2021  1:08 AM Earney Guardian T Add [R04 0] Nosebleed     7/26/2021  1:08 AM Earney Guardian T Add [R04 2] Hemoptysis     7/26/2021  1:19 AM Earney Guardian T Add [J18 9] Right lower lobe pneumonia     7/26/2021  1:19 AM Earney Guardian T Modify [R05] Cough     7/26/2021  1:19 AM Earney Guardian T Modify [J18 9] Right lower lobe pneumonia       ED Disposition     ED Disposition Condition Date/Time Comment    Discharge Stable Mon Jul 26, 2021  1:08 AM Josselin Angeles discharge to home/self care  Follow-up Information     Follow up With Specialties Details Why Contact Info Additional [address] [facility] Emergency Department Emergency Medicine Go to  If symptoms worsen [address] [facility] [id number] [facility] Emergency Department, [address] [facility],[address] [facility], [address] Wilber Renee MD Internal Medicine Call in 1 day To discuss this visit and schedule close outpatient follow-up   [address] [place] [address] [facility] Pulmonology Call in 1 day To reestablish pulmonology care Daron [address] [facility], [address], [address] [facility]            PATIENT REFERRED TO:    [facility] Emergency Department  [address] [facility]  [address] [facility] [address] [phone]  Go to   If symptoms worsen    Idalia March MD  [address] [place] [address]  [phone]    Call in 1 day  To discuss this visit and schedule close outpatient follow-up      Susan Neri [address] [id number] [phone]  Call in 1 day  To reestablish pulmonology care      DISCHARGE MEDICATIONS:    Discharge Medication List as of 7/26/2021  1:20 AM      START taking these medications    Details   doxycycline hyclate (VIBRAMYCIN) 100 mg capsule Take 1 capsule (100 mg total) by mouth 2 (two) times a day for 7 days, Starting Mon 7/26/2021, Until Mon 8/2/2021, Print         CONTINUE these medications which have NOT CHANGED    Details   amitriptyline (ELAVIL) 100 mg tablet Take 1 tablet (100 mg total) by mouth daily at bedtime, Starting Tue 6/29/2021, Until Thu 7/29/2021, Normal      divalproex sodium (DEPAKOTE) 250 mg EC tablet Take 250 mg by mouth 2 (two) times a day  , Historical Med      dronabinol (MARINOL) 5 MG capsule Take 1 capsule (5 mg total) by mouth 2 (two) times a day before meals, Starting Mon 6/44/0361, Normal      folic acid (FOLVITE) 1 mg tablet Take 1 tablet (1 mg total) by mouth daily, Starting Tue 7/13/2021, Normal      gabapentin (NEURONTIN) 600 MG tablet take 1 tablet by mouth four times a day, Normal      methotrexate 2 5 mg tablet Take 4 tabs once weekly x 2 weeks, then increase to 6 tabs once weekly x 2 weeks, then increase to 8 tabs once weekly and continue , Normal      montelukast (SINGULAIR) 10 mg tablet Take 10 mg by mouth daily at bedtime, Historical Med      oxyCODONE (ROXICODONE) 5 mg immediate release tablet Starting Wed 6/30/2021, Historical Med      pantoprazole (PROTONIX) 40 mg tablet Take 40 mg by mouth daily, Historical Med      predniSONE 20 mg tablet Take 1 tablet (20 mg total) by mouth daily, Starting Tue 7/13/2021, Normal SUMAtriptan (IMITREX) 100 mg tablet Take 1 tablet by mouth once as needed  , Historical Med      ustekinumab (Stelara) 90 mg/mL subcutaneous injection Inject 1 syringe subcutaneously every 8 weeks, Normal      VITAMIN D PO Take by mouth daily , Historical Med             No discharge procedures on file           MD Marcelina Hubbard MD  07/26/21 6389

## 2021-07-27 LAB
ATRIAL RATE: 103 BPM
ATRIAL RATE: 86 BPM
P AXIS: 72 DEGREES
P AXIS: 77 DEGREES
PR INTERVAL: 162 MS
PR INTERVAL: 164 MS
QRS AXIS: 53 DEGREES
QRS AXIS: 71 DEGREES
QRSD INTERVAL: 92 MS
QRSD INTERVAL: 98 MS
QT INTERVAL: 352 MS
QT INTERVAL: 384 MS
QTC INTERVAL: 459 MS
QTC INTERVAL: 461 MS
T WAVE AXIS: 59 DEGREES
T WAVE AXIS: 70 DEGREES
VENTRICULAR RATE: 103 BPM
VENTRICULAR RATE: 86 BPM

## 2021-07-27 PROCEDURE — 93010 ELECTROCARDIOGRAM REPORT: CPT | Performed by: INTERNAL MEDICINE

## 2021-07-28 ENCOUNTER — OFFICE VISIT (OUTPATIENT)
Dept: INTERNAL MEDICINE CLINIC | Facility: CLINIC | Age: 41
End: 2021-07-28
Payer: COMMERCIAL

## 2021-07-28 VITALS
HEART RATE: 93 BPM | HEIGHT: 64 IN | OXYGEN SATURATION: 97 % | BODY MASS INDEX: 26.43 KG/M2 | WEIGHT: 154.8 LBS | DIASTOLIC BLOOD PRESSURE: 70 MMHG | SYSTOLIC BLOOD PRESSURE: 110 MMHG | TEMPERATURE: 97.9 F

## 2021-07-28 DIAGNOSIS — K50.00 CROHN'S DISEASE OF SMALL INTESTINE WITHOUT COMPLICATION (HCC): ICD-10-CM

## 2021-07-28 DIAGNOSIS — J18.9 PNEUMONIA OF RIGHT LOWER LOBE DUE TO INFECTIOUS ORGANISM: Primary | ICD-10-CM

## 2021-07-28 PROBLEM — J45.30 MILD PERSISTENT ASTHMATIC BRONCHITIS WITHOUT COMPLICATION: Status: ACTIVE | Noted: 2021-07-28

## 2021-07-28 PROBLEM — J20.8 ACUTE BRONCHITIS DUE TO OTHER SPECIFIED ORGANISMS: Status: ACTIVE | Noted: 2021-07-28

## 2021-07-28 PROCEDURE — 3725F SCREEN DEPRESSION PERFORMED: CPT | Performed by: INTERNAL MEDICINE

## 2021-07-28 PROCEDURE — 3008F BODY MASS INDEX DOCD: CPT | Performed by: INTERNAL MEDICINE

## 2021-07-28 PROCEDURE — 4004F PT TOBACCO SCREEN RCVD TLK: CPT | Performed by: INTERNAL MEDICINE

## 2021-07-28 PROCEDURE — 99214 OFFICE O/P EST MOD 30 MIN: CPT | Performed by: INTERNAL MEDICINE

## 2021-07-28 RX ORDER — AMITRIPTYLINE HYDROCHLORIDE 100 MG/1
100 TABLET, FILM COATED ORAL
Qty: 100 TABLET | Refills: 1 | Status: SHIPPED | OUTPATIENT
Start: 2021-07-28 | End: 2022-04-02

## 2021-07-28 NOTE — PROGRESS NOTES
Assessment/Plan:       Diagnoses and all orders for this visit:    Pneumonia of right lower lobe due to infectious organism    Crohn's disease of small intestine without complication (HCC)  -     amitriptyline (ELAVIL) 100 mg tablet; Take 1 tablet (100 mg total) by mouth daily at bedtime                Subjective:      Patient ID: Rita Mcmahon is a 35 y o  female  Follow-up for this 68-year-old with a constellation of symptoms and signs which appears to be a multi-system autoimmune illness   Crohn's disease on Stelara  Development of inflammatory joint pain  Seen by Rheumatology and started on methotrexate   Paresthesias of fingers 123 and 4 both hands suggestive of carpal tunnel syndrome  Livedo reticularis  Went to the ER with increase in cough  Found to have an infiltrate -ground-glass infiltrate-right lower lobe and given doxycycline  Presents today with some improvement in cough  A 35year-old with a constellation of symptoms referable to what appears to be an increasingly inclusive autoimmune illness  Diagnosed with Crohn's disease on Stelara  Complaint of joint pain, fatigue, easy bruisability, loss of stamina, abdominal cramps and diarrhea,          The following portions of the patient's history were reviewed and updated as appropriate:   She has a past medical history of Cervical cancer ([facility] [address] ), Crohn disease ([facility] ), Endocarditis, Fibromyalgia, Gastritis, Hypertension, RA (rheumatoid arthritis) ([facility] ), and Vertigo  ,  does not have any pertinent problems on file  ,   has a past surgical history that includes Appendectomy; Bronchoscopy; and Colonoscopy  ,  family history includes Colon cancer (age of onset: 28) in her brother; Crohn's disease in her brother  ,   reports that she has been smoking cigarettes  She has been smoking about 1 00 pack per day  She has never used smokeless tobacco  She reports previous drug use  Drug: Marijuana   She reports that she does not drink alcohol ,  is allergic to ketorolac, penicillins, and ketorolac tromethamine     Current Outpatient Medications   Medication Sig Dispense Refill    amitriptyline (ELAVIL) 100 mg tablet Take 1 tablet (100 mg total) by mouth daily at bedtime 100 tablet 1    divalproex sodium (DEPAKOTE) 250 mg EC tablet Take 250 mg by mouth 2 (two) times a day        doxycycline hyclate (VIBRAMYCIN) 100 mg capsule Take 1 capsule (100 mg total) by mouth 2 (two) times a day for 7 days 14 capsule 0    dronabinol (MARINOL) 5 MG capsule Take 1 capsule (5 mg total) by mouth 2 (two) times a day before meals 30 capsule 1    folic acid (FOLVITE) 1 mg tablet Take 1 tablet (1 mg total) by mouth daily 90 tablet 3    gabapentin (NEURONTIN) 600 MG tablet take 1 tablet by mouth four times a day 120 tablet 2    methotrexate 2 5 mg tablet Take 4 tabs once weekly x 2 weeks, then increase to 6 tabs once weekly x 2 weeks, then increase to 8 tabs once weekly and continue  32 tablet 2    montelukast (SINGULAIR) 10 mg tablet Take 10 mg by mouth daily at bedtime      oxyCODONE (ROXICODONE) 5 mg immediate release tablet       pantoprazole (PROTONIX) 40 mg tablet Take 40 mg by mouth daily      SUMAtriptan (IMITREX) 100 mg tablet Take 1 tablet by mouth once as needed        ustekinumab (Stelara) 90 mg/mL subcutaneous injection Inject 1 syringe subcutaneously every 8 weeks 1 mL 3    predniSONE 20 mg tablet Take 1 tablet (20 mg total) by mouth daily (Patient not taking: Reported on 7/28/2021) 10 tablet 0    VITAMIN D PO Take by mouth daily  (Patient not taking: Reported on 7/28/2021)       No current facility-administered medications for this visit  Review of Systems   Constitutional: Positive for fatigue  Respiratory: Positive for cough and shortness of breath  Gastrointestinal: Positive for abdominal pain and diarrhea  Endocrine: Positive for cold intolerance  Musculoskeletal: Positive for arthralgias, joint swelling and myalgias     Skin: Positive for rash  Allergic/Immunologic: Positive for immunocompromised state  Neurological: Positive for numbness and headaches  Hematological: Bruises/bleeds easily  Psychiatric/Behavioral: The patient is nervous/anxious  Objective:  Vitals:    07/28/21 1718   BP: 110/70   Pulse: 93   Temp: 97 9 °F (36 6 °C)   SpO2: 97%      Physical Exam  Constitutional:       Appearance: Normal appearance  Eyes:      General: No scleral icterus  Cardiovascular:      Rate and Rhythm: Normal rate  Pulmonary:      Effort: Pulmonary effort is normal       Breath sounds: Normal breath sounds  Skin:     General: Skin is warm  Comments:   Livedo reticularis   Neurological:      General: No focal deficit present  Mental Status: She is alert  Psychiatric:         Mood and Affect: Mood normal          Judgment: Judgment normal            Patient Instructions    Diffuse multi-system autoimmune disease   Ground-glass pulmonary infiltrate currently being treated with doxycycline with working diagnosis of infectious pneumonitis        Continue course of doxycycline and see me back in 2 weeks

## 2021-07-28 NOTE — PATIENT INSTRUCTIONS
Diffuse multi-system autoimmune disease   Ground-glass pulmonary infiltrate currently being treated with doxycycline with working diagnosis of infectious pneumonitis        Continue course of doxycycline and see me back in 2 weeks

## 2021-07-29 DIAGNOSIS — R63.0 ANOREXIA: ICD-10-CM

## 2021-07-29 RX ORDER — DRONABINOL 5 MG/1
5 CAPSULE ORAL
Qty: 60 CAPSULE | Refills: 2 | Status: SHIPPED | OUTPATIENT
Start: 2021-07-29 | End: 2022-01-13 | Stop reason: SDUPTHER

## 2021-08-18 ENCOUNTER — OFFICE VISIT (OUTPATIENT)
Dept: INTERNAL MEDICINE CLINIC | Facility: CLINIC | Age: 41
End: 2021-08-18
Payer: COMMERCIAL

## 2021-08-18 VITALS
BODY MASS INDEX: 26.73 KG/M2 | HEIGHT: 64 IN | HEART RATE: 96 BPM | WEIGHT: 156.6 LBS | DIASTOLIC BLOOD PRESSURE: 82 MMHG | SYSTOLIC BLOOD PRESSURE: 126 MMHG | OXYGEN SATURATION: 99 %

## 2021-08-18 DIAGNOSIS — Z12.31 ENCOUNTER FOR SCREENING MAMMOGRAM FOR BREAST CANCER: ICD-10-CM

## 2021-08-18 DIAGNOSIS — J45.30 MILD PERSISTENT ASTHMATIC BRONCHITIS WITHOUT COMPLICATION: ICD-10-CM

## 2021-08-18 DIAGNOSIS — J18.9 PNEUMONITIS: ICD-10-CM

## 2021-08-18 PROCEDURE — 3008F BODY MASS INDEX DOCD: CPT | Performed by: INTERNAL MEDICINE

## 2021-08-18 PROCEDURE — 99213 OFFICE O/P EST LOW 20 MIN: CPT | Performed by: INTERNAL MEDICINE

## 2021-08-18 RX ORDER — PREDNISONE 10 MG/1
TABLET ORAL
Qty: 30 TABLET | Refills: 0 | Status: SHIPPED | OUTPATIENT
Start: 2021-08-18

## 2021-08-18 RX ORDER — AZITHROMYCIN 250 MG/1
TABLET, FILM COATED ORAL
Qty: 6 TABLET | Refills: 0 | Status: SHIPPED | OUTPATIENT
Start: 2021-08-18 | End: 2021-08-23

## 2021-08-18 NOTE — PROGRESS NOTES
Assessment/Plan:       Diagnoses and all orders for this visit:    Pneumonitis  -     azithromycin (ZITHROMAX) 250 mg tablet; 2 RIGHT AWAY THEN 1 DAILY FOR 5 DAYS  -     predniSONE 10 mg tablet; 4 DAILY FOR 3 DAYS,3 DAILY FOR 3 DAYS,2 DAILY FOR 3 DAYS, ONE DAILY FOR 3 DAYS    Mild persistent asthmatic bronchitis without complication    Encounter for screening mammogram for breast cancer  -     Mammo screening bilateral w 3d & cad; Future                Subjective:      Patient ID: Keya Sosa is a 35 y o  female  Persistent pulmonary symptoms and this young woman with an autoimmune disorder  The following portions of the patient's history were reviewed and updated as appropriate:   She has a past medical history of Cervical cancer ([facility] [address] ), Crohn disease ([facility] [address] ), Endocarditis, Fibromyalgia, Gastritis, Hypertension, RA (rheumatoid arthritis) ([facility] ), and Vertigo  ,  does not have any pertinent problems on file  ,   has a past surgical history that includes Appendectomy; Bronchoscopy; and Colonoscopy  ,  family history includes Colon cancer (age of onset: 28) in her brother; Crohn's disease in her brother  ,   reports that she has been smoking cigarettes  She has been smoking about 1 00 pack per day  She has never used smokeless tobacco  She reports previous drug use  Drug: Marijuana  She reports that she does not drink alcohol ,  is allergic to ketorolac, penicillins, and ketorolac tromethamine     Current Outpatient Medications   Medication Sig Dispense Refill    amitriptyline (ELAVIL) 100 mg tablet Take 1 tablet (100 mg total) by mouth daily at bedtime 100 tablet 1    divalproex sodium (DEPAKOTE) 250 mg EC tablet Take 250 mg by mouth 2 (two) times a day        dronabinol (MARINOL) 5 MG capsule Take 1 capsule (5 mg total) by mouth 2 (two) times a day before meals 60 capsule 2    folic acid (FOLVITE) 1 mg tablet Take 1 tablet (1 mg total) by mouth daily 90 tablet 3    gabapentin (NEURONTIN) 600 MG tablet take 1 tablet by mouth four times a day 120 tablet 2    methotrexate 2 5 mg tablet Take 4 tabs once weekly x 2 weeks, then increase to 6 tabs once weekly x 2 weeks, then increase to 8 tabs once weekly and continue  32 tablet 2    montelukast (SINGULAIR) 10 mg tablet Take 10 mg by mouth daily at bedtime      oxyCODONE (ROXICODONE) 5 mg immediate release tablet Take 5 mg by mouth 3 (three) times a day       pantoprazole (PROTONIX) 40 mg tablet Take 40 mg by mouth daily      SUMAtriptan (IMITREX) 100 mg tablet Take 1 tablet by mouth once as needed        ustekinumab (Stelara) 90 mg/mL subcutaneous injection Inject 1 syringe subcutaneously every 8 weeks 1 mL 3    azithromycin (ZITHROMAX) 250 mg tablet 2 RIGHT AWAY THEN 1 DAILY FOR 5 DAYS 6 tablet 0    predniSONE 10 mg tablet 4 DAILY FOR 3 DAYS,3 DAILY FOR 3 DAYS,2 DAILY FOR 3 DAYS, ONE DAILY FOR 3 DAYS 30 tablet 0    VITAMIN D PO Take by mouth daily  (Patient not taking: Reported on 7/28/2021)       No current facility-administered medications for this visit  Review of Systems   Constitutional: Positive for fatigue  Respiratory: Positive for cough, shortness of breath and wheezing  Musculoskeletal: Positive for arthralgias, gait problem, joint swelling and myalgias  Objective:  Vitals:    08/18/21 1728   BP: 126/82   Pulse: 96   SpO2: 99%      Physical Exam  Pulmonary:      Breath sounds: Examination of the right-lower field reveals rales  Examination of the left-lower field reveals rhonchi  Rhonchi and rales present  No wheezing  There are no Patient Instructions on file for this visit

## 2021-08-21 PROBLEM — J98.4 PNEUMONITIS: Status: ACTIVE | Noted: 2021-07-28

## 2021-08-21 PROBLEM — Z12.31 ENCOUNTER FOR SCREENING MAMMOGRAM FOR BREAST CANCER: Status: ACTIVE | Noted: 2021-08-21

## 2021-08-27 ENCOUNTER — TELEPHONE (OUTPATIENT)
Dept: GASTROENTEROLOGY | Facility: CLINIC | Age: 41
End: 2021-08-27

## 2021-08-27 DIAGNOSIS — K50.00 CROHN'S DISEASE OF SMALL INTESTINE WITHOUT COMPLICATION (HCC): Primary | ICD-10-CM

## 2021-08-27 RX ORDER — USTEKINUMAB 90 MG/ML
INJECTION, SOLUTION SUBCUTANEOUS
Qty: 1 ML | Refills: 3 | Status: SHIPPED | OUTPATIENT
Start: 2021-08-27 | End: 2022-01-13 | Stop reason: SDUPTHER

## 2021-08-27 NOTE — TELEPHONE ENCOUNTER
I obtained a prior auth for pt's [facility]-ref # [id number] from 08- through 08-  Pt informed  Pt is aware the pharmacy has two accounts with her previous last name Halima Banks and her new name Dillon Gadominique  She states she will call them to combine both accounts

## 2021-10-21 ENCOUNTER — NURSE TRIAGE (OUTPATIENT)
Dept: OTHER | Facility: OTHER | Age: 41
End: 2021-10-21

## 2021-10-21 ENCOUNTER — TELEPHONE (OUTPATIENT)
Dept: INTERNAL MEDICINE CLINIC | Facility: CLINIC | Age: 41
End: 2021-10-21

## 2021-10-22 ENCOUNTER — OFFICE VISIT (OUTPATIENT)
Dept: INTERNAL MEDICINE CLINIC | Facility: CLINIC | Age: 41
End: 2021-10-22
Payer: COMMERCIAL

## 2021-10-22 VITALS
SYSTOLIC BLOOD PRESSURE: 140 MMHG | OXYGEN SATURATION: 100 % | HEART RATE: 109 BPM | HEIGHT: 64 IN | WEIGHT: 154 LBS | DIASTOLIC BLOOD PRESSURE: 86 MMHG | TEMPERATURE: 98.9 F | BODY MASS INDEX: 26.29 KG/M2

## 2021-10-22 DIAGNOSIS — R30.0 DYSURIA: Primary | ICD-10-CM

## 2021-10-22 DIAGNOSIS — R35.0 INCREASED URINARY FREQUENCY: ICD-10-CM

## 2021-10-22 DIAGNOSIS — K50.00 CROHN'S DISEASE OF SMALL INTESTINE WITHOUT COMPLICATION (HCC): ICD-10-CM

## 2021-10-22 DIAGNOSIS — R10.9 FLANK PAIN: ICD-10-CM

## 2021-10-22 PROCEDURE — 4004F PT TOBACCO SCREEN RCVD TLK: CPT | Performed by: FAMILY MEDICINE

## 2021-10-22 PROCEDURE — 99214 OFFICE O/P EST MOD 30 MIN: CPT | Performed by: FAMILY MEDICINE

## 2021-10-22 RX ORDER — CIPROFLOXACIN 250 MG/1
250 TABLET, FILM COATED ORAL 2 TIMES DAILY
COMMUNITY
Start: 2021-10-22 | End: 2021-10-22 | Stop reason: SDUPTHER

## 2021-10-22 RX ORDER — NITROFURANTOIN 25; 75 MG/1; MG/1
100 CAPSULE ORAL 2 TIMES DAILY
Qty: 10 CAPSULE | Refills: 0 | Status: SHIPPED | OUTPATIENT
Start: 2021-10-22 | End: 2021-10-22

## 2021-10-22 RX ORDER — CIPROFLOXACIN 250 MG/1
250 TABLET, FILM COATED ORAL 2 TIMES DAILY
Qty: 8 TABLET | Refills: 0 | Status: SHIPPED | OUTPATIENT
Start: 2021-10-22 | End: 2021-10-26

## 2021-10-26 DIAGNOSIS — M79.7 FIBROMYALGIA: ICD-10-CM

## 2021-10-26 RX ORDER — GABAPENTIN 600 MG/1
TABLET ORAL
Qty: 120 TABLET | Refills: 2 | Status: SHIPPED | OUTPATIENT
Start: 2021-10-26 | End: 2022-01-22

## 2021-10-29 ENCOUNTER — OFFICE VISIT (OUTPATIENT)
Dept: INTERNAL MEDICINE CLINIC | Facility: CLINIC | Age: 41
End: 2021-10-29
Payer: COMMERCIAL

## 2021-10-29 ENCOUNTER — APPOINTMENT (OUTPATIENT)
Dept: LAB | Facility: CLINIC | Age: 41
End: 2021-10-29
Payer: COMMERCIAL

## 2021-10-29 VITALS
TEMPERATURE: 97.2 F | HEART RATE: 88 BPM | RESPIRATION RATE: 16 BRPM | HEIGHT: 64 IN | WEIGHT: 155 LBS | BODY MASS INDEX: 26.46 KG/M2 | OXYGEN SATURATION: 95 % | DIASTOLIC BLOOD PRESSURE: 74 MMHG | SYSTOLIC BLOOD PRESSURE: 128 MMHG

## 2021-10-29 DIAGNOSIS — R39.9 UTI SYMPTOMS: Primary | ICD-10-CM

## 2021-10-29 DIAGNOSIS — R39.9 UTI SYMPTOMS: ICD-10-CM

## 2021-10-29 DIAGNOSIS — M05.79 RHEUMATOID ARTHRITIS INVOLVING MULTIPLE SITES WITH POSITIVE RHEUMATOID FACTOR (HCC): Primary | ICD-10-CM

## 2021-10-29 PROCEDURE — 87086 URINE CULTURE/COLONY COUNT: CPT

## 2021-10-29 PROCEDURE — 99214 OFFICE O/P EST MOD 30 MIN: CPT | Performed by: INTERNAL MEDICINE

## 2021-10-29 PROCEDURE — 3008F BODY MASS INDEX DOCD: CPT | Performed by: FAMILY MEDICINE

## 2021-10-29 PROCEDURE — 81003 URINALYSIS AUTO W/O SCOPE: CPT | Performed by: INTERNAL MEDICINE

## 2021-10-29 RX ORDER — SULFAMETHOXAZOLE AND TRIMETHOPRIM 800; 160 MG/1; MG/1
1 TABLET ORAL EVERY 12 HOURS SCHEDULED
Qty: 14 TABLET | Refills: 0 | Status: SHIPPED | OUTPATIENT
Start: 2021-10-29 | End: 2021-11-05

## 2021-10-29 RX ORDER — OXYCODONE HYDROCHLORIDE 5 MG/1
5 TABLET ORAL 3 TIMES DAILY
Qty: 90 TABLET | Refills: 0 | Status: SHIPPED | OUTPATIENT
Start: 2021-10-29 | End: 2021-11-29 | Stop reason: SDUPTHER

## 2021-10-30 LAB
BILIRUB UR QL STRIP: NEGATIVE
CLARITY UR: ABNORMAL
COLOR UR: ABNORMAL
GLUCOSE UR STRIP-MCNC: NEGATIVE MG/DL
HGB UR QL STRIP.AUTO: NEGATIVE
KETONES UR STRIP-MCNC: ABNORMAL MG/DL
LEUKOCYTE ESTERASE UR QL STRIP: NEGATIVE
NITRITE UR QL STRIP: NEGATIVE
PH UR STRIP.AUTO: 5.5 [PH]
PROT UR STRIP-MCNC: NEGATIVE MG/DL
SP GR UR STRIP.AUTO: 1.03 (ref 1–1.03)
UROBILINOGEN UR QL STRIP.AUTO: 1 E.U./DL

## 2021-10-31 LAB — BACTERIA UR CULT: NORMAL

## 2021-11-02 ENCOUNTER — OFFICE VISIT (OUTPATIENT)
Dept: INTERNAL MEDICINE CLINIC | Facility: CLINIC | Age: 41
End: 2021-11-02
Payer: COMMERCIAL

## 2021-11-02 VITALS
SYSTOLIC BLOOD PRESSURE: 106 MMHG | DIASTOLIC BLOOD PRESSURE: 66 MMHG | HEIGHT: 64 IN | RESPIRATION RATE: 16 BRPM | WEIGHT: 158.7 LBS | BODY MASS INDEX: 27.1 KG/M2 | HEART RATE: 76 BPM

## 2021-11-02 DIAGNOSIS — R10.9 RIGHT FLANK DISCOMFORT: ICD-10-CM

## 2021-11-02 DIAGNOSIS — R39.9 UTI SYMPTOMS: Primary | ICD-10-CM

## 2021-11-02 DIAGNOSIS — Z29.8 NEED FOR SBE (SUBACUTE BACTERIAL ENDOCARDITIS) PROPHYLAXIS: ICD-10-CM

## 2021-11-02 PROCEDURE — 3008F BODY MASS INDEX DOCD: CPT

## 2021-11-02 PROCEDURE — 4004F PT TOBACCO SCREEN RCVD TLK: CPT

## 2021-11-02 PROCEDURE — 99214 OFFICE O/P EST MOD 30 MIN: CPT

## 2021-11-03 RX ORDER — DOXYCYCLINE HYCLATE 100 MG/1
100 CAPSULE ORAL ONCE
Qty: 1 CAPSULE | Refills: 0 | Status: SHIPPED | OUTPATIENT
Start: 2021-11-03 | End: 2021-11-03

## 2021-11-10 ENCOUNTER — TELEPHONE (OUTPATIENT)
Dept: INTERNAL MEDICINE CLINIC | Facility: CLINIC | Age: 41
End: 2021-11-10

## 2021-11-29 DIAGNOSIS — M05.79 RHEUMATOID ARTHRITIS INVOLVING MULTIPLE SITES WITH POSITIVE RHEUMATOID FACTOR (HCC): ICD-10-CM

## 2021-12-02 ENCOUNTER — TELEPHONE (OUTPATIENT)
Dept: INTERNAL MEDICINE CLINIC | Facility: CLINIC | Age: 41
End: 2021-12-02

## 2021-12-02 RX ORDER — OXYCODONE HYDROCHLORIDE 5 MG/1
5 TABLET ORAL 3 TIMES DAILY
Qty: 90 TABLET | Refills: 0 | Status: SHIPPED | OUTPATIENT
Start: 2021-12-02 | End: 2022-03-16

## 2021-12-08 ENCOUNTER — APPOINTMENT (OUTPATIENT)
Dept: LAB | Facility: CLINIC | Age: 41
End: 2021-12-08
Payer: COMMERCIAL

## 2021-12-08 DIAGNOSIS — Z79.899 METHOTREXATE, LONG TERM, CURRENT USE: ICD-10-CM

## 2021-12-08 DIAGNOSIS — I10 ESSENTIAL HYPERTENSION: ICD-10-CM

## 2021-12-08 DIAGNOSIS — K50.00 CROHN'S DISEASE OF SMALL INTESTINE WITHOUT COMPLICATION (HCC): ICD-10-CM

## 2021-12-08 DIAGNOSIS — Z11.4 ENCOUNTER FOR SCREENING FOR HIV: ICD-10-CM

## 2021-12-08 LAB
ALBUMIN SERPL BCP-MCNC: 3.7 G/DL (ref 3.5–5)
ALP SERPL-CCNC: 91 U/L (ref 46–116)
ALT SERPL W P-5'-P-CCNC: 21 U/L (ref 12–78)
ANION GAP SERPL CALCULATED.3IONS-SCNC: 6 MMOL/L (ref 4–13)
AST SERPL W P-5'-P-CCNC: 12 U/L (ref 5–45)
BASOPHILS # BLD AUTO: 0.12 THOUSANDS/ΜL (ref 0–0.1)
BASOPHILS NFR BLD AUTO: 1 % (ref 0–1)
BILIRUB SERPL-MCNC: 0.44 MG/DL (ref 0.2–1)
BUN SERPL-MCNC: 9 MG/DL (ref 5–25)
CALCIUM SERPL-MCNC: 9 MG/DL (ref 8.3–10.1)
CHLORIDE SERPL-SCNC: 108 MMOL/L (ref 100–108)
CO2 SERPL-SCNC: 24 MMOL/L (ref 21–32)
CREAT SERPL-MCNC: 0.75 MG/DL (ref 0.6–1.3)
CRP SERPL QL: 12.3 MG/L
EOSINOPHIL # BLD AUTO: 0.05 THOUSAND/ΜL (ref 0–0.61)
EOSINOPHIL NFR BLD AUTO: 0 % (ref 0–6)
ERYTHROCYTE [DISTWIDTH] IN BLOOD BY AUTOMATED COUNT: 13.4 % (ref 11.6–15.1)
GFR SERPL CREATININE-BSD FRML MDRD: 98 ML/MIN/1.73SQ M
GLUCOSE SERPL-MCNC: 122 MG/DL (ref 65–140)
HCT VFR BLD AUTO: 43 % (ref 34.8–46.1)
HGB BLD-MCNC: 14.1 G/DL (ref 11.5–15.4)
IMM GRANULOCYTES # BLD AUTO: 0.04 THOUSAND/UL (ref 0–0.2)
IMM GRANULOCYTES NFR BLD AUTO: 0 % (ref 0–2)
LYMPHOCYTES # BLD AUTO: 3.26 THOUSANDS/ΜL (ref 0.6–4.47)
LYMPHOCYTES NFR BLD AUTO: 26 % (ref 14–44)
MCH RBC QN AUTO: 29.7 PG (ref 26.8–34.3)
MCHC RBC AUTO-ENTMCNC: 32.8 G/DL (ref 31.4–37.4)
MCV RBC AUTO: 91 FL (ref 82–98)
MONOCYTES # BLD AUTO: 0.67 THOUSAND/ΜL (ref 0.17–1.22)
MONOCYTES NFR BLD AUTO: 5 % (ref 4–12)
NEUTROPHILS # BLD AUTO: 8.43 THOUSANDS/ΜL (ref 1.85–7.62)
NEUTS SEG NFR BLD AUTO: 68 % (ref 43–75)
NRBC BLD AUTO-RTO: 0 /100 WBCS
PLATELET # BLD AUTO: 407 THOUSANDS/UL (ref 149–390)
PMV BLD AUTO: 10 FL (ref 8.9–12.7)
POTASSIUM SERPL-SCNC: 4 MMOL/L (ref 3.5–5.3)
PROT SERPL-MCNC: 7.9 G/DL (ref 6.4–8.2)
RBC # BLD AUTO: 4.75 MILLION/UL (ref 3.81–5.12)
SODIUM SERPL-SCNC: 138 MMOL/L (ref 136–145)
TSH SERPL DL<=0.05 MIU/L-ACNC: 0.87 UIU/ML (ref 0.36–3.74)
WBC # BLD AUTO: 12.57 THOUSAND/UL (ref 4.31–10.16)

## 2021-12-08 PROCEDURE — 85025 COMPLETE CBC W/AUTO DIFF WBC: CPT

## 2021-12-08 PROCEDURE — 80053 COMPREHEN METABOLIC PANEL: CPT

## 2021-12-08 PROCEDURE — 84443 ASSAY THYROID STIM HORMONE: CPT

## 2021-12-08 PROCEDURE — 86140 C-REACTIVE PROTEIN: CPT

## 2021-12-08 PROCEDURE — 36415 COLL VENOUS BLD VENIPUNCTURE: CPT

## 2021-12-08 PROCEDURE — 87389 HIV-1 AG W/HIV-1&-2 AB AG IA: CPT

## 2021-12-08 PROCEDURE — 85652 RBC SED RATE AUTOMATED: CPT

## 2021-12-09 LAB
ERYTHROCYTE [SEDIMENTATION RATE] IN BLOOD: 53 MM/HOUR (ref 0–19)
HIV 1+2 AB+HIV1 P24 AG SERPL QL IA: NORMAL

## 2021-12-13 ENCOUNTER — APPOINTMENT (OUTPATIENT)
Dept: LAB | Facility: CLINIC | Age: 41
End: 2021-12-13
Payer: COMMERCIAL

## 2022-01-12 ENCOUNTER — TELEPHONE (OUTPATIENT)
Dept: GASTROENTEROLOGY | Facility: CLINIC | Age: 42
End: 2022-01-12

## 2022-01-12 NOTE — TELEPHONE ENCOUNTER
Patient called to reschedule her appointment that she missed today  She is asking for refills on her stelara and dronabinol  Thank you!

## 2022-01-22 DIAGNOSIS — M79.7 FIBROMYALGIA: ICD-10-CM

## 2022-01-22 RX ORDER — GABAPENTIN 600 MG/1
TABLET ORAL
Qty: 120 TABLET | Refills: 2 | Status: SHIPPED | OUTPATIENT
Start: 2022-01-22 | End: 2022-05-27 | Stop reason: SDUPTHER

## 2022-02-21 ENCOUNTER — OFFICE VISIT (OUTPATIENT)
Dept: GASTROENTEROLOGY | Facility: CLINIC | Age: 42
End: 2022-02-21
Payer: COMMERCIAL

## 2022-02-21 VITALS
SYSTOLIC BLOOD PRESSURE: 129 MMHG | HEART RATE: 68 BPM | BODY MASS INDEX: 26.87 KG/M2 | DIASTOLIC BLOOD PRESSURE: 70 MMHG | WEIGHT: 146 LBS | HEIGHT: 62 IN

## 2022-02-21 DIAGNOSIS — F17.210 CIGARETTE SMOKER: ICD-10-CM

## 2022-02-21 DIAGNOSIS — K58.0 IRRITABLE BOWEL SYNDROME WITH DIARRHEA: ICD-10-CM

## 2022-02-21 DIAGNOSIS — K21.9 GASTROESOPHAGEAL REFLUX DISEASE WITHOUT ESOPHAGITIS: ICD-10-CM

## 2022-02-21 DIAGNOSIS — K50.80 CROHN'S DISEASE OF BOTH SMALL AND LARGE INTESTINE WITHOUT COMPLICATION (HCC): Primary | ICD-10-CM

## 2022-02-21 PROCEDURE — 4004F PT TOBACCO SCREEN RCVD TLK: CPT | Performed by: INTERNAL MEDICINE

## 2022-02-21 PROCEDURE — 3008F BODY MASS INDEX DOCD: CPT | Performed by: INTERNAL MEDICINE

## 2022-02-21 PROCEDURE — 99214 OFFICE O/P EST MOD 30 MIN: CPT | Performed by: INTERNAL MEDICINE

## 2022-02-21 RX ORDER — VARENICLINE TARTRATE 1 MG/1
1 TABLET, FILM COATED ORAL 2 TIMES DAILY
Qty: 60 TABLET | Refills: 0 | Status: SHIPPED | OUTPATIENT
Start: 2022-02-21

## 2022-02-21 NOTE — PROGRESS NOTES
[facility] - Outpatient Consultation  Chela Ames 35 y o  female MRN: [id number]  Encounter: [id number]          ASSESSMENT AND PLAN:      1  Crohn's disease of both small and large intestine without complication ([address] )  Continue with stelara    subcu every 8 weeks, she is due for blood test which we ordered including CBC, BMP, LFT, ESR, C-reactive protein, long discussion with the patient regarding smoking cessation, she agreed to quit, she currently smokes 1 pack per day, will prescribe Chantix to try to wean off from smoking which will help with the Crohn's flare-up  She is due for colonoscopy which will schedule it  - Colonoscopy; Future  - CBC and differential; Future  - Comprehensive metabolic panel; Future  - Sedimentation rate, automated; Future  - C-reactive protein; Future    2  Cigarette smoker  Long discussion with patient about smoking and increased risk for the flare-up of Crohn's disease, she currently smokes 1 pack per day, she is willing to quit, she needed help, she is asking to prescribe Chantix which will send it for 1 month supply  - varenicline (CHANTIX) 1 mg tablet; Take 1 tablet (1 mg total) by mouth 2 (two) times a day  Dispense: 60 tablet; Refill: 0    3  Gastroesophageal reflux disease without esophagitis  Continue with Protonix 40 mg p o  q daily   - EGD; Future    4  Irritable bowel syndrome with diarrhea  She stop taking dicyclomine, continue with amitriptyline    5   Chronic nausea and lack of appetite- continue with Marinol 5 mg twice a day  ______________________________________________________________________    HPI:  35 year female with history of Crohn's disease, history of psoriatic arthritis, history of irritable bowel syndrome, chronic GERD, family history of colon cancer, now comes with complaint of diarrhea, abdominal pain, multiple bruises in the skin and worsening of reflux symptoms, she is currently on Stelara 90 mg subcu injection every 8 weekly along with pantoprazole 40 mg p o  q day and Marinol 5 mg twice a day  She is chronic smoker, smoked 1 pack per day, she denies any IV drug use, she denies any NSAID use, she also has psoriatic arthritis and currently on methotrexate  REVIEW OF SYSTEMS:    CONSTITUTIONAL: Denies any fever, chills, rigors, and weight loss  HEENT: No earache or tinnitus  Denies hearing loss or visual disturbances  CARDIOVASCULAR: No chest pain or palpitations  RESPIRATORY: Denies any cough, hemoptysis, shortness of breath or dyspnea on exertion  GASTROINTESTINAL: As noted in the History of Present Illness  GENITOURINARY: No problems with urination  Denies any hematuria or dysuria  NEUROLOGIC: No dizziness or vertigo, denies headaches  MUSCULOSKELETAL: Denies any muscle or joint pain  SKIN: Denies skin rashes or itching  ENDOCRINE: Denies excessive thirst  Denies intolerance to heat or cold  PSYCHOSOCIAL: Denies depression or anxiety  Denies any recent memory loss         Historical Information   Past Medical History:   Diagnosis Date    Cervical [place] ([place] [address] )     Crohn disease ([place] [address] )     Endocarditis     Fibromyalgia     Gastritis     Hypertension     RA (rheumatoid arthritis) ([place] [address] )     Vertigo      Past Surgical History:   Procedure Laterality Date    APPENDECTOMY      BRONCHOSCOPY      multiple    COLONOSCOPY       Social History   Social History     Substance and Sexual Activity   Alcohol Use No     Social History     Substance and Sexual Activity   Drug Use Not Currently    Types: Marijuana     Social History     Tobacco Use   Smoking Status Current Every Day Smoker    Packs/day: 1 00    Types: Cigarettes   Smokeless Tobacco Never Used     Family History   Problem Relation Age of Onset    Colon cancer Brother 28    Crohn's disease Brother        Meds/Allergies       Current Outpatient Medications:     amitriptyline (ELAVIL) 100 mg tablet    divalproex sodium (DEPAKOTE) 250 mg EC tablet   dronabinol (MARINOL) 5 MG capsule    folic acid (FOLVITE) 1 mg tablet    gabapentin (NEURONTIN) 600 MG tablet    methotrexate 2 5 mg tablet    montelukast (SINGULAIR) 10 mg tablet    oxyCODONE (ROXICODONE) 5 immediate release tablet    pantoprazole (PROTONIX) 40 mg tablet    SUMAtriptan (IMITREX) 100 mg tablet    ustekinumab (Stelara) 90 mg/mL subcutaneous injection    VITAMIN D PO    predniSONE 10 mg tablet    varenicline (CHANTIX) 1 mg tablet    Allergies   Allergen Reactions    Ketorolac Hives    Penicillins Hives    Ketorolac Tromethamine Rash           Objective     Blood pressure 129/70, pulse 68, height 5' 2" (1 575 m), weight 66 2 kg (146 lb)  Body mass index is 26 7 kg/m²  PHYSICAL EXAM:      General Appearance:   Alert, cooperative, no distress   HEENT:   Normocephalic, atraumatic, anicteric      Neck:  Supple, symmetrical, trachea midline   Lungs:   Clear to auscultation bilaterally; no rales, rhonchi or wheezing; respirations unlabored    Heart[de-identified]   Regular rate and rhythm; no murmur, rub, or gallop  Abdomen:   Soft, non-tender, non-distended; normal bowel sounds; no masses, no organomegaly    Genitalia:   Deferred    Rectal:   Deferred    Extremities:  No cyanosis, clubbing or edema    Pulses:  2+ and symmetric    Skin:  No jaundice, rashes, or lesions    Lymph nodes:  No palpable cervical lymphadenopathy        Lab Results:   No visits with results within 1 Day(s) from this visit  Latest known visit with results is:   Appointment on 12/10/2021   Component Date Value    RPR 12/13/2021 Non-Reactive          Radiology Results:   No results found

## 2022-03-09 ENCOUNTER — OFFICE VISIT (OUTPATIENT)
Dept: RHEUMATOLOGY | Facility: CLINIC | Age: 42
End: 2022-03-09
Payer: COMMERCIAL

## 2022-03-09 ENCOUNTER — TELEPHONE (OUTPATIENT)
Dept: RHEUMATOLOGY | Facility: CLINIC | Age: 42
End: 2022-03-09

## 2022-03-09 VITALS — SYSTOLIC BLOOD PRESSURE: 102 MMHG | DIASTOLIC BLOOD PRESSURE: 78 MMHG | HEART RATE: 87 BPM

## 2022-03-09 DIAGNOSIS — M19.90 INFLAMMATORY ARTHRITIS: Primary | ICD-10-CM

## 2022-03-09 DIAGNOSIS — Z79.899 LONG-TERM USE OF IMMUNOSUPPRESSANT MEDICATION: ICD-10-CM

## 2022-03-09 DIAGNOSIS — K50.10 CROHN'S DISEASE OF COLON WITHOUT COMPLICATION (HCC): ICD-10-CM

## 2022-03-09 DIAGNOSIS — L40.9 PSORIASIS: ICD-10-CM

## 2022-03-09 DIAGNOSIS — M79.7 FIBROMYALGIA: ICD-10-CM

## 2022-03-09 DIAGNOSIS — Z79.899 METHOTREXATE, LONG TERM, CURRENT USE: ICD-10-CM

## 2022-03-09 DIAGNOSIS — F11.90 OPIOID USE: ICD-10-CM

## 2022-03-09 PROCEDURE — 4004F PT TOBACCO SCREEN RCVD TLK: CPT | Performed by: INTERNAL MEDICINE

## 2022-03-09 PROCEDURE — 99215 OFFICE O/P EST HI 40 MIN: CPT | Performed by: INTERNAL MEDICINE

## 2022-03-09 RX ORDER — FOLIC ACID 1 MG/1
1 TABLET ORAL DAILY
Qty: 90 TABLET | Refills: 3 | Status: SHIPPED | OUTPATIENT
Start: 2022-03-09 | End: 2022-05-28 | Stop reason: SDUPTHER

## 2022-03-09 NOTE — PROGRESS NOTES
Assessment and Plan:   Ms Dwaine Scott a 40-year-old female with history significant for Crohn's disease diagnosed in 1996, psoriasis, fibromyalgia/chronic pain syndrome and possible peripheral spondyloarthropathy related to psoriasis/IBD who presents for a follow up  She is currently on Stelara injections every 6 weeks through her gastroenterologist         Rheumatic Summary:  1) Diagnosed with Crohn's disease in 1996 - started on Enbrel+mesalamine+6-MP+steroids x 8 years - d/c'ed due to inefficacy  - Managed with chronic steroids until 2014 then started on Humira till 8/2019 - d/c'ed due to decline in efficacy  - Started Stelara 2/2020-present - IBD stable  2) ? Inflammatory arthritis (negative RF, borderline CCP of 20) - arthralgias since 2000 - no improvement with high doses steroids, Enbrel, Humira or Stelara  - 7/13/21: continue Stelara and add on MTX, prednisone 20 mg daily x 10 days  - 3/9/22: continue Stelara through GI  Add on injectable MTX   3) Fibromyalgia - currently gabapentin 600 mg QID, oxycodone 5 mg TID PRN - minimal improvement  - Previously been on amitriptyline and Dilaudid - no significant improvement  4) Psoriasis diagnosed at the age of 4         # Inflammatory arthritis likely secondary to psoriasis vs IBD  - Jonathon Brush presents today for a follow up of diffuse arthralgias and myalgias which have been gradually progressive over the past 15 years +  Given the widespread and chronic complaints, as well as a lack of benefit with prior medications including high doses of prednisone, etanercept and adalimumab (and now Stelara since February 2020), I had considered her diagnosis to be secondary to fibromyalgia      - Apart from the fibromyalgia I question if she may be presenting with a concomitant inflammatory arthritis secondary to the underlying psoriasis/inflammatory bowel disease, given the focal joint involvement and evidence of fluctuating joint swelling noted    In view of this I recommend adding on DMARD therapy and we have previously discussed methotrexate as an initial option but she has not done this consistently due to concerns for potential side effects  She is willing to retry oral methotrexate at 10 mg once weekly with folic acid 1 mg daily and in the meanwhile I will start an authorization for injectable methotrexate 15 mg once weekly in order to prevent GI related side effects with the oral formulation  Once she starts the methotrexate she will update high-risk medication lab monitoring in 4 weeks  I advised her while she is on the Stelara other biologic medications cannot be considered and we will have to see how she responds to a combination of the Stelara with non-biologic DMARDs (sulfasalazine can also be considered)     - For management of the chronic pain syndrome she will follow up with Pain Management and is currently on gabapentin 600 mg 4 times a day along with oxycodone 5 mg TID PRN  Plan:  Diagnoses and all orders for this visit:    Inflammatory arthritis  -     methotrexate 2 5 mg tablet; Take 4 tabs once weekly  Crohn's disease of colon without complication ([facility] [address] )    Psoriasis    Long-term use of immunosuppressant medication    Methotrexate, long term, current use  -     folic acid (FOLVITE) 1 mg tablet; Take 1 tablet (1 mg total) by mouth daily  -     CBC and differential; Future  -     Comprehensive metabolic panel; Future  -     C-reactive protein; Future  -     Sedimentation rate, automated; Future    Fibromyalgia    Opioid use      Activities as tolerated  Exercise: try to maintain a low impact exercise regimen as much as possible  Walk for 30 minutes a day for at least 3 days a week  Continue other medications as prescribed by PCP and other specialists  RTC in 4 months          HPI    INITIAL VISIT NOTE (4/2020):  Ms Adam Tiwari a 68-year-old female with history significant for Crohn's disease diagnosed in 1996, fibromyalgia, 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 (currently on oral bisphosphonate therapy) and psoriasis, who presents for further evaluation of diffuse joint pains   She is referred by Dr rBi Doss a rheumatology consult      Patient reports she was initially evaluated by Gastroenterology in [address] [place] [address] when she was diagnosed with Crohn's disease   She mentions at that time she was started on a combination of Enbrel (possibly for the joint pains), mesalamine and 6 mercaptopurine in association with steroids   She reports being on this regimen for approximately 8 years following which it was discontinued due to a decline in efficacy   She reports overall since her diagnosis in 1996 she has been on chronic intermittent doses of prednisone, with her last course about 1 month ago  Emiliano Pope will typically start off at doses of 40 mg once daily and taper down, with the lower doses causing a flare-up of the colitis   Of note she mentions that even the higher doses of prednisone have never helped with her joint pains   She states following her initial regimen she was then switched to Humira which she was on for about 5 years, and again it was discontinued in August 2019 due to a decline in efficacy   She has since started Port fredo on February 6, 2020 and has thus far completed the initial dosing  Emiliano Toshia is now on a maintenance dosing of 1 injection every 8 weeks  Emiliano Pope has not yet noticed if the Port fredo is helping with her joint pains  Emiliano Pope mentions her last colonoscopy was done in December 2019 and this was consistent with active pancolitis   She follows with [facility]  Emiliano Pope reports she is continuing to have loose stools with occasional mucus, but denies bloody diarrhea or abdominal pain      She was previously seen by Rheumatology in [address] [place] [address] and continued follow-up with them until she relocated to [place] approximately 7 years ago  Emiliano Pope states since then her medications have mostly been managed by her primary care doctor and Gastroenterology  Hannah Cameron has previously been on gabapentin for a diagnosis of fibromyalgia, but states that this was recently discontinued and she was advised to follow up with pain management   She is receiving hydromorphone 4 mg twice daily from pain management at this time, and states that it only helps to a mild degree with her body pain      She reports her joint pains have been ongoing for the past 15 years and have been progressively worsening over time to the point that she is noticing them on a daily basis in a constant manner, including during her sleep at night   As mentioned she has previously been on gabapentin up to 600 mg 3 times daily and amitriptyline 100 mg at bedtime which would help her, but this regimen was discontinued by her primary care doctor and she was advised to follow-up with Rheumatology and Pain Management   She states that the joint pains affect her all over but mostly with her knees, ankles, hands and low back   She reports the low back pain will wake her up from sleep at night and is also associated with morning stiffness of up to 2 hours   The back pain worsens with activities and is also worsened at rest   She also describes pain affecting her diffusely throughout her muscles   She has noticed occasional swelling affecting her fingers and knees   She experiences morning stiffness which affects her diffusely and takes about 3-4 hours to improve   She refrains from NSAID use due to her history of colitis      She reports as a result of being on chronic steroids she has developed osteopenia/osteoporosis   Her last DEXA scan was done approximately 1 year ago, and I do not have the records available for review   She does not take daily calcium supplements but does take vitamin-D replacement therapy  Hannah Cameron is also on Fosamax once weekly      She reports a history of blurred vision but denies eye pain or redness   She denies a history of inflammatory eye disease  Hannah Cameron has been diagnosed with psoriasis at the age of 4 which will typically affect her arms and legs   She reports a family history of psoriasis in her father as well as multiple family members on her mother's side with inflammatory bowel disease   She denies current fevers, unintentional weight loss, mouth/nose ulcers, swollen glands, blood clots or Raynaud's      Labs in our system show a negative rheumatoid factor         7/28/2020:  Patient presents for a follow-up today  Vinay Zamorano had to convert to a virtual visit as she had a temperature on arrival   She did not have a chance to get the blood work and x-rays done following the last office visit, as she states that she did not receive a copy in the mail      She has now been on the Port fredo since February 2020 and does not feel like it is helping with the bowel disease as she continues to have multiple episodes of diarrhea and mucus in her stool  Arherbert Wisdom has also not noticed any change in her joint pains, but states that it has helped with the psoriasis   I did start her on gabapentin at 300 mg 3 times daily at the last office visit for fibromyalgia, and she feels like this helped to a certain degree   She is continuing to experience pain mostly in her neck, upper back region/shoulders, elbows, hands and knees   She has noticed swelling of her hands, knees, ankles and feet   She does experience morning stiffness which affects her diffusely mostly in her back region and can persist throughout the day when it occurs   She reports usually 4 days out of the week will be severe for her and may be weather dependent   She is not taking any over-the-counter pain medications at this time      She did try to send me pictures of her hand swelling following the last visit, but unfortunately this got scanned in as black and white, and it is challenging to appreciate any abnormalities   I did also receive her DEXA scan report that was done in October 2019 and appears to be normal for her age         3/10/2021:  Patient presents for a follow-up today  Sanju Tucker did review her labs done after the last office visit which showed a borderline elevated anti CCP antibody of 20   An ESR, CRP and hepatitis panel were unremarkable      At the last office visit we had discussed starting methotrexate, which she took for approximately 3 weeks but as it was causing gastrointestinal side effects she discontinued it  Jessica Waller thinks this may have been related to significant personal stressors as well and would like to give the methotrexate a retry  Jessica Waller is still on Stelara for the Crohn's disease through her gastroenterologist  Jessica Waller is prescribed Dilaudid by her pain management specialist  Sunnyadrianne Ashutoshmiquel has been taking the gabapentin 600 mg 4 times daily which does help   She reports generally during the cooler weather she feels better and her symptoms may flare in the summer   She is continuing to experience pain mostly in her neck, upper back region/shoulders, elbows, hands and knees   She has noticed swelling of her hands, knees, ankles and feet   She does experience morning stiffness which affects her diffusely mostly in her back region and can persist throughout the day when it occurs          7/13/2021:  Patient presents for a follow up today  At the last office visit to address the possibility of an inflammatory arthritis secondary to the psoriasis/IBD I had discussed starting her on methotrexate 10 mg once weekly, but patient states she only took one dose and then discontinued it due to concerns for potential side effects  She is currently receiving Stelara injections every 3 months primarily for the IBD      She has been experiencing a flare up of joint pains over the past few weeks which is effecting her hands, low back, knees, ankles and feet  She has noticed swelling of her hands (her primary care physician described this to her as "sausage digits", but this is not evident on her exam today), ankles and feet    She experiences morning stiffness primarily of her lower body that can take hours to improve  She is following with pain management and is currently on gabapentin 600 mg four times daily and oxycodone 5 mg three times daily as needed  The Dilaudid was discontinued  These medications are not helping with her pain      She denies psoriasis or a flare up of the inflammatory bowel disease  3/9/2022:  Patient presents for a follow-up of possible inflammatory arthritis secondary to the psoriasis/IBD  At the last office visit I had discussed starting her on methotrexate but she did not do this due to concerns for potential side effects  She is only on Stelara injections every 6 weeks at this moment primarily for the inflammatory bowel disease  She reports her joint pains are again flaring and this primarily affect her hands, knees and ankles where she will also notice swelling  She experiences stiffness mostly in the night and to some degree in the morning but this improves with activities  She does take the gabapentin 600 mg 4 times a day and oxycodone 5 mg 3 times a day as needed  The following portions of the patient's history were reviewed and updated as appropriate: allergies, current medications, past family history, past medical history, past social history, past surgical history and problem list       Review of Systems  Constitutional: Negative for weight change, fevers, chills, night sweats, fatigue  ENT/Mouth: Negative for hearing changes, ear pain, nasal congestion, sinus pain, hoarseness, sore throat, rhinorrhea, swallowing difficulty  Eyes: Negative for pain, redness, discharge, vision changes  Cardiovascular: Negative for chest pain, SOB, palpitations  Respiratory: Negative for cough, sputum, wheezing, dyspnea  Gastrointestinal: Negative for nausea, vomiting, diarrhea, constipation, pain, heartburn  Genitourinary: Negative for dysuria, urinary frequency, hematuria     Musculoskeletal: As per HPI  Skin: Negative for skin rash, color changes  Neuro: Negative for weakness, numbness, tingling, loss of consciousness  Psych: Negative for anxiety, depression  Heme/Lymph: Negative for easy bruising, bleeding, lymphadenopathy          Past Medical History:   Diagnosis Date    Cervical cancer ([place] [address] )     Crohn disease ([place] [address] )     Endocarditis     Fibromyalgia     Gastritis     Hypertension     RA (rheumatoid arthritis) ([place] [address] )     Vertigo        Past Surgical History:   Procedure Laterality Date    APPENDECTOMY      BRONCHOSCOPY      multiple    COLONOSCOPY         Social History     Socioeconomic History    Marital status: /Civil Union     Spouse name: Not on file    Number of children: Not on file    Years of education: Not on file    Highest education level: Not on file   Occupational History    Not on file   Tobacco Use    Smoking status: Current Every Day Smoker     Packs/day: 1 00     Types: Cigarettes    Smokeless tobacco: Never Used   Vaping Use    Vaping Use: Never used   Substance and Sexual Activity    Alcohol use: No    Drug use: Not Currently     Types: Marijuana    Sexual activity: Not Currently   Other Topics Concern    Not on file   Social History Narrative    Has a      Social Determinants of Health     Financial Resource Strain: Not on file   Food Insecurity: Not on file   Transportation Needs: Not on file   Physical Activity: Inactive    Days of Exercise per Week: 0 days    Minutes of Exercise per Session: 0 min   Stress: Stress Concern Present    Feeling of Stress : Very much   Social Connections: Not on file   Intimate Partner Violence: Not on file   Housing Stability: Not on file       Family History   Problem Relation Age of Onset    Colon cancer Brother 28    Crohn's disease Brother        Allergies   Allergen Reactions    Ketorolac Hives    Penicillins Hives    Ketorolac Tromethamine Rash       Current Outpatient Medications:    amitriptyline (ELAVIL) 100 mg tablet, Take 1 tablet (100 mg total) by mouth daily at bedtime, Disp: 100 tablet, Rfl: 1    divalproex sodium (DEPAKOTE) 250 mg EC tablet, Take 250 mg by mouth 2 (two) times a day  , Disp: , Rfl:     dronabinol (MARINOL) 5 MG capsule, Take 1 capsule (5 mg total) by mouth 2 (two) times a day before meals, Disp: 60 capsule, Rfl: 2    folic acid (FOLVITE) 1 mg tablet, Take 1 tablet (1 mg total) by mouth daily, Disp: 90 tablet, Rfl: 3    gabapentin (NEURONTIN) 600 MG tablet, take 1 tablet by mouth four times a day, Disp: 120 tablet, Rfl: 2    methotrexate 2 5 mg tablet, Take 4 tabs once weekly  , Disp: 20 tablet, Rfl: 2    montelukast (SINGULAIR) 10 mg tablet, Take 10 mg by mouth daily at bedtime, Disp: , Rfl:     oxyCODONE (ROXICODONE) 5 immediate release tablet, Take 1 tablet (5 mg total) by mouth 3 (three) times a day Max Daily Amount: 15 mg, Disp: 90 tablet, Rfl: 0    pantoprazole (PROTONIX) 40 mg tablet, Take 40 mg by mouth daily, Disp: , Rfl:     predniSONE 10 mg tablet, 4 DAILY FOR 3 DAYS,3 DAILY FOR 3 DAYS,2 DAILY FOR 3 DAYS, ONE DAILY FOR 3 DAYS (Patient not taking: Reported on 2/21/2022 ), Disp: 30 tablet, Rfl: 0    SUMAtriptan (IMITREX) 100 mg tablet, Take 1 tablet by mouth once as needed  , Disp: , Rfl:     ustekinumab (Stelara) 90 mg/mL subcutaneous injection, Inject 1 syringe subcutaneously every 8 weeks, Disp: 1 mL, Rfl: 3    varenicline (CHANTIX) 1 mg tablet, Take 1 tablet (1 mg total) by mouth 2 (two) times a day, Disp: 60 tablet, Rfl: 0    VITAMIN D PO, Take by mouth daily , Disp: , Rfl:       Objective:    Vitals:    03/09/22 1338   BP: 102/78   Pulse: 87       Physical Exam  General: Well appearing, well nourished, in no distress  Oriented x 3, normal mood and affect  Ambulating without difficulty  Skin: Good turgor, no rash, unusual bruising or prominent lesions  Hair: Normal texture and distribution  Nails: Normal color, no deformities    HEENT:  Head: Normocephalic, atraumatic  Eyes: Conjunctiva clear, sclera non-icteric, EOM intact  Extremities: No amputations or deformities, cyanosis, edema  Musculoskeletal:  There is no significant tenderness or soft tissue swelling noted of her hands, knees or ankles on today's examination  Neurologic: Alert and oriented  No focal neurological deficits appreciated  Psychiatric: Normal mood and affect  RONEL Reddy    Rheumatology

## 2022-03-09 NOTE — TELEPHONE ENCOUNTER
Please start prior authorization for subcutaneous injectable methotrexate 15 mg once weekly for spondyloarthritis  She was unable to tolerate oral methotrexate  Can do the authorization for generic methotrexate or Rasuvo/Otrexup  Thanks

## 2022-03-14 RX ORDER — METHOTREXATE 15 MG/.3ML
15 INJECTION, SOLUTION SUBCUTANEOUS
Qty: 1.2 ML | Refills: 5 | Status: SHIPPED | OUTPATIENT
Start: 2022-03-14 | End: 2022-05-28 | Stop reason: SDUPTHER

## 2022-03-14 NOTE — TELEPHONE ENCOUNTER
Prior Leesa Arce is not needed if we order the Rasuvo Vials as opposed to the auto injector  So can we please send an RX to [address] I placed in patients chart, thank you  And I will let patient know

## 2022-03-14 NOTE — TELEPHONE ENCOUNTER
Sent, please let her know  If there are any issues with receiving the medication please ask her to call us back  Once she starts the injectable methotrexate she will discontinue the oral methotrexate

## 2022-03-15 DIAGNOSIS — M05.79 RHEUMATOID ARTHRITIS INVOLVING MULTIPLE SITES WITH POSITIVE RHEUMATOID FACTOR (HCC): ICD-10-CM

## 2022-03-16 ENCOUNTER — TELEPHONE (OUTPATIENT)
Dept: GASTROENTEROLOGY | Facility: CLINIC | Age: 42
End: 2022-03-16

## 2022-03-16 RX ORDER — OXYCODONE HYDROCHLORIDE 5 MG/1
TABLET ORAL
Qty: 90 TABLET | Refills: 0 | Status: SHIPPED | OUTPATIENT
Start: 2022-03-16

## 2022-03-16 NOTE — TELEPHONE ENCOUNTER
In January RX for Marinol was sent to her pharmacy, with her insurance cost was too High  She now has [facility] and it needs prior [employer]    Please obtain auth  Thank you

## 2022-03-17 DIAGNOSIS — R11.14 BILIOUS VOMITING WITH NAUSEA: Primary | ICD-10-CM

## 2022-03-17 RX ORDER — ONDANSETRON 4 MG/1
4 TABLET, FILM COATED ORAL EVERY 8 HOURS PRN
Qty: 30 TABLET | Refills: 1 | Status: SHIPPED | OUTPATIENT
Start: 2022-03-17

## 2022-03-17 RX ORDER — PROCHLORPERAZINE MALEATE 5 MG/1
5 TABLET ORAL EVERY 6 HOURS PRN
Qty: 30 TABLET | Refills: 0 | Status: SHIPPED | OUTPATIENT
Start: 2022-03-17

## 2022-03-17 NOTE — TELEPHONE ENCOUNTER
Patient's insurance changed to [facility] and was asked to obtain a prior auth for Marinol since the change  I did speak to Laureen Spatz at [facility] tried to do the prior auth and it is going to be denied because Marinol needs proper documentation faxed to them that states has a dx of cancer, receiving chemotherapy, vomiting and even the anorexia only if associated with chemo or treatments for aids or hiv  It will be denied  Can you please advise if there is something else you would like to prescribe

## 2022-03-29 ENCOUNTER — TELEPHONE (OUTPATIENT)
Dept: GASTROENTEROLOGY | Facility: CLINIC | Age: 42
End: 2022-03-29

## 2022-03-29 DIAGNOSIS — K50.00 CROHN'S DISEASE OF SMALL INTESTINE WITHOUT COMPLICATION (HCC): ICD-10-CM

## 2022-03-29 RX ORDER — USTEKINUMAB 90 MG/ML
INJECTION, SOLUTION SUBCUTANEOUS
Qty: 2 ML | Refills: 2 | Status: SHIPPED | OUTPATIENT
Start: 2022-03-29

## 2022-03-30 ENCOUNTER — PREP FOR PROCEDURE (OUTPATIENT)
Dept: GASTROENTEROLOGY | Facility: CLINIC | Age: 42
End: 2022-03-30

## 2022-03-30 DIAGNOSIS — K21.9 GASTROESOPHAGEAL REFLUX DISEASE WITHOUT ESOPHAGITIS: ICD-10-CM

## 2022-03-30 DIAGNOSIS — K50.80 CROHN'S DISEASE OF BOTH SMALL AND LARGE INTESTINE WITHOUT COMPLICATION (HCC): Primary | ICD-10-CM

## 2022-03-30 DIAGNOSIS — K58.0 IRRITABLE BOWEL SYNDROME WITH DIARRHEA: ICD-10-CM

## 2022-04-01 ENCOUNTER — TELEPHONE (OUTPATIENT)
Dept: OBGYN CLINIC | Facility: CLINIC | Age: 42
End: 2022-04-01

## 2022-04-01 NOTE — TELEPHONE ENCOUNTER
Dr Hali Opitz from [facility] called to check on the prior authorization for [facility]  Please call her [phone]    Thank you

## 2022-04-02 DIAGNOSIS — K50.00 CROHN'S DISEASE OF SMALL INTESTINE WITHOUT COMPLICATION (HCC): ICD-10-CM

## 2022-04-02 RX ORDER — AMITRIPTYLINE HYDROCHLORIDE 100 MG/1
TABLET, FILM COATED ORAL
Qty: 100 TABLET | Refills: 1 | Status: SHIPPED | OUTPATIENT
Start: 2022-04-02

## 2022-04-19 ENCOUNTER — TELEPHONE (OUTPATIENT)
Dept: GASTROENTEROLOGY | Facility: HOSPITAL | Age: 42
End: 2022-04-19

## 2022-04-19 ENCOUNTER — TELEPHONE (OUTPATIENT)
Dept: RHEUMATOLOGY | Facility: CLINIC | Age: 42
End: 2022-04-19

## 2022-04-19 DIAGNOSIS — M19.90 INFLAMMATORY ARTHRITIS: Primary | ICD-10-CM

## 2022-04-19 RX ORDER — METHOTREXATE 25 MG/ML
15 INJECTION INTRA-ARTERIAL; INTRAMUSCULAR; INTRATHECAL; INTRAVENOUS WEEKLY
Qty: 4 ML | Refills: 5 | Status: SHIPPED | OUTPATIENT
Start: 2022-04-19

## 2022-04-19 NOTE — TELEPHONE ENCOUNTER
Lori raman and I gave them a Verbal for patients Rasuvo prefilled syringes  Patient Marta Olsen called back and gave her the lab results and medication sent to pharmacy.

## 2022-04-19 NOTE — TELEPHONE ENCOUNTER
[address] [place] [address] calling back with questions, needed to speak to office about Rasuvo prefilled syringes  Transferred call   Davonte Trammell

## 2022-04-26 ENCOUNTER — ANESTHESIA EVENT (OUTPATIENT)
Dept: GASTROENTEROLOGY | Facility: HOSPITAL | Age: 42
End: 2022-04-26

## 2022-04-26 ENCOUNTER — HOSPITAL ENCOUNTER (OUTPATIENT)
Dept: GASTROENTEROLOGY | Facility: HOSPITAL | Age: 42
Setting detail: OUTPATIENT SURGERY
Discharge: HOME | End: 2022-04-26
Attending: INTERNAL MEDICINE
Payer: COMMERCIAL

## 2022-04-26 ENCOUNTER — TELEPHONE (OUTPATIENT)
Dept: OBGYN CLINIC | Facility: HOSPITAL | Age: 42
End: 2022-04-26

## 2022-04-26 ENCOUNTER — ANESTHESIA (OUTPATIENT)
Dept: GASTROENTEROLOGY | Facility: HOSPITAL | Age: 42
End: 2022-04-26

## 2022-04-26 VITALS
WEIGHT: 163 LBS | HEIGHT: 61 IN | DIASTOLIC BLOOD PRESSURE: 60 MMHG | TEMPERATURE: 98 F | RESPIRATION RATE: 16 BRPM | OXYGEN SATURATION: 98 % | HEART RATE: 79 BPM | BODY MASS INDEX: 30.78 KG/M2 | SYSTOLIC BLOOD PRESSURE: 115 MMHG

## 2022-04-26 DIAGNOSIS — K58.0 IRRITABLE BOWEL SYNDROME WITH DIARRHEA: ICD-10-CM

## 2022-04-26 DIAGNOSIS — K21.9 GASTROESOPHAGEAL REFLUX DISEASE WITHOUT ESOPHAGITIS: ICD-10-CM

## 2022-04-26 DIAGNOSIS — K50.80 CROHN'S DISEASE OF BOTH SMALL AND LARGE INTESTINE WITHOUT COMPLICATION (HCC): ICD-10-CM

## 2022-04-26 PROBLEM — R56.9 SEIZURES (HCC): Status: ACTIVE | Noted: 2022-04-26

## 2022-04-26 PROBLEM — F17.200 SMOKING: Status: ACTIVE | Noted: 2017-12-29

## 2022-04-26 LAB
EXT PREGNANCY TEST URINE: NEGATIVE
EXT. CONTROL: NORMAL

## 2022-04-26 PROCEDURE — 81025 URINE PREGNANCY TEST: CPT | Performed by: ANESTHESIOLOGY

## 2022-04-26 PROCEDURE — 45378 DIAGNOSTIC COLONOSCOPY: CPT | Performed by: INTERNAL MEDICINE

## 2022-04-26 PROCEDURE — 88305 TISSUE EXAM BY PATHOLOGIST: CPT | Performed by: PATHOLOGY

## 2022-04-26 PROCEDURE — 43239 EGD BIOPSY SINGLE/MULTIPLE: CPT | Performed by: INTERNAL MEDICINE

## 2022-04-26 RX ORDER — SUCRALFATE ORAL 1 G/10ML
1 SUSPENSION ORAL 4 TIMES DAILY
Qty: 420 ML | Refills: 1 | Status: SHIPPED | OUTPATIENT
Start: 2022-04-26

## 2022-04-26 RX ORDER — PROPOFOL 10 MG/ML
INJECTION, EMULSION INTRAVENOUS AS NEEDED
Status: DISCONTINUED | OUTPATIENT
Start: 2022-04-26 | End: 2022-04-26

## 2022-04-26 RX ORDER — SODIUM CHLORIDE, SODIUM LACTATE, POTASSIUM CHLORIDE, CALCIUM CHLORIDE 600; 310; 30; 20 MG/100ML; MG/100ML; MG/100ML; MG/100ML
INJECTION, SOLUTION INTRAVENOUS CONTINUOUS PRN
Status: DISCONTINUED | OUTPATIENT
Start: 2022-04-26 | End: 2022-04-26

## 2022-04-26 RX ORDER — SODIUM CHLORIDE, SODIUM LACTATE, POTASSIUM CHLORIDE, CALCIUM CHLORIDE 600; 310; 30; 20 MG/100ML; MG/100ML; MG/100ML; MG/100ML
75 INJECTION, SOLUTION INTRAVENOUS CONTINUOUS
Status: DISCONTINUED | OUTPATIENT
Start: 2022-04-26 | End: 2022-04-30 | Stop reason: HOSPADM

## 2022-04-26 RX ADMIN — SODIUM CHLORIDE, SODIUM LACTATE, POTASSIUM CHLORIDE, AND CALCIUM CHLORIDE: .6; .31; .03; .02 INJECTION, SOLUTION INTRAVENOUS at 09:43

## 2022-04-26 RX ADMIN — PROPOFOL 50 MG: 10 INJECTION, EMULSION INTRAVENOUS at 09:45

## 2022-04-26 RX ADMIN — PROPOFOL 50 MG: 10 INJECTION, EMULSION INTRAVENOUS at 09:51

## 2022-04-26 RX ADMIN — PROPOFOL 100 MG: 10 INJECTION, EMULSION INTRAVENOUS at 09:43

## 2022-04-26 RX ADMIN — PROPOFOL 50 MG: 10 INJECTION, EMULSION INTRAVENOUS at 09:48

## 2022-04-26 NOTE — ANESTHESIA PREPROCEDURE EVALUATION
Procedure:  COLONOSCOPY  EGD    Relevant Problems   CARDIO   (+) Hypertension (does not take meds)   (+) Intractable migraine with aura with status migrainosus   (+) Migraine without aura and with status migrainosus, not intractable      GI/HEPATIC   (+) Gastroesophageal reflux disease      MUSCULOSKELETAL   (+) RA (rheumatoid arthritis) (HCC)      NEURO/PSYCH   (+) Intractable migraine with aura with status migrainosus   (+) Migraine without aura and with status migrainosus, not intractable   (+) Seizures (HCC)      PULMONARY   (+) Smoking        Physical Exam    Airway    Mallampati score: II  TM Distance: >3 FB  Neck ROM: full     Dental   lower dentures and upper dentures,     Cardiovascular  Rhythm: regular, Rate: normal,     Pulmonary  Breath sounds clear to auscultation,     Other Findings        Anesthesia Plan  ASA Score- 2     Anesthesia Type- IV sedation with anesthesia with ASA Monitors  Additional Monitors:   Airway Plan:           Plan Factors-    Chart reviewed  Patient is a current smoker  Patient instructed to abstain from smoking on day of procedure  Patient smoked on day of surgery  Induction- intravenous  Postoperative Plan-     Informed Consent- Anesthetic plan and risks discussed with patient  I personally reviewed this patient with the CRNA  Discussed and agreed on the Anesthesia Plan with the CRNA  Jeffrey Kelley

## 2022-04-26 NOTE — H&P
History and Physical -  [facility]  Daniel Braswell 38 y o  female MRN: [id number]                  HPI: Daniel Braswell is a 38y o  year old female who presents for epigastric abdominal pain, history of Crohn's disease      REVIEW OF SYSTEMS: Per the HPI, and otherwise unremarkable  Historical Information   Past Medical History:   Diagnosis Date    [facility] )     Crohn disease ([facility] )     Endocarditis     Fibromyalgia     Gastritis     Hypertension     RA [facility]rheumatoid arthritis) ([facility] )     Vertigo      Past Surgical History:   Procedure Laterality Date    APPENDECTOMY      BRONCHOSCOPY      multiple    COLONOSCOPY       Social History   Social History     Substance and Sexual Activity   Alcohol Use No     Social History     Substance and Sexual Activity   Drug Use Not Currently    Types: Marijuana     Social History     Tobacco Use   Smoking Status Current Every Day Smoker    Packs/day: 1 00    Types: Cigarettes   Smokeless Tobacco Never Used     Family History   Problem Relation Age of Onset    Colon cancer Brother 28    Crohn's disease Brother        Meds/Allergies     (Not in a hospital admission)      Allergies   Allergen Reactions    Ketorolac Hives    Penicillins Hives    Ketorolac Tromethamine Rash       Objective     /84   Pulse 84 Comment: NSR  Temp 97 7 °F (36 5 °C) (Temporal)   Resp 20   Ht 5' 1" (1 549 m)   Wt 73 9 kg (163 lb)   LMP 04/26/2022 (Exact Date)   SpO2 96%   Breastfeeding No   BMI 30 80 kg/m²       PHYSICAL EXAM    Gen: NAD  CV: RRR  CHEST: Clear  ABD: soft, NT/ND  EXT: no edema      ASSESSMENT/PLAN:  This is a 38y o  year old female here for EGD and colonoscopy, and she is stable and optimized for her procedure

## 2022-04-26 NOTE — TELEPHONE ENCOUNTER
Previous task was reviewed  [facility] is calling in reference to the Rasuvo auto injector prior auth  Order is written for auto injector pens  Please advise      [address] [phone]

## 2022-04-26 NOTE — ANESTHESIA POSTPROCEDURE EVALUATION
Post-Op Assessment Note    CV Status:  Stable  Pain Score: 0    Pain management: adequate     Mental Status:  Alert and awake   Hydration Status:  Euvolemic   PONV Controlled:  Controlled   Airway Patency:  Patent      Post Op Vitals Reviewed: Yes      Staff: Anesthesiologist, CRNA         No complications documented      BP   107/64   Temp   97 3   Pulse  75   Resp   16   SpO2   96

## 2022-04-28 ENCOUNTER — TELEPHONE (OUTPATIENT)
Dept: OBGYN CLINIC | Facility: HOSPITAL | Age: 42
End: 2022-04-28

## 2022-04-28 NOTE — TELEPHONE ENCOUNTER
May Lott from [employer] is calling stating this prescription still needs a prior auth      CB: [phone]

## 2022-04-28 NOTE — TELEPHONE ENCOUNTER
Spoke with Lori and they approved for the vials of MTX 50mg/2ml for [facility] #[id number]  I spoke with sherlyn (pharmacist) and she will route this to patient local pharmacy along with Syringes and also be in contact with patient

## 2022-05-04 ENCOUNTER — TELEPHONE (OUTPATIENT)
Dept: GASTROENTEROLOGY | Facility: CLINIC | Age: 42
End: 2022-05-04

## 2022-05-04 DIAGNOSIS — R63.0 ANOREXIA: ICD-10-CM

## 2022-05-04 RX ORDER — DRONABINOL 5 MG/1
5 CAPSULE ORAL
Qty: 60 CAPSULE | Refills: 2 | Status: SHIPPED | OUTPATIENT
Start: 2022-05-04

## 2022-05-04 NOTE — TELEPHONE ENCOUNTER
Scheduled date of colonoscopy (as of today): 5/25/22  Physician performing colonoscopy: charles  Location of colonoscopy:  Bowel prep reviewed with patient:[email or web address]  Instructions reviewed with patient by: jonh  Clearances: n/a

## 2022-05-04 NOTE — TELEPHONE ENCOUNTER
Patient called in for medication refill  She is out of her medication  Patient also called to schedule follow up appointment and reschedule her colonoscopy  Please follow up with patient

## 2022-05-13 ENCOUNTER — TELEPHONE (OUTPATIENT)
Dept: GASTROENTEROLOGY | Facility: CLINIC | Age: 42
End: 2022-05-13

## 2022-05-13 NOTE — TELEPHONE ENCOUNTER
Paperwork completed and fax back to [employer] company for review and determination await response

## 2022-05-13 NOTE — TELEPHONE ENCOUNTER
Mi Ross is calling in from [employer] [facility] [employer] relating this patients rasuvo injections auto injectors  She is stating that the only prior auth that they received was for the syringes and the patient does not want to use that she only wants to use the injectors pens  She is asking if an updated prior [facility] has been done for this as they still did not hear anything from the insurance relating this, it is still rejecting for prior auth needed  Please advise          Call back# [phone]

## 2022-05-13 NOTE — TELEPHONE ENCOUNTER
I called insurance company and they are sending a fax for the prior authorization process awaiting fax

## 2022-05-13 NOTE — TELEPHONE ENCOUNTER
Patient called regarding her marinol medication  She states a prior auth needs to be done through her secondary insurance so that she can get it covered at a lower cost  She states she was told this by her pharmacy  Please obtain auth and call patient with any questions   Thank you

## 2022-05-20 NOTE — TELEPHONE ENCOUNTER
[phone] [facility] pharmacy     Jeramy Phillips from [facility] asked if a peer to peer can be completed to try and authorize the injector pens

## 2022-05-27 ENCOUNTER — TELEPHONE (OUTPATIENT)
Dept: OBGYN CLINIC | Facility: HOSPITAL | Age: 42
End: 2022-05-27

## 2022-05-27 DIAGNOSIS — Z79.899 METHOTREXATE, LONG TERM, CURRENT USE: ICD-10-CM

## 2022-05-27 DIAGNOSIS — M79.7 FIBROMYALGIA: ICD-10-CM

## 2022-05-27 RX ORDER — GABAPENTIN 600 MG/1
600 TABLET ORAL 4 TIMES DAILY
Qty: 120 TABLET | Refills: 2 | Status: SHIPPED | OUTPATIENT
Start: 2022-05-27 | End: 2022-05-28 | Stop reason: SDUPTHER

## 2022-05-27 NOTE — TELEPHONE ENCOUNTER
Can you please check which pharmacy to refill gabapentin to and add to chart  She already has 1 year worth of folic acid at her pharmacy   And methotrexate is she taking oral?

## 2022-05-27 NOTE — TELEPHONE ENCOUNTER
[facility] for the g[facility]entin, she is taking MTX orally as she has not received the prefilled syringes, I will call the specialty  pharmacy to check what the issue is

## 2022-05-27 NOTE — TELEPHONE ENCOUNTER
Patient sees Dr Kieran Leon  Patient is calling in stating that she was ordered to have lab work done and was not exactly sure as to when she needs to go to get this done  She is currently taking the Methotrexate 6 tablets, and she is now on the 5th week of it  Patient is stating that she is very tired, has no energy and is loosing lots of lots of hair as well  She is not able to locate the scripts for her to have this blood work and is asking if she is able to get them faxed to her, she is going within [place] but just wanted to clarify  Please advise          Call back# [phone]

## 2022-05-27 NOTE — TELEPHONE ENCOUNTER
The bloodwork order should be in her chart  She is taking the oral methotrexate? No luck with the injectable?

## 2022-05-27 NOTE — TELEPHONE ENCOUNTER
Patient sees Dr Mary Richmond  Patient is calling in to get a medication refill on Gabapentin 600 mg tabs, Methotrexate, along with Folic acid  She is asking if she is able to get a refill on all three of these medications  While verifying the patients pharmacy she stated it is where they always send them to  I stated we have [facility] on file and patient stated no and disconnected the call           Call back if needed# [phone]

## 2022-05-28 DIAGNOSIS — M19.90 INFLAMMATORY ARTHRITIS: ICD-10-CM

## 2022-05-28 DIAGNOSIS — M79.7 FIBROMYALGIA: ICD-10-CM

## 2022-05-28 DIAGNOSIS — Z79.899 METHOTREXATE, LONG TERM, CURRENT USE: ICD-10-CM

## 2022-05-28 RX ORDER — GABAPENTIN 600 MG/1
600 TABLET ORAL 4 TIMES DAILY
Qty: 120 TABLET | Refills: 2 | Status: SHIPPED | OUTPATIENT
Start: 2022-05-28

## 2022-05-28 RX ORDER — METHOTREXATE 15 MG/.3ML
15 INJECTION, SOLUTION SUBCUTANEOUS
Qty: 1.2 ML | Refills: 5 | Status: SHIPPED | OUTPATIENT
Start: 2022-05-28

## 2022-05-28 RX ORDER — FOLIC ACID 1 MG/1
1 TABLET ORAL DAILY
Qty: 90 TABLET | Refills: 3 | Status: SHIPPED | OUTPATIENT
Start: 2022-05-28

## 2022-05-28 NOTE — TELEPHONE ENCOUNTER
Looking through the conversation, not sure which pharmacy to send what to   Also, if you want her to get the Rasuvo or oral

## 2022-05-29 NOTE — TELEPHONE ENCOUNTER
[facility] which patient used to get prescriptions in [address] is nonexistent anymore, not sure what other [facility] to send to

## 2022-05-31 NOTE — TELEPHONE ENCOUNTER
Spoke with [facility] and they do not fill the vials and syringes, they let me know they have tried to contact the patient on several occasions to find out her local pharmacy so that they can fill the RX  Auto Injectors are still being denied as of today 5/31 when I did another PA  I called and left a detailed message for patient asking that she call and provide me with the details for her local pharmacy

## 2022-06-17 ENCOUNTER — TELEPHONE (OUTPATIENT)
Dept: OBGYN CLINIC | Facility: HOSPITAL | Age: 42
End: 2022-06-17

## 2022-06-17 NOTE — TELEPHONE ENCOUNTER
[facility] is looking for a prior auth for the medication:      Methotrexate, PF, (Rasuvo) 15 MG/0 3ML SOAJ [[id number]]     Cover My Meds key # [id number]

## 2022-06-21 ENCOUNTER — TELEPHONE (OUTPATIENT)
Dept: OBGYN CLINIC | Facility: HOSPITAL | Age: 42
End: 2022-06-21

## 2022-06-21 NOTE — TELEPHONE ENCOUNTER
Layne Hernandez from [employer] is calling for an update on the pt medication Rasuvo 15mg needs a prior [facility]# [phone]

## 2022-06-22 NOTE — TELEPHONE ENCOUNTER
[facility] from [employer] called into the office she will be faxing over PA paperwork for this medication         She said you can also call this # to start PA: [phone]

## 2022-06-24 NOTE — TELEPHONE ENCOUNTER
Smita Saxena from [facility] called looking for prior authorization for Rasuvo  They have patient as Mark Wheeler  Patient also know as Eron Pal, [place], or Mark Wheeler  He is asking for call back in regard to authordorothy      PA #:   #:

## 2022-06-27 ENCOUNTER — APPOINTMENT (OUTPATIENT)
Dept: LAB | Facility: CLINIC | Age: 42
End: 2022-06-27
Payer: COMMERCIAL

## 2022-06-27 DIAGNOSIS — Z79.631 METHOTREXATE, LONG TERM, CURRENT USE: ICD-10-CM

## 2022-06-27 DIAGNOSIS — K50.80 CROHN'S DISEASE OF BOTH SMALL AND LARGE INTESTINE WITHOUT COMPLICATION (HCC): ICD-10-CM

## 2022-06-27 LAB
ALBUMIN SERPL BCP-MCNC: 3.5 G/DL (ref 3.5–5)
ALP SERPL-CCNC: 73 U/L (ref 46–116)
ALT SERPL W P-5'-P-CCNC: 56 U/L (ref 12–78)
ANION GAP SERPL CALCULATED.3IONS-SCNC: 8 MMOL/L (ref 4–13)
AST SERPL W P-5'-P-CCNC: 52 U/L (ref 5–45)
BASOPHILS # BLD AUTO: 0.1 THOUSANDS/ΜL (ref 0–0.1)
BASOPHILS NFR BLD AUTO: 1 % (ref 0–1)
BILIRUB SERPL-MCNC: 0.2 MG/DL (ref 0.2–1)
BUN SERPL-MCNC: 6 MG/DL (ref 5–25)
CALCIUM SERPL-MCNC: 9.1 MG/DL (ref 8.3–10.1)
CHLORIDE SERPL-SCNC: 105 MMOL/L (ref 100–108)
CO2 SERPL-SCNC: 28 MMOL/L (ref 21–32)
CREAT SERPL-MCNC: 0.72 MG/DL (ref 0.6–1.3)
CRP SERPL QL: 8.7 MG/L
EOSINOPHIL # BLD AUTO: 0.38 THOUSAND/ΜL (ref 0–0.61)
EOSINOPHIL NFR BLD AUTO: 4 % (ref 0–6)
ERYTHROCYTE [DISTWIDTH] IN BLOOD BY AUTOMATED COUNT: 14.6 % (ref 11.6–15.1)
ERYTHROCYTE [SEDIMENTATION RATE] IN BLOOD: 35 MM/HOUR (ref 0–19)
GFR SERPL CREATININE-BSD FRML MDRD: 102 ML/MIN/1.73SQ M
GLUCOSE SERPL-MCNC: 99 MG/DL (ref 65–140)
HCT VFR BLD AUTO: 42 % (ref 34.8–46.1)
HGB BLD-MCNC: 13.8 G/DL (ref 11.5–15.4)
IMM GRANULOCYTES # BLD AUTO: 0.06 THOUSAND/UL (ref 0–0.2)
IMM GRANULOCYTES NFR BLD AUTO: 1 % (ref 0–2)
LYMPHOCYTES # BLD AUTO: 3.28 THOUSANDS/ΜL (ref 0.6–4.47)
LYMPHOCYTES NFR BLD AUTO: 34 % (ref 14–44)
MCH RBC QN AUTO: 30.5 PG (ref 26.8–34.3)
MCHC RBC AUTO-ENTMCNC: 32.9 G/DL (ref 31.4–37.4)
MCV RBC AUTO: 93 FL (ref 82–98)
MONOCYTES # BLD AUTO: 0.38 THOUSAND/ΜL (ref 0.17–1.22)
MONOCYTES NFR BLD AUTO: 4 % (ref 4–12)
NEUTROPHILS # BLD AUTO: 5.4 THOUSANDS/ΜL (ref 1.85–7.62)
NEUTS SEG NFR BLD AUTO: 56 % (ref 43–75)
NRBC BLD AUTO-RTO: 0 /100 WBCS
PLATELET # BLD AUTO: 380 THOUSANDS/UL (ref 149–390)
PMV BLD AUTO: 9.6 FL (ref 8.9–12.7)
POTASSIUM SERPL-SCNC: 4 MMOL/L (ref 3.5–5.3)
PROT SERPL-MCNC: 7.4 G/DL (ref 6.4–8.2)
RBC # BLD AUTO: 4.52 MILLION/UL (ref 3.81–5.12)
SODIUM SERPL-SCNC: 141 MMOL/L (ref 136–145)
WBC # BLD AUTO: 9.6 THOUSAND/UL (ref 4.31–10.16)

## 2022-06-27 PROCEDURE — 85025 COMPLETE CBC W/AUTO DIFF WBC: CPT

## 2022-06-27 PROCEDURE — 86140 C-REACTIVE PROTEIN: CPT

## 2022-06-27 PROCEDURE — 85652 RBC SED RATE AUTOMATED: CPT

## 2022-06-27 PROCEDURE — 80053 COMPREHEN METABOLIC PANEL: CPT

## 2022-06-27 PROCEDURE — 36415 COLL VENOUS BLD VENIPUNCTURE: CPT

## 2022-06-29 NOTE — TELEPHONE ENCOUNTER
[employer] calling in asking what is going on with the Rusvo  It is currently on hold  Is a new medication going to be ordered or is an appeal for the Rusvo going to happen?    cb # [phone]    Please advise Ashlynx

## 2022-07-12 NOTE — PROGRESS NOTES
Assessment and Plan:   Ms Emelia Griggs a 40-year-old female with history significant for Crohn's disease diagnosed in 1996, psoriasis, fibromyalgia/chronic pain syndrome and possible peripheral spondyloarthropathy related to psoriasis/IBD who presents for a follow up  She is currently on Stelara injections every 6 weeks through her gastroenterologist         Rheumatic Summary:  1) Diagnosed with Crohn's disease in 1996 - started on Enbrel+mesalamine+6-MP+steroids x 8 years - d/c'ed due to inefficacy  - Managed with chronic steroids until 2014 then started on Humira till 8/2019 - d/c'ed due to decline in efficacy  - Started Stelara 2/2020-present - IBD stable  2) ? Inflammatory arthritis (negative RF, borderline CCP of 20) - arthralgias since 2000 - no improvement with high doses steroids, Enbrel, Humira or Stelara  - 7/13/21: continue Stelara and add on MTX, prednisone 20 mg daily x 10 days  - 3/9/22: continue Stelara through GI  Add on injectable MTX   3) Fibromyalgia - currently gabapentin 600 mg QID, oxycodone 5 mg TID PRN - minimal improvement  - Previously been on amitriptyline and Dilaudid - no significant improvement  4) Psoriasis diagnosed at the age of 4         # Inflammatory arthritis likely secondary to psoriasis vs IBD  - Gearldean Maker presents today for a follow up of diffuse arthralgias and myalgias which have been gradually progressive over the past 15 years +  Given the widespread and chronic complaints, as well as a lack of benefit with prior medications including high doses of prednisone, etanercept and adalimumab (and now Stelara since February 2020), I had considered her diagnosis to be secondary to fibromyalgia      - Apart from the fibromyalgia I question if she may be presenting with a concomitant inflammatory arthritis secondary to the underlying psoriasis/inflammatory bowel disease, given the focal joint involvement and evidence of fluctuating joint swelling noted    In view of this I recommend adding on DMARD therapy and we have previously discussed methotrexate as an initial option but she has not done this consistently due to concerns for potential side effects  She is willing to retry oral methotrexate at 10 mg once weekly with folic acid 1 mg daily and in the meanwhile I will start an authorization for injectable methotrexate 15 mg once weekly in order to prevent GI related side effects with the oral formulation  Once she starts the methotrexate she will update high-risk medication lab monitoring in 4 weeks  I advised her while she is on the Stelara other biologic medications cannot be considered and we will have to see how she responds to a combination of the Stelara with non-biologic DMARDs (sulfasalazine can also be considered)     - For management of the chronic pain syndrome she will follow up with Pain Management and is currently on gabapentin 600 mg 4 times a day along with oxycodone 5 mg TID PRN  Plan:  Diagnoses and all orders for this visit:    Inflammatory arthritis    Crohn's disease of colon without complication ([facility] [address] )    Psoriasis    Methotrexate, long term, current use    Long-term use of immunosuppressant medication    Fibromyalgia    Opioid use      Activities as tolerated  Exercise: try to maintain a low impact exercise regimen as much as possible  Walk for 30 minutes a day for at least 3 days a week  Continue other medications as prescribed by PCP and other specialists  RTC in 4 months          HPI    INITIAL VISIT NOTE (4/2020):  Ms Lia Merlos a 24-year-old female with history significant for Crohn's disease diagnosed in 1996, fibromyalgia, 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 (currently on oral bisphosphonate therapy) and psoriasis, who presents for further evaluation of diffuse joint pains   She is referred by Dr Josefa Nguyen a rheumatology consult      Patient reports she was initially evaluated by Gastroenterology in [address] [place] [address] when she was diagnosed with Crohn's disease   She mentions at that time she was started on a combination of Enbrel (possibly for the joint pains), mesalamine and 6 mercaptopurine in association with steroids   She reports being on this regimen for approximately 8 years following which it was discontinued due to a decline in efficacy   She reports overall since her diagnosis in 1996 she has been on chronic intermittent doses of prednisone, with her last course about 1 month ago  Enrico Erazo will typically start off at doses of 40 mg once daily and taper down, with the lower doses causing a flare-up of the colitis   Of note she mentions that even the higher doses of prednisone have never helped with her joint pains   She states following her initial regimen she was then switched to Humira which she was on for about 5 years, and again it was discontinued in August 2019 due to a decline in efficacy   She has since started Port fredo on February 6, 2020 and has thus far completed the initial dosing  Enrico Castro is now on a maintenance dosing of 1 injection every 8 weeks  Enrico Erazo has not yet noticed if the Port fredo is helping with her joint pains  Enrico Erazo mentions her last colonoscopy was done in December 2019 and this was consistent with active pancolitis   She follows with [facility]  Enrico Erazo reports she is continuing to have loose stools with occasional mucus, but denies bloody diarrhea or abdominal pain      She was previously seen by Rheumatology in [address] [place] [address] and continued follow-up with them until she relocated to [place] approximately 7 years ago  Enrico Erazo states since then her medications have mostly been managed by her primary care doctor and Gastroenterology  Enrico Castro has previously been on gabapentin for a diagnosis of fibromyalgia, but states that this was recently discontinued and she was advised to follow up with pain management   She is receiving hydromorphone 4 mg twice daily from pain management at this time, and states that it only helps to a mild degree with her body pain      She reports her joint pains have been ongoing for the past 15 years and have been progressively worsening over time to the point that she is noticing them on a daily basis in a constant manner, including during her sleep at night   As mentioned she has previously been on gabapentin up to 600 mg 3 times daily and amitriptyline 100 mg at bedtime which would help her, but this regimen was discontinued by her primary care doctor and she was advised to follow-up with Rheumatology and Pain Management   She states that the joint pains affect her all over but mostly with her knees, ankles, hands and low back   She reports the low back pain will wake her up from sleep at night and is also associated with morning stiffness of up to 2 hours   The back pain worsens with activities and is also worsened at rest   She also describes pain affecting her diffusely throughout her muscles   She has noticed occasional swelling affecting her fingers and knees   She experiences morning stiffness which affects her diffusely and takes about 3-4 hours to improve   She refrains from NSAID use due to her history of colitis      She reports as a result of being on chronic steroids she has developed osteopenia/osteoporosis   Her last DEXA scan was done approximately 1 year ago, and I do not have the records available for review   She does not take daily calcium supplements but does take vitamin-D replacement therapy  Nata Rader is also on Fosamax once weekly      She reports a history of blurred vision but denies eye pain or redness   She denies a history of inflammatory eye disease  Nata Rader has been diagnosed with psoriasis at the age of 4 which will typically affect her arms and legs   She reports a family history of psoriasis in her father as well as multiple family members on her mother's side with inflammatory bowel disease   She denies current fevers, unintentional weight loss, mouth/nose ulcers, swollen glands, blood clots or Raynaud's      Labs in our system show a negative rheumatoid factor         7/28/2020:  Patient presents for a follow-up today  Sam Urias had to convert to a virtual visit as she had a temperature on arrival   She did not have a chance to get the blood work and x-rays done following the last office visit, as she states that she did not receive a copy in the mail      She has now been on the Port fredo since February 2020 and does not feel like it is helping with the bowel disease as she continues to have multiple episodes of diarrhea and mucus in her stool  Spencer Loo has also not noticed any change in her joint pains, but states that it has helped with the psoriasis   I did start her on gabapentin at 300 mg 3 times daily at the last office visit for fibromyalgia, and she feels like this helped to a certain degree   She is continuing to experience pain mostly in her neck, upper back region/shoulders, elbows, hands and knees   She has noticed swelling of her hands, knees, ankles and feet   She does experience morning stiffness which affects her diffusely mostly in her back region and can persist throughout the day when it occurs   She reports usually 4 days out of the week will be severe for her and may be weather dependent   She is not taking any over-the-counter pain medications at this time      She did try to send me pictures of her hand swelling following the last visit, but unfortunately this got scanned in as black and white, and it is challenging to appreciate any abnormalities   I did also receive her DEXA scan report that was done in October 2019 and appears to be normal for her age         3/10/2021:  Patient presents for a follow-up today  Seema Gongora did review her labs done after the last office visit which showed a borderline elevated anti CCP antibody of 20   An ESR, CRP and hepatitis panel were unremarkable      At the last office visit we had discussed starting methotrexate, which she took for approximately 3 weeks but as it was causing gastrointestinal side effects she discontinued it  Sundeep Griffith thinks this may have been related to significant personal stressors as well and would like to give the methotrexate a retry  Sundeep Griffith is still on Stelara for the Crohn's disease through her gastroenterologist  Sundeep Griffith is prescribed Dilaudid by her pain management specialist  Sundeep Griffith has been taking the gabapentin 600 mg 4 times daily which does help   She reports generally during the cooler weather she feels better and her symptoms may flare in the summer   She is continuing to experience pain mostly in her neck, upper back region/shoulders, elbows, hands and knees   She has noticed swelling of her hands, knees, ankles and feet   She does experience morning stiffness which affects her diffusely mostly in her back region and can persist throughout the day when it occurs          7/13/2021:  Patient presents for a follow up today  At the last office visit to address the possibility of an inflammatory arthritis secondary to the psoriasis/IBD I had discussed starting her on methotrexate 10 mg once weekly, but patient states she only took one dose and then discontinued it due to concerns for potential side effects  She is currently receiving Stelara injections every 3 months primarily for the IBD      She has been experiencing a flare up of joint pains over the past few weeks which is effecting her hands, low back, knees, ankles and feet  She has noticed swelling of her hands (her primary care physician described this to her as "sausage digits", but this is not evident on her exam today), ankles and feet  She experiences morning stiffness primarily of her lower body that can take hours to improve  She is following with pain management and is currently on gabapentin 600 mg four times daily and oxycodone 5 mg three times daily as needed  The Dilaudid was discontinued    These medications are not helping with her pain      She denies psoriasis or a flare up of the inflammatory bowel disease  3/9/2022:  Patient presents for a follow-up of possible inflammatory arthritis secondary to the psoriasis/IBD  At the last office visit I had discussed starting her on methotrexate but she did not do this due to concerns for potential side effects  She is only on Stelara injections every 6 weeks at this moment primarily for the inflammatory bowel disease  She reports her joint pains are again flaring and this primarily affect her hands, knees and ankles where she will also notice swelling  She experiences stiffness mostly in the night and to some degree in the morning but this improves with activities  She does take the gabapentin 600 mg 4 times a day and oxycodone 5 mg 3 times a day as needed  7/13/2022: The following portions of the patient's history were reviewed and updated as appropriate: allergies, current medications, past family history, past medical history, past social history, past surgical history and problem list       Review of Systems  Constitutional: Negative for weight change, fevers, chills, night sweats, fatigue  ENT/Mouth: Negative for hearing changes, ear pain, nasal congestion, sinus pain, hoarseness, sore throat, rhinorrhea, swallowing difficulty  Eyes: Negative for pain, redness, discharge, vision changes  Cardiovascular: Negative for chest pain, SOB, palpitations  Respiratory: Negative for cough, sputum, wheezing, dyspnea  Gastrointestinal: Negative for nausea, vomiting, diarrhea, constipation, pain, heartburn  Genitourinary: Negative for dysuria, urinary frequency, hematuria  Musculoskeletal: As per HPI  Skin: Negative for skin rash, color changes  Neuro: Negative for weakness, numbness, tingling, loss of consciousness  Psych: Negative for anxiety, depression  Heme/Lymph: Negative for easy bruising, bleeding, lymphadenopathy          Past Medical History:   Diagnosis Date    Cervical cancer ([facility] [address] )     Crohn disease ([facility] )     Endocarditis     Fibromyalgia     Gastritis     Hypertension     RA (rheumatoid arthritis) ([facility] )     Vertigo        Past Surgical History:   Procedure Laterality Date    APPENDECTOMY      BRONCHOSCOPY      multiple    COLONOSCOPY         Social History     Socioeconomic History    Marital status: /Civil Union     Spouse name: Not on file    Number of children: Not on file    Years of education: Not on file    Highest education level: Not on file   Occupational History    Not on file   Tobacco Use    Smoking status: Current Every Day Smoker     Packs/day: 1 00     Types: Cigarettes    Smokeless tobacco: Never Used   Vaping Use    Vaping Use: Never used   Substance and Sexual Activity    Alcohol use: No    Drug use: Not Currently     Types: Marijuana    Sexual activity: Not Currently   Other Topics Concern    Not on file   Social History Narrative    Has a      Social Determinants of Health     Financial Resource Strain: Not on file   Food Insecurity: Not on file   Transportation Needs: Not on file   Physical Activity: Not on file   Stress: Not on file   Social Connections: Not on file   Intimate Partner Violence: Not on file   Housing Stability: Not on file       Family History   Problem Relation Age of Onset    Colon cancer Brother 28    Crohn's disease Brother        Allergies   Allergen Reactions    Ketorolac Hives    Penicillins Hives    Ketorolac Tromethamine Rash       Current Outpatient Medications:     amitriptyline (ELAVIL) 100 mg tablet, take 1 tablet by mouth at bedtime, Disp: 100 tablet, Rfl: 1    divalproex sodium (DEPAKOTE) 250 mg EC tablet, Take 250 mg by mouth 2 (two) times a day  , Disp: , Rfl:     dronabinol (MARINOL) 5 MG capsule, Take 1 capsule (5 mg total) by mouth 2 (two) times a day before meals, Disp: 60 capsule, Rfl: 2    folic acid (FOLVITE) 1 mg tablet, Take 1 tablet (1 mg total) by mouth daily, Disp: 90 tablet, Rfl: 3   gabapentin (NEURONTIN) 600 MG tablet, Take 1 tablet (600 mg total) by mouth 4 (four) times a day, Disp: 120 tablet, Rfl: 2    Methotrexate Sodium (methotrexate, PF,) 50 mg/2 mL injection, Inject 0 6 mL (15 mg total) under the skin once a week, Disp: 4 mL, Rfl: 5    Methotrexate, PF, (Rasuvo) 15 MG/0 3ML SOAJ, Inject 0 3 mL (15 mg total) under the skin every 7 days, Disp: 1 2 mL, Rfl: 5    montelukast (SINGULAIR) 10 mg tablet, Take 10 mg by mouth daily at bedtime, Disp: , Rfl:     ondansetron (ZOFRAN) 4 mg tablet, Take 1 tablet (4 mg total) by mouth every 8 (eight) hours as needed for nausea or vomiting, Disp: 30 tablet, Rfl: 1    oxyCODONE (ROXICODONE) 5 immediate release tablet, take 1 tablet by mouth three times a day MAXIMUM DAILY DOSE OF 15 milligram, Disp: 90 tablet, Rfl: 0    pantoprazole (PROTONIX) 40 mg tablet, Take 40 mg by mouth daily, Disp: , Rfl:     polyethylene glycol-electrolytes (NULYTELY) 4000 mL solution, Take 4,000 mL by mouth once for 1 dose, Disp: 4000 mL, Rfl: 0    predniSONE 10 mg tablet, 4 DAILY FOR 3 DAYS,3 DAILY FOR 3 DAYS,2 DAILY FOR 3 DAYS, ONE DAILY FOR 3 DAYS (Patient not taking: Reported on 2/21/2022 ), Disp: 30 tablet, Rfl: 0    prochlorperazine (COMPAZINE) 5 mg tablet, Take 1 tablet (5 mg total) by mouth every 6 (six) hours as needed for nausea or vomiting, Disp: 30 tablet, Rfl: 0    sucralfate (CARAFATE) 1 g/10 mL suspension, Take 10 mL (1 g total) by mouth 4 (four) times a day, Disp: 420 mL, Rfl: 1    SUMAtriptan (IMITREX) 100 mg tablet, Take 1 tablet by mouth once as needed  , Disp: , Rfl:     ustekinumab (Stelara) 90 mg/mL subcutaneous injection, MAINTENANCE: INJECT 1 SYRINGE SUBCUTANEOUSLY EVERY 8 WEEKS  REFRIGERATE  DO NOT FREEZE , Disp: 2 mL, Rfl: 2    varenicline (CHANTIX) 1 mg tablet, Take 1 tablet (1 mg total) by mouth 2 (two) times a day, Disp: 60 tablet, Rfl: 0    VITAMIN D PO, Take by mouth daily , Disp: , Rfl:       Objective:     There were no vitals filed for this visit  Physical Exam  General: Well appearing, well nourished, in no distress  Oriented x 3, normal mood and affect  Ambulating without difficulty  Skin: Good turgor, no rash, unusual bruising or prominent lesions  Hair: Normal texture and distribution  Nails: Normal color, no deformities  HEENT:  Head: Normocephalic, atraumatic  Eyes: Conjunctiva clear, sclera non-icteric, EOM intact  Extremities: No amputations or deformities, cyanosis, edema  Musculoskeletal:  There is no significant tenderness or soft tissue swelling noted of her hands, knees or ankles on today's examination  Neurologic: Alert and oriented  No focal neurological deficits appreciated  Psychiatric: Normal mood and affect  Dorthey Schirmer, M D    Rheumatology

## 2022-07-13 ENCOUNTER — TELEMEDICINE (OUTPATIENT)
Dept: RHEUMATOLOGY | Facility: CLINIC | Age: 42
End: 2022-07-13

## 2022-07-13 DIAGNOSIS — Z79.631 METHOTREXATE, LONG TERM, CURRENT USE: Primary | ICD-10-CM

## 2022-07-13 NOTE — PROGRESS NOTES
Virtual Regular Visit    Verification of patient location:    Patient is located in the following state in which I hold an active license [place]      Assessment/Plan:    Problem List Items Addressed This Visit        Musculoskeletal and Integument    Psoriasis      Other Visit Diagnoses     Inflammatory arthritis    -  Primary    Crohn's disease of colon without complication ([facility] [address] )        Methotrexate, long term, current use        Relevant Orders    CBC and differential    Comprehensive metabolic panel    C-reactive protein    Sedimentation rate, automated    Long-term use of immunosuppressant medication        Fibromyalgia        Opioid use                   Reason for visit is follow up  Chief Complaint   Patient presents with    Virtual Regular Visit        Encounter provider Kirby Wynne MD    Provider located at [address] [place] [id number]      Recent Visits  No visits were found meeting these conditions  Showing recent visits within past 7 days and meeting all other requirements  Today's Visits  Date Type Provider Dept   07/13/22 Telemedicine Kirby Wynne MD [facility] today's visits and meeting all other requirements  Future Appointments  No visits were found meeting these conditions  Showing future appointments within next 150 days and meeting all other requirements       The patient was identified by name and date of birth  Harper Sanchez was informed that this is a telemedicine visit and that the visit is being conducted through Telephone  My office door was closed  No one else was in the room  She acknowledged consent and understanding of privacy and security of the video platform  The patient has agreed to participate and understands they can discontinue the visit at any time  Patient is aware this is a billable service         Subjective    HPI     INITIAL VISIT NOTE (4/2020):  Ms Kerri Heimlich a 18-year-old female with history significant for Crohn's disease diagnosed in 1996, fibromyalgia, 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 (currently on oral bisphosphonate therapy) and psoriasis, who presents for further evaluation of diffuse joint pains   She is referred by Dr Fausto Parks a rheumatology consult      Patient reports she was initially evaluated by Gastroenterology in [address] [place] [address] when she was diagnosed with Crohn's disease   She mentions at that time she was started on a combination of Enbrel (possibly for the joint pains), mesalamine and 6 mercaptopurine in association with steroids   She reports being on this regimen for approximately 8 years following which it was discontinued due to a decline in efficacy   She reports overall since her diagnosis in 1996 she has been on chronic intermittent doses of prednisone, with her last course about 1 month ago  Jackie Brian will typically start off at doses of 40 mg once daily and taper down, with the lower doses causing a flare-up of the colitis   Of note she mentions that even the higher doses of prednisone have never helped with her joint pains   She states following her initial regimen she was then switched to Humira which she was on for about 5 years, and again it was discontinued in August 2019 due to a decline in efficacy   She has since started Port fredo on February 6, 2020 and has thus far completed the initial dosing  Jackie Brian is now on a maintenance dosing of 1 injection every 8 weeks  Jackie Brian has not yet noticed if the Port fredo is helping with her joint pains  Jackie Brian mentions her last colonoscopy was done in December 2019 and this was consistent with active pancolitis   She follows with [facility]  Jackie Brian reports she is continuing to have loose stools with occasional mucus, but denies bloody diarrhea or abdominal pain      She was previously seen by Rheumatology in  and continued follow-up with them until she relocated to [place] approximately 7 years ago  Azul Mandujano states since then her medications have mostly been managed by her primary care doctor and Gastroenterology  Azul Mandujano has previously been on gabapentin for a diagnosis of fibromyalgia, but states that this was recently discontinued and she was advised to follow up with pain management   She is receiving hydromorphone 4 mg twice daily from pain management at this time, and states that it only helps to a mild degree with her body pain      She reports her joint pains have been ongoing for the past 15 years and have been progressively worsening over time to the point that she is noticing them on a daily basis in a constant manner, including during her sleep at night   As mentioned she has previously been on gabapentin up to 600 mg 3 times daily and amitriptyline 100 mg at bedtime which would help her, but this regimen was discontinued by her primary care doctor and she was advised to follow-up with Rheumatology and Pain Management   She states that the joint pains affect her all over but mostly with her knees, ankles, hands and low back   She reports the low back pain will wake her up from sleep at night and is also associated with morning stiffness of up to 2 hours   The back pain worsens with activities and is also worsened at rest   She also describes pain affecting her diffusely throughout her muscles   She has noticed occasional swelling affecting her fingers and knees   She experiences morning stiffness which affects her diffusely and takes about 3-4 hours to improve   She refrains from NSAID use due to her history of colitis      She reports as a result of being on chronic steroids she has developed osteopenia/osteoporosis   Her last DEXA scan was done approximately 1 year ago, and I do not have the records available for review   She does not take daily calcium supplements but does take vitamin-D replacement therapy  Azul Mandujano is also on Fosamax once weekly      She reports a history of blurred vision but denies eye pain or redness   She denies a history of inflammatory eye disease   She has been diagnosed with psoriasis at the age of 4 which will typically affect her arms and legs   She reports a family history of psoriasis in her father as well as multiple family members on her mother's side with inflammatory bowel disease   She denies current fevers, unintentional weight loss, mouth/nose ulcers, swollen glands, blood clots or Raynaud's      Labs in our system show a negative rheumatoid factor         7/28/2020:  Patient presents for a follow-up today  Ayesha Espinosa had to convert to a virtual visit as she had a temperature on arrival   She did not have a chance to get the blood work and x-rays done following the last office visit, as she states that she did not receive a copy in the mail      She has now been on the Port fredo since February 2020 and does not feel like it is helping with the bowel disease as she continues to have multiple episodes of diarrhea and mucus in her stool  Marquise Cameronu has also not noticed any change in her joint pains, but states that it has helped with the psoriasis   I did start her on gabapentin at 300 mg 3 times daily at the last office visit for fibromyalgia, and she feels like this helped to a certain degree   She is continuing to experience pain mostly in her neck, upper back region/shoulders, elbows, hands and knees   She has noticed swelling of her hands, knees, ankles and feet   She does experience morning stiffness which affects her diffusely mostly in her back region and can persist throughout the day when it occurs   She reports usually 4 days out of the week will be severe for her and may be weather dependent   She is not taking any over-the-counter pain medications at this time      She did try to send me pictures of her hand swelling following the last visit, but unfortunately this got scanned in as black and white, and it is challenging to appreciate any abnormalities   I did also receive her DEXA scan report that was done in October 2019 and appears to be normal for her age         3/10/2021:  Patient presents for a follow-up today  Cuong Wright did review her labs done after the last office visit which showed a borderline elevated anti CCP antibody of 20   An ESR, CRP and hepatitis panel were unremarkable      At the last office visit we had discussed starting methotrexate, which she took for approximately 3 weeks but as it was causing gastrointestinal side effects she discontinued it  Rupesh Cline thinks this may have been related to significant personal stressors as well and would like to give the methotrexate a retry  Rupesh Cline is still on Stelara for the Crohn's disease through her gastroenterologist  Rupesh Cline is prescribed Dilaudid by her pain management specialist  Rupesh Cline has been taking the gabapentin 600 mg 4 times daily which does help   She reports generally during the cooler weather she feels better and her symptoms may flare in the summer   She is continuing to experience pain mostly in her neck, upper back region/shoulders, elbows, hands and knees   She has noticed swelling of her hands, knees, ankles and feet   She does experience morning stiffness which affects her diffusely mostly in her back region and can persist throughout the day when it occurs          7/13/2021:  Patient presents for a follow up today  At the last office visit to address the possibility of an inflammatory arthritis secondary to the psoriasis/IBD I had discussed starting her on methotrexate 10 mg once weekly, but patient states she only took one dose and then discontinued it due to concerns for potential side effects  She is currently receiving Stelara injections every 3 months primarily for the IBD      She has been experiencing a flare up of joint pains over the past few weeks which is effecting her hands, low back, knees, ankles and feet    She has noticed swelling of her hands (her primary care physician described this to her as "sausage digits", but this is not evident on her exam today), ankles and feet  She experiences morning stiffness primarily of her lower body that can take hours to improve  She is following with pain management and is currently on gabapentin 600 mg four times daily and oxycodone 5 mg three times daily as needed  The Dilaudid was discontinued  These medications are not helping with her pain      She denies psoriasis or a flare up of the inflammatory bowel disease  3/9/2022:  Patient presents for a follow-up of possible inflammatory arthritis secondary to the psoriasis/IBD  At the last office visit I had discussed starting her on methotrexate but she did not do this due to concerns for potential side effects  She is only on Stelara injections every 6 weeks at this moment primarily for the inflammatory bowel disease  She reports her joint pains are again flaring and this primarily affect her hands, knees and ankles where she will also notice swelling  She experiences stiffness mostly in the night and to some degree in the morning but this improves with activities  She does take the gabapentin 600 mg 4 times a day and oxycodone 5 mg 3 times a day as needed        7/13/2022:      Past Medical History:   Diagnosis Date    Cervical cancer ([facility] [address] )     Crohn disease ([facility] [address] )     Endocarditis     Fibromyalgia     Gastritis     Hypertension     RA (rheumatoid arthritis) ([facility] [address] )     Vertigo        Past Surgical History:   Procedure Laterality Date    APPENDECTOMY      BRONCHOSCOPY      multiple    COLONOSCOPY         Current Outpatient Medications   Medication Sig Dispense Refill    amitriptyline (ELAVIL) 100 mg tablet take 1 tablet by mouth at bedtime 100 tablet 1    divalproex sodium (DEPAKOTE) 250 mg EC tablet Take 250 mg by mouth 2 (two) times a day        dronabinol (MARINOL) 5 MG capsule Take 1 capsule (5 mg total) by mouth 2 (two) times a day before meals 60 capsule 2    folic acid (FOLVITE) 1 mg tablet Take 1 tablet (1 mg total) by mouth daily 90 tablet 3    gabapentin (NEURONTIN) 600 MG tablet Take 1 tablet (600 mg total) by mouth 4 (four) times a day 120 tablet 2    Methotrexate Sodium (methotrexate, PF,) 50 mg/2 mL injection Inject 0 6 mL (15 mg total) under the skin once a week 4 mL 5    Methotrexate, PF, (Rasuvo) 15 MG/0 3ML SOAJ Inject 0 3 mL (15 mg total) under the skin every 7 days 1 2 mL 5    montelukast (SINGULAIR) 10 mg tablet Take 10 mg by mouth daily at bedtime      ondansetron (ZOFRAN) 4 mg tablet Take 1 tablet (4 mg total) by mouth every 8 (eight) hours as needed for nausea or vomiting 30 tablet 1    oxyCODONE (ROXICODONE) 5 immediate release tablet take 1 tablet by mouth three times a day MAXIMUM DAILY DOSE OF 15 milligram 90 tablet 0    pantoprazole (PROTONIX) 40 mg tablet Take 40 mg by mouth daily      polyethylene glycol-electrolytes (NULYTELY) 4000 mL solution Take 4,000 mL by mouth once for 1 dose 4000 mL 0    predniSONE 10 mg tablet 4 DAILY FOR 3 DAYS,3 DAILY FOR 3 DAYS,2 DAILY FOR 3 DAYS, ONE DAILY FOR 3 DAYS (Patient not taking: Reported on 2/21/2022 ) 30 tablet 0    prochlorperazine (COMPAZINE) 5 mg tablet Take 1 tablet (5 mg total) by mouth every 6 (six) hours as needed for nausea or vomiting 30 tablet 0    sucralfate (CARAFATE) 1 g/10 mL suspension Take 10 mL (1 g total) by mouth 4 (four) times a day 420 mL 1    SUMAtriptan (IMITREX) 100 mg tablet Take 1 tablet by mouth once as needed        ustekinumab (Stelara) 90 mg/mL subcutaneous injection MAINTENANCE: INJECT 1 SYRINGE SUBCUTANEOUSLY EVERY 8 WEEKS  REFRIGERATE  DO NOT FREEZE  2 mL 2    varenicline (CHANTIX) 1 mg tablet Take 1 tablet (1 mg total) by mouth 2 (two) times a day 60 tablet 0    VITAMIN D PO Take by mouth daily        No current facility-administered medications for this visit          Allergies   Allergen Reactions    Ketorolac Hives    Penicillins Hives    Ketorolac Tromethamine Rash       Review of Systems  Constitutional: Negative for weight change, fevers, chills, night sweats, fatigue  ENT/Mouth: Negative for hearing changes, ear pain, nasal congestion, sinus pain, hoarseness, sore throat, rhinorrhea, swallowing difficulty  Eyes: Negative for pain, redness, discharge, vision changes  Cardiovascular: Negative for chest pain, SOB, palpitations  Respiratory: Negative for cough, sputum, wheezing, dyspnea  Gastrointestinal: Negative for nausea, vomiting, diarrhea, constipation, pain, heartburn  Genitourinary: Negative for dysuria, urinary frequency, hematuria  Musculoskeletal: As per HPI  Skin: Negative for skin rash, color changes  Neuro: Negative for weakness, numbness, tingling, loss of consciousness  Psych: Negative for anxiety, depression  Heme/Lymph: Negative for easy bruising, bleeding, lymphadenopathy  Assessment and Plan:   Ms Tal Harrison a 40-year-old female with history significant for Crohn's disease diagnosed in 1996, psoriasis, fibromyalgia/chronic pain syndrome and possible peripheral spondyloarthropathy related to psoriasis/IBD who presents for a follow up  Alvinkahlil Macias is currently on Stelara injections every 6 weeks through her gastroenterologist         Rheumatic Summary:  1) Diagnosed with Crohn's disease in 1996 - started on Enbrel+mesalamine+6-MP+steroids x 8 years - d/c'ed due to inefficacy  - Managed with chronic steroids until 2014 then started on Humira till 8/2019 - d/c'ed due to decline in efficacy  - Started Stelara 2/2020-present - IBD stable  2) ? Inflammatory arthritis (negative RF, borderline CCP of 20) - arthralgias since 2000 - no improvement with high doses steroids, Enbrel, Humira or Stelara  - 7/13/21: continue Stelara and add on MTX, prednisone 20 mg daily x 10 days  - 3/9/22: continue Stelara through GI   Add on injectable MTX   3) Fibromyalgia - currently gabapentin 600 mg QID, oxycodone 5 mg TID PRN - minimal improvement  - Previously been on amitriptyline and Dilaudid - no significant improvement  4) Psoriasis diagnosed at the age of 4         # Inflammatory arthritis likely secondary to psoriasis vs IBD  - Janey Ann presents today for a follow up of diffuse arthralgias and myalgias which have been gradually progressive over the past 15 years +  Given the widespread and chronic complaints, as well as a lack of benefit with prior medications including high doses of prednisone, etanercept and adalimumab (and now Stelara since February 2020), I had considered her diagnosis to be secondary to fibromyalgia      - Apart from the fibromyalgia I question if she may be presenting with a concomitant inflammatory arthritis secondary to the underlying psoriasis/inflammatory bowel disease, given the focal joint involvement and evidence of fluctuating joint swelling noted  In view of this I recommend adding on DMARD therapy and we have previously discussed methotrexate as an initial option but she has not done this consistently due to concerns for potential side effects  She is willing to retry oral methotrexate at 10 mg once weekly with folic acid 1 mg daily and in the meanwhile I will start an authorization for injectable methotrexate 15 mg once weekly in order to prevent GI related side effects with the oral formulation  Once she starts the methotrexate she will update high-risk medication lab monitoring in 4 weeks   I advised her while she is on the Stelara other biologic medications cannot be considered and we will have to see how she responds to a combination of the Stelara with non-biologic DMARDs (sulfasalazine can also be considered)     - For management of the chronic pain syndrome she will follow up with Pain Management and is currently on gabapentin 600 mg 4 times a day along with oxycodone 5 mg TID PRN         I spent 11 minutes directly with the patient during this visit        VIRTUAL VISIT Jude Escobedo 15 verbally agrees to participate in [employer]  Pt is aware that [employer] could be limited without vital signs or the ability to perform a full hands-on physical Loreda Go understands she or the provider may request at any time to terminate the video visit and request the patient to seek care or treatment in person

## 2022-08-05 ENCOUNTER — TELEPHONE (OUTPATIENT)
Dept: GASTROENTEROLOGY | Facility: CLINIC | Age: 42
End: 2022-08-05

## 2022-08-05 NOTE — TELEPHONE ENCOUNTER
Submitted on navinet with provided keycode of [facility]    "No member found"

## 2022-08-05 NOTE — TELEPHONE ENCOUNTER
Fax received from [phone] prior auth for Stelara 90mg/ml syringes is about to   Thank you

## 2022-08-08 NOTE — TELEPHONE ENCOUNTER
Called [facility] and spoke with ulises  She stated information listed in their system does not match ours  Unable to submit auth unless information matches  Called patient and left voicemail asking her to please update information with insurance or else I cannot obtain authorization

## 2022-08-11 NOTE — TELEPHONE ENCOUNTER
Called [facility] department at [phone] and spoke with benjamin  She was able to pull up patient account  Stelara 90mg Q 8 weeks has been reapproved       Valid 8/11/2022 through 8/11/2023  [id number]

## 2022-08-16 ENCOUNTER — APPOINTMENT (OUTPATIENT)
Dept: LAB | Facility: CLINIC | Age: 42
End: 2022-08-16
Payer: COMMERCIAL

## 2022-08-16 DIAGNOSIS — R53.83 FATIGUE, UNSPECIFIED TYPE: ICD-10-CM

## 2022-08-16 DIAGNOSIS — R63.0 ANOREXIA: ICD-10-CM

## 2022-08-16 DIAGNOSIS — R53.1 WEAKNESS: ICD-10-CM

## 2022-08-16 DIAGNOSIS — Z79.631 METHOTREXATE, LONG TERM, CURRENT USE: ICD-10-CM

## 2022-08-16 DIAGNOSIS — K50.00 CROHN'S DISEASE OF SMALL INTESTINE WITHOUT COMPLICATION (HCC): ICD-10-CM

## 2022-08-16 DIAGNOSIS — R11.14 BILIOUS VOMITING WITH NAUSEA: ICD-10-CM

## 2022-08-16 DIAGNOSIS — M19.90 INFLAMMATORY ARTHRITIS: ICD-10-CM

## 2022-08-16 LAB
ALBUMIN SERPL BCP-MCNC: 3.6 G/DL (ref 3.5–5)
ALP SERPL-CCNC: 75 U/L (ref 46–116)
ALT SERPL W P-5'-P-CCNC: 23 U/L (ref 12–78)
ANION GAP SERPL CALCULATED.3IONS-SCNC: 4 MMOL/L (ref 4–13)
AST SERPL W P-5'-P-CCNC: 16 U/L (ref 5–45)
BASOPHILS # BLD AUTO: 0.12 THOUSANDS/ΜL (ref 0–0.1)
BASOPHILS NFR BLD AUTO: 1 % (ref 0–1)
BILIRUB SERPL-MCNC: <0.1 MG/DL (ref 0.2–1)
BUN SERPL-MCNC: 5 MG/DL (ref 5–25)
CALCIUM SERPL-MCNC: 8.1 MG/DL (ref 8.3–10.1)
CHLORIDE SERPL-SCNC: 107 MMOL/L (ref 96–108)
CO2 SERPL-SCNC: 28 MMOL/L (ref 21–32)
CREAT SERPL-MCNC: 0.69 MG/DL (ref 0.6–1.3)
CRP SERPL QL: 10.8 MG/L
EOSINOPHIL # BLD AUTO: 0.36 THOUSAND/ΜL (ref 0–0.61)
EOSINOPHIL NFR BLD AUTO: 4 % (ref 0–6)
ERYTHROCYTE [DISTWIDTH] IN BLOOD BY AUTOMATED COUNT: 14.3 % (ref 11.6–15.1)
ERYTHROCYTE [SEDIMENTATION RATE] IN BLOOD: 24 MM/HOUR (ref 0–19)
GFR SERPL CREATININE-BSD FRML MDRD: 106 ML/MIN/1.73SQ M
GLUCOSE P FAST SERPL-MCNC: 109 MG/DL (ref 65–99)
HCT VFR BLD AUTO: 41.7 % (ref 34.8–46.1)
HGB BLD-MCNC: 13.2 G/DL (ref 11.5–15.4)
IMM GRANULOCYTES # BLD AUTO: 0.04 THOUSAND/UL (ref 0–0.2)
IMM GRANULOCYTES NFR BLD AUTO: 0 % (ref 0–2)
LYMPHOCYTES # BLD AUTO: 3.62 THOUSANDS/ΜL (ref 0.6–4.47)
LYMPHOCYTES NFR BLD AUTO: 38 % (ref 14–44)
MCH RBC QN AUTO: 30.1 PG (ref 26.8–34.3)
MCHC RBC AUTO-ENTMCNC: 31.7 G/DL (ref 31.4–37.4)
MCV RBC AUTO: 95 FL (ref 82–98)
MONOCYTES # BLD AUTO: 0.58 THOUSAND/ΜL (ref 0.17–1.22)
MONOCYTES NFR BLD AUTO: 6 % (ref 4–12)
NEUTROPHILS # BLD AUTO: 4.72 THOUSANDS/ΜL (ref 1.85–7.62)
NEUTS SEG NFR BLD AUTO: 51 % (ref 43–75)
NRBC BLD AUTO-RTO: 0 /100 WBCS
PLATELET # BLD AUTO: 322 THOUSANDS/UL (ref 149–390)
PMV BLD AUTO: 9.8 FL (ref 8.9–12.7)
POTASSIUM SERPL-SCNC: 3.6 MMOL/L (ref 3.5–5.3)
PROT SERPL-MCNC: 7.3 G/DL (ref 6.4–8.4)
RBC # BLD AUTO: 4.38 MILLION/UL (ref 3.81–5.12)
SODIUM SERPL-SCNC: 139 MMOL/L (ref 135–147)
TSH SERPL DL<=0.05 MIU/L-ACNC: 1.85 UIU/ML (ref 0.45–4.5)
VALPROATE SERPL-MCNC: 30 UG/ML (ref 50–100)
WBC # BLD AUTO: 9.44 THOUSAND/UL (ref 4.31–10.16)

## 2022-08-16 PROCEDURE — 84443 ASSAY THYROID STIM HORMONE: CPT

## 2022-08-16 PROCEDURE — 36415 COLL VENOUS BLD VENIPUNCTURE: CPT

## 2022-08-16 PROCEDURE — 80164 ASSAY DIPROPYLACETIC ACD TOT: CPT

## 2022-08-16 PROCEDURE — 86140 C-REACTIVE PROTEIN: CPT

## 2022-08-16 PROCEDURE — 85025 COMPLETE CBC W/AUTO DIFF WBC: CPT

## 2022-08-16 PROCEDURE — 85652 RBC SED RATE AUTOMATED: CPT

## 2022-08-16 PROCEDURE — 80053 COMPREHEN METABOLIC PANEL: CPT

## 2022-08-17 RX ORDER — ONDANSETRON 4 MG/1
4 TABLET, FILM COATED ORAL EVERY 8 HOURS PRN
Qty: 30 TABLET | Refills: 0 | Status: SHIPPED | OUTPATIENT
Start: 2022-08-17

## 2022-08-17 RX ORDER — AMITRIPTYLINE HYDROCHLORIDE 100 MG/1
100 TABLET, FILM COATED ORAL
Qty: 100 TABLET | Refills: 0 | Status: SHIPPED | OUTPATIENT
Start: 2022-08-17

## 2022-08-18 ENCOUNTER — TELEPHONE (OUTPATIENT)
Dept: GASTROENTEROLOGY | Facility: CLINIC | Age: 42
End: 2022-08-18

## 2022-08-19 ENCOUNTER — TELEPHONE (OUTPATIENT)
Dept: OBGYN CLINIC | Facility: MEDICAL CENTER | Age: 42
End: 2022-08-19

## 2022-08-19 ENCOUNTER — OFFICE VISIT (OUTPATIENT)
Dept: INTERNAL MEDICINE CLINIC | Facility: CLINIC | Age: 42
End: 2022-08-19
Payer: COMMERCIAL

## 2022-08-19 ENCOUNTER — TELEPHONE (OUTPATIENT)
Dept: OBGYN CLINIC | Facility: HOSPITAL | Age: 42
End: 2022-08-19

## 2022-08-19 VITALS
WEIGHT: 180.2 LBS | BODY MASS INDEX: 34.02 KG/M2 | OXYGEN SATURATION: 97 % | DIASTOLIC BLOOD PRESSURE: 78 MMHG | HEART RATE: 80 BPM | RESPIRATION RATE: 16 BRPM | TEMPERATURE: 97.4 F | SYSTOLIC BLOOD PRESSURE: 132 MMHG | HEIGHT: 61 IN

## 2022-08-19 DIAGNOSIS — R56.9 SEIZURES (HCC): ICD-10-CM

## 2022-08-19 DIAGNOSIS — Z12.31 ENCOUNTER FOR SCREENING MAMMOGRAM FOR BREAST CANCER: Primary | ICD-10-CM

## 2022-08-19 DIAGNOSIS — M05.79 RHEUMATOID ARTHRITIS INVOLVING MULTIPLE SITES WITH POSITIVE RHEUMATOID FACTOR (HCC): ICD-10-CM

## 2022-08-19 PROCEDURE — 99396 PREV VISIT EST AGE 40-64: CPT | Performed by: INTERNAL MEDICINE

## 2022-08-19 NOTE — PROGRESS NOTES
Assessment/Plan:       Diagnoses and all orders for this visit:    Encounter for screening mammogram for breast cancer  -     Mammo screening bilateral w cad; Future    Rheumatoid arthritis involving multiple sites with positive rheumatoid factor (HCC)    Seizures (HCC)                Subjective:      Patient ID: Azar Sandoval is a 38 y o  female  This 64-year-old with a constellation of symptoms and signs which appears to be a multi-system autoimmune illness   Crohn's disease on Stelara  Development of inflammatory joint pain  Seen by Rheumatology and started on methotrexate   Paresthesias of fingers 123 and 4 both hands suggestive of carpal tunnel syndrome  Livedo reticularis  Though she has some kind of autoimmune overlap or mixed connective tissue disorder with multiple features of autoimmune illness  Interval development of episodes of diaphoresis  The following portions of the patient's history were reviewed and updated as appropriate:   She has a past medical history of Cervical cancer ([facility] [address] ), Crohn disease ([facility] [address] ), Endocarditis, Fibromyalgia, Gastritis, Hypertension, RA (rheumatoid arthritis) ([facility] [address] ), and Vertigo  ,  does not have any pertinent problems on file  ,   has a past surgical history that includes Appendectomy; Bronchoscopy; and Colonoscopy  ,  family history includes Colon cancer (age of onset: 28) in her brother; Crohn's disease in her brother  ,   reports that she has been smoking cigarettes  She has been smoking about 1 00 pack per day  She has never used smokeless tobacco  She reports previous drug use  Drug: Marijuana  She reports that she does not drink alcohol ,  is allergic to ketorolac, penicillins, and ketorolac tromethamine     Current Outpatient Medications   Medication Sig Dispense Refill    amitriptyline (ELAVIL) 100 mg tablet Take 1 tablet (100 mg total) by mouth daily at bedtime 100 tablet 0    divalproex sodium (DEPAKOTE) 250 mg EC tablet Take 250 mg by mouth 2 (two) times a day        dronabinol (MARINOL) 5 MG capsule Take 1 capsule (5 mg total) by mouth 2 (two) times a day before meals 60 capsule 2    folic acid (FOLVITE) 1 mg tablet Take 1 tablet (1 mg total) by mouth daily 90 tablet 0    gabapentin (NEURONTIN) 600 MG tablet Take 1 tablet (600 mg total) by mouth 4 (four) times a day 120 tablet 0    methotrexate 2 5 mg tablet Take 4 tablets (10 mg total) by mouth once a week 16 tablet 0    Methotrexate, PF, (Rasuvo) 15 MG/0 3ML SOAJ Inject 0 3 mL (15 mg total) under the skin every 7 days 1 2 mL 5    montelukast (SINGULAIR) 10 mg tablet Take 10 mg by mouth daily at bedtime      ondansetron (ZOFRAN) 4 mg tablet Take 1 tablet (4 mg total) by mouth every 8 (eight) hours as needed for nausea or vomiting 30 tablet 0    oxyCODONE (ROXICODONE) 5 immediate release tablet take 1 tablet by mouth three times a day MAXIMUM DAILY DOSE OF 15 milligram 90 tablet 0    pantoprazole (PROTONIX) 40 mg tablet Take 40 mg by mouth daily      predniSONE 10 mg tablet 4 DAILY FOR 3 DAYS,3 DAILY FOR 3 DAYS,2 DAILY FOR 3 DAYS, ONE DAILY FOR 3 DAYS 30 tablet 0    SUMAtriptan (IMITREX) 100 mg tablet Take 1 tablet by mouth once as needed        ustekinumab (Stelara) 90 mg/mL subcutaneous injection MAINTENANCE: INJECT 1 SYRINGE SUBCUTANEOUSLY EVERY 8 WEEKS  REFRIGERATE  DO NOT FREEZE  2 mL 2    polyethylene glycol-electrolytes (NULYTELY) 4000 mL solution Take 4,000 mL by mouth once for 1 dose 4000 mL 0    varenicline (CHANTIX) 1 mg tablet Take 1 tablet (1 mg total) by mouth 2 (two) times a day (Patient not taking: Reported on 8/19/2022) 60 tablet 0    VITAMIN D PO Take by mouth daily  (Patient not taking: Reported on 8/19/2022)       No current facility-administered medications for this visit  Review of Systems   Constitutional: Positive for fatigue  Musculoskeletal: Positive for arthralgias, gait problem and joint swelling  Neurological: Positive for headaches  Objective:  Vitals:    08/19/22 1310   BP: 132/78   Pulse: 80   Resp: 16   Temp: (!) 97 4 °F (36 3 °C)   SpO2: 97%      Physical Exam  Constitutional:       Appearance: She is well-developed  HENT:      Head: Normocephalic and atraumatic  Eyes:      Pupils: Pupils are equal, round, and reactive to light  Neck:      Thyroid: No thyromegaly  Trachea: No tracheal deviation  Cardiovascular:      Rate and Rhythm: Normal rate and regular rhythm  Heart sounds: Normal heart sounds  No murmur heard  No gallop  Pulmonary:      Effort: No respiratory distress  Breath sounds: No wheezing or rales  Abdominal:      General: Bowel sounds are normal       Palpations: Abdomen is soft  Tenderness: There is no abdominal tenderness  Musculoskeletal:         General: No tenderness or deformity  Normal range of motion  Cervical back: Normal range of motion and neck supple  Skin:     General: Skin is warm  Neurological:      Mental Status: She is alert and oriented to person, place, and time  Coordination: Coordination normal    Psychiatric:         Judgment: Judgment normal            Patient Instructions   A patient with MCTD    Care of her ongoing issue devolved to the [address] [place] [address] scheduled   Revisit with primary care your

## 2022-08-19 NOTE — TELEPHONE ENCOUNTER
Patient sees Dr Bertha Jesus from [facility] pharmacy is calling because they received a script for rasuvo but patient is also taking methotrexate tablets  They would like to know if patient stills needs the rasuvo?       CB: [phone]

## 2022-08-19 NOTE — PATIENT INSTRUCTIONS
A patient with MCTD    Care of her ongoing issue devolved to the [address] [place] [address] scheduled   Revisit with primary care your

## 2022-08-21 RX ORDER — DRONABINOL 5 MG/1
5 CAPSULE ORAL
Qty: 60 CAPSULE | Refills: 0 | Status: SHIPPED | OUTPATIENT
Start: 2022-08-21

## 2022-08-24 ENCOUNTER — TELEPHONE (OUTPATIENT)
Dept: GASTROENTEROLOGY | Facility: AMBULARY SURGERY CENTER | Age: 42
End: 2022-08-24

## 2022-08-24 NOTE — TELEPHONE ENCOUNTER
Patients GI provider:  Dr Pratt Ou     Number to return call: [phone]     Reason for call: Pt calling stated she needs prior auth for script for marinol     Scheduled procedure/appointment date if applicable: Apt/procedure 9/13/22

## 2022-08-30 NOTE — TELEPHONE ENCOUNTER
Dr Gregg the prior auth for the Zofran has been denied through her insurance [employer]  Please advise  They are looking for needing to take due to chemotherapy/radiotherapypregnancy,pallative care  From what I am hearing most of the insurances are not approving the Zofran  It does not give approved meds  Do you want the patient to call and see what is on her formulary?

## 2022-09-06 NOTE — TELEPHONE ENCOUNTER
The demographics that are in patient's chart are the ones I used to prior auth her zofran  I do not understand the issue with the demographics today at all with Malena  I also left message for patient she needs to change her last name to [place] with the pharmacies because in her chart it says [place] is her legal name and it is causing a lot of confusion

## 2022-09-13 ENCOUNTER — TELEPHONE (OUTPATIENT)
Dept: OTHER | Facility: OTHER | Age: 42
End: 2022-09-13

## 2022-09-13 DIAGNOSIS — M79.7 FIBROMYALGIA: ICD-10-CM

## 2022-09-13 RX ORDER — GABAPENTIN 600 MG/1
TABLET ORAL
Qty: 120 TABLET | Refills: 0 | Status: SHIPPED | OUTPATIENT
Start: 2022-09-13 | End: 2022-10-16 | Stop reason: SDUPTHER

## 2022-09-13 NOTE — TELEPHONE ENCOUNTER
Patient is calling regarding cancelling an appointment      Date/Time: 09/13/2022 @ 4721    Patient was rescheduled: YES [] NO [x]    Patient requesting call back to reschedule: YES [] NO [x]

## 2022-09-21 NOTE — TELEPHONE ENCOUNTER
I tried calling pt but mailbox is still full so I sent an unable to contact letter  Will await a call back from pt to r/s appt   Sb

## 2022-10-07 ENCOUNTER — TELEPHONE (OUTPATIENT)
Dept: RHEUMATOLOGY | Facility: CLINIC | Age: 42
End: 2022-10-07

## 2022-10-07 DIAGNOSIS — M19.90 INFLAMMATORY ARTHRITIS: ICD-10-CM

## 2022-10-07 NOTE — TELEPHONE ENCOUNTER
Is she on oral or injectable methotrexate? And what dose is she taking? she also needs a follow up appointment with me, thanks

## 2022-10-07 NOTE — TELEPHONE ENCOUNTER
I spoke with the patient and the patient states that she currently is taking 2 5 mg 4 tablets once weekly and she currently has a follow up scheduled with you for 11/09/22

## 2022-10-07 NOTE — TELEPHONE ENCOUNTER
Patient called requesting a refill of methotrexate, she states that the pharmacy called her and let her know her refill was declined  Patient states she is now using [facility]  and I added it to pts chart  Please advise

## 2022-10-15 DIAGNOSIS — M79.7 FIBROMYALGIA: ICD-10-CM

## 2022-10-16 RX ORDER — GABAPENTIN 600 MG/1
TABLET ORAL
Qty: 120 TABLET | Refills: 0 | Status: SHIPPED | OUTPATIENT
Start: 2022-10-16

## 2022-10-26 ENCOUNTER — HOSPITAL ENCOUNTER (OUTPATIENT)
Dept: MRI IMAGING | Facility: CLINIC | Age: 42
Discharge: HOME | End: 2022-10-26
Payer: COMMERCIAL

## 2022-10-26 DIAGNOSIS — R25.1 DYSPHONIA OF ORGANIC TREMOR: ICD-10-CM

## 2022-10-26 DIAGNOSIS — R49.0 DYSPHONIA OF ORGANIC TREMOR: ICD-10-CM

## 2022-10-26 PROCEDURE — 70551 MRI BRAIN STEM W/O DYE: CPT

## 2022-11-08 ENCOUNTER — APPOINTMENT (OUTPATIENT)
Dept: LAB | Facility: CLINIC | Age: 42
End: 2022-11-08

## 2022-11-08 DIAGNOSIS — I63.9 CEREBROVASCULAR ACCIDENT (CVA), UNSPECIFIED MECHANISM (HCC): ICD-10-CM

## 2022-11-08 DIAGNOSIS — R53.83 FATIGUE, UNSPECIFIED TYPE: ICD-10-CM

## 2022-11-08 DIAGNOSIS — Z79.631 METHOTREXATE, LONG TERM, CURRENT USE: ICD-10-CM

## 2022-11-09 ENCOUNTER — TELEPHONE (OUTPATIENT)
Dept: RHEUMATOLOGY | Facility: CLINIC | Age: 42
End: 2022-11-09

## 2022-11-09 ENCOUNTER — TELEMEDICINE (OUTPATIENT)
Dept: RHEUMATOLOGY | Facility: CLINIC | Age: 42
End: 2022-11-09

## 2022-11-09 DIAGNOSIS — Z79.631 METHOTREXATE, LONG TERM, CURRENT USE: ICD-10-CM

## 2022-11-09 DIAGNOSIS — M19.90 INFLAMMATORY ARTHRITIS: Primary | ICD-10-CM

## 2022-11-09 DIAGNOSIS — L40.9 PSORIASIS: ICD-10-CM

## 2022-11-09 DIAGNOSIS — K50.10 CROHN'S DISEASE OF COLON WITHOUT COMPLICATION (HCC): ICD-10-CM

## 2022-11-09 DIAGNOSIS — Z79.60 LONG-TERM USE OF IMMUNOSUPPRESSANT MEDICATION: ICD-10-CM

## 2022-11-09 DIAGNOSIS — M79.7 FIBROMYALGIA: ICD-10-CM

## 2022-11-09 LAB
ALBUMIN SERPL BCP-MCNC: 3.4 G/DL (ref 3.5–5)
ALP SERPL-CCNC: 72 U/L (ref 46–116)
ALT SERPL W P-5'-P-CCNC: 25 U/L (ref 12–78)
ANION GAP SERPL CALCULATED.3IONS-SCNC: 6 MMOL/L (ref 4–13)
AST SERPL W P-5'-P-CCNC: 16 U/L (ref 5–45)
BILIRUB SERPL-MCNC: 0.19 MG/DL (ref 0.2–1)
BUN SERPL-MCNC: 8 MG/DL (ref 5–25)
CALCIUM ALBUM COR SERPL-MCNC: 10.1 MG/DL (ref 8.3–10.1)
CALCIUM SERPL-MCNC: 9.6 MG/DL (ref 8.3–10.1)
CHLORIDE SERPL-SCNC: 103 MMOL/L (ref 96–108)
CO2 SERPL-SCNC: 26 MMOL/L (ref 21–32)
CREAT SERPL-MCNC: 0.6 MG/DL (ref 0.6–1.3)
CRP SERPL QL: 9.8 MG/L
ERYTHROCYTE [SEDIMENTATION RATE] IN BLOOD: 38 MM/HOUR (ref 0–19)
GFR SERPL CREATININE-BSD FRML MDRD: 111 ML/MIN/1.73SQ M
GLUCOSE SERPL-MCNC: 96 MG/DL (ref 65–140)
HCYS SERPL-SCNC: 10.1 UMOL/L (ref 3.7–11.2)
POTASSIUM SERPL-SCNC: 4.2 MMOL/L (ref 3.5–5.3)
PROT SERPL-MCNC: 7.5 G/DL (ref 6.4–8.4)
SODIUM SERPL-SCNC: 135 MMOL/L (ref 135–147)

## 2022-11-09 NOTE — PROGRESS NOTES
Virtual Regular Visit    Verification of patient location:    Patient is located in the following state in which I hold an active license [place]      Assessment/Plan:    Problem List Items Addressed This Visit        Musculoskeletal and Integument    Psoriasis      Other Visit Diagnoses     Inflammatory arthritis    -  Primary    Relevant Medications    methotrexate 2 5 mg tablet    Crohn's disease of colon without complication (HCC)        Methotrexate, long term, current use        Relevant Orders    CBC and differential    Comprehensive metabolic panel    Long-term use of immunosuppressant medication        Fibromyalgia                   Reason for visit is follow up  Chief Complaint   Patient presents with   • Virtual Regular Visit        Encounter provider Matt Covarrubias MD    Provider located at [address] [place] [id number]      Recent Visits  No visits were found meeting these conditions  Showing recent visits within past 7 days and meeting all other requirements  Today's Visits  Date Type Provider Dept   11/09/22 Telemedicine Matt Covarrubias MD [facility] today's visits and meeting all other requirements  Future Appointments  No visits were found meeting these conditions  Showing future appointments within next 150 days and meeting all other requirements       The patient was identified by name and date of birth  Clive Hoover was informed that this is a telemedicine visit and that the visit is being conducted through Telephone  My office door was closed  No one else was in the room  She acknowledged consent and understanding of privacy and security of the video platform  The patient has agreed to participate and understands they can discontinue the visit at any time  Patient is aware this is a billable service         Subjective    HPI     INITIAL VISIT NOTE (4/2020):  Ms  Loki is a 47-year-old female with history significant for Crohn's disease diagnosed in 1996, fibromyalgia, 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 (currently on oral bisphosphonate therapy) and psoriasis, who presents for further evaluation of diffuse joint pains   She is referred by Dr Maria M Pinon a rheumatology consult      Patient reports she was initially evaluated by Gastroenterology in [address] [place] [address] when she was diagnosed with Crohn's disease   She mentions at that time she was started on a combination of Enbrel (possibly for the joint pains), mesalamine and 6 mercaptopurine in association with steroids   She reports being on this regimen for approximately 8 years following which it was discontinued due to a decline in efficacy   She reports overall since her diagnosis in 1996 she has been on chronic intermittent doses of prednisone, with her last course about 1 month ago  Concepción Luna will typically start off at doses of 40 mg once daily and taper down, with the lower doses causing a flare-up of the colitis   Of note she mentions that even the higher doses of prednisone have never helped with her joint pains   She states following her initial regimen she was then switched to Humira which she was on for about 5 years, and again it was discontinued in August 2019 due to a decline in efficacy   She has since started [place] fredo on February 6, 2020 and has thus far completed the initial dosing  Concepción Luna is now on a maintenance dosing of 1 injection every 8 weeks  Concepción Luna has not yet noticed if the [place] fredo is helping with her joint pains  Concepción Luna mentions her last colonoscopy was done in December 2019 and this was consistent with active pancolitis   She follows with [facility]  Concepción Luna reports she is continuing to have loose stools with occasional mucus, but denies bloody diarrhea or abdominal pain      She was previously seen by Rheumatology in [address] [place] [address] and continued follow-up with them until she relocated to [place] approximately 7 years ago  Valentina Aburto states since then her medications have mostly been managed by her primary care doctor and Gastroenterology  Valentina Aburto has previously been on gabapentin for a diagnosis of fibromyalgia, but states that this was recently discontinued and she was advised to follow up with pain management   She is receiving hydromorphone 4 mg twice daily from pain management at this time, and states that it only helps to a mild degree with her body pain      She reports her joint pains have been ongoing for the past 15 years and have been progressively worsening over time to the point that she is noticing them on a daily basis in a constant manner, including during her sleep at night   As mentioned she has previously been on gabapentin up to 600 mg 3 times daily and amitriptyline 100 mg at bedtime which would help her, but this regimen was discontinued by her primary care doctor and she was advised to follow-up with Rheumatology and Pain Management   She states that the joint pains affect her all over but mostly with her knees, ankles, hands and low back   She reports the low back pain will wake her up from sleep at night and is also associated with morning stiffness of up to 2 hours   The back pain worsens with activities and is also worsened at rest   She also describes pain affecting her diffusely throughout her muscles   She has noticed occasional swelling affecting her fingers and knees   She experiences morning stiffness which affects her diffusely and takes about 3-4 hours to improve   She refrains from NSAID use due to her history of colitis      She reports as a result of being on chronic steroids she has developed osteopenia/osteoporosis   Her last DEXA scan was done approximately 1 year ago, and I do not have the records available for review   She does not take daily calcium supplements but does take vitamin-D replacement therapy  Valentina Aburto is also on Fosamax once weekly      She reports a history of blurred vision but denies eye pain or redness   She denies a history of inflammatory eye disease   She has been diagnosed with psoriasis at the age of 4 which will typically affect her arms and legs   She reports a family history of psoriasis in her father as well as multiple family members on her mother's side with inflammatory bowel disease   She denies current fevers, unintentional weight loss, mouth/nose ulcers, swollen glands, blood clots or Raynaud's      Labs in our system show a negative rheumatoid factor         7/28/2020:  Patient presents for a follow-up today  Mable Salgado had to convert to a virtual visit as she had a temperature on arrival   She did not have a chance to get the blood work and x-rays done following the last office visit, as she states that she did not receive a copy in the mail      She has now been on the Duarte since February 2020 and does not feel like it is helping with the bowel disease as she continues to have multiple episodes of diarrhea and mucus in her stool  Derrell Jo has also not noticed any change in her joint pains, but states that it has helped with the psoriasis   I did start her on gabapentin at 300 mg 3 times daily at the last office visit for fibromyalgia, and she feels like this helped to a certain degree   She is continuing to experience pain mostly in her neck, upper back region/shoulders, elbows, hands and knees   She has noticed swelling of her hands, knees, ankles and feet   She does experience morning stiffness which affects her diffusely mostly in her back region and can persist throughout the day when it occurs   She reports usually 4 days out of the week will be severe for her and may be weather dependent   She is not taking any over-the-counter pain medications at this time      She did try to send me pictures of her hand swelling following the last visit, but unfortunately this got scanned in as black and white, and it is challenging to appreciate any abnormalities   I did also receive her DEXA scan report that was done in October 2019 and appears to be normal for her age         3/10/2021:  Patient presents for a follow-up today  Kaitlyn Olivo did review her labs done after the last office visit which showed a borderline elevated anti CCP antibody of 20   An ESR, CRP and hepatitis panel were unremarkable      At the last office visit we had discussed starting methotrexate, which she took for approximately 3 weeks but as it was causing gastrointestinal side effects she discontinued it  Vanna Renteria thinks this may have been related to significant personal stressors as well and would like to give the methotrexate a retry  Vanna Renteria is still on Stelara for the Crohn's disease through her gastroenterologist  Vanna Renteria is prescribed Dilaudid by her pain management specialist  Vanna Renteria has been taking the gabapentin 600 mg 4 times daily which does help   She reports generally during the cooler weather she feels better and her symptoms may flare in the summer   She is continuing to experience pain mostly in her neck, upper back region/shoulders, elbows, hands and knees   She has noticed swelling of her hands, knees, ankles and feet   She does experience morning stiffness which affects her diffusely mostly in her back region and can persist throughout the day when it occurs          7/13/2021:  Patient presents for a follow up today   At the last office visit to address the possibility of an inflammatory arthritis secondary to the psoriasis/IBD I had discussed starting her on methotrexate 10 mg once weekly, but patient states she only took one dose and then discontinued it due to concerns for potential side effects   She is currently receiving Stelara injections every 3 months primarily for the IBD      She has been experiencing a flare up of joint pains over the past few weeks which is effecting her hands, low back, knees, ankles and feet   She has noticed swelling of her hands (her primary care physician described this to her as "sausage digits", but this is not evident on her exam today), ankles and feet   She experiences morning stiffness primarily of her lower body that can take hours to improve   She is following with pain management and is currently on gabapentin 600 mg four times daily and oxycodone 5 mg three times daily as needed   The Dilaudid was discontinued   These medications are not helping with her pain      She denies psoriasis or a flare up of the inflammatory bowel disease         3/9/2022:  Patient presents for a follow-up of possible inflammatory arthritis secondary to the psoriasis/IBD  At the last office visit I had discussed starting her on methotrexate but she did not do this due to concerns for potential side effects  She is only on Stelara injections every 6 weeks at this moment primarily for the inflammatory bowel disease      She reports her joint pains are again flaring and this primarily affect her hands, knees and ankles where she will also notice swelling  She experiences stiffness mostly in the night and to some degree in the morning but this improves with activities  She does take the gabapentin 600 mg 4 times a day and oxycodone 5 mg 3 times a day as needed  11/9/2022:  Patient presents for a follow-up of an inflammatory arthritis secondary to psoriasis/inflammatory bowel disease  Currently she is on Stelara injections every 6 weeks and oral methotrexate 10 mg once weekly that was started approximately 4 months ago  I reviewed her recent ESR and CRP which were slightly elevated at 38 and 9 8, respectively  The last CBC and CMP done in August were unremarkable  Patient reports with the low dose of methotrexate she has not noticed any improvement in her symptoms yet  She is still experiencing chronic joint pains and swelling as discussed previously        Past Medical History:   Diagnosis Date   • Cervical cancer ([facility] )    • Crohn disease ([facility] )    • Endocarditis    • Fibromyalgia • Gastritis    • Hypertension    • RA (rheumatoid arthritis) (HCC)    • Vertigo        Past Surgical History:   Procedure Laterality Date   • APPENDECTOMY     • BRONCHOSCOPY      multiple   • COLONOSCOPY         Current Outpatient Medications   Medication Sig Dispense Refill   • methotrexate 2 5 mg tablet Increase to 6 tabs once weekly x 2 weeks then increase to 8 tabs once weekly and continue  96 tablet 1   • amitriptyline (ELAVIL) 100 mg tablet Take 1 tablet (100 mg total) by mouth daily at bedtime 100 tablet 0   • divalproex sodium (DEPAKOTE) 250 mg EC tablet Take 250 mg by mouth 2 (two) times a day       • dronabinol (MARINOL) 5 MG capsule Take 1 capsule (5 mg total) by mouth 2 (two) times a day before meals 60 capsule 0   • folic acid (FOLVITE) 1 mg tablet Take 1 tablet (1 mg total) by mouth daily 90 tablet 0   • gabapentin (NEURONTIN) 600 MG tablet take 1 tablet by mouth four times a day 120 tablet 0   • montelukast (SINGULAIR) 10 mg tablet Take 10 mg by mouth daily at bedtime     • ondansetron (ZOFRAN) 4 mg tablet Take 1 tablet (4 mg total) by mouth every 8 (eight) hours as needed for nausea or vomiting 30 tablet 0   • oxyCODONE (ROXICODONE) 5 immediate release tablet take 1 tablet by mouth three times a day MAXIMUM DAILY DOSE OF 15 milligram 90 tablet 0   • pantoprazole (PROTONIX) 40 mg tablet Take 40 mg by mouth daily     • polyethylene glycol-electrolytes (NULYTELY) 4000 mL solution Take 4,000 mL by mouth once for 1 dose 4000 mL 0   • predniSONE 10 mg tablet 4 DAILY FOR 3 DAYS,3 DAILY FOR 3 DAYS,2 DAILY FOR 3 DAYS, ONE DAILY FOR 3 DAYS 30 tablet 0   • SUMAtriptan (IMITREX) 100 mg tablet Take 1 tablet by mouth once as needed       • ustekinumab (Stelara) 90 mg/mL subcutaneous injection MAINTENANCE: INJECT 1 SYRINGE SUBCUTANEOUSLY EVERY 8 WEEKS  REFRIGERATE   DO NOT FREEZE  2 mL 2   • varenicline (CHANTIX) 1 mg tablet Take 1 tablet (1 mg total) by mouth 2 (two) times a day (Patient not taking: Reported on 8/19/2022) 60 tablet 0   • VITAMIN D PO Take by mouth daily  (Patient not taking: Reported on 8/19/2022)       No current facility-administered medications for this visit  Allergies   Allergen Reactions   • Ketorolac Hives   • Penicillins Hives   • Ketorolac Tromethamine Rash       Review of Systems  Constitutional: Negative for weight change, fevers, chills, night sweats, fatigue  ENT/Mouth: Negative for hearing changes, ear pain, nasal congestion, sinus pain, hoarseness, sore throat, rhinorrhea, swallowing difficulty  Eyes: Negative for pain, redness, discharge, vision changes  Cardiovascular: Negative for chest pain, SOB, palpitations  Respiratory: Negative for cough, sputum, wheezing, dyspnea  Gastrointestinal: Negative for nausea, vomiting, diarrhea, constipation, pain, heartburn  Genitourinary: Negative for dysuria, urinary frequency, hematuria  Musculoskeletal: As per HPI  Skin: Negative for skin rash, color changes  Neuro: Negative for weakness, numbness, tingling, loss of consciousness  Psych: Negative for anxiety, depression  Heme/Lymph: Negative for easy bruising, bleeding, lymphadenopathy  Assessment and Plan:   Ms Sara Joseph a 49-year-old female with history significant for Crohn's disease diagnosed in 1996, psoriasis, fibromyalgia/chronic pain syndrome and possible peripheral spondyloarthropathy related to psoriasis/IBD who presents for a follow up  Frank Moy is currently on Stelara injections every 6 weeks through her gastroenterologist and oral methotrexate 10 mg once weekly that was added in approximately July 2022         Rheumatic Summary:  1) Diagnosed with Crohn's disease in 1996 - started on Enbrel+mesalamine+6-MP+steroids x 8 years - d/c'ed due to inefficacy  - Managed with chronic steroids until 2014 then started on Humira till 8/2019 - d/c'ed due to decline in efficacy  - Started Stelara 2/2020-present - IBD stable  2) ? Inflammatory arthritis (negative RF, borderline CCP of 20) - arthralgias since 2000 - no improvement with high doses steroids, Enbrel, Humira or Stelara  - 7/13/21: continue Stelara and add on MTX, prednisone 20 mg daily x 10 days  - 3/9/22: continue Stelara through GI  Add on injectable MTX   - 11/9/22: continue Stelara through GI and increase MTX to 20 mg/wk  3) Fibromyalgia - currently gabapentin 600 mg QID, oxycodone 5 mg TID PRN - minimal improvement  - Previously been on amitriptyline and Dilaudid - no significant improvement  4) Psoriasis diagnosed at the age of 4         # Inflammatory arthritis likely secondary to psoriasis vs IBD  - Katie Levine presents today for a follow up of diffuse arthralgias and myalgias which have been gradually progressive over the past 15 years +  Given the widespread and chronic complaints as well as a lack of benefit with prior medications including high doses of prednisone, etanercept and adalimumab (and now Stelara since February 2020), I had considered her diagnosis to be secondary to fibromyalgia      - Apart from the fibromyalgia I question if she may be presenting with a concomitant inflammatory arthritis secondary to the underlying psoriasis/inflammatory bowel disease, given the focal joint involvement and evidence of fluctuating joint swelling noted  In view of this I started her on oral methotrexate at the last office visit but she has not noticed any improvement in her symptoms with the low dose so far  In view of this I would like her to gradually increase to 20 mg once weekly and I will assess her response to this  If required at future office visits sulfasalazine can also be added  She will continue with the Stelara dosing through GI  She will update high-risk medication lab monitoring in the next 1-2 months      - For management of the chronic pain syndrome she will follow up with Pain Management and is currently on gabapentin 600 mg 4 times a day along with oxycodone 5 mg TID PRN         I spent 12 minutes directly with the patient during this visit

## 2022-11-10 LAB — LPA SERPL-SCNC: 103.4 NMOL/L

## 2022-11-11 LAB
ALBUMIN SERPL ELPH-MCNC: 3.93 G/DL (ref 3.5–5)
ALBUMIN SERPL ELPH-MCNC: 55.4 % (ref 52–65)
ALPHA1 GLOB SERPL ELPH-MCNC: 0.33 G/DL (ref 0.1–0.4)
ALPHA1 GLOB SERPL ELPH-MCNC: 4.7 % (ref 2.5–5)
ALPHA2 GLOB SERPL ELPH-MCNC: 0.81 G/DL (ref 0.4–1.2)
ALPHA2 GLOB SERPL ELPH-MCNC: 11.4 % (ref 7–13)
APTT SCREEN TO CONFIRM RATIO: 1.14 RATIO (ref 0–1.34)
B2 MICROGLOB SERPL-MCNC: 2.1 MG/L (ref 0.6–2.4)
BETA GLOB ABNORMAL SERPL ELPH-MCNC: 0.46 G/DL (ref 0.4–0.8)
BETA1 GLOB SERPL ELPH-MCNC: 6.5 % (ref 5–13)
BETA2 GLOB SERPL ELPH-MCNC: 4.9 % (ref 2–8)
BETA2+GAMMA GLOB SERPL ELPH-MCNC: 0.35 G/DL (ref 0.2–0.5)
CARDIOLIPIN IGA SER IA-ACNC: 2.4
CARDIOLIPIN IGG SER IA-ACNC: 1
CARDIOLIPIN IGM SER IA-ACNC: 33
CONFIRM APTT/NORMAL: 40.7 SEC (ref 0–47.6)
DRVVT IMM 1:2 NP PPP: 45.5 SEC (ref 0–40.4)
DRVVT SCREEN TO CONFIRM RATIO: 1.5 RATIO (ref 0.8–1.2)
GAMMA GLOB ABNORMAL SERPL ELPH-MCNC: 1.21 G/DL (ref 0.5–1.6)
GAMMA GLOB SERPL ELPH-MCNC: 17.1 % (ref 12–22)
IGG/ALB SER: 1.24 {RATIO} (ref 1.1–1.8)
LA PPP-IMP: ABNORMAL
PROT PATTERN SERPL ELPH-IMP: NORMAL
PROT SERPL-MCNC: 7.1 G/DL (ref 6.4–8.2)
SCREEN APTT: 39.4 SEC (ref 0–51.9)
SCREEN DRVVT: 56.7 SEC (ref 0–47)
THROMBIN TIME: 17.4 SEC (ref 0–23)

## 2022-11-14 LAB — F5 GENE MUT ANL BLD/T: NORMAL

## 2022-11-23 ENCOUNTER — HOSPITAL ENCOUNTER (OUTPATIENT)
Dept: NON INVASIVE DIAGNOSTICS | Facility: CLINIC | Age: 42
Discharge: HOME | End: 2022-11-23

## 2022-11-23 VITALS
DIASTOLIC BLOOD PRESSURE: 78 MMHG | HEIGHT: 61 IN | SYSTOLIC BLOOD PRESSURE: 132 MMHG | WEIGHT: 180 LBS | BODY MASS INDEX: 33.99 KG/M2 | HEART RATE: 77 BPM

## 2022-11-23 DIAGNOSIS — I65.29 STENOSIS OF CAROTID ARTERY, UNSPECIFIED LATERALITY: ICD-10-CM

## 2022-11-23 LAB
AORTIC ROOT: 3.3 CM
APICAL FOUR CHAMBER EJECTION FRACTION: 68 %
AV LVOT PEAK GRADIENT: 6 MMHG
AV PEAK GRADIENT: 7 MMHG
E WAVE DECELERATION TIME: 188 MS
FRACTIONAL SHORTENING: 30 % (ref 28–44)
INTERVENTRICULAR SEPTUM IN DIASTOLE (PARASTERNAL SHORT AXIS VIEW): 0.8 CM
INTERVENTRICULAR SEPTUM: 0.8 CM (ref 0.6–1.1)
LAAS-AP2: 13.6 CM2
LAAS-AP4: 13.7 CM2
LEFT ATRIUM AREA SYSTOLE SINGLE PLANE A4C: 13.5 CM2
LEFT ATRIUM SIZE: 3.1 CM
LEFT INTERNAL DIMENSION IN SYSTOLE: 3 CM (ref 2.1–4)
LEFT VENTRICULAR INTERNAL DIMENSION IN DIASTOLE: 4.3 CM (ref 3.5–6)
LEFT VENTRICULAR POSTERIOR WALL IN END DIASTOLE: 0.9 CM
LEFT VENTRICULAR STROKE VOLUME: 50 ML
LVSV (TEICH): 50 ML
MV E'TISSUE VEL-LAT: 13 CM/S
MV PEAK A VEL: 0.71 M/S
MV PEAK E VEL: 101 CM/S
MV STENOSIS PRESSURE HALF TIME: 55 MS
MV VALVE AREA P 1/2 METHOD: 4 CM2
RIGHT ATRIUM AREA SYSTOLE A4C: 11.3 CM2
RIGHT VENTRICLE ID DIMENSION: 2.4 CM
SL CV LEFT ATRIUM LENGTH A2C: 4.5 CM
SL CV LV EF: 65
SL CV PED ECHO LEFT VENTRICLE DIASTOLIC VOLUME (MOD BIPLANE) 2D: 84 ML
SL CV PED ECHO LEFT VENTRICLE SYSTOLIC VOLUME (MOD BIPLANE) 2D: 34 ML
TR MAX PG: 24 MMHG
TR PEAK VELOCITY: 2.5 M/S
TRICUSPID VALVE PEAK REGURGITATION VELOCITY: 2.45 M/S

## 2022-11-29 ENCOUNTER — TELEPHONE (OUTPATIENT)
Dept: OBGYN CLINIC | Facility: HOSPITAL | Age: 42
End: 2022-11-29

## 2022-11-29 ENCOUNTER — HOSPITAL ENCOUNTER (OUTPATIENT)
Dept: VASCULAR ULTRASOUND | Facility: HOSPITAL | Age: 42
Discharge: HOME | End: 2022-11-29
Attending: PSYCHIATRY & NEUROLOGY

## 2022-11-29 DIAGNOSIS — I65.29 STENOSIS OF CAROTID ARTERY, UNSPECIFIED LATERALITY: ICD-10-CM

## 2022-11-29 NOTE — TELEPHONE ENCOUNTER
Caller: Andres Ward    Doctor: Mell Lilly    Reason for call: patient is calling to schedule a "consult" with Dr Mell Lilly for a referral to hematology  I am unable to offer Andres Ward anything sooner than the four month follow up she has scheduled in March 2023  Please advise        Call back#: [phone]

## 2022-11-30 ENCOUNTER — TELEPHONE (OUTPATIENT)
Dept: RHEUMATOLOGY | Facility: CLINIC | Age: 42
End: 2022-11-30

## 2022-11-30 ENCOUNTER — TELEMEDICINE (OUTPATIENT)
Dept: RHEUMATOLOGY | Facility: CLINIC | Age: 42
End: 2022-11-30

## 2022-11-30 DIAGNOSIS — M79.7 FIBROMYALGIA: ICD-10-CM

## 2022-11-30 DIAGNOSIS — K50.10 CROHN'S DISEASE OF COLON WITHOUT COMPLICATION (HCC): ICD-10-CM

## 2022-11-30 DIAGNOSIS — M19.90 INFLAMMATORY ARTHRITIS: ICD-10-CM

## 2022-11-30 DIAGNOSIS — L40.9 PSORIASIS: ICD-10-CM

## 2022-11-30 DIAGNOSIS — F11.90 OPIOID USE: ICD-10-CM

## 2022-11-30 DIAGNOSIS — Z79.631 METHOTREXATE, LONG TERM, CURRENT USE: ICD-10-CM

## 2022-11-30 DIAGNOSIS — R76.0 ANTIPHOSPHOLIPID ANTIBODY POSITIVE: Primary | ICD-10-CM

## 2022-11-30 DIAGNOSIS — I63.9 RECURRENT STROKES (HCC): ICD-10-CM

## 2022-11-30 DIAGNOSIS — Z79.60 LONG-TERM USE OF IMMUNOSUPPRESSANT MEDICATION: ICD-10-CM

## 2022-11-30 NOTE — PROGRESS NOTES
Virtual Regular Visit    Verification of patient location:    Patient is located in the following state in which I hold an active license [place]      Assessment/Plan:    Problem List Items Addressed This Visit        Musculoskeletal and Integument    Psoriasis   Other Visit Diagnoses     Antiphospholipid antibody positive    -  Primary    Relevant Orders    Ambulatory Referral to Hematology / Oncology    Antinuclear Antibodies, IFA    Anti-DNA antibody, double-stranded    Anti-Daja 1 Antibody    Anti-scleroderma antibody    Centromere Antibody    Histone Antibody    Nuclear antigen antibody    Sjogren's Antibodies    C4 complement    C3 complement    Urinalysis with microscopic    Protein / creatinine ratio, urine    Lupus anticoagulant    Beta-2 glycoprotein antibodies    Cardiolipin antibody    Recurrent strokes (HCC)        Relevant Orders    Ambulatory Referral to Hematology / Oncology    Antinuclear Antibodies, IFA    Anti-DNA antibody, double-stranded    Anti-Daja 1 Antibody    Anti-scleroderma antibody    Centromere Antibody    Histone Antibody    Nuclear antigen antibody    Sjogren's Antibodies    C4 complement    C3 complement    Urinalysis with microscopic    Protein / creatinine ratio, urine    Lupus anticoagulant    Beta-2 glycoprotein antibodies    Cardiolipin antibody    Inflammatory arthritis        Crohn's disease of colon without complication (HCC)        Long-term use of immunosuppressant medication        Methotrexate, long term, current use        Fibromyalgia        Opioid use                   Reason for visit is follow up        Chief Complaint   Patient presents with   • Virtual Regular Visit        Encounter provider Dieter Montoya MD    Provider located at [facility] [place] [id number]      Recent Visits  Date Type Provider Dept   11/29/22 Telephone Dieter Montoya MD [place] [id number] Showing recent visits within past 7 days and meeting all other requirements  Today's Visits  Date Type Provider Dept   11/30/22 Telemedicine Luis Coelho MD [facility] today's visits and meeting all other requirements  Future Appointments  No visits were found meeting these conditions  Showing future appointments within next 150 days and meeting all other requirements       The patient was identified by name and date of birth  Yfn Sanford was informed that this is a telemedicine visit and that the visit is being conducted through Telephone  My office door was closed  No one else was in the room  She acknowledged consent and understanding of privacy and security of the video platform  The patient has agreed to participate and understands they can discontinue the visit at any time  Patient is aware this is a billable service         Subjective    HPI     INITIAL VISIT NOTE (4/2020):  Ms Sigrid Jameson a 78-year-old female with history significant for Crohn's disease diagnosed in 1996, fibromyalgia, 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 (currently on oral bisphosphonate therapy) and psoriasis, who presents for further evaluation of diffuse joint pains   She is referred by Dr Joshua Thomas a rheumatology consult      Patient reports she was initially evaluated by Gastroenterology in [address] [place] [address] when she was diagnosed with Crohn's disease   She mentions at that time she was started on a combination of Enbrel (possibly for the joint pains), mesalamine and 6 mercaptopurine in association with steroids   She reports being on this regimen for approximately 8 years following which it was discontinued due to a decline in efficacy   She reports overall since her diagnosis in 1996 she has been on chronic intermittent doses of prednisone, with her last course about 1 month ago  Reymundo Ledesma will typically start off at doses of 40 mg once daily and taper down, with the lower doses causing a flare-up of the colitis   Of note she mentions that even the higher doses of prednisone have never helped with her joint pains   She states following her initial regimen she was then switched to Humira which she was on for about 5 years, and again it was discontinued in August 2019 due to a decline in efficacy   She has since started [place] on February 6, 2020 and has thus far completed the initial dosing  Telma Gallagher is now on a maintenance dosing of 1 injection every 8 weeks  Telma Gallagher has not yet noticed if the [place] is helping with her joint pains  Telma Gallagher mentions her last colonoscopy was done in December 2019 and this was consistent with active pancolitis   She follows with [facility]  Telmamagen Gallagher reports she is continuing to have loose stools with occasional mucus, but denies bloody diarrhea or abdominal pain      She was previously seen by Rheumatology in [address] [place] [address] and continued follow-up with them until she relocated to [place] approximately 7 years ago  Telma Elliott states since then her medications have mostly been managed by her primary care doctor and Gastroenterology  Telma Gallagher has previously been on gabapentin for a diagnosis of fibromyalgia, but states that this was recently discontinued and she was advised to follow up with pain management   She is receiving hydromorphone 4 mg twice daily from pain management at this time, and states that it only helps to a mild degree with her body pain      She reports her joint pains have been ongoing for the past 15 years and have been progressively worsening over time to the point that she is noticing them on a daily basis in a constant manner, including during her sleep at night   As mentioned she has previously been on gabapentin up to 600 mg 3 times daily and amitriptyline 100 mg at bedtime which would help her, but this regimen was discontinued by her primary care doctor and she was advised to follow-up with Rheumatology and Pain Management   She states that the joint pains affect her all over but mostly with her knees, ankles, hands and low back   She reports the low back pain will wake her up from sleep at night and is also associated with morning stiffness of up to 2 hours   The back pain worsens with activities and is also worsened at rest   She also describes pain affecting her diffusely throughout her muscles   She has noticed occasional swelling affecting her fingers and knees   She experiences morning stiffness which affects her diffusely and takes about 3-4 hours to improve   She refrains from NSAID use due to her history of colitis      She reports as a result of being on chronic steroids she has developed osteopenia/osteoporosis  Erskin Boas last DEXA scan was done approximately 1 year ago, and I do not have the records available for review   She does not take daily calcium supplements but does take vitamin-D replacement therapy  Concepción Luna is also on Fosamax once weekly      She reports a history of blurred vision but denies eye pain or redness   She denies a history of inflammatory eye disease  Concepción Luna has been diagnosed with psoriasis at the age of 4 which will typically affect her arms and legs   She reports a family history of psoriasis in her father as well as multiple family members on her mother's side with inflammatory bowel disease   She denies current fevers, unintentional weight loss, mouth/nose ulcers, swollen glands, blood clots or Raynaud's      Labs in our system show a negative rheumatoid factor         7/28/2020:  Patient presents for a follow-up today  Fitz Tahira had to convert to a virtual visit as she had a temperature on arrival   She did not have a chance to get the blood work and x-rays done following the last office visit, as she states that she did not receive a copy in the mail      She has now been on the [employer] since February 2020 and does not feel like it is helping with the bowel disease as she continues to have multiple episodes of diarrhea and mucus in her stool  Concepción Luna has also not noticed any change in her joint pains, but states that it has helped with the psoriasis   I did start her on gabapentin at 300 mg 3 times daily at the last office visit for fibromyalgia, and she feels like this helped to a certain degree   She is continuing to experience pain mostly in her neck, upper back region/shoulders, elbows, hands and knees   She has noticed swelling of her hands, knees, ankles and feet   She does experience morning stiffness which affects her diffusely mostly in her back region and can persist throughout the day when it occurs   She reports usually 4 days out of the week will be severe for her and may be weather dependent   She is not taking any over-the-counter pain medications at this time      She did try to send me pictures of her hand swelling following the last visit, but unfortunately this got scanned in as black and white, and it is challenging to appreciate any abnormalities   I did also receive her DEXA scan report that was done in October 2019 and appears to be normal for her age         3/10/2021:  Patient presents for a follow-up today  Karen Vilchis did review her labs done after the last office visit which showed a borderline elevated anti CCP antibody of 20   An ESR, CRP and hepatitis panel were unremarkable      At the last office visit we had discussed starting methotrexate, which she took for approximately 3 weeks but as it was causing gastrointestinal side effects she discontinued it  Justina Haines thinks this may have been related to significant personal stressors as well and would like to give the methotrexate a retry  Justina Haines is still on Stelara for the Crohn's disease through her gastroenterologist  Justina Haines is prescribed Dilaudid by her pain management specialist  Justina Haines has been taking the gabapentin 600 mg 4 times daily which does help   She reports generally during the cooler weather she feels better and her symptoms may flare in the summer   She is continuing to experience pain mostly in her neck, upper back region/shoulders, elbows, hands and knees   She has noticed swelling of her hands, knees, ankles and feet   She does experience morning stiffness which affects her diffusely mostly in her back region and can persist throughout the day when it occurs          7/13/2021:  Patient presents for a follow up today   At the last office visit to address the possibility of an inflammatory arthritis secondary to the psoriasis/IBD I had discussed starting her on methotrexate 10 mg once weekly, but patient states she only took one dose and then discontinued it due to concerns for potential side effects   She is currently receiving Stelara injections every 3 months primarily for the IBD      She has been experiencing a flare up of joint pains over the past few weeks which is effecting her hands, low back, knees, ankles and feet   She has noticed swelling of her hands (her primary care physician described this to her as "sausage digits", but this is not evident on her exam today), ankles and feet   She experiences morning stiffness primarily of her lower body that can take hours to improve   She is following with pain management and is currently on gabapentin 600 mg four times daily and oxycodone 5 mg three times daily as needed   The Dilaudid was discontinued   These medications are not helping with her pain      She denies psoriasis or a flare up of the inflammatory bowel disease         3/9/2022:  Patient presents for a follow-up of possible inflammatory arthritis secondary to the psoriasis/IBD   At the last office visit I had discussed starting her on methotrexate but she did not do this due to concerns for potential side effects   She is only on Stelara injections every 6 weeks at this moment primarily for the inflammatory bowel disease      She reports her joint pains are again flaring and this primarily affect her hands, knees and ankles where she will also notice swelling   She experiences stiffness mostly in the night and to some degree in the morning but this improves with activities  Telma Gallagher does take the gabapentin 600 mg 4 times a day and oxycodone 5 mg 3 times a day as needed         11/9/2022:  Patient presents for a follow-up of an inflammatory arthritis secondary to psoriasis/inflammatory bowel disease  Currently she is on Stelara injections every 6 weeks and oral methotrexate 10 mg once weekly that was started approximately 4 months ago  I reviewed her recent ESR and CRP which were slightly elevated at 38 and 9 8, respectively  The last CBC and CMP done in August were unremarkable      Patient reports with the low dose of methotrexate she has not noticed any improvement in her symptoms yet  She is still experiencing chronic joint pains and swelling as discussed previously  11/30/2022:  Patient presents for an acute visit today to review concerns for recurrent strokes due to an MRI of her brain from 10/26/22 showing 4 nonspecific white matter lesions, 3 of which are retrospectively stable from 2017  The findings were thought to be related to 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  There was no acute infarction noted  She had a carotid artery ultrasound done which was unremarkable  She is following with Neurology (Dr Magdalena Staples) and there are concerns that these lesions may be related to silent strokes  She had a thrombotic panel done which showed a positive lupus anticoagulant and elevated anticardiolipin IgM antibody of 33  She was advised to follow up with me to further evaluate for lupus and was started on Plavix by Neurology         Past Medical History:   Diagnosis Date   • Cervical cancer ([facility] )    • Crohn disease ([facility] )    • Endocarditis    • Fibromyalgia    • Gastritis    • Hypertension    • RA (rheumatoid arthritis) ([facility] )    • Vertigo        Past Surgical History:   Procedure Laterality Date   • APPENDECTOMY     • BRONCHOSCOPY      multiple   • COLONOSCOPY Current Outpatient Medications   Medication Sig Dispense Refill   • amitriptyline (ELAVIL) 100 mg tablet Take 1 tablet (100 mg total) by mouth daily at bedtime 100 tablet 0   • divalproex sodium (DEPAKOTE) 250 mg EC tablet Take 250 mg by mouth 2 (two) times a day       • dronabinol (MARINOL) 5 MG capsule take 1 capsule by mouth twice a day before meals 200 capsule 1   • folic acid (FOLVITE) 1 mg tablet take 1 tablet by mouth once daily 90 tablet 3   • gabapentin (NEURONTIN) 600 MG tablet take 1 tablet by mouth four times a day 120 tablet 0   • methotrexate 2 5 mg tablet Increase to 6 tabs once weekly x 2 weeks then increase to 8 tabs once weekly and continue  96 tablet 1   • montelukast (SINGULAIR) 10 mg tablet Take 10 mg by mouth daily at bedtime     • ondansetron (ZOFRAN) 4 mg tablet Take 1 tablet (4 mg total) by mouth every 8 (eight) hours as needed for nausea or vomiting 30 tablet 0   • oxyCODONE (ROXICODONE) 5 immediate release tablet take 1 tablet by mouth three times a day MAXIMUM DAILY DOSE OF 15 milligram 90 tablet 0   • pantoprazole (PROTONIX) 40 mg tablet Take 40 mg by mouth daily     • polyethylene glycol-electrolytes (NULYTELY) 4000 mL solution Take 4,000 mL by mouth once for 1 dose 4000 mL 0   • predniSONE 10 mg tablet 4 DAILY FOR 3 DAYS,3 DAILY FOR 3 DAYS,2 DAILY FOR 3 DAYS, ONE DAILY FOR 3 DAYS 30 tablet 0   • SUMAtriptan (IMITREX) 100 mg tablet Take 1 tablet by mouth once as needed       • ustekinumab (Stelara) 90 mg/mL subcutaneous injection MAINTENANCE: INJECT 1 SYRINGE SUBCUTANEOUSLY EVERY 8 WEEKS  REFRIGERATE  DO NOT FREEZE  2 mL 2   • varenicline (CHANTIX) 1 mg tablet Take 1 tablet (1 mg total) by mouth 2 (two) times a day (Patient not taking: Reported on 8/19/2022) 60 tablet 0   • VITAMIN D PO Take by mouth daily  (Patient not taking: Reported on 8/19/2022)       No current facility-administered medications for this visit          Allergies   Allergen Reactions   • Ketorolac Hives   • Penicillins Hives   • Ketorolac Tromethamine Rash       Review of Systems  Constitutional: Negative for weight change, fevers, chills, night sweats, fatigue  ENT/Mouth: Negative for hearing changes, ear pain, nasal congestion, sinus pain, hoarseness, sore throat, rhinorrhea, swallowing difficulty  Eyes: Negative for pain, redness, discharge, vision changes  Cardiovascular: Negative for chest pain, SOB, palpitations  Respiratory: Negative for cough, sputum, wheezing, dyspnea  Gastrointestinal: Negative for nausea, vomiting, diarrhea, constipation, pain, heartburn  Genitourinary: Negative for dysuria, urinary frequency, hematuria  Musculoskeletal: As per HPI  Skin: Negative for skin rash, color changes  Neuro: Negative for weakness, numbness, tingling, loss of consciousness  Psych: Negative for anxiety, depression  Heme/Lymph: Negative for easy bruising, bleeding, lymphadenopathy  Assessment and Plan:   Ms Vinay Breaux a 20-year-old female with history significant for Crohn's disease diagnosed in 1996, psoriasis, fibromyalgia/chronic pain syndrome and possible peripheral spondyloarthropathy related to psoriasis/IBD who presents for a follow up  Coretta Griggs is currently on Stelara injections every 6 weeks through her gastroenterologist and oral methotrexate 20 mg once weekly that was added in approximately July 2022         Rheumatic Summary:  1) Diagnosed with Crohn's disease in 1996 - started on Enbrel+mesalamine+6-MP+steroids x 8 years - d/c'ed due to inefficacy  - Managed with chronic steroids until 2014 then started on Humira till 8/2019 - d/c'ed due to decline in efficacy  - Started Stelara 2/2020-present - IBD stable  2) ? Inflammatory arthritis (negative RF, borderline CCP of 20) - arthralgias since 2000 - no improvement with high doses steroids, Enbrel, Humira or Stelara  - 7/13/21: continue Stelara and add on MTX, prednisone 20 mg daily x 10 days  - 3/9/22: continue Stelara through GI  Add on injectable MTX   - 11/9/22: continue Stelara through GI and increase MTX to 20 mg/wk  3) Fibromyalgia - currently gabapentin 600 mg QID, oxycodone 5 mg TID PRN - minimal improvement  - Previously been on amitriptyline and Dilaudid - no significant improvement  4) Psoriasis diagnosed at the age of 4   11) ? Recurrent strokes based on an MRI brain 10/22 - positive LAC and aCL IgM  - 11/30/22: repeat labs in 3 months, refer to Hematology and continue Plavix         # Concerns for recurrent CVA with positive APLs  - Oral Qasim presents today to further discuss the neurology workup done so far with an MRI of her brain showing four nonspecific white matter lesions which her neurologist thinks may be secondary to recurrent strokes  Further workup for this showed a positive lupus anticoagulant as well as elevated anti cardiolipin IgM antibody  As the next steps in her management I would recommend repeating the antiphospholipid antibodies as well as a connective tissue disease panel in 3 months to assess for persistent positivity  If this is positive I would recommend a hematology referral   In the meanwhile she will continue treatment as prescribed by Neurology         I spent 12 minutes directly with the patient during this visit

## 2022-12-04 ENCOUNTER — NURSE TRIAGE (OUTPATIENT)
Dept: OTHER | Facility: OTHER | Age: 42
End: 2022-12-04

## 2022-12-04 NOTE — TELEPHONE ENCOUNTER
Reason for Disposition  • Unexplained bleeding from another site (e g , gums, nose, urine) as well  • [1] Bruise on head/face, chest, or abdomen AND [2]  taking Coumadin (warfarin) or other strong blood thinner, or known bleeding disorder (e g , thrombocytopenia)    Answer Assessment - Initial Assessment Questions  1  SIZE: "How large is the bruise?"      "small and big bruises"    3  NUMBER: "How many bruises are there?"   18    4  LOCATION: "Where is the bruise located?"      Stomach, arms, thighs, legs    5  ONSET: "How long ago did the bruise occur?"     Since Friday    6  CAUSE: "Tell me how it happened "    Patient started taking Plavix 11/29    7  MEDICAL HISTORY: "Do you have any medical problems that can cause easy bruising or bleeding?" (e g , leukemia, liver disease, recent chemotherapy)   denies    8  MEDICATIONS: "Do you take any medications which thin the blood such as: aspirin, heparin, ibuprofen (NSAIDS), Plavix, or Coumadin?"  Plavix 75mg    9  OTHER SYMPTOMS: "Do you have any other symptoms?"  (e g , weakness, dizziness, pain, fever, nosebleed, blood in urine/stool)     Nose bleed, pain    10  PREGNANCY: "Is there any chance you are pregnant?" "When was your last menstrual period?"      No    Answer Assessment - Initial Assessment Questions  1  ONSET: "When did the cough begin?"       On going cough    2  SEVERITY: "How bad is the cough today?" "Did the blood appear after a coughing spell?"      Mild cough and yes only appear after coughing spells    3  HEMOPTYSIS: "How much blood?" (flecks, streaks, tablespoons, etc )  Less than a tablespoon    5   DIFFICULTY BREATHING: "Are you having difficulty breathing?" If Yes, ask: "How bad is it?" (e g , mild, moderate, severe)     - MILD: No SOB at rest, mild SOB with walking, speaks normally in sentences, can lay down, no retractions, pulse < 100      - MODERATE: SOB at rest, SOB with minimal exertion and prefers to sit, cannot lie down flat, speaks in phrases, mild retractions, audible wheezing, pulse 100-120      - SEVERE: Very SOB at rest, speaks in single words, struggling to breathe, sitting hunched forward, retractions, pulse > 120       Always has sob (long term)    6  FEVER: "Do you have a fever?" If Yes, ask: "What is your temperature, how was it measured, and when did it start?"   no    7  CARDIAC HISTORY: "Do you have any history of heart disease?" (e g , heart attack, congestive heart failure)   No    8  LUNG HISTORY: "Do you have any history of lung disease?"  (e g , pulmonary embolus, asthma, emphysema)  No    9  PE RISK FACTORS: "Do you have a history of blood clots?" (or: recent major surgery, recent prolonged travel, bedridden)   Hx of "silent cope" per patient    10  OTHER SYMPTOMS: "Do you have any other symptoms?" (e g , runny nose, wheezing, chest pain)        fatigue  11  PREGNANCY: "Is there any chance you are pregnant?" "When was your last menstrual period?"  No    12   TRAVEL: "Have you traveled out of the country in the last month?" (e g , travel history, exposures)  No    Protocols used: BRUISES-ADULT-AH, COUGHING UP BLOOD-ADULT-AH

## 2022-12-04 NOTE — TELEPHONE ENCOUNTER
Regarding: coughing up blood, bruising  ----- Message from Roxana Mccray sent at 12/4/2022  7:56 AM EST -----  "I have bruising all over, and I am coughing up blood "

## 2022-12-06 ENCOUNTER — TELEPHONE (OUTPATIENT)
Dept: INTERNAL MEDICINE CLINIC | Facility: CLINIC | Age: 42
End: 2022-12-06

## 2022-12-06 NOTE — TELEPHONE ENCOUNTER
Received request for medical records from [employer]      Faxed on 12- to [facility] phone [phone]

## 2022-12-13 ENCOUNTER — TELEPHONE (OUTPATIENT)
Dept: GASTROENTEROLOGY | Facility: CLINIC | Age: 42
End: 2022-12-13

## 2022-12-13 NOTE — TELEPHONE ENCOUNTER
Current [address] [facility] [address] is valid until 8/2023   Will contact pharmacy

## 2022-12-13 NOTE — TELEPHONE ENCOUNTER
Patients GI provider:  Dr Malachi Pa     Number to return call: [phone]     Reason for call: patients pharmacy called and stated a new prior auth is needed for Stelara injection medication please review thank you     Scheduled procedure/appointment date if applicable: n/a

## 2022-12-15 NOTE — TELEPHONE ENCOUNTER
Valerie Lenz from [employer] called stating they already received a PA for quantity  New PA is needed for actual medication   Valerie Lenz provided call back number [phone]

## 2022-12-15 NOTE — TELEPHONE ENCOUNTER
Melvin Ashton from pharmacy calling regarding still wear a prior authorization  She reports that the quantity authorization is good until 2023, but the medication itself  on 2022  She is requesting a new prior authorization  Any questions, please contact Melvin Ashton at [phone]  Thank you!

## 2022-12-15 NOTE — TELEPHONE ENCOUNTER
Called pharmacy and spoke with Yanique Arriaga  He could not see why there was an issue with the prior authorization and neither could the other team  He stated he was reaching out to the company to see what the problem was  While placing my on hold - the phone was disconnected         Submitted authorization on [facility] with keycode: [id number]

## 2022-12-19 NOTE — PROGRESS NOTES
[facility] - Hematology & Medical Oncology  Outpatient Visit Encounter Note      Geronimo Francis 38 y o  female [id number] Date:  12/20/2022    HEMATOLOGICAL HISTORY        Clotting History Hx TIAs per patient per external neurology notes, No hx of DVT/PE   Bleeding History Denies   Cancer History Denies other than "precancerous pap smear" per patient   Family Cancer History Brother has colon cancer (also has Crohn Disease), Paternal grandmother with leukemia    H/O Blood/Plt Transfusion Denies   Tobacco Use Current smoker 1ppd for 25+ years   ETOH Denies   Other drug use Denies   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      Mom's sister DVT after car accident, no other family hx of clotting or bleeding disorders to patient's knowledge  Precancerous pap smear per patient  No herbal supplements     SUBJECTIVE      Geronimo Francis is a 38 y o  here for new consultation with me today  The patient is referred by self/neurology/rheumatology and the reason for consultation is abnormal labs - lupus anticoagulant present, hypercoagulable workup  In review of the chart and talking with the patient, past medical history significant for crohn's disease, RA, endocarditis, inflammatory arthritis, fibromyalgia, HTN, TIAs? 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 who was undergoing workup with neurology and rheumatology when she was found to have lupus anticoagulant testing positive  She states she is seeing a neurologist for recurrent "silent mini strokes" for which the records are external and unavailable for review  On imaging from our network MRI brain 10/26/2022 without contrast showing nonspecific findings as reported below that could be consistent with chronic complex migraines, postinflammatory, postinfectious, vs mild microangiopathy but no acute infarct noted on imaging   3/4 nonspecific white matter changes on this study stable from 2017  Carotid US <50% stenosis b/l  Denies history of other blood clot or for strong family history of clotting  Patient states that she has chronic joint pain as well as chronic intermittent swelling affecting her fingers, occasionally her knees and this is worse in the morning along with joint stiffness that does improve throughout the day and is not persistent  She also states that the swelling is worse throughout the summer and overall feels better in the winter with decreased joint stiffness and swelling  She states that her Crohn's disease is under control and does not experience diarrhea or mucus-like stool currently, has never had blood in her stool per patient  Is following with gastroenterology  She is still smoking  No synthetic estrogen/progesterone use  Today, complains of finger swelling left upper extremity and chronic SOB at baseline however when she lays down at night it is worsened and she feels as though she cannot lay flat - will be calling PCP for this today after visit  Hx migraine headaches  Ongoing workup with ophthalmologist for blurred vision worsened in morning  Easier bruising, on plavix per patient  Otherwise, denies chest pain, abdominal pain, n/v/d/c, melena, hematochezia, hematuria, night sweats, chills, fevers, profound fatigue, frequent infections  I have reviewed the relevant past medical, surgical, social and family history  I have also reviewed allergies and medications for this patient  Review of Systems  Review of Systems   All other systems reviewed and are negative  OBJECTIVE     Physical Exam  Vitals:    12/20/22 0914   BP: 126/82   Pulse: 90   Resp: 18   Temp: 97 6 °F (36 4 °C)   SpO2: 96%   Weight: 79 4 kg (175 lb)   Height: 5' 1" (1 549 m)       Physical Exam  Vitals reviewed  Constitutional:       General: She is not in acute distress  Appearance: Normal appearance  She is not ill-appearing     HENT:      Head: Normocephalic and atraumatic  Eyes:      General: No scleral icterus  Neck:      Comments: No lymphadenopathy appreciated on exam    Cardiovascular:      Rate and Rhythm: Normal rate and regular rhythm  Pulses: Normal pulses  Heart sounds: No murmur heard  Pulmonary:      Effort: Pulmonary effort is normal       Breath sounds: Wheezing (diffuse) present  No rhonchi  Abdominal:      General: Abdomen is flat  Bowel sounds are normal       Palpations: Abdomen is soft  Tenderness: There is no abdominal tenderness  There is no guarding  Musculoskeletal:         General: Swelling (left digits, mild  No edema throughout arm  States it has improved since this morning  ) present  Cervical back: Normal range of motion and neck supple  Right lower leg: No edema  Left lower leg: No edema  Lymphadenopathy:      Cervical: No cervical adenopathy  Skin:     General: Skin is warm and dry  Findings: No bruising or rash  Neurological:      Mental Status: She is alert and oriented to person, place, and time  Imaging  Relevant imaging reviewed in chart  MRI brain wo contrast 10/26/22  IMPRESSION:   1   4 nonspecific white matter lesions, 3 of which are retrospectively stable from 2017  Findings could be related to very mild, precocious microangiopathy, especially if there are cerebrovascular risk factors  Other postinfectious, postinflammatory or   demyelinating processes not excluded  Patients with migraine headaches can have white matter changes  2   No acute infarction, intracranial hemorrhage or mass effect      Labs  Relevant labs reviewed in chart   11/08/2022   Lupus anticoagulant positivity   Factor V Leiden negative  Cardiolipin antibody mostly normal, weak positive for IgM at 33 0    8/16/2022 CBC diff WNL, CMP WNL    ASSESSMENT & PLAN      Diagnosis ICD-10-CM Associated Orders   1   Antiphospholipid antibody positive  R76 0 Ambulatory Referral to Hematology / Oncology     Cardiolipin antibody     Beta-2 glycoprotein antibodies     Lupus anticoagulant     Prothrombin gene mutation     Antithrombin III Activity     Protein C activity     Protein S activity      2  Recurrent strokes (HCC)  I63 9 Ambulatory Referral to Hematology / Oncology     Cardiolipin antibody     Beta-2 glycoprotein antibodies     Lupus anticoagulant     Prothrombin gene mutation     Antithrombin III Activity     Protein C activity     Protein S activity          38 y o  female presenting for evaluation of lupus anticoagulant positive test results with reported history of recurrent strokes and concern for hypercoagulable state  · Discussion  · Lupus anticoagulant positivity suggestive of antiphospholipid syndrome, mildly elevated cardiolipin antibody IgM will repeat testing in 12 weeks   · Follows with neurologist in private practice, asked patient to please obtain records for this  · Encouraged smoking cessation     · Plan/Labs  · Will repeat testing in 12 weeks and include remaining hypercoagulable workup    Follow Up  • 3 months with labs 1 week prior to appointment    All questions were answered to the patient's satisfaction during this encounter  They appreciated and thanked me for spending time with them  The patient knows the contact information for our office and know to reach out for any relevant concerns related to this encounter  For all other listed problems and medical diagnosis in his chart - they are managed by PCP and/or other specialists, which patient acknowledges        Hematology & Medical Oncology

## 2022-12-20 ENCOUNTER — CONSULT (OUTPATIENT)
Dept: HEMATOLOGY ONCOLOGY | Facility: CLINIC | Age: 42
End: 2022-12-20

## 2022-12-20 ENCOUNTER — TELEPHONE (OUTPATIENT)
Dept: HEMATOLOGY ONCOLOGY | Facility: HOSPITAL | Age: 42
End: 2022-12-20

## 2022-12-20 VITALS
DIASTOLIC BLOOD PRESSURE: 82 MMHG | WEIGHT: 175 LBS | HEART RATE: 90 BPM | BODY MASS INDEX: 33.04 KG/M2 | TEMPERATURE: 97.6 F | OXYGEN SATURATION: 96 % | HEIGHT: 61 IN | RESPIRATION RATE: 18 BRPM | SYSTOLIC BLOOD PRESSURE: 126 MMHG

## 2022-12-20 DIAGNOSIS — R76.0 ANTIPHOSPHOLIPID ANTIBODY POSITIVE: ICD-10-CM

## 2022-12-20 DIAGNOSIS — I63.9 RECURRENT STROKES (HCC): ICD-10-CM

## 2022-12-20 RX ORDER — ESCITALOPRAM OXALATE 20 MG/1
20 TABLET ORAL DAILY
COMMUNITY

## 2022-12-22 ENCOUNTER — TELEPHONE (OUTPATIENT)
Dept: HEMATOLOGY ONCOLOGY | Facility: CLINIC | Age: 42
End: 2022-12-22

## 2022-12-22 DIAGNOSIS — K50.00 CROHN'S DISEASE OF SMALL INTESTINE WITHOUT COMPLICATION (HCC): ICD-10-CM

## 2022-12-22 RX ORDER — AMITRIPTYLINE HYDROCHLORIDE 100 MG/1
TABLET, FILM COATED ORAL
Qty: 100 TABLET | Refills: 0 | Status: SHIPPED | OUTPATIENT
Start: 2022-12-22

## 2022-12-22 NOTE — TELEPHONE ENCOUNTER
M for patient informing her DR Chung Porter would like to see her on 2/28, her appt has been changed from 9:40 to 1:20pm   Advised to call the office if that time does not work for her  Direct teams number provided

## 2022-12-23 ENCOUNTER — TELEPHONE (OUTPATIENT)
Dept: GASTROENTEROLOGY | Facility: CLINIC | Age: 42
End: 2022-12-23

## 2022-12-23 NOTE — TELEPHONE ENCOUNTER
Please see procedure done 4/26/22  Please call patient to schedule recall colon - poor prep with Dr Hayes Organ   Thank you

## 2023-01-04 ENCOUNTER — HOSPITAL ENCOUNTER (EMERGENCY)
Facility: HOSPITAL | Age: 43
Discharge: HOME | End: 2023-01-04
Attending: EMERGENCY MEDICINE

## 2023-01-04 VITALS
DIASTOLIC BLOOD PRESSURE: 73 MMHG | SYSTOLIC BLOOD PRESSURE: 144 MMHG | HEART RATE: 81 BPM | RESPIRATION RATE: 16 BRPM | TEMPERATURE: 98.8 F | OXYGEN SATURATION: 96 %

## 2023-01-04 DIAGNOSIS — T14.8XXA BRUISING: Primary | ICD-10-CM

## 2023-01-04 LAB
ALBUMIN SERPL BCP-MCNC: 4 G/DL (ref 3.5–5)
ALP SERPL-CCNC: 81 U/L (ref 46–116)
ALT SERPL W P-5'-P-CCNC: 25 U/L (ref 12–78)
ANION GAP SERPL CALCULATED.3IONS-SCNC: 10 MMOL/L (ref 4–13)
APTT PPP: 31 SECONDS (ref 23–37)
AST SERPL W P-5'-P-CCNC: 29 U/L (ref 5–45)
BASOPHILS # BLD MANUAL: 0 THOUSAND/UL (ref 0–0.1)
BASOPHILS NFR MAR MANUAL: 0 % (ref 0–1)
BILIRUB SERPL-MCNC: 0.39 MG/DL (ref 0.2–1)
BUN SERPL-MCNC: 8 MG/DL (ref 5–25)
CALCIUM SERPL-MCNC: 9.1 MG/DL (ref 8.3–10.1)
CHLORIDE SERPL-SCNC: 102 MMOL/L (ref 96–108)
CO2 SERPL-SCNC: 29 MMOL/L (ref 21–32)
CREAT SERPL-MCNC: 0.55 MG/DL (ref 0.6–1.3)
EOSINOPHIL # BLD MANUAL: 0 THOUSAND/UL (ref 0–0.4)
EOSINOPHIL NFR BLD MANUAL: 0 % (ref 0–6)
ERYTHROCYTE [DISTWIDTH] IN BLOOD BY AUTOMATED COUNT: 14.2 % (ref 11.6–15.1)
GFR SERPL CREATININE-BSD FRML MDRD: 114 ML/MIN/1.73SQ M
GLUCOSE SERPL-MCNC: 94 MG/DL (ref 65–140)
HCT VFR BLD AUTO: 39 % (ref 34.8–46.1)
HGB BLD-MCNC: 12.9 G/DL (ref 11.5–15.4)
INR PPP: 1.15 (ref 0.84–1.19)
LYMPHOCYTES # BLD AUTO: 2.9 THOUSAND/UL (ref 0.6–4.47)
LYMPHOCYTES # BLD AUTO: 25 % (ref 14–44)
MCH RBC QN AUTO: 28.9 PG (ref 26.8–34.3)
MCHC RBC AUTO-ENTMCNC: 33.1 G/DL (ref 31.4–37.4)
MCV RBC AUTO: 87 FL (ref 82–98)
MONOCYTES # BLD AUTO: 0.69 THOUSAND/UL (ref 0–1.22)
MONOCYTES NFR BLD: 6 % (ref 4–12)
NEUTROPHILS # BLD MANUAL: 7.99 THOUSAND/UL (ref 1.85–7.62)
NEUTS SEG NFR BLD AUTO: 69 % (ref 43–75)
PLATELET # BLD AUTO: 338 THOUSANDS/UL (ref 149–390)
PLATELET BLD QL SMEAR: ADEQUATE
PMV BLD AUTO: 9.3 FL (ref 8.9–12.7)
POTASSIUM SERPL-SCNC: 3.6 MMOL/L (ref 3.5–5.3)
PROT SERPL-MCNC: 8 G/DL (ref 6.4–8.4)
PROTHROMBIN TIME: 14.5 SECONDS (ref 11.6–14.5)
RBC # BLD AUTO: 4.46 MILLION/UL (ref 3.81–5.12)
RBC MORPH BLD: NORMAL
SODIUM SERPL-SCNC: 141 MMOL/L (ref 135–147)
WBC # BLD AUTO: 11.58 THOUSAND/UL (ref 4.31–10.16)

## 2023-01-23 NOTE — ED PROVIDER NOTES
History  Chief Complaint   Patient presents with   • Bleeding/Bruising     Pt reports starting plavix about a month ago due to having strokes, pt reports immediate bruising after beginning medication and noticed bruising on her L breast 2 days ago, bruising has continued to worsen, bruising covers half of pts breast  Pt also reports about 6 or 7 nose bleeds that gush blood a day  This is a 42-year-old female patient presenting for evaluation of scattered bruising  She states she was started on Plavix for recent stroke  Since then she has noticed intermittent bruising/hematoma formation  She denies any trauma  No injury  She is having no pain in these areas  She denies any weight loss no night sweats no fever  Normal bowel movements, no melena or hematochezia, no coffee-ground emesis  No headache  History provided by:  Patient   used: No    Medical Problem  Severity:  Mild  Onset quality:  Gradual  Duration:  1 month  Timing:  Intermittent  Progression:  Unchanged  Chronicity:  New  Associated symptoms: no abdominal pain, no chest pain, no cough, no ear pain, no fever, no rash, no shortness of breath, no sore throat and no vomiting        Prior to Admission Medications   Prescriptions Last Dose Informant Patient Reported? Taking?    SUMAtriptan (IMITREX) 100 mg tablet   Yes No   Sig: Take 1 tablet by mouth once as needed     VITAMIN D PO   Yes No   Sig: Take by mouth daily    Patient not taking: Reported on 8/19/2022   amitriptyline (ELAVIL) 100 mg tablet   No No   Sig: take 1 tablet by mouth at bedtime   divalproex sodium (DEPAKOTE) 250 mg EC tablet   Yes No   Sig: Take 250 mg by mouth 2 (two) times a day     dronabinol (MARINOL) 5 MG capsule   No No   Sig: take 1 capsule by mouth twice a day before meals   escitalopram (LEXAPRO) 20 mg tablet   Yes No   Sig: Take 20 mg by mouth daily   folic acid (FOLVITE) 1 mg tablet   No No   Sig: take 1 tablet by mouth once daily   gabapentin (NEURONTIN) 600 MG tablet   No No   Sig: take 1 tablet by mouth four times a day   methotrexate 2 5 mg tablet   No No   Sig: Increase to 6 tabs once weekly x 2 weeks then increase to 8 tabs once weekly and continue  montelukast (SINGULAIR) 10 mg tablet   Yes No   Sig: Take 10 mg by mouth daily at bedtime   ondansetron (ZOFRAN) 4 mg tablet   No No   Sig: Take 1 tablet (4 mg total) by mouth every 8 (eight) hours as needed for nausea or vomiting   oxyCODONE (ROXICODONE) 5 immediate release tablet   No No   Sig: take 1 tablet by mouth three times a day MAXIMUM DAILY DOSE OF 15 milligram   pantoprazole (PROTONIX) 40 mg tablet   Yes No   Sig: Take 40 mg by mouth daily   polyethylene glycol-electrolytes (NULYTELY) 4000 mL solution   No No   Sig: Take 4,000 mL by mouth once for 1 dose   predniSONE 10 mg tablet   No No   Si DAILY FOR 3 DAYS,3 DAILY FOR 3 DAYS,2 DAILY FOR 3 DAYS, ONE DAILY FOR 3 DAYS   Patient not taking: Reported on 2022   ustekinumab (Stelara) 90 mg/mL subcutaneous injection   No No   Sig: MAINTENANCE: INJECT 1 SYRINGE SUBCUTANEOUSLY EVERY 8 WEEKS  REFRIGERATE  DO NOT FREEZE    varenicline (CHANTIX) 1 mg tablet   No No   Sig: Take 1 tablet (1 mg total) by mouth 2 (two) times a day   Patient not taking: Reported on 2022      Facility-Administered Medications: None       Past Medical History:   Diagnosis Date   • Cervical cancer (HCC)    • Crohn disease ([facility] )    • Endocarditis    • Fibromyalgia    • Gastritis    • Hypertension    • RA (rheumatoid arthritis) ([facility] )    • Vertigo        Past Surgical History:   Procedure Laterality Date   • APPENDECTOMY     • BRONCHOSCOPY      multiple   • COLONOSCOPY         Family History   Problem Relation Age of Onset   • Colon cancer Brother 28   • Crohn's disease Brother      I have reviewed and agree with the history as documented      E-Cigarette/Vaping   • E-Cigarette Use Never User      E-Cigarette/Vaping Substances   • Nicotine Yes    • THC No    • CBD No      Social History     Tobacco Use   • Smoking status: Every Day     Packs/day: 1 00     Types: Cigarettes   • Smokeless tobacco: Never   Vaping Use   • Vaping Use: Never used   Substance Use Topics   • Alcohol use: No   • Drug use: Not Currently     Types: Marijuana       Review of Systems   Constitutional: Negative for chills and fever  HENT: Negative for ear pain and sore throat  Eyes: Negative for pain and visual disturbance  Respiratory: Negative for cough and shortness of breath  Cardiovascular: Negative for chest pain and palpitations  Gastrointestinal: Negative for abdominal pain and vomiting  Genitourinary: Negative for dysuria and hematuria  Musculoskeletal: Negative for arthralgias and back pain  Skin: Negative for color change and rash  Neurological: Negative for seizures and syncope  All other systems reviewed and are negative  Physical Exam  Physical Exam  Vitals and nursing note reviewed  Constitutional:       General: She is not in acute distress  Appearance: She is well-developed  HENT:      Head: Normocephalic and atraumatic  Eyes:      Conjunctiva/sclera: Conjunctivae normal    Cardiovascular:      Rate and Rhythm: Normal rate and regular rhythm  Heart sounds: No murmur heard  Pulmonary:      Effort: Pulmonary effort is normal  No respiratory distress  Breath sounds: Normal breath sounds  Chest:      Comments: Chaperone present for examination  There is a moderate sized hematoma of the left breast   The area is mildly tender  No fluctuance or induration  Abdominal:      Palpations: Abdomen is soft  Tenderness: There is no abdominal tenderness  Musculoskeletal:         General: No swelling  Cervical back: Neck supple  Skin:     General: Skin is warm and dry  Capillary Refill: Capillary refill takes less than 2 seconds  Neurological:      Mental Status: She is alert     Psychiatric:         Mood and Affect: Mood normal  Vital Signs  ED Triage Vitals [01/04/23 1906]   Temperature Pulse Respirations Blood Pressure SpO2   98 8 °F (37 1 °C) 92 18 164/89 96 %      Temp Source Heart Rate Source Patient Position - Orthostatic VS BP Location FiO2 (%)   Oral Monitor Sitting Right arm --      Pain Score       --           Vitals:    01/04/23 1906 01/04/23 2201   BP: 164/89 144/73   Pulse: 92 81   Patient Position - Orthostatic VS: Sitting Sitting         Visual Acuity      ED Medications  Medications - No data to display    Diagnostic Studies  Results Reviewed     Procedure Component Value Units Date/Time    Comprehensive metabolic panel [[id number]]  (Abnormal) Collected: 01/04/23 2113    Lab Status: Final result Specimen: Blood from Arm, Left Updated: 01/04/23 2146     Sodium 141 mmol/L      Potassium 3 6 mmol/L      Chloride 102 mmol/L      CO2 29 mmol/L      ANION GAP 10 mmol/L      BUN 8 mg/dL      Creatinine 0 55 mg/dL      Glucose 94 mg/dL      Calcium 9 1 mg/dL      AST 29 U/L      ALT 25 U/L      Alkaline Phosphatase 81 U/L      Total Protein 8 0 g/dL      Albumin 4 0 g/dL      Total Bilirubin 0 39 mg/dL      eGFR 114 ml/min/1 73sq m     Narrative:      [place] guidelines for Chronic Kidney Disease (CKD):   •  Stage 1 with normal or high GFR (GFR > 90 mL/min/1 73 square meters)  •  Stage 2 Mild CKD (GFR = 60-89 mL/min/1 73 square meters)  •  Stage 3A Moderate CKD (GFR = 45-59 mL/min/1 73 square meters)  •  Stage 3B Moderate CKD (GFR = 30-44 mL/min/1 73 square meters)  •  Stage 4 Severe CKD (GFR = 15-29 mL/min/1 73 square meters)  •  Stage 5 End Stage CKD (GFR <15 mL/min/1 73 square meters)  Note: GFR calculation is accurate only with a steady state creatinine    Manual Differential(PHLEBS Do Not Order) [[id number]]  (Abnormal) Collected: 01/04/23 2113    Lab Status: Final result Specimen: Blood from Arm, Left Updated: 01/04/23 2144     Segmented % 69 %      Lymphocytes % 25 %      Monocytes % 6 % Eosinophils, % 0 %      Basophils % 0 %      Absolute Neutrophils 7 99 Thousand/uL      Lymphocytes Absolute 2 90 Thousand/uL      Monocytes Absolute 0 69 Thousand/uL      Eosinophils Absolute 0 00 Thousand/uL      Basophils Absolute 0 00 Thousand/uL      Total Counted --     RBC Morphology Normal     Platelet Estimate Adequate    CBC and differential [[id number]]  (Abnormal) Collected: 01/04/23 2113    Lab Status: Final result Specimen: Blood from Arm, Left Updated: 01/04/23 2144     WBC 11 58 Thousand/uL      RBC 4 46 Million/uL      Hemoglobin 12 9 g/dL      Hematocrit 39 0 %      MCV 87 fL      MCH 28 9 pg      MCHC 33 1 g/dL      RDW 14 2 %      MPV 9 3 fL      Platelets 424 Thousands/uL     Narrative: This is an appended report  These results have been appended to a previously verified report  Davide Wallace [[id number]]  (Normal) Collected: 01/04/23 2113    Lab Status: Final result Specimen: Blood from Arm, Left Updated: 01/04/23 2141     Protime 14 5 seconds      INR 1 15    APTT [[id number]]  (Normal) Collected: 01/04/23 2113    Lab Status: Final result Specimen: Blood from Arm, Left Updated: 01/04/23 2141     PTT 31 seconds                  No orders to display              Procedures  Procedures         ED Course                               SBIRT 22yo+    Flowsheet Row Most Recent Value   SBIRT (25 yo +)    In order to provide better care to our patients, we are screening all of our patients for alcohol and drug use  Would it be okay to ask you these screening questions? Yes Filed at: 01/04/2023 2057   Initial Alcohol Screen: US AUDIT-C     1  How often do you have a drink containing alcohol? 0 Filed at: 01/04/2023 2057   2  How many drinks containing alcohol do you have on a typical day you are drinking? 0 Filed at: 01/04/2023 2057   3a  Male UNDER 65: How often do you have five or more drinks on one occasion? 0 Filed at: 01/04/2023 2057   3b  FEMALE Any Age, or MALE 65+:  How often do you have 4 or more drinks on one occassion? 0 Filed at: 01/04/2023 2057   Audit-C Score 0 Filed at: 01/04/2023 2057   TITO: How many times in the past year have you    Used an illegal drug or used a prescription medication for non-medical reasons? Never Filed at: 01/04/2023 2057                    Medical Decision Making  Differential diagnosis includes but is not limited to hematoma, coagulopathy, anemia  Plan: Labs  Dispo pending  MDM: 38year-old with bruising  Likely in the setting of recent Plavix  She is denying any trauma or abuse  She has been on exam   Follow-up with her neurologist and PCP  Symptomatic management  Return parameters provided  Patient understands and agrees with this plan  Bruising: complicated acute illness or injury  Amount and/or Complexity of Data Reviewed  Labs: ordered  Disposition  Final diagnoses:   Bruising     Time reflects when diagnosis was documented in both MDM as applicable and the Disposition within this note     Time User Action Codes Description Comment    1/4/2023  9:52 PM Crys Shea  [address] [facility] Bruising       ED Disposition     ED Disposition   Discharge    Condition   Stable    Date/Time   Wed Jan 4, 2023  9:52 PM    Comment   Traci Arguelles discharge to home/self care                 Follow-up Information     Follow up With Specialties Details Why Lucinda Desir MD Internal Medicine   [address] [facility] [address] [facility] [id number] [phone]            Discharge Medication List as of 1/4/2023  9:52 PM      CONTINUE these medications which have NOT CHANGED    Details   amitriptyline (ELAVIL) 100 mg tablet take 1 tablet by mouth at bedtime, Normal      divalproex sodium (DEPAKOTE) 250 mg EC tablet Take 250 mg by mouth 2 (two) times a day  , Historical Med      dronabinol (MARINOL) 5 MG capsule take 1 capsule by mouth twice a day before meals, Normal      escitalopram (LEXAPRO) 20 mg tablet Take 20 mg by mouth daily, Historical Med      folic acid (FOLVITE) 1 mg tablet take 1 tablet by mouth once daily, Normal      gabapentin (NEURONTIN) 600 MG tablet take 1 tablet by mouth four times a day, Normal      methotrexate 2 5 mg tablet Increase to 6 tabs once weekly x 2 weeks then increase to 8 tabs once weekly and continue , Normal      montelukast (SINGULAIR) 10 mg tablet Take 10 mg by mouth daily at bedtime, Historical Med      ondansetron (ZOFRAN) 4 mg tablet Take 1 tablet (4 mg total) by mouth every 8 (eight) hours as needed for nausea or vomiting, Starting Wed 8/17/2022, Normal      oxyCODONE (ROXICODONE) 5 immediate release tablet take 1 tablet by mouth three times a day MAXIMUM DAILY DOSE OF 15 milligram, Normal      pantoprazole (PROTONIX) 40 mg tablet Take 40 mg by mouth daily, Historical Med      polyethylene glycol-electrolytes (NULYTELY) 4000 mL solution Take 4,000 mL by mouth once for 1 dose, Starting Wed 5/4/2022, Normal      predniSONE 10 mg tablet 4 DAILY FOR 3 DAYS,3 DAILY FOR 3 DAYS,2 DAILY FOR 3 DAYS, ONE DAILY FOR 3 DAYS, Normal      SUMAtriptan (IMITREX) 100 mg tablet Take 1 tablet by mouth once as needed  , Historical Med      ustekinumab (Stelara) 90 mg/mL subcutaneous injection MAINTENANCE: INJECT 1 SYRINGE SUBCUTANEOUSLY EVERY 8 WEEKS  REFRIGERATE  DO NOT FREEZE , Normal      varenicline (CHANTIX) 1 mg tablet Take 1 tablet (1 mg total) by mouth 2 (two) times a day, Starting Mon 2/21/2022, Normal      VITAMIN D PO Take by mouth daily , Historical Med             No discharge procedures on file      PDMP Review       Value Time User    PDMP Reviewed  Yes 11/14/2022  6:01 PM Frederick Aviles MD          ED Provider  Electronically Signed by           Didi Berry PA-C  01/23/23 0410

## 2023-02-17 DIAGNOSIS — K50.00 CROHN'S DISEASE OF SMALL INTESTINE WITHOUT COMPLICATION (HCC): Primary | ICD-10-CM

## 2023-02-24 ENCOUNTER — TELEPHONE (OUTPATIENT)
Dept: HEMATOLOGY ONCOLOGY | Facility: CLINIC | Age: 43
End: 2023-02-24

## 2023-02-24 NOTE — TELEPHONE ENCOUNTER
Labs ordered during consult on 12/20/2023 have not been collected or resulted in the chart from an external lab  Initiated update through Care Everywhere , nothing found  These tests take approximately 10-14 days to result  Patient will need to reschedule her appointment with yT Zamora PA-C, Kenton Loud will be leaving the practice in March 2023  [facility] requesting a return call to [phone]

## 2023-02-27 ENCOUNTER — TELEPHONE (OUTPATIENT)
Dept: OTHER | Facility: OTHER | Age: 43
End: 2023-02-27

## 2023-02-27 NOTE — TELEPHONE ENCOUNTER
Patient called this evening returning call that was made to her on Friday regarding appointment  She is scheduled for tomorrow 2 28 at 1320 with Dr Hodan Gotti and has confirmed appointment

## 2023-02-28 ENCOUNTER — TELEPHONE (OUTPATIENT)
Dept: HEMATOLOGY ONCOLOGY | Facility: CLINIC | Age: 43
End: 2023-02-28

## 2023-02-28 NOTE — TELEPHONE ENCOUNTER
Appointment Cancellation Or Reschedule     Person calling in Patient   If someone other than patient calling, are they listed on the communication consent form? N/A   Provider Dr Chung Porter   Office Visit Date and Time 02/28 at 1:20pm   [facility]   Did patient want to reschedule their office appointment? If so, when was it scheduled to? 03/13 at 11:30am   Did you have STAR scheduled for this appointment? No   Do you need STAR set up for your new appointment? If yes, please send to "PATIENT RIDESHARE" pool for STAR rescheduling No   Is this patient calling to reschedule an infusion appointment? No   When is their next infusion appointment? n/a   Is this patient a Chemo patient? No   Reason for Cancellation or Reschedule Patient did not complete labs    Was No show policy reviewed with patient if patient canceling within 24 hours? Yes     If the patient is cancelling an appointment and needs their STAR Transport cancelled, please route to [facility]  If the patient is a treatment patient, please route this to the office nurse  If the patient is not on treatment, please route to the Clerical pool based on location  If the patient is a surgical oncology patient, please route to surg/onc clinical pool  Route message as high priority if same day cancellation

## 2023-03-06 ENCOUNTER — LAB (OUTPATIENT)
Dept: LAB | Facility: CLINIC | Age: 43
End: 2023-03-06

## 2023-03-06 ENCOUNTER — TELEPHONE (OUTPATIENT)
Dept: GASTROENTEROLOGY | Facility: CLINIC | Age: 43
End: 2023-03-06

## 2023-03-06 DIAGNOSIS — R76.0 ANTIPHOSPHOLIPID ANTIBODY POSITIVE: ICD-10-CM

## 2023-03-06 DIAGNOSIS — K50.00 CROHN'S DISEASE OF SMALL INTESTINE WITHOUT COMPLICATION (HCC): ICD-10-CM

## 2023-03-06 DIAGNOSIS — Z79.631 METHOTREXATE, LONG TERM, CURRENT USE: ICD-10-CM

## 2023-03-06 DIAGNOSIS — I63.9 RECURRENT STROKES (HCC): ICD-10-CM

## 2023-03-06 LAB
BASOPHILS # BLD AUTO: 0.11 THOUSANDS/ÂΜL (ref 0–0.1)
BASOPHILS NFR BLD AUTO: 1 % (ref 0–1)
EOSINOPHIL # BLD AUTO: 0.38 THOUSAND/ÂΜL (ref 0–0.61)
EOSINOPHIL NFR BLD AUTO: 5 % (ref 0–6)
ERYTHROCYTE [DISTWIDTH] IN BLOOD BY AUTOMATED COUNT: 13.9 % (ref 11.6–15.1)
HCT VFR BLD AUTO: 40.3 % (ref 34.8–46.1)
HGB BLD-MCNC: 13.3 G/DL (ref 11.5–15.4)
IMM GRANULOCYTES # BLD AUTO: 0.03 THOUSAND/UL (ref 0–0.2)
IMM GRANULOCYTES NFR BLD AUTO: 0 % (ref 0–2)
LYMPHOCYTES # BLD AUTO: 2.58 THOUSANDS/ÂΜL (ref 0.6–4.47)
LYMPHOCYTES NFR BLD AUTO: 30 % (ref 14–44)
MCH RBC QN AUTO: 29.6 PG (ref 26.8–34.3)
MCHC RBC AUTO-ENTMCNC: 33 G/DL (ref 31.4–37.4)
MCV RBC AUTO: 90 FL (ref 82–98)
MONOCYTES # BLD AUTO: 0.44 THOUSAND/ÂΜL (ref 0.17–1.22)
MONOCYTES NFR BLD AUTO: 5 % (ref 4–12)
NEUTROPHILS # BLD AUTO: 4.97 THOUSANDS/ÂΜL (ref 1.85–7.62)
NEUTS SEG NFR BLD AUTO: 59 % (ref 43–75)
NRBC BLD AUTO-RTO: 0 /100 WBCS
PLATELET # BLD AUTO: 345 THOUSANDS/UL (ref 149–390)
PMV BLD AUTO: 10.5 FL (ref 8.9–12.7)
RBC # BLD AUTO: 4.49 MILLION/UL (ref 3.81–5.12)
WBC # BLD AUTO: 8.51 THOUSAND/UL (ref 4.31–10.16)

## 2023-03-06 NOTE — TELEPHONE ENCOUNTER
Spoke to pt confirming pt's colonoscopy scheduled on 3/9/23 at [facility] with Dr Deven Werner  Informed [facility] would be calling the day prior with the arrival time  Pt has instructions and will be hold her Plavix 7 days prior to the procedure  She is also aware that she has the 2 day preparation

## 2023-03-06 NOTE — TELEPHONE ENCOUNTER
Please resend Golytely  It was sent into a mail order which I deleted as she no longer uses  Thank you so much!

## 2023-03-07 LAB
B2 GLYCOPROT1 IGA SERPL IA-ACNC: 2.1
B2 GLYCOPROT1 IGG SERPL IA-ACNC: <0.8
B2 GLYCOPROT1 IGM SERPL IA-ACNC: <2.4
BACTERIA UR QL AUTO: ABNORMAL /HPF
BILIRUB UR QL STRIP: NEGATIVE
C3 SERPL-MCNC: 97.9 MG/DL (ref 90–180)
C4 SERPL-MCNC: 27 MG/DL (ref 10–40)
CAOX CRY URNS QL MICRO: ABNORMAL /HPF
CARDIOLIPIN IGA SER IA-ACNC: 2.3
CARDIOLIPIN IGG SER IA-ACNC: <0.5
CARDIOLIPIN IGM SER IA-ACNC: 21
CLARITY UR: CLEAR
COLOR UR: ABNORMAL
CREAT UR-MCNC: 85.2 MG/DL
GLUCOSE UR STRIP-MCNC: NEGATIVE MG/DL
HGB UR QL STRIP.AUTO: NEGATIVE
KETONES UR STRIP-MCNC: NEGATIVE MG/DL
LEUKOCYTE ESTERASE UR QL STRIP: ABNORMAL
MUCOUS THREADS UR QL AUTO: ABNORMAL
NITRITE UR QL STRIP: NEGATIVE
NON-SQ EPI CELLS URNS QL MICRO: ABNORMAL /HPF
PH UR STRIP.AUTO: 5.5 [PH]
PROT UR STRIP-MCNC: ABNORMAL MG/DL
PROT UR-MCNC: 14 MG/DL
PROT/CREAT UR: 0.16 MG/G{CREAT} (ref 0–0.1)
RBC #/AREA URNS AUTO: ABNORMAL /HPF
SP GR UR STRIP.AUTO: 1.02 (ref 1–1.03)
UROBILINOGEN UR STRIP-ACNC: <2 MG/DL
WBC #/AREA URNS AUTO: ABNORMAL /HPF

## 2023-03-08 LAB
CENTROMERE B AB SER-ACNC: <0.2 AI (ref 0–0.9)
DEPRECATED AT III PPP: 90 % OF NORMAL (ref 92–136)
DSDNA AB SER-ACNC: 1 IU/ML (ref 0–9)
ENA JO1 AB SER-ACNC: <0.2 AI (ref 0–0.9)
ENA RNP AB SER-ACNC: <0.2 AI (ref 0–0.9)
ENA SCL70 AB SER-ACNC: <0.2 AI (ref 0–0.9)
ENA SM AB SER-ACNC: <0.2 AI (ref 0–0.9)
ENA SS-A AB SER-ACNC: <0.2 AI (ref 0–0.9)
ENA SS-B AB SER-ACNC: <0.2 AI (ref 0–0.9)

## 2023-03-09 ENCOUNTER — TELEPHONE (OUTPATIENT)
Dept: GASTROENTEROLOGY | Facility: CLINIC | Age: 43
End: 2023-03-09

## 2023-03-09 LAB
ANA TITR SER IF: NEGATIVE {TITER}
APTT SCREEN TO CONFIRM RATIO: 1.2 RATIO (ref 0–1.34)
CONFIRM APTT/NORMAL: 46.8 SEC (ref 0–47.6)
HISTONE IGG SER IA-ACNC: 0.3 UNITS (ref 0–0.9)
LA PPP-IMP: NORMAL
PROT C AG ACT/NOR PPP IA: 88 % OF NORMAL (ref 60–150)
PROT S ACT/NOR PPP: 82 % (ref 68–108)
SCREEN APTT: 35 SEC (ref 0–43.5)
SCREEN DRVVT: 46.8 SEC (ref 0–47)
THROMBIN TIME: 15.8 SEC (ref 0–23)

## 2023-03-09 NOTE — TELEPHONE ENCOUNTER
Pt cancelled her procedure for today stating she didn't get her prep until 8pm last night and not sure why her prep didn't work  Notified Johnathan Harris in GI lab of cancellation  Rescheduled to 5/23/23 with Rita Charles in [facility]

## 2023-03-13 LAB — F2 GENE MUT ANL BLD/T: NORMAL

## 2023-03-17 ENCOUNTER — TELEPHONE (OUTPATIENT)
Dept: RHEUMATOLOGY | Facility: CLINIC | Age: 43
End: 2023-03-17

## 2023-03-19 DIAGNOSIS — K50.00 CROHN'S DISEASE OF SMALL INTESTINE WITHOUT COMPLICATION (HCC): ICD-10-CM

## 2023-03-19 DIAGNOSIS — M79.7 FIBROMYALGIA: ICD-10-CM

## 2023-03-19 RX ORDER — GABAPENTIN 600 MG/1
600 TABLET ORAL 4 TIMES DAILY
Qty: 120 TABLET | Refills: 0 | Status: SHIPPED | OUTPATIENT
Start: 2023-03-19 | End: 2023-03-22 | Stop reason: SDUPTHER

## 2023-03-20 ENCOUNTER — APPOINTMENT (EMERGENCY)
Dept: RADIOLOGY | Facility: HOSPITAL | Age: 43
End: 2023-03-20

## 2023-03-20 ENCOUNTER — OFFICE VISIT (OUTPATIENT)
Dept: HEMATOLOGY ONCOLOGY | Facility: CLINIC | Age: 43
End: 2023-03-20

## 2023-03-20 ENCOUNTER — TELEPHONE (OUTPATIENT)
Dept: HEMATOLOGY ONCOLOGY | Facility: CLINIC | Age: 43
End: 2023-03-20

## 2023-03-20 ENCOUNTER — HOSPITAL ENCOUNTER (EMERGENCY)
Facility: HOSPITAL | Age: 43
Discharge: HOME | End: 2023-03-20
Attending: EMERGENCY MEDICINE

## 2023-03-20 VITALS
SYSTOLIC BLOOD PRESSURE: 124 MMHG | TEMPERATURE: 98.3 F | RESPIRATION RATE: 16 BRPM | WEIGHT: 150 LBS | HEART RATE: 88 BPM | OXYGEN SATURATION: 97 % | BODY MASS INDEX: 27.6 KG/M2 | HEIGHT: 62 IN | DIASTOLIC BLOOD PRESSURE: 69 MMHG

## 2023-03-20 VITALS
RESPIRATION RATE: 16 BRPM | DIASTOLIC BLOOD PRESSURE: 70 MMHG | OXYGEN SATURATION: 98 % | BODY MASS INDEX: 32.1 KG/M2 | WEIGHT: 170 LBS | HEART RATE: 85 BPM | SYSTOLIC BLOOD PRESSURE: 112 MMHG | HEIGHT: 61 IN | TEMPERATURE: 98.1 F

## 2023-03-20 DIAGNOSIS — R07.9 CHEST PAIN: Primary | ICD-10-CM

## 2023-03-20 DIAGNOSIS — K50.00 CROHN'S DISEASE OF SMALL INTESTINE WITHOUT COMPLICATION (HCC): ICD-10-CM

## 2023-03-20 DIAGNOSIS — D68.59 ANTITHROMBIN III DEFICIENCY (HCC): Primary | ICD-10-CM

## 2023-03-20 DIAGNOSIS — R07.9 CHEST PAIN, UNSPECIFIED TYPE: ICD-10-CM

## 2023-03-20 RX ORDER — AMITRIPTYLINE HYDROCHLORIDE 100 MG/1
100 TABLET, FILM COATED ORAL
Qty: 100 TABLET | Refills: 0 | Status: SHIPPED | OUTPATIENT
Start: 2023-03-20

## 2023-03-20 RX ORDER — USTEKINUMAB 90 MG/ML
INJECTION, SOLUTION SUBCUTANEOUS
Qty: 2 ML | Refills: 0 | Status: SHIPPED | OUTPATIENT
Start: 2023-03-20

## 2023-03-20 RX ORDER — AMITRIPTYLINE HYDROCHLORIDE 100 MG/1
100 TABLET, FILM COATED ORAL
Qty: 100 TABLET | Refills: 0 | OUTPATIENT
Start: 2023-03-20

## 2023-03-20 NOTE — ED PROVIDER NOTES
History  Chief Complaint   Patient presents with   • Chest Pain     On the way to cardiologist and started with chest pain at 1515  History of MI in January  Nausea and dizziness      36 yo female with sudden onset mild R sided nonpleuritic nonradiating CP several hours ago  Mild  Not worsened with exertion  No dyspnea, diaphoresis, syncope, near syncope or hemoptysis  No fever or cough  Pt reports h/o MI but I can not find evidence of this in review of epic  She is a smoker  She denies leg pain or swelling  She has no h/o vte or recent surgery or immobilization  Prior to Admission Medications   Prescriptions Last Dose Informant Patient Reported? Taking? SUMAtriptan (IMITREX) 100 mg tablet   Yes No   Sig: Take 1 tablet by mouth once as needed     VITAMIN D PO   Yes No   Sig: Take by mouth daily   amitriptyline (ELAVIL) 100 mg tablet   No No   Sig: Take 1 tablet (100 mg total) by mouth daily at bedtime   divalproex sodium (DEPAKOTE) 250 mg EC tablet   Yes No   Sig: Take 250 mg by mouth 2 (two) times a day     dronabinol (MARINOL) 5 MG capsule   No No   Sig: take 1 capsule by mouth twice a day before meals   escitalopram (LEXAPRO) 20 mg tablet   Yes No   Sig: Take 20 mg by mouth daily   folic acid (FOLVITE) 1 mg tablet   No No   Sig: take 1 tablet by mouth once daily   gabapentin (NEURONTIN) 600 MG tablet   No No   Sig: Take 1 tablet (600 mg total) by mouth 4 (four) times a day   methotrexate 2 5 mg tablet   No No   Sig: Increase to 6 tabs once weekly x 2 weeks then increase to 8 tabs once weekly and continue     montelukast (SINGULAIR) 10 mg tablet   Yes No   Sig: Take 10 mg by mouth daily at bedtime   ondansetron (ZOFRAN) 4 mg tablet   No No   Sig: Take 1 tablet (4 mg total) by mouth every 8 (eight) hours as needed for nausea or vomiting   oxyCODONE (ROXICODONE) 5 immediate release tablet   No No   Sig: take 1 tablet by mouth three times a day MAXIMUM DAILY DOSE OF 15 milligram   pantoprazole (PROTONIX) 40 mg tablet   Yes No   Sig: Take 40 mg by mouth daily   polyethylene glycol (GOLYTELY) 4000 mL solution   No No   Sig: Take 4,000 mL by mouth once for 1 dose   polyethylene glycol-electrolytes (NULYTELY) 4000 mL solution   No No   Sig: Take 4,000 mL by mouth once for 1 dose   predniSONE 10 mg tablet   No No   Si DAILY FOR 3 DAYS,3 DAILY FOR 3 DAYS,2 DAILY FOR 3 DAYS, ONE DAILY FOR 3 DAYS   Patient not taking: Reported on 2022   ustekinumab (Stelara) 90 mg/mL subcutaneous injection   No No   Sig: MAINTENANCE: INJECT 1 SYRINGE SUBCUTANEOUSLY EVERY 8 WEEKS  REFRIGERATE  DO NOT FREEZE    varenicline (CHANTIX) 1 mg tablet   No No   Sig: Take 1 tablet (1 mg total) by mouth 2 (two) times a day   Patient not taking: Reported on 2022      Facility-Administered Medications: None       Past Medical History:   Diagnosis Date   • Cervical cancer (HCC)    • Crohn disease ([facility] )    • Endocarditis    • Fibromyalgia    • Gastritis    • Hypertension    • RA (rheumatoid arthritis) ([facility] )    • TIA (transient ischemic attack)    • Vertigo        Past Surgical History:   Procedure Laterality Date   • APPENDECTOMY     • BRONCHOSCOPY      multiple   • CARDIAC CATHETERIZATION  2023   • COLONOSCOPY         Family History   Problem Relation Age of Onset   • Colon cancer Brother 28   • Crohn's disease Brother      I have reviewed and agree with the history as documented  E-Cigarette/Vaping   • E-Cigarette Use Never User      E-Cigarette/Vaping Substances   • Nicotine Yes    • THC No    • CBD No      Social History     Tobacco Use   • Smoking status: Every Day     Packs/day: 1 00     Types: Cigarettes   • Smokeless tobacco: Never   Vaping Use   • Vaping Use: Never used   Substance Use Topics   • Alcohol use: No   • Drug use: Not Currently     Types: Marijuana       Review of Systems   Cardiovascular: Positive for chest pain  Physical Exam  Physical Exam  Vitals and nursing note reviewed     Constitutional: General: She is not in acute distress  Appearance: Normal appearance  She is well-developed  She is not ill-appearing, toxic-appearing or diaphoretic  HENT:      Head: Normocephalic and atraumatic  Eyes:      Conjunctiva/sclera: Conjunctivae normal       Pupils: Pupils are equal, round, and reactive to light  Neck:      Vascular: No JVD  Cardiovascular:      Rate and Rhythm: Normal rate and regular rhythm  Heart sounds: Normal heart sounds  Pulmonary:      Effort: Pulmonary effort is normal  No respiratory distress  Breath sounds: Normal breath sounds  No stridor  No wheezing or rhonchi  Abdominal:      General: There is no distension  Palpations: Abdomen is soft  Tenderness: There is no abdominal tenderness  There is no guarding or rebound  Musculoskeletal:         General: No tenderness or deformity  Normal range of motion  Cervical back: Normal range of motion and neck supple  Skin:     General: Skin is warm and dry  Capillary Refill: Capillary refill takes less than 2 seconds  Coloration: Skin is not pale  Findings: No erythema or rash  Neurological:      General: No focal deficit present  Mental Status: She is alert and oriented to person, place, and time  Cranial Nerves: No cranial nerve deficit  Sensory: No sensory deficit  Motor: No abnormal muscle tone        Coordination: Coordination normal          Vital Signs  ED Triage Vitals   Temperature Pulse Respirations Blood Pressure SpO2   03/20/23 1640 03/20/23 1640 03/20/23 1640 03/20/23 1642 03/20/23 1640   98 3 °F (36 8 °C) 81 16 147/79 98 %      Temp Source Heart Rate Source Patient Position - Orthostatic VS BP Location FiO2 (%)   03/20/23 1640 03/20/23 1640 03/20/23 1640 03/20/23 1640 --   Temporal Monitor Sitting Left arm       Pain Score       --                  Vitals:    03/20/23 1640 03/20/23 1642 03/20/23 1700   BP:  147/79 124/69   Pulse: 81  88   Patient Position - Orthostatic VS: Sitting Sitting          Visual Acuity      ED Medications  Medications - No data to display    Diagnostic Studies  Results Reviewed     None                 No orders to display              Procedures  ECG 12 Lead Documentation Only    Date/Time: 3/20/2023 5:14 PM  Performed by: Mundo Jordan MD  Authorized by: Mundo Jordan MD     Indications / Diagnosis:  Cp  ECG reviewed by me, the ED Provider: yes    Patient location:  ED  Previous ECG:     Previous ECG:  Compared to current    Comparison ECG info:  26 july 21    Similarity:  No change  Interpretation:     Interpretation: normal    Rate:     ECG rate:  77    ECG rate assessment: normal    Rhythm:     Rhythm: sinus rhythm    Comments:      Normal ekg  No hanh or std  Unchanged as compared to prior ekg  ED Course  ED Course as of 03/20/23 1715   Mon Mar 20, 2023   1703 Pt refusing labs, xray and further evaluation  She requests d/c  She provides informed refusal of further testing and clearly demonstrates capacity  Medical Decision Making  Chest pain: complicated acute illness or injury with systemic symptoms that poses a threat to life or bodily functions     Details: we discussed the ddx and the related w/u  pt initially was agreeable but then refused further testing and requested discharge  she clearly provided informed refusal and demonstrated capacity  Disposition  Final diagnoses:   Chest pain     Time reflects when diagnosis was documented in both MDM as applicable and the Disposition within this note     Time User Action Codes Description Comment    3/20/2023  5:02 PM Ke Dudley Add [R07 9] Chest pain       ED Disposition     ED Disposition   Discharge    Condition   Stable    Date/Time   Mon Mar 20, 2023  5:02 PM    [address] discharge to home/self care                 Follow-up Information     Follow up With Specialties Details Why Contact Info Additional [address] [facility] Emergency Department Emergency Medicine  if you change your mind about further evaluation or if your symptoms worsen or change [address] [place] [address] Emergency Department, [address] [place] [address], [address]          Discharge Medication List as of 3/20/2023  5:03 PM      CONTINUE these medications which have NOT CHANGED    Details   amitriptyline (ELAVIL) 100 mg tablet Take 1 tablet (100 mg total) by mouth daily at bedtime, Starting Mon 3/20/2023, Normal      divalproex sodium (DEPAKOTE) 250 mg EC tablet Take 250 mg by mouth 2 (two) times a day  , Historical Med      dronabinol (MARINOL) 5 MG capsule take 1 capsule by mouth twice a day before meals, Normal      escitalopram (LEXAPRO) 20 mg tablet Take 20 mg by mouth daily, Historical Med      folic acid (FOLVITE) 1 mg tablet take 1 tablet by mouth once daily, Normal      gabapentin (NEURONTIN) 600 MG tablet Take 1 tablet (600 mg total) by mouth 4 (four) times a day, Starting Sun 3/19/2023, Normal      methotrexate 2 5 mg tablet Increase to 6 tabs once weekly x 2 weeks then increase to 8 tabs once weekly and continue , Normal      montelukast (SINGULAIR) 10 mg tablet Take 10 mg by mouth daily at bedtime, Historical Med      ondansetron (ZOFRAN) 4 mg tablet Take 1 tablet (4 mg total) by mouth every 8 (eight) hours as needed for nausea or vomiting, Starting Wed 8/17/2022, Normal      oxyCODONE (ROXICODONE) 5 immediate release tablet take 1 tablet by mouth three times a day MAXIMUM DAILY DOSE OF 15 milligram, Normal      pantoprazole (PROTONIX) 40 mg tablet Take 40 mg by mouth daily, Historical Med      polyethylene glycol (GOLYTELY) 4000 mL solution Take 4,000 mL by mouth once for 1 dose, Starting Mon 3/20/2023, Normal      polyethylene glycol-electrolytes (NULYTELY) 4000 mL solution Take 4,000 mL by mouth once for 1 dose, Starting Wed 5/4/2022, Normal      predniSONE 10 mg tablet 4 DAILY FOR 3 DAYS,3 DAILY FOR 3 DAYS,2 DAILY FOR 3 DAYS, ONE DAILY FOR 3 DAYS, Normal      SUMAtriptan (IMITREX) 100 mg tablet Take 1 tablet by mouth once as needed  , Historical Med      ustekinumab (Stelara) 90 mg/mL subcutaneous injection MAINTENANCE: INJECT 1 SYRINGE SUBCUTANEOUSLY EVERY 8 WEEKS  REFRIGERATE  DO NOT FREEZE , Normal      varenicline (CHANTIX) 1 mg tablet Take 1 tablet (1 mg total) by mouth 2 (two) times a day, Starting Mon 2/21/2022, Normal      VITAMIN D PO Take by mouth daily, Historical Med             No discharge procedures on file      PDMP Review       Value Time User    PDMP Reviewed  Yes 11/14/2022  6:01 PM Damon Delgado MD          ED Provider  Electronically Signed by           Valentino Harry MD  03/20/23 9140

## 2023-03-20 NOTE — ED NOTES
Patient requesting not to send bloodwork, refusing CXR states 'my EKG was fine, I can just go right?' Provider aware      Sunitha Harvey, TAYLOR  03/20/23 2225

## 2023-03-20 NOTE — TELEPHONE ENCOUNTER
Patient Call Form   Reason for patient call? Patient is running 5 min late, almost there! Patient's primary oncologist? Holland Cooks   Name of person the patient was calling for? Holland Cooks   Does the patient issue require a call back? no   If call back required, inform patient that the message will be forwarded to the team and someone will get back to them as soon as possible    Did you relay this information to the patient?  yes

## 2023-03-20 NOTE — PROGRESS NOTES
[facility] - Hematology & Medical Oncology  Outpatient Visit Encounter Note      Katy Sherman 37 y o  female [id number] Date:  3/20/2023    HEMATOLOGICAL HISTORY        Clotting History Hx TIAs per patient per external neurology notes, No hx of DVT/PE   Bleeding History Denies   Cancer History Denies other than "precancerous pap smear" per patient   Family Cancer History Brother has colon cancer (also has Crohn Disease), Paternal grandmother with leukemia    H/O Blood/Plt Transfusion Denies   Tobacco Use Current smoker 1ppd for 25+ years   ETOH Denies   Other drug use Denies   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 who was undergoing workup with neurology and rheumatology when she was found to have lupus anticoagulant testing positive  She states she is seeing a neurologist for recurrent "silent mini strokes" for which the records are external and unavailable for review  On imaging from our network MRI brain 10/26/2022 without contrast showing nonspecific findings as reported below that could be consistent with chronic complex migraines, postinflammatory, postinfectious, vs mild microangiopathy but no acute infarct noted on imaging   3/4 nonspecific white matter changes on this study stable from 2017  Carotid US <50% stenosis b/l  Denies history of other blood clot or for strong family history of clotting  Patient states that she has chronic joint pain as well as chronic intermittent swelling affecting her fingers, occasionally her knees and this is worse in the morning along with joint stiffness that does improve throughout the day and is not persistent  She also states that the swelling is worse throughout the summer and overall feels better in the winter with decreased joint stiffness and swelling  She states that her Crohn's disease is under control and does not experience diarrhea or mucus-like stool currently, has never had blood in her stool per patient  Is following with gastroenterology  She is still smoking  No synthetic estrogen/progesterone use  Today, complains of finger swelling left upper extremity and chronic SOB at baseline however when she lays down at night it is worsened and she feels as though she cannot lay flat - will be calling PCP for this today after visit  Hx migraine headaches  Ongoing workup with ophthalmologist for blurred vision worsened in morning  Easier bruising, on plavix per patient  Otherwise, denies chest pain, abdominal pain, n/v/d/c, melena, hematochezia, hematuria, night sweats, chills, fevers, profound fatigue, frequent infections  TODAY:   Complaining of chest pain, acute onset during office visit  No radiation, did wake up with upper extremity pain and swelling b/l  Also complaining of joint pain in hands b/l and knees  No SOB  Sent patient directly to ED  I have reviewed the relevant past medical, surgical, social and family history  I have also reviewed allergies and medications for this patient  Review of Systems  Review of Systems   All other systems reviewed and are negative          OBJECTIVE     Physical Exam  Vitals:    03/20/23 1603   BP: 112/70   Pulse: 85   Resp: 16   Temp: 98 1 °F (36 7 °C)   TempSrc: Temporal   SpO2: 98%   Weight: 77 1 kg (170 lb)   Height: 5' 1" (1 549 m)       Physical Exam  Vitals reviewed  Constitutional:       General: She is not in acute distress  Appearance: She is not ill-appearing  Neck:      Comments: No lymphadenopathy appreciated on exam    Cardiovascular:      Rate and Rhythm: Normal rate and regular rhythm  Heart sounds: Normal heart sounds  No murmur heard  Pulmonary:      Effort: Pulmonary effort is normal       Breath sounds: Wheezing (diffuse) present  No rhonchi  Musculoskeletal:      Right lower leg: No edema  Left lower leg: No edema  Skin:     General: Skin is warm and dry  Neurological:      Mental Status: She is alert and oriented to person, place, and time  Imaging  Relevant imaging reviewed in chart  MRI brain wo contrast 10/26/22  IMPRESSION:   1   4 nonspecific white matter lesions, 3 of which are retrospectively stable from 2017  Findings could be related to very mild, precocious microangiopathy, especially if there are cerebrovascular risk factors  Other postinfectious, postinflammatory or   demyelinating processes not excluded  Patients with migraine headaches can have white matter changes  2   No acute infarction, intracranial hemorrhage or mass effect      Labs  Relevant labs reviewed in chart       3/06/2023  CBCdiff mostly WNL  Lupus anticoagulant: not detected  Beta 2 glyco ab: within normal ranges  Cardiolipin antibody: IgM 21 0 weak positive, Igg negative, IgA negative   Prothrombin gene mutation: not detected   Antithrombin III activity: 90%   Protein C activity: 88%  Protein S activity: 82%    11/08/2022   Lupus anticoagulant positivity   Factor V Leiden negative  Cardiolipin antibody mostly normal, weak positive for IgM at 33 0    8/16/2022 CBC diff WNL, CMP WNL    ASSESSMENT & PLAN      Diagnosis ICD-10-CM Associated Orders   1  Antithrombin III deficiency (HCC)  D68 59 Antithrombin III Activity      2   Chest pain, unspecified type  R07 9 Transfer to other facility          37 y o  female presenting for evaluation of lupus anticoagulant positive test results with reported history of recurrent strokes and concern for hypercoagulable state  · Discussion  · Lupus anticoagulant positivity suggestive of antiphospholipid syndrome, mildly elevated cardiolipin antibody IgM will repeat testing in 12 weeks   · On repeat, no evidence of lupus anticoagulant and beta 2 glycoprotein ab testing is nromal  Anticardiolipin IgM still weak positive however on both occasions <40 and does not meet diagnostic criteria for APLS, also in absence of true hx of VTE  Low risk profile, especially in setting of IgM isotype  · Antithrombin III activity very mildly low for reference range at 90% however not <80%  We will repeat this in about 1 month and I will call with results - if normal she does not need to follow up with hematology  · Follows with neurologist in private practice, asked patient to please obtain records for this  · Encouraged smoking cessation   · Sent patient directly to ED for complaints of acute onset of chest pain, ADT transfer order placed    · Plan/Labs  · Repeat ATIII labs in approximately 4 weeks    Follow Up  • Timing pending lab results     All questions were answered to the patient's satisfaction during this encounter  They appreciated and thanked me for spending time with them  The patient knows the contact information for our office and know to reach out for any relevant concerns related to this encounter  For all other listed problems and medical diagnosis in his chart - they are managed by PCP and/or other specialists, which patient acknowledges        Hematology & Medical Oncology

## 2023-03-21 LAB
ATRIAL RATE: 77 BPM
P AXIS: 72 DEGREES
PR INTERVAL: 156 MS
QRS AXIS: 45 DEGREES
QRSD INTERVAL: 84 MS
QT INTERVAL: 388 MS
QTC INTERVAL: 439 MS
T WAVE AXIS: 47 DEGREES
VENTRICULAR RATE: 77 BPM

## 2023-03-21 NOTE — PROGRESS NOTES
Assessment and Plan:   Ms Carolyn Bland a 47-year-old female with history significant for Crohn's disease diagnosed in 1996, psoriasis, fibromyalgia/chronic pain syndrome and possible peripheral spondyloarthropathy related to psoriasis/IBD who presents for a follow up  Enrico Score is currently on Stelara injections every 6 weeks through her gastroenterologist and oral methotrexate 10 mg once weekly that was added in approximately July 2022         Rheumatic Summary:  1) Diagnosed with Crohn's disease in 1996 - started on Enbrel+mesalamine+6-MP+steroids x 8 years - d/c'ed due to inefficacy  - Managed with chronic steroids until 2014 then started on Humira till 8/2019 - d/c'ed due to decline in efficacy  - Started Stelara 2/2020-present - IBD stable  2) ? Inflammatory arthritis (negative RF, borderline CCP of 20) - arthralgias since 2000 - no improvement with high doses steroids, Enbrel, Humira or Stelara  - 7/13/21: continue Stelara and add on MTX, prednisone 20 mg daily x 10 days  - 3/9/22: continue Stelara through GI  Add on injectable MTX   - 11/9/22: continue Stelara through GI and increase MTX to 20 mg/wk     3) Fibromyalgia - currently gabapentin 600 mg QID, oxycodone 5 mg TID PRN - minimal improvement  - Previously been on amitriptyline and Dilaudid - no significant improvement  4) Psoriasis diagnosed at the age of 4   11) ? Recurrent strokes based on an MRI brain 10/22 - positive LAC and aCL IgM    - 11/30/22: repeat labs in 3 months, refer to Hematology and continue Plavix          # Inflammatory arthritis likely secondary to psoriasis vs IBD  - Pancho Lemus presents today for a follow up of diffuse arthralgias and myalgias which have been gradually progressive over the past 15 years +   Given the widespread and chronic complaints as well as a lack of benefit with prior medications including high doses of prednisone, etanercept and adalimumab (and now Stelara since February 2020), I had considered her diagnosis to be secondary to fibromyalgia      - Apart from the fibromyalgia I question if she may be presenting with a concomitant inflammatory arthritis secondary to the underlying psoriasis/inflammatory bowel disease, given the focal joint involvement and evidence of fluctuating joint swelling noted  In view of this I started her on oral methotrexate at the last office visit but she has not noticed any improvement in her symptoms with the low dose so far  In view of this I would like her to gradually increase to 20 mg once weekly and I will assess her response to this  If required at future office visits sulfasalazine can also be added  She will continue with the Stelara dosing through GI  She will update high-risk medication lab monitoring in the next 1-2 months      - For management of the chronic pain syndrome she will follow up with Pain Management and is currently on gabapentin 600 mg 4 times a day along with oxycodone 5 mg TID PRN           Plan:  Diagnoses and all orders for this visit:    Inflammatory arthritis    Methotrexate, long term, current use    Crohn's disease of colon without complication (HCC)    Long-term use of immunosuppressant medication    Psoriasis    Fibromyalgia    Opioid use    Anti-cardiolipin antibody positive      Activities as tolerated  Exercise: try to maintain a low impact exercise regimen as much as possible  Walk for 30 minutes a day for at least 3 days a week  Continue other medications as prescribed by PCP and other specialists         RTC in       HPI    INITIAL VISIT NOTE (4/2020):  Ms Marah Marrufo a 57-year-old female with history significant for Crohn's disease diagnosed in 1996, fibromyalgia, 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 (currently on oral bisphosphonate therapy) and psoriasis, who presents for further evaluation of diffuse joint pains   She is referred by Dr Pandey Linker a rheumatology consult      Patient reports she was initially evaluated by Gastroenterology in 1996 when she was diagnosed with Crohn's disease   She mentions at that time she was started on a combination of Enbrel (possibly for the joint pains), mesalamine and 6 mercaptopurine in association with steroids   She reports being on this regimen for approximately 8 years following which it was discontinued due to a decline in efficacy   She reports overall since her diagnosis in 1996 she has been on chronic intermittent doses of prednisone, with her last course about 1 month ago  Giles Paul will typically start off at doses of 40 mg once daily and taper down, with the lower doses causing a flare-up of the colitis   Of note she mentions that even the higher doses of prednisone have never helped with her joint pains   She states following her initial regimen she was then switched to Humira which she was on for about 5 years, and again it was discontinued in August 2019 due to a decline in efficacy   She has since started Port fredo on February 6, 2020 and has thus far completed the initial dosing  Giles Humberto is now on a maintenance dosing of 1 injection every 8 weeks  Giles Paul has not yet noticed if the Port fredo is helping with her joint pains  Giles Paul mentions her last colonoscopy was done in December 2019 and this was consistent with active pancolitis   She follows with [facility]  Giles Paul reports she is continuing to have loose stools with occasional mucus, but denies bloody diarrhea or abdominal pain      She was previously seen by Rheumatology in [address] [place] [address] and continued follow-up with them until she relocated to [address] [place] [address] approximately 7 years ago  Giles Paul states since then her medications have mostly been managed by her primary care doctor and Gastroenterology  Giles Paul has previously been on gabapentin for a diagnosis of fibromyalgia, but states that this was recently discontinued and she was advised to follow up with pain management   She is receiving hydromorphone 4 mg twice daily from pain management at this time, and states that it only helps to a mild degree with her body pain      She reports her joint pains have been ongoing for the past 15 years and have been progressively worsening over time to the point that she is noticing them on a daily basis in a constant manner, including during her sleep at night   As mentioned she has previously been on gabapentin up to 600 mg 3 times daily and amitriptyline 100 mg at bedtime which would help her, but this regimen was discontinued by her primary care doctor and she was advised to follow-up with Rheumatology and Pain Management   She states that the joint pains affect her all over but mostly with her knees, ankles, hands and low back   She reports the low back pain will wake her up from sleep at night and is also associated with morning stiffness of up to 2 hours   The back pain worsens with activities and is also worsened at rest   She also describes pain affecting her diffusely throughout her muscles   She has noticed occasional swelling affecting her fingers and knees   She experiences morning stiffness which affects her diffusely and takes about 3-4 hours to improve   She refrains from NSAID use due to her history of colitis      She reports as a result of being on chronic steroids she has developed osteopenia/osteoporosis   Her last DEXA scan was done approximately 1 year ago, and I do not have the records available for review   She does not take daily calcium supplements but does take vitamin-D replacement therapy  Emiliano Pope is also on Fosamax once weekly      She reports a history of blurred vision but denies eye pain or redness   She denies a history of inflammatory eye disease  Emiliano Pope has been diagnosed with psoriasis at the age of 4 which will typically affect her arms and legs   She reports a family history of psoriasis in her father as well as multiple family members on her mother's side with inflammatory bowel disease   She denies current fevers, unintentional weight loss, mouth/nose ulcers, swollen glands, blood clots or Raynaud's      Labs in our system show a negative rheumatoid factor         7/28/2020:  Patient presents for a follow-up today  Salvatore Bejarano had to convert to a virtual visit as she had a temperature on arrival   She did not have a chance to get the blood work and x-rays done following the last office visit, as she states that she did not receive a copy in the mail      She has now been on the Port fredo since February 2020 and does not feel like it is helping with the bowel disease as she continues to have multiple episodes of diarrhea and mucus in her stool  Tristan Garcia has also not noticed any change in her joint pains, but states that it has helped with the psoriasis   I did start her on gabapentin at 300 mg 3 times daily at the last office visit for fibromyalgia, and she feels like this helped to a certain degree   She is continuing to experience pain mostly in her neck, upper back region/shoulders, elbows, hands and knees   She has noticed swelling of her hands, knees, ankles and feet   She does experience morning stiffness which affects her diffusely mostly in her back region and can persist throughout the day when it occurs   She reports usually 4 days out of the week will be severe for her and may be weather dependent   She is not taking any over-the-counter pain medications at this time      She did try to send me pictures of her hand swelling following the last visit, but unfortunately this got scanned in as black and white, and it is challenging to appreciate any abnormalities   I did also receive her DEXA scan report that was done in October 2019 and appears to be normal for her age         3/10/2021:  Patient presents for a follow-up today  Bruce Campbell did review her labs done after the last office visit which showed a borderline elevated anti CCP antibody of 20   An ESR, CRP and hepatitis panel were unremarkable      At the last office visit we had discussed starting methotrexate, which she took for approximately 3 weeks but as it was causing gastrointestinal side effects she discontinued it  Manisha Peraza thinks this may have been related to significant personal stressors as well and would like to give the methotrexate a retry  Orandrés Peraza is still on Stelara for the Crohn's disease through her gastroenterologist  Manisha Peraza is prescribed Dilaudid by her pain management specialist  Manisha Peraza has been taking the gabapentin 600 mg 4 times daily which does help   She reports generally during the cooler weather she feels better and her symptoms may flare in the summer   She is continuing to experience pain mostly in her neck, upper back region/shoulders, elbows, hands and knees   She has noticed swelling of her hands, knees, ankles and feet   She does experience morning stiffness which affects her diffusely mostly in her back region and can persist throughout the day when it occurs          7/13/2021:  Patient presents for a follow up today   At the last office visit to address the possibility of an inflammatory arthritis secondary to the psoriasis/IBD I had discussed starting her on methotrexate 10 mg once weekly, but patient states she only took one dose and then discontinued it due to concerns for potential side effects   She is currently receiving Stelara injections every 3 months primarily for the IBD      She has been experiencing a flare up of joint pains over the past few weeks which is effecting her hands, low back, knees, ankles and feet   She has noticed swelling of her hands (her primary care physician described this to her as "sausage digits", but this is not evident on her exam today), ankles and feet   She experiences morning stiffness primarily of her lower body that can take hours to improve   She is following with pain management and is currently on gabapentin 600 mg four times daily and oxycodone 5 mg three times daily as needed   The Dilaudid was discontinued   These medications are not helping with her pain      She denies psoriasis or a flare up of the inflammatory bowel disease         3/9/2022:  Patient presents for a follow-up of possible inflammatory arthritis secondary to the psoriasis/IBD   At the last office visit I had discussed starting her on methotrexate but she did not do this due to concerns for potential side effects   She is only on Stelara injections every 6 weeks at this moment primarily for the inflammatory bowel disease      She reports her joint pains are again flaring and this primarily affect her hands, knees and ankles where she will also notice swelling   She experiences stiffness mostly in the night and to some degree in the morning but this improves with activities   She does take the gabapentin 600 mg 4 times a day and oxycodone 5 mg 3 times a day as needed         11/9/2022:  Patient presents for a follow-up of an inflammatory arthritis secondary to psoriasis/inflammatory bowel disease   Currently she is on Stelara injections every 6 weeks and oral methotrexate 10 mg once weekly that was started approximately 4 months ago   I reviewed her recent ESR and CRP which were slightly elevated at 38 and 9 8, respectively   The last CBC and CMP done in August were unremarkable      Patient reports with the low dose of methotrexate she has not noticed any improvement in her symptoms yet  Muna Wisdom is still experiencing chronic joint pains and swelling as discussed previously         11/30/2022:  Patient presents for an acute visit today to review concerns for recurrent strokes due to an MRI of her brain from 10/26/22 showing 4 nonspecific white matter lesions, 3 of which are retrospectively stable from 2017  The findings were thought to be related to 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  There was no acute infarction noted  She had a carotid artery ultrasound done which was unremarkable    She is following with Neurology (Dr Kari Hi) and there are concerns that these lesions may be related to silent strokes  She had a thrombotic panel done which showed a positive lupus anticoagulant and elevated anticardiolipin IgM antibody of 33  She was advised to follow up with me to further evaluate for lupus and was started on Plavix by Neurology  3/22/2023: The following portions of the patient's history were reviewed and updated as appropriate: allergies, current medications, past family history, past medical history, past social history, past surgical history and problem list       Review of Systems  Constitutional: Negative for weight change, fevers, chills, night sweats, fatigue  ENT/Mouth: Negative for hearing changes, ear pain, nasal congestion, sinus pain, hoarseness, sore throat, rhinorrhea, swallowing difficulty  Eyes: Negative for pain, redness, discharge, vision changes  Cardiovascular: Negative for chest pain, SOB, palpitations  Respiratory: Negative for cough, sputum, wheezing, dyspnea  Gastrointestinal: Negative for nausea, vomiting, diarrhea, constipation, pain, heartburn  Genitourinary: Negative for dysuria, urinary frequency, hematuria  Musculoskeletal: As per HPI  Skin: Negative for skin rash, color changes  Neuro: Negative for weakness, numbness, tingling, loss of consciousness  Psych: Negative for anxiety, depression  Heme/Lymph: Negative for easy bruising, bleeding, lymphadenopathy          Past Medical History:   Diagnosis Date   • Cervical cancer ([facility] )    • Crohn disease ([facility] )    • Endocarditis    • Fibromyalgia    • Gastritis    • Hypertension    • RA (rheumatoid arthritis) ([facility] )    • TIA (transient ischemic attack)    • Vertigo        Past Surgical History:   Procedure Laterality Date   • APPENDECTOMY     • BRONCHOSCOPY      multiple   • CARDIAC CATHETERIZATION  01/11/2023   • COLONOSCOPY         Social History     Socioeconomic History   • Marital status: /Civil Union     Spouse name: Not on file   • Number of children: Not on file   • Years of education: Not on file   • Highest education level: Not on file   Occupational History   • Not on file   Tobacco Use   • Smoking status: Every Day     Packs/day: 1 00     Types: Cigarettes   • Smokeless tobacco: Never   Vaping Use   • Vaping Use: Never used   Substance and Sexual Activity   • Alcohol use: No   • Drug use: Not Currently     Types: Marijuana   • Sexual activity: Not Currently   Other Topics Concern   • Not on file   Social History Narrative    Has a      Social Determinants of Health     Financial Resource Strain: Not on file   Food Insecurity: Not on file   Transportation Needs: Not on file   Physical Activity: Not on file   Stress: Not on file   Social Connections: Not on file   Intimate Partner Violence: Not on file   Housing Stability: Not on file       Family History   Problem Relation Age of Onset   • Colon cancer Brother 28   • Crohn's disease Brother        Allergies   Allergen Reactions   • Ketorolac Hives   • Penicillins Hives   • Ketorolac Tromethamine Rash       Current Outpatient Medications:   •  amitriptyline (ELAVIL) 100 mg tablet, Take 1 tablet (100 mg total) by mouth daily at bedtime, Disp: 100 tablet, Rfl: 0  •  divalproex sodium (DEPAKOTE) 250 mg EC tablet, Take 250 mg by mouth 2 (two) times a day  , Disp: , Rfl:   •  dronabinol (MARINOL) 5 MG capsule, take 1 capsule by mouth twice a day before meals, Disp: 200 capsule, Rfl: 1  •  escitalopram (LEXAPRO) 20 mg tablet, Take 20 mg by mouth daily, Disp: , Rfl:   •  folic acid (FOLVITE) 1 mg tablet, take 1 tablet by mouth once daily, Disp: 90 tablet, Rfl: 3  •  gabapentin (NEURONTIN) 600 MG tablet, Take 1 tablet (600 mg total) by mouth 4 (four) times a day, Disp: 120 tablet, Rfl: 0  •  methotrexate 2 5 mg tablet, Increase to 6 tabs once weekly x 2 weeks then increase to 8 tabs once weekly and continue , Disp: 96 tablet, Rfl: 1  •  montelukast (SINGULAIR) 10 mg tablet, Take 10 mg by mouth daily at bedtime, Disp: , Rfl:   • ondansetron (ZOFRAN) 4 mg tablet, Take 1 tablet (4 mg total) by mouth every 8 (eight) hours as needed for nausea or vomiting, Disp: 30 tablet, Rfl: 0  •  oxyCODONE (ROXICODONE) 5 immediate release tablet, take 1 tablet by mouth three times a day MAXIMUM DAILY DOSE OF 15 milligram, Disp: 90 tablet, Rfl: 0  •  pantoprazole (PROTONIX) 40 mg tablet, Take 40 mg by mouth daily, Disp: , Rfl:   •  polyethylene glycol (GOLYTELY) 4000 mL solution, Take 4,000 mL by mouth once for 1 dose, Disp: 4000 mL, Rfl: 0  •  polyethylene glycol-electrolytes (NULYTELY) 4000 mL solution, Take 4,000 mL by mouth once for 1 dose, Disp: 4000 mL, Rfl: 0  •  predniSONE 10 mg tablet, 4 DAILY FOR 3 DAYS,3 DAILY FOR 3 DAYS,2 DAILY FOR 3 DAYS, ONE DAILY FOR 3 DAYS (Patient not taking: Reported on 12/20/2022), Disp: 30 tablet, Rfl: 0  •  SUMAtriptan (IMITREX) 100 mg tablet, Take 1 tablet by mouth once as needed  , Disp: , Rfl:   •  ustekinumab (Stelara) 90 mg/mL subcutaneous injection, MAINTENANCE: INJECT 1 SYRINGE SUBCUTANEOUSLY EVERY 8 WEEKS  REFRIGERATE  DO NOT FREEZE , Disp: 2 mL, Rfl: 0  •  varenicline (CHANTIX) 1 mg tablet, Take 1 tablet (1 mg total) by mouth 2 (two) times a day (Patient not taking: Reported on 8/19/2022), Disp: 60 tablet, Rfl: 0  •  VITAMIN D PO, Take by mouth daily, Disp: , Rfl:       Objective: There were no vitals filed for this visit  Physical Exam  General: Well appearing, well nourished, in no distress  Oriented x 3, normal mood and affect  Ambulating without difficulty  Skin: Good turgor, no rash, unusual bruising or prominent lesions  Hair: Normal texture and distribution  Nails: Normal color, no deformities  HEENT:  Head: Normocephalic, atraumatic  Eyes: Conjunctiva clear, sclera non-icteric, EOM intact  Extremities: No amputations or deformities, cyanosis, edema  Musculoskeletal:   Neurologic: Alert and oriented  No focal neurological deficits appreciated  Psychiatric: Normal mood and affect  RONEL Vickers    Rheumatology

## 2023-03-22 ENCOUNTER — TELEPHONE (OUTPATIENT)
Dept: OBGYN CLINIC | Facility: MEDICAL CENTER | Age: 43
End: 2023-03-22

## 2023-03-22 ENCOUNTER — TELEMEDICINE (OUTPATIENT)
Dept: RHEUMATOLOGY | Facility: CLINIC | Age: 43
End: 2023-03-22

## 2023-03-22 DIAGNOSIS — F11.90 OPIOID USE: ICD-10-CM

## 2023-03-22 DIAGNOSIS — M79.7 FIBROMYALGIA: ICD-10-CM

## 2023-03-22 DIAGNOSIS — M19.90 INFLAMMATORY ARTHRITIS: Primary | ICD-10-CM

## 2023-03-22 DIAGNOSIS — K50.10 CROHN'S DISEASE OF COLON WITHOUT COMPLICATION (HCC): ICD-10-CM

## 2023-03-22 DIAGNOSIS — R76.0 ANTI-CARDIOLIPIN ANTIBODY POSITIVE: ICD-10-CM

## 2023-03-22 DIAGNOSIS — Z79.60 LONG-TERM USE OF IMMUNOSUPPRESSANT MEDICATION: ICD-10-CM

## 2023-03-22 DIAGNOSIS — L40.9 PSORIASIS: ICD-10-CM

## 2023-03-22 RX ORDER — GABAPENTIN 600 MG/1
600 TABLET ORAL 4 TIMES DAILY
Qty: 120 TABLET | Refills: 5 | Status: SHIPPED | OUTPATIENT
Start: 2023-03-22

## 2023-03-22 NOTE — TELEPHONE ENCOUNTER
Caller: Patient    Doctor: Ziggy Li    Reason for call:    Patient is asking for a virtual appointment today is possible, her ride cannot bring her  Appointment time is 12:45 pm      (her email is [email or web address]    Call back#: [phone]

## 2023-03-22 NOTE — PROGRESS NOTES
Virtual Regular Visit    Verification of patient location:    Patient is located in the following state in which I hold an active license [place]      Assessment/Plan:    Problem List Items Addressed This Visit        Musculoskeletal and Integument    Psoriasis   Other Visit Diagnoses     Inflammatory arthritis    -  Primary    Relevant Orders    C-reactive protein    Sedimentation rate, automated    Crohn's disease of colon without complication (HCC)        Long-term use of immunosuppressant medication        Fibromyalgia        Relevant Medications    gabapentin (NEURONTIN) 600 MG tablet    Opioid use        Anti-cardiolipin antibody positive                   Reason for visit is follow up  Chief Complaint   Patient presents with   • Virtual Regular Visit        Encounter provider Kirby Wynne MD    Provider located at [address] [place] [id number]      Recent Visits  No visits were found meeting these conditions  Showing recent visits within past 7 days and meeting all other requirements  Today's Visits  Date Type Provider Dept   03/22/23 Telephone Kirby Wynne MD [address]   03/22/23 Telemedicine Kirby Wynne MD [facility] today's visits and meeting all other requirements  Future Appointments  No visits were found meeting these conditions  Showing future appointments within next 150 days and meeting all other requirements       The patient was identified by name and date of birth  Harper Sanchez was informed that this is a telemedicine visit and that the visit is being conducted through Telephone  My office door was closed  No one else was in the room  She acknowledged consent and understanding of privacy and security of the video platform  The patient has agreed to participate and understands they can discontinue the visit at any time      Patient is aware this is a billable service         Subjective    HPI     INITIAL VISIT NOTE (4/2020):  Ms Adam Tiwari a 49-year-old female with history significant for Crohn's disease diagnosed in 1996, fibromyalgia, 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 (currently on oral bisphosphonate therapy) and psoriasis, who presents for further evaluation of diffuse joint pains   She is referred by Dr Mc Bettencourt a rheumatology consult      Patient reports she was initially evaluated by Gastroenterology in [address] [place] [address] when she was diagnosed with Crohn's disease   She mentions at that time she was started on a combination of Enbrel (possibly for the joint pains), mesalamine and 6 mercaptopurine in association with steroids   She reports being on this regimen for approximately 8 years following which it was discontinued due to a decline in efficacy   She reports overall since her diagnosis in 1996 she has been on chronic intermittent doses of prednisone, with her last course about 1 month ago  Daja Goss will typically start off at doses of 40 mg once daily and taper down, with the lower doses causing a flare-up of the colitis   Of note she mentions that even the higher doses of prednisone have never helped with her joint pains   She states following her initial regimen she was then switched to Humira which she was on for about 5 years, and again it was discontinued in August 2019 due to a decline in efficacy   She has since started Port fredo on February 6, 2020 and has thus far completed the initial dosing  Daja Goss is now on a maintenance dosing of 1 injection every 8 weeks  Daja Goss has not yet noticed if the Port fredo is helping with her joint pains  Daja Goss mentions her last colonoscopy was done in December 2019 and this was consistent with active pancolitis   She follows with [facility]  Daja Goss reports she is continuing to have loose stools with occasional mucus, but denies bloody diarrhea or abdominal pain      She was previously seen by Rheumatology in [address] [place] [address] and continued follow-up with them until she relocated to [place] approximately 7 years ago  Dwain Alvarez states since then her medications have mostly been managed by her primary care doctor and Gastroenterology  Dwain Alvarez has previously been on gabapentin for a diagnosis of fibromyalgia, but states that this was recently discontinued and she was advised to follow up with pain management   She is receiving hydromorphone 4 mg twice daily from pain management at this time, and states that it only helps to a mild degree with her body pain      She reports her joint pains have been ongoing for the past 15 years and have been progressively worsening over time to the point that she is noticing them on a daily basis in a constant manner, including during her sleep at night   As mentioned she has previously been on gabapentin up to 600 mg 3 times daily and amitriptyline 100 mg at bedtime which would help her, but this regimen was discontinued by her primary care doctor and she was advised to follow-up with Rheumatology and Pain Management   She states that the joint pains affect her all over but mostly with her knees, ankles, hands and low back   She reports the low back pain will wake her up from sleep at night and is also associated with morning stiffness of up to 2 hours   The back pain worsens with activities and is also worsened at rest   She also describes pain affecting her diffusely throughout her muscles   She has noticed occasional swelling affecting her fingers and knees   She experiences morning stiffness which affects her diffusely and takes about 3-4 hours to improve   She refrains from NSAID use due to her history of colitis      She reports as a result of being on chronic steroids she has developed osteopenia/osteoporosis   Her last DEXA scan was done approximately 1 year ago, and I do not have the records available for review   She does not take daily calcium supplements but does take vitamin-D replacement therapy  Giles Paul is also on Fosamax once weekly      She reports a history of blurred vision but denies eye pain or redness   She denies a history of inflammatory eye disease   She has been diagnosed with psoriasis at the age of 4 which will typically affect her arms and legs   She reports a family history of psoriasis in her father as well as multiple family members on her mother's side with inflammatory bowel disease   She denies current fevers, unintentional weight loss, mouth/nose ulcers, swollen glands, blood clots or Raynaud's      Labs in our system show a negative rheumatoid factor         7/28/2020:  Patient presents for a follow-up today  Mohini Alejo had to convert to a virtual visit as she had a temperature on arrival   She did not have a chance to get the blood work and x-rays done following the last office visit, as she states that she did not receive a copy in the mail      She has now been on the Port fredo since February 2020 and does not feel like it is helping with the bowel disease as she continues to have multiple episodes of diarrhea and mucus in her stool  Giles Paul has also not noticed any change in her joint pains, but states that it has helped with the psoriasis   I did start her on gabapentin at 300 mg 3 times daily at the last office visit for fibromyalgia, and she feels like this helped to a certain degree   She is continuing to experience pain mostly in her neck, upper back region/shoulders, elbows, hands and knees   She has noticed swelling of her hands, knees, ankles and feet   She does experience morning stiffness which affects her diffusely mostly in her back region and can persist throughout the day when it occurs   She reports usually 4 days out of the week will be severe for her and may be weather dependent   She is not taking any over-the-counter pain medications at this time      She did try to send me pictures of her hand swelling following the last visit, but unfortunately this got scanned in as black and white, and it is challenging to appreciate any abnormalities   I did also receive her DEXA scan report that was done in October 2019 and appears to be normal for her age         3/10/2021:  Patient presents for a follow-up today  Felipe Johnson did review her labs done after the last office visit which showed a borderline elevated anti CCP antibody of 20   An ESR, CRP and hepatitis panel were unremarkable      At the last office visit we had discussed starting methotrexate, which she took for approximately 3 weeks but as it was causing gastrointestinal side effects she discontinued it  Alexa Harris thinks this may have been related to significant personal stressors as well and would like to give the methotrexate a retry  Alexamagen Harris is still on Stelara for the Crohn's disease through her gastroenterologist  Alexa Harris is prescribed Dilaudid by her pain management specialist  Alexa Harris has been taking the gabapentin 600 mg 4 times daily which does help   She reports generally during the cooler weather she feels better and her symptoms may flare in the summer   She is continuing to experience pain mostly in her neck, upper back region/shoulders, elbows, hands and knees   She has noticed swelling of her hands, knees, ankles and feet   She does experience morning stiffness which affects her diffusely mostly in her back region and can persist throughout the day when it occurs          7/13/2021:  Patient presents for a follow up today   At the last office visit to address the possibility of an inflammatory arthritis secondary to the psoriasis/IBD I had discussed starting her on methotrexate 10 mg once weekly, but patient states she only took one dose and then discontinued it due to concerns for potential side effects   She is currently receiving Stelara injections every 3 months primarily for the IBD      She has been experiencing a flare up of joint pains over the past few weeks which is effecting her hands, low back, knees, ankles and feet   She has noticed swelling of her hands (her primary care physician described this to her as "sausage digits", but this is not evident on her exam today), ankles and feet   She experiences morning stiffness primarily of her lower body that can take hours to improve   She is following with pain management and is currently on gabapentin 600 mg four times daily and oxycodone 5 mg three times daily as needed   The Dilaudid was discontinued   These medications are not helping with her pain      She denies psoriasis or a flare up of the inflammatory bowel disease         3/9/2022:  Patient presents for a follow-up of possible inflammatory arthritis secondary to the psoriasis/IBD   At the last office visit I had discussed starting her on methotrexate but she did not do this due to concerns for potential side effects   She is only on Stelara injections every 6 weeks at this moment primarily for the inflammatory bowel disease      She reports her joint pains are again flaring and this primarily affect her hands, knees and ankles where she will also notice swelling   She experiences stiffness mostly in the night and to some degree in the morning but this improves with activities   She does take the gabapentin 600 mg 4 times a day and oxycodone 5 mg 3 times a day as needed         11/9/2022:  Patient presents for a follow-up of an inflammatory arthritis secondary to psoriasis/inflammatory bowel disease   Currently she is on Stelara injections every 6 weeks and oral methotrexate 10 mg once weekly that was started approximately 4 months ago   I reviewed her recent ESR and CRP which were slightly elevated at 38 and 9 8, respectively   The last CBC and CMP done in August were unremarkable      Patient reports with the low dose of methotrexate she has not noticed any improvement in her symptoms yet  Shefali Guerrero is still experiencing chronic joint pains and swelling as discussed previously         11/30/2022:  Patient presents for an acute visit today to review concerns for recurrent strokes due to an MRI of her brain from 10/26/22 showing 4 nonspecific white matter lesions, 3 of which are retrospectively stable from 2017  The findings were thought to be related to 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  There was no acute infarction noted  She had a carotid artery ultrasound done which was unremarkable  She is following with Neurology (Dr Mayra Mojica) and there are concerns that these lesions may be related to silent strokes  She had a thrombotic panel done which showed a positive lupus anticoagulant and elevated anticardiolipin IgM antibody of 33  She was advised to follow up with me to further evaluate for lupus and was started on Plavix by Neurology  3/22/2023:  Patient presents for a follow-up of an inflammatory arthritis secondary to psoriasis/inflammatory bowel disease  She is currently on Stelara injections every 6 weeks and reports she discontinued the methotrexate after the last visit as she is concerned about potential side effects  She would prefer to remain off any additional immunosuppressants and manage the chronic pain with gabapentin 600 mg 4 times daily which does help to some degree  No recent steroids  I had also seen her in November due to concerns for recurrent strokes  Blood work done as a result of this showed a low titer anticardiolipin IgM antibody of 21  She is following with hematology and per their note there is insufficient criteria to diagnose her with antiphospholipid antibody syndrome and the plan is for continued monitoring  An TONIA by immunofluorescence, TONIA specificity, C3, C4 and remainder of the antiphospholipid antibody panel was unremarkable        Past Medical History:   Diagnosis Date   • Cervical cancer ([facility] )    • Crohn disease ([facility] )    • Endocarditis    • Fibromyalgia    • Gastritis    • Hypertension    • RA (rheumatoid arthritis) ([facility] )    • TIA (transient ischemic attack) • Vertigo        Past Surgical History:   Procedure Laterality Date   • APPENDECTOMY     • BRONCHOSCOPY      multiple   • CARDIAC CATHETERIZATION  01/11/2023   • COLONOSCOPY         Current Outpatient Medications   Medication Sig Dispense Refill   • gabapentin (NEURONTIN) 600 MG tablet Take 1 tablet (600 mg total) by mouth 4 (four) times a day 120 tablet 5   • amitriptyline (ELAVIL) 100 mg tablet Take 1 tablet (100 mg total) by mouth daily at bedtime 100 tablet 0   • divalproex sodium (DEPAKOTE) 250 mg EC tablet Take 250 mg by mouth 2 (two) times a day       • dronabinol (MARINOL) 5 MG capsule take 1 capsule by mouth twice a day before meals 200 capsule 1   • escitalopram (LEXAPRO) 20 mg tablet Take 20 mg by mouth daily     • folic acid (FOLVITE) 1 mg tablet take 1 tablet by mouth once daily 90 tablet 3   • montelukast (SINGULAIR) 10 mg tablet Take 10 mg by mouth daily at bedtime     • ondansetron (ZOFRAN) 4 mg tablet Take 1 tablet (4 mg total) by mouth every 8 (eight) hours as needed for nausea or vomiting 30 tablet 0   • oxyCODONE (ROXICODONE) 5 immediate release tablet take 1 tablet by mouth three times a day MAXIMUM DAILY DOSE OF 15 milligram 90 tablet 0   • pantoprazole (PROTONIX) 40 mg tablet Take 40 mg by mouth daily     • polyethylene glycol (GOLYTELY) 4000 mL solution Take 4,000 mL by mouth once for 1 dose 4000 mL 0   • polyethylene glycol-electrolytes (NULYTELY) 4000 mL solution Take 4,000 mL by mouth once for 1 dose 4000 mL 0   • SUMAtriptan (IMITREX) 100 mg tablet Take 1 tablet by mouth once as needed       • ustekinumab (Stelara) 90 mg/mL subcutaneous injection MAINTENANCE: INJECT 1 SYRINGE SUBCUTANEOUSLY EVERY 8 WEEKS  REFRIGERATE   DO NOT FREEZE  2 mL 0   • varenicline (CHANTIX) 1 mg tablet Take 1 tablet (1 mg total) by mouth 2 (two) times a day (Patient not taking: Reported on 8/19/2022) 60 tablet 0   • VITAMIN D PO Take by mouth daily       No current facility-administered medications for this visit  Allergies   Allergen Reactions   • Ketorolac Hives   • Penicillins Hives   • Ketorolac Tromethamine Rash       Review of Systems  Constitutional: Negative for weight change, fevers, chills, night sweats, fatigue  ENT/Mouth: Negative for hearing changes, ear pain, nasal congestion, sinus pain, hoarseness, sore throat, rhinorrhea, swallowing difficulty  Eyes: Negative for pain, redness, discharge, vision changes  Cardiovascular: Negative for chest pain, SOB, palpitations  Respiratory: Negative for cough, sputum, wheezing, dyspnea  Gastrointestinal: Negative for nausea, vomiting, diarrhea, constipation, pain, heartburn  Genitourinary: Negative for dysuria, urinary frequency, hematuria  Musculoskeletal: As per HPI  Skin: Negative for skin rash, color changes  Neuro: Negative for weakness, numbness, tingling, loss of consciousness  Psych: Negative for anxiety, depression  Heme/Lymph: Negative for easy bruising, bleeding, lymphadenopathy  Assessment and Plan:   Ms Lia Merlos a 47-year-old female with history significant for Crohn's disease diagnosed in 1996, psoriasis, fibromyalgia/chronic pain syndrome and possible peripheral spondyloarthropathy related to psoriasis/IBD who presents for a follow up  Tasia Zuñiga is currently on Stelara injections every 6 weeks through her gastroenterologist         Rheumatic Summary:  1) Diagnosed with Crohn's disease in 1996 - started on Enbrel+mesalamine+6-MP+steroids x 8 years - d/c'ed due to inefficacy  - Managed with chronic steroids until 2014 then started on Humira till 8/2019 - d/c'ed due to decline in efficacy  - Started Stelara 2/2020-present - IBD stable  2) ? Inflammatory arthritis (negative RF, borderline CCP of 20) - arthralgias since 2000 - no improvement with high doses steroids, Enbrel, Humira or Stelara  - 7/13/21: continue Stelara and add on MTX, prednisone 20 mg daily x 10 days  - 3/9/22: continue Stelara through GI   Add on injectable MTX   - 11/9/22: continue Stelara through GI and increase MTX to 20 mg/wk    - 3/22/23: MTX self d/c'ed in 11/22 due to concerns for side effects, prefers to avoid IS use and only remain on Stelara per GI and gabapentin  3) Fibromyalgia - currently gabapentin 600 mg QID, oxycodone 5 mg TID PRN - minimal improvement  - Previously been on amitriptyline and Dilaudid - no significant improvement  4) Psoriasis diagnosed at the age of 4   11) ? Recurrent strokes based on an MRI brain 10/22 - positive LAC and aCL IgM  - 11/30/22: repeat labs in 3 months, refer to Hematology and continue Plavix  - 3/22/23: negative LAC on repeat check  Persistently positive low titer aCL IgM - following with Hematology, not diagnosed with APLAS  # Inflammatory arthritis likely secondary to psoriasis vs IBD  - Shelly Ambriz presents today for a follow up of diffuse arthralgias and myalgias which have been gradually progressive over the past 15 years +   Given the widespread and chronic complaints as well as a lack of benefit with prior medications including high doses of prednisone, etanercept and adalimumab (and now Stelara since February 2020), I had considered her diagnosis to be secondary to fibromyalgia      - Apart from the fibromyalgia I question if she may be presenting with a concomitant inflammatory arthritis secondary to the underlying psoriasis/inflammatory bowel disease given the focal joint involvement and evidence of fluctuating joint swelling noted    In view of this I started her on oral methotrexate at the last office visit but she self discontinued this due to concerns for potential side effects and would prefer to avoid additional immunosuppression's except for what she is already receiving through gastroenterology for the Crohn's disease      - For management of the chronic pain syndrome she will follow up with Pain Management and is currently on gabapentin 600 mg 4 times a day along with oxycodone 5 mg TID PRN        I spent 11 minutes directly with the patient during this visit

## 2023-05-17 ENCOUNTER — TELEPHONE (OUTPATIENT)
Dept: GASTROENTEROLOGY | Facility: CLINIC | Age: 43
End: 2023-05-17

## 2023-05-17 NOTE — TELEPHONE ENCOUNTER
lmom confirming pt's colonoscopy scheduled on 5/23/23 at [facility] with Dr Bartholomew Fuelling  Informed [facility] would be calling the day prior with the arrival time  Informed of clear liquid diet 2 days prior as well as the bowel cleansing preparation  Informed pt to hold her Plavix 7 days prior to procedure  Informed would need a  the day of the procedure due to being under sedation  I asked pt to please call back if has not received instructions or if has any questions

## 2023-05-18 NOTE — TELEPHONE ENCOUNTER
I lmom informing pt that she is scheduled on Tue 5/23/23 and that [facility] would be calling on Monday with her time  I asked her to please call back if has further questions

## 2023-05-23 ENCOUNTER — ANESTHESIA (OUTPATIENT)
Dept: GASTROENTEROLOGY | Facility: HOSPITAL | Age: 43
End: 2023-05-23

## 2023-05-23 ENCOUNTER — HOSPITAL ENCOUNTER (OUTPATIENT)
Dept: GASTROENTEROLOGY | Facility: HOSPITAL | Age: 43
Setting detail: OUTPATIENT SURGERY
Discharge: HOME | End: 2023-05-23
Attending: INTERNAL MEDICINE

## 2023-05-23 ENCOUNTER — ANESTHESIA EVENT (OUTPATIENT)
Dept: GASTROENTEROLOGY | Facility: HOSPITAL | Age: 43
End: 2023-05-23

## 2023-05-23 VITALS
DIASTOLIC BLOOD PRESSURE: 69 MMHG | SYSTOLIC BLOOD PRESSURE: 116 MMHG | BODY MASS INDEX: 27.38 KG/M2 | WEIGHT: 148.8 LBS | RESPIRATION RATE: 20 BRPM | HEIGHT: 62 IN | TEMPERATURE: 97 F | OXYGEN SATURATION: 97 % | HEART RATE: 86 BPM

## 2023-05-23 DIAGNOSIS — K58.9 IRRITABLE BOWEL SYNDROME, UNSPECIFIED TYPE: ICD-10-CM

## 2023-05-23 DIAGNOSIS — K50.90 CROHN'S DISEASE WITHOUT COMPLICATION, UNSPECIFIED GASTROINTESTINAL TRACT LOCATION (HCC): ICD-10-CM

## 2023-05-23 DIAGNOSIS — Z53.8 PROCEDURE NOT CARRIED OUT FOR OTHER REASONS: ICD-10-CM

## 2023-05-23 LAB
EXT PREGNANCY TEST URINE: NEGATIVE
EXT. CONTROL: NORMAL

## 2023-05-23 RX ORDER — SODIUM CHLORIDE, SODIUM LACTATE, POTASSIUM CHLORIDE, CALCIUM CHLORIDE 600; 310; 30; 20 MG/100ML; MG/100ML; MG/100ML; MG/100ML
INJECTION, SOLUTION INTRAVENOUS CONTINUOUS PRN
Status: SHIPPED | OUTPATIENT
Start: 2023-05-23

## 2023-05-23 RX ORDER — CLOPIDOGREL BISULFATE 75 MG/1
75 TABLET ORAL DAILY
COMMUNITY

## 2023-05-23 RX ORDER — IPRATROPIUM BROMIDE AND ALBUTEROL SULFATE 2.5; .5 MG/3ML; MG/3ML
3 SOLUTION RESPIRATORY (INHALATION) ONCE
Status: COMPLETED | OUTPATIENT
Start: 2023-05-23 | End: 2023-05-23

## 2023-05-23 RX ADMIN — SODIUM CHLORIDE, SODIUM LACTATE, POTASSIUM CHLORIDE, AND CALCIUM CHLORIDE: .6; .31; .03; .02 INJECTION, SOLUTION INTRAVENOUS at 09:56

## 2023-05-23 RX ADMIN — IPRATROPIUM BROMIDE AND ALBUTEROL SULFATE 3 ML: .5; 2.5 SOLUTION RESPIRATORY (INHALATION) at 09:59

## 2023-05-23 NOTE — QUICK NOTE
Patient had [employer] while in bed in pre-op  She didn't want to wait till the end of the day after re-establishing proper NPO status  Will cancel today and reschedule          Dyan Coker MD  Anesthesiology

## 2023-05-23 NOTE — ANESTHESIA PREPROCEDURE EVALUATION
Procedure:  COLONOSCOPY    Relevant Problems   CARDIO   (+) Hypertension   (+) Intractable migraine with aura with status migrainosus   (+) Migraine without aura and with status migrainosus, not intractable      GI/HEPATIC   (+) Gastroesophageal reflux disease      MUSCULOSKELETAL   (+) RA (rheumatoid arthritis) (HCC)      NEURO/PSYCH   (+) Intractable migraine with aura with status migrainosus   (+) Migraine without aura and with status migrainosus, not intractable   (+) Seizures (HCC)      PULMONARY   (+) Smoking        Physical Exam    Airway    Mallampati score: II  TM Distance: >3 FB  Neck ROM: full     Dental   No notable dental hx     Cardiovascular  Rhythm: regular, Rate: normal,     Pulmonary  Breath sounds clear to auscultation,     Other Findings  Intercisor Distance > 3cm          Anesthesia Plan  ASA Score- 2     Anesthesia Type- IV sedation with anesthesia with ASA Monitors.         Additional Monitors:   Airway Plan:     Comment: NPO appropriate. Discussed benefits/risks of monitored anesthetic care which involves providing a dynamic level of mild to deep sedation. Complications include awareness and/or airway obstruction/aspiration which may necessitate conversion to general anesthesia. All questions answered. Patient understands and wishes to proceed. .       Plan Factors-Exercise tolerance (METS): >4 METS.    Chart reviewed. EKG reviewed. Existing labs reviewed.             Induction-     Postoperative Plan- Plan for postoperative opioid use.     Informed Consent- Anesthetic plan and risks discussed with patient.  I personally reviewed this patient with the CRNA. Discussed and agreed on the Anesthesia Plan with the CRNA..

## 2023-07-11 ENCOUNTER — TELEPHONE (OUTPATIENT)
Dept: GASTROENTEROLOGY | Facility: CLINIC | Age: 43
End: 2023-07-11

## 2023-07-11 NOTE — TELEPHONE ENCOUNTER
Attempted to submit prior authorization and neither the phone number or address on file match what insurance has.

## 2023-07-21 NOTE — TELEPHONE ENCOUNTER
Called patient at [phone]. Phone rang twice and than an automated message came on stating my call could not be completed at this time.

## 2023-12-08 NOTE — TELEPHONE ENCOUNTER
Airway  Date/Time: 12/8/2023 3:02 PM  Urgency: elective    Airway not difficult    General Information and Staff    Patient location during procedure: OR  Anesthesiologist: Natasha Jones MD  Performed: anesthesiologist   Performed by: Natasha Jones MD  Authorized by: Natasha Jones MD      Indications and Patient Condition  Indications for airway management: anesthesia  Sedation level: deep  Preoxygenated: yes  Patient position: sniffing  Mask difficulty assessment: 1 - vent by mask    Final Airway Details  Final airway type: endotracheal airway      Successful airway: ETT  Cuffed: yes   Successful intubation technique: direct laryngoscopy  Facilitating devices/methods: cricoid pressure and intubating stylet  Endotracheal tube insertion site: oral  Blade: Benjamin  Blade size: #3  ETT size (mm): 6.5    Cormack-Lehane Classification: grade I - full view of glottis  Placement verified by: capnometry   Measured from: lips  ETT to lips (cm): 21  Number of attempts at approach: 1 Can schedule a telephone visit for tomorrow

## 2023-12-12 NOTE — PROGRESS NOTES
Assessment and Plan:   Ms. Krystal Dominguez is a 78-year-old female with history significant for Crohn's disease diagnosed in 1996, psoriasis, fibromyalgia/chronic pain syndrome and possible peripheral spondyloarthropathy related to psoriasis/IBD who presents for a follow up. She is currently on Stelara injections every 6 weeks through her gastroenterologist, but it has been on hold since 6/2023. Rheumatic Summary:  1) Diagnosed with Crohn's disease in 1996 - started on Enbrel+mesalamine+6-MP+steroids x 8 years - d/c'ed due to inefficacy. - Managed with chronic steroids until 2014 then started on Humira till 8/2019 - d/c'ed due to decline in efficacy. - Started Stelara 2/2020-present - IBD stable. 2) ? Inflammatory arthritis (negative RF, borderline CCP of 20) - arthralgias since 2000 - no improvement with high doses steroids, Enbrel, Humira or Stelara. - 7/13/21: continue Stelara and add on MTX, prednisone 20 mg daily x 10 days. - 3/9/22: continue Stelara through GI. Add on injectable MTX.  - 11/9/22: continue Stelara through GI and increase MTX to 20 mg/wk. - 3/22/23: MTX self d/c'ed in 11/22 due to concerns for side effects, prefers to avoid IS use and only remain on Stelara per GI and gabapentin. - 12/13/23: been off Stelara since June as her GI left, advised to restart. 3) Fibromyalgia - currently gabapentin 600 mg QID - minimal improvement. - Previously been on amitriptyline and Dilaudid - no significant improvement. - 12/13/23: not on opioids, continue gabapentin 600 mg QID. 4) Psoriasis diagnosed at the age of 4.  11) ? Recurrent strokes based on an MRI brain 10/22 - positive LAC and aCL IgM. - 11/30/22: repeat labs in 3 months, refer to Hematology and continue Plavix. - 3/22/23: negative LAC on repeat check. Persistently positive low titer aCL IgM - following with Hematology, not diagnosed with APLAS.         # Inflammatory arthritis likely secondary to psoriasis vs IBD  - Holley Priest presents today for a follow up of diffuse arthralgias and myalgias which have been gradually progressive over the past 15 years +. Given the widespread and chronic complaints as well as a lack of benefit with prior medications including high doses of prednisone, etanercept and adalimumab (and now Stelara since February 2020), I had considered her diagnosis to be secondary to fibromyalgia. - Apart from the fibromyalgia I question if she may be presenting with a concomitant inflammatory arthritis secondary to the underlying psoriasis/inflammatory bowel disease given the focal joint involvement and evidence of fluctuating joint swelling noted. In view of this I started her on oral methotrexate but she self discontinued this due to concerns for potential side effects and would prefer to avoid additional immunosuppression except for what she is already receiving through gastroenterology for the Crohn's disease. Since she has been off the Stelara injections since June 2023 as her gastroenterologist left the network, I requested she contact them as soon as possible to restart this since it does help with her joint and bowel disease.     - For management of the chronic pain syndrome she will continue gabapentin 600 mg 4 times a day. She is no longer on opioids. Plan:  Diagnoses and all orders for this visit:    Inflammatory arthritis    Crohn's disease of colon without complication ([address])    Long-term use of immunosuppressant medication    Psoriasis    Fibromyalgia  -     gabapentin (NEURONTIN) 600 MG tablet; Take 1 tablet (600 mg total) by mouth 4 (four) times a day      Activities as tolerated. Exercise: try to maintain a low impact exercise regimen as much as possible. Walk for 30 minutes a day for at least 3 days a week. Continue other medications as prescribed by PCP and other specialists. RTC in 6 months.         HPI    INITIAL VISIT NOTE (4/2020):  Ms. Jaren Bonner is a 49-year-old female with history significant for Crohn's disease diagnosed in 1996, fibromyalgia, 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 (currently on oral bisphosphonate therapy) and psoriasis, who presents for further evaluation of diffuse joint pains. She is referred by Dr. Rosario Squires for a rheumatology consult. Patient reports she was initially evaluated by Gastroenterology in 1995 [facility] when she was diagnosed with Crohn's disease. She mentions at that time she was started on a combination of Enbrel (possibly for the joint pains), mesalamine and 6 mercaptopurine in association with steroids. She reports being on this regimen for approximately 8 years following which it was discontinued due to a decline in efficacy. She reports overall since her diagnosis in 1996 she has been on chronic intermittent doses of prednisone, with her last course about 1 month ago. She will typically start off at doses of 40 mg once daily and taper down, with the lower doses causing a flare-up of the colitis. Of note she mentions that even the higher doses of prednisone have never helped with her joint pains. She states following her initial regimen she was then switched to Humira which she was on for about 5 years, and again it was discontinued in August 2019 due to a decline in efficacy. She has since started Stelara on February 6, 2020 and has thus far completed the initial dosing. She is now on a maintenance dosing of 1 injection every 8 weeks. She has not yet noticed if the Stelara is helping with her joint pains. She mentions her last colonoscopy was done in December 2019 and this was consistent with active pancolitis. She follows with [facility]. She reports she is continuing to have loose stools with occasional mucus, but denies bloody diarrhea or abdominal pain. She was previously seen by Rheumatology in [address] [place] [address] and continued follow-up with them until she relocated to [place] approximately 7 years ago.   She states since then her medications have mostly been managed by her primary care doctor and Gastroenterology. She has previously been on gabapentin for a diagnosis of fibromyalgia, but states that this was recently discontinued and she was advised to follow up with pain management. She is receiving hydromorphone 4 mg twice daily from pain management at this time, and states that it only helps to a mild degree with her body pain. She reports her joint pains have been ongoing for the past 15 years and have been progressively worsening over time to the point that she is noticing them on a daily basis in a constant manner, including during her sleep at night. As mentioned she has previously been on gabapentin up to 600 mg 3 times daily and amitriptyline 100 mg at bedtime which would help her, but this regimen was discontinued by her primary care doctor and she was advised to follow-up with Rheumatology and Pain Management. She states that the joint pains affect her all over but mostly with her knees, ankles, hands and low back. She reports the low back pain will wake her up from sleep at night and is also associated with morning stiffness of up to 2 hours. The back pain worsens with activities and is also worsened at rest.  She also describes pain affecting her diffusely throughout her muscles. She has noticed occasional swelling affecting her fingers and knees. She experiences morning stiffness which affects her diffusely and takes about 3-4 hours to improve. She refrains from NSAID use due to her history of colitis. She reports as a result of being on chronic steroids she has developed osteopenia/osteoporosis. Her last DEXA scan was done approximately 1 year ago, and I do not have the records available for review. She does not take daily calcium supplements but does take vitamin-D replacement therapy. She is also on Fosamax once weekly. She reports a history of blurred vision but denies eye pain or redness.   She denies a history of inflammatory eye disease. She has been diagnosed with psoriasis at the age of 4 which will typically affect her arms and legs. She reports a family history of psoriasis in her father as well as multiple family members on her mother's side with inflammatory bowel disease. She denies current fevers, unintentional weight loss, mouth/nose ulcers, swollen glands, blood clots or Raynaud's. Labs in our system show a negative rheumatoid factor. 7/28/2020:  Patient presents for a follow-up today. We had to convert to a virtual visit as she had a temperature on arrival.  She did not have a chance to get the blood work and x-rays done following the last office visit, as she states that she did not receive a copy in the mail. She has now been on the [address] since February 2020 and does not feel like it is helping with the bowel disease as she continues to have multiple episodes of diarrhea and mucus in her stool. She has also not noticed any change in her joint pains, but states that it has helped with the psoriasis. I did start her on gabapentin at 300 mg 3 times daily at the last office visit for fibromyalgia, and she feels like this helped to a certain degree. She is continuing to experience pain mostly in her neck, upper back region/shoulders, elbows, hands and knees. She has noticed swelling of her hands, knees, ankles and feet. She does experience morning stiffness which affects her diffusely mostly in her back region and can persist throughout the day when it occurs. She reports usually 4 days out of the week will be severe for her and may be weather dependent. She is not taking any over-the-counter pain medications at this time. She did try to send me pictures of her hand swelling following the last visit, but unfortunately this got scanned in as black and white, and it is challenging to appreciate any abnormalities.   I did also receive her DEXA scan report that was done in October 2019 and appears to be normal for her age. 3/10/2021:  Patient presents for a follow-up today. I did review her labs done after the last office visit which showed a borderline elevated anti CCP antibody of 20. An ESR, CRP and hepatitis panel were unremarkable. At the last office visit we had discussed starting methotrexate, which she took for approximately 3 weeks but as it was causing gastrointestinal side effects she discontinued it. She thinks this may have been related to significant personal stressors as well and would like to give the methotrexate a retry. She is still on Stelara for the Crohn's disease through her gastroenterologist.  She is prescribed Dilaudid by her pain management specialist.  She has been taking the gabapentin 600 mg 4 times daily which does help. She reports generally during the cooler weather she feels better and her symptoms may flare in the summer. She is continuing to experience pain mostly in her neck, upper back region/shoulders, elbows, hands and knees. She has noticed swelling of her hands, knees, ankles and feet. She does experience morning stiffness which affects her diffusely mostly in her back region and can persist throughout the day when it occurs. 7/13/2021:  Patient presents for a follow up today. At the last office visit to address the possibility of an inflammatory arthritis secondary to the psoriasis/IBD I had discussed starting her on methotrexate 10 mg once weekly, but patient states she only took one dose and then discontinued it due to concerns for potential side effects. She is currently receiving Stelara injections every 3 months primarily for the IBD. She has been experiencing a flare up of joint pains over the past few weeks which is effecting her hands, low back, knees, ankles and feet.   She has noticed swelling of her hands (her primary care physician described this to her as "sausage digits", but this is not evident on her exam today), ankles and feet. She experiences morning stiffness primarily of her lower body that can take hours to improve. She is following with pain management and is currently on gabapentin 600 mg four times daily and oxycodone 5 mg three times daily as needed. The Dilaudid was discontinued. These medications are not helping with her pain. She denies psoriasis or a flare up of the inflammatory bowel disease. 3/9/2022:  Patient presents for a follow-up of possible inflammatory arthritis secondary to the psoriasis/IBD. At the last office visit I had discussed starting her on methotrexate but she did not do this due to concerns for potential side effects. She is only on Stelara injections every 6 weeks at this moment primarily for the inflammatory bowel disease. She reports her joint pains are again flaring and this primarily affect her hands, knees and ankles where she will also notice swelling. She experiences stiffness mostly in the night and to some degree in the morning but this improves with activities. She does take the gabapentin 600 mg 4 times a day and oxycodone 5 mg 3 times a day as needed. 11/9/2022:  Patient presents for a follow-up of an inflammatory arthritis secondary to psoriasis/inflammatory bowel disease. Currently she is on Stelara injections every 6 weeks and oral methotrexate 10 mg once weekly that was started approximately 4 months ago. I reviewed her recent ESR and CRP which were slightly elevated at 38 and 9.8, respectively. The last CBC and CMP done in August were unremarkable. Patient reports with the low dose of methotrexate she has not noticed any improvement in her symptoms yet. She is still experiencing chronic joint pains and swelling as discussed previously.         11/30/2022:  Patient presents for an acute visit today to review concerns for recurrent strokes due to an MRI of her brain from 10/26/22 showing 4 nonspecific white matter lesions, 3 of which are retrospectively stable from 2017. 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 There was no acute infarction noted. She had a carotid artery ultrasound done which was unremarkable. She is following with Neurology (Dr. Ladonna Bailey) and there are concerns that these lesions may be related to silent strokes. She had a thrombotic panel done which showed a positive lupus anticoagulant and elevated anticardiolipin IgM antibody of 33. She was advised to follow up with me to further evaluate for lupus and was started on Plavix by Neurology. 3/22/2023:  Patient presents for a follow-up of an inflammatory arthritis secondary to psoriasis/inflammatory bowel disease. She is currently on Stelara injections every 6 weeks and reports she discontinued the methotrexate after the last visit as she is concerned about potential side effects. She would prefer to remain off any additional immunosuppressants and manage the chronic pain with gabapentin 600 mg 4 times daily which does help to some degree. No recent steroids. I had also seen her in November due to concerns for recurrent strokes. Blood work done as a result of this showed a low titer anticardiolipin IgM antibody of 21. She is following with hematology and per their note there is insufficient criteria to diagnose her with antiphospholipid antibody syndrome and the plan is for continued monitoring. An TONIA by immunofluorescence, TONIA specificity, C3, C4 and remainder of the antiphospholipid antibody panel was unremarkable. 12/13/2023:  Patient presents for a follow-up of an inflammatory arthritis secondary to psoriasis/inflammatory bowel disease. She had been on Stelara injections every 6 weeks but has been off the medication for the past 6 months as her gastroenterologist left the network.   She reports with being off the Stelara she has noticed worsening of her joint pains such as affecting her hands and knees. She is on gabapentin 600 mg 4 times daily which does help to some degree and she feels it is worthwhile to continue it. No recent steroids. The following portions of the patient's history were reviewed and updated as appropriate: allergies, current medications, past family history, past medical history, past social history, past surgical history and problem list.      Review of Systems  Constitutional: Negative for weight change, fevers, chills, night sweats, fatigue. ENT/Mouth: Negative for hearing changes, ear pain, nasal congestion, sinus pain, hoarseness, sore throat, rhinorrhea, swallowing difficulty. Eyes: Negative for pain, redness, discharge, vision changes. Cardiovascular: Negative for chest pain, SOB, palpitations. Respiratory: Negative for cough, sputum, wheezing, dyspnea. Gastrointestinal: Negative for nausea, vomiting, diarrhea, constipation, pain, heartburn. Genitourinary: Negative for dysuria, urinary frequency, hematuria. Musculoskeletal: As per HPI. Skin: Negative for skin rash currently, color changes. Neuro: Negative for weakness, numbness, tingling, loss of consciousness. Psych: Negative for anxiety, depression. Heme/Lymph: Negative for easy bruising, bleeding, lymphadenopathy.         Past Medical History:   Diagnosis Date    Cervical cancer ([address])     Crohn disease ([address])     Endocarditis     Fibromyalgia     Gastritis     Hypertension     RA (rheumatoid arthritis) ([address])     TIA (transient ischemic attack)     Vertigo        Past Surgical History:   Procedure Laterality Date    APPENDECTOMY      BRONCHOSCOPY      multiple    CARDIAC CATHETERIZATION  01/11/2023    COLONOSCOPY         Social History     Socioeconomic History    Marital status: /Civil Union     Spouse name: Not on file    Number of children: Not on file    Years of education: Not on file    Highest education level: Not on file   Occupational History    Not on file   Tobacco Use Smoking status: Every Day     Current packs/day: 1.00     Types: Cigarettes    Smokeless tobacco: Never   Vaping Use    Vaping status: Never Used   Substance and Sexual Activity    Alcohol use: No    Drug use: Not Currently     Types: Marijuana    Sexual activity: Not Currently   Other Topics Concern    Not on file   Social History Narrative    Has a      Social Determinants of Health     Financial Resource Strain: Not on file   Food Insecurity: Not on file   Transportation Needs: Not on file   Physical Activity: Inactive (5/26/2021)    Exercise Vital Sign     Days of Exercise per Week: 0 days     Minutes of Exercise per Session: 0 min   Stress: Stress Concern Present (5/26/2021)    [facility]     Feeling of Stress : Very much   Social Connections: Not on file   Intimate Partner Violence: Not on file   Housing Stability: Not on file       Family History   Problem Relation Age of Onset    Colon cancer Brother 35    Crohn's disease Brother        Allergies   Allergen Reactions    Ketorolac Hives    Penicillins Hives    Ketorolac Tromethamine Rash       Current Outpatient Medications:     amitriptyline (ELAVIL) 100 mg tablet, Take 1 tablet (100 mg total) by mouth daily at bedtime, Disp: 100 tablet, Rfl: 0    clopidogrel (PLAVIX) 75 mg tablet, Take 75 mg by mouth daily, Disp: , Rfl:     divalproex sodium (DEPAKOTE) 250 mg EC tablet, Take 250 mg by mouth 2 (two) times a day  , Disp: , Rfl:     dronabinol (MARINOL) 5 MG capsule, take 1 capsule by mouth twice a day before meals, Disp: 200 capsule, Rfl: 1    escitalopram (LEXAPRO) 20 mg tablet, Take 20 mg by mouth daily, Disp: , Rfl:     gabapentin (NEURONTIN) 600 MG tablet, Take 1 tablet (600 mg total) by mouth 4 (four) times a day, Disp: 360 tablet, Rfl: 1    montelukast (SINGULAIR) 10 mg tablet, Take 10 mg by mouth daily at bedtime, Disp: , Rfl:     ondansetron (ZOFRAN) 4 mg tablet, Take 1 tablet (4 mg total) by mouth every 8 (eight) hours as needed for nausea or vomiting, Disp: 30 tablet, Rfl: 0    pantoprazole (PROTONIX) 40 mg tablet, Take 40 mg by mouth daily, Disp: , Rfl:     SUMAtriptan (IMITREX) 100 mg tablet, Take 1 tablet by mouth once as needed  , Disp: , Rfl:     ustekinumab (Stelara) 90 mg/mL subcutaneous injection, MAINTENANCE: INJECT 1 SYRINGE SUBCUTANEOUSLY EVERY 8 WEEKS. REFRIGERATE. DO NOT FREEZE., Disp: 2 mL, Rfl: 0    VITAMIN D PO, Take by mouth daily, Disp: , Rfl:     polyethylene glycol (GOLYTELY) 4000 mL solution, Take 4,000 mL by mouth once for 1 dose, Disp: 4000 mL, Rfl: 0    polyethylene glycol-electrolytes (NULYTELY) 4000 mL solution, Take 4,000 mL by mouth once for 1 dose, Disp: 4000 mL, Rfl: 0  No current facility-administered medications for this visit. Facility-Administered Medications Ordered in Other Visits:     lactated ringers infusion, , Intravenous, Continuous PRN, Eugune Chamber, CRNA, New Bag at 05/23/23 5997      Objective:    Vitals:    12/13/23 1118   BP: 122/74   Weight: 68 kg (150 lb)   Height: 5' 2" (1.575 m)       Physical Exam  General: Well appearing, well nourished, in no distress. Oriented x 3, normal mood and affect. Ambulating without difficulty. Skin: Good turgor, no rash, unusual bruising or prominent lesions. Hair: Normal texture and distribution. Nails: Normal color, no deformities. HEENT:  Head: Normocephalic, atraumatic. Eyes: Conjunctiva clear, sclera non-icteric, EOM intact. Extremities: No amputations or deformities, cyanosis, edema. Neurologic: Alert and oriented. No focal neurological deficits appreciated. Psychiatric: Normal mood and affect. Denisa Ro M.D.   Rheumatology

## 2023-12-13 ENCOUNTER — OFFICE VISIT (OUTPATIENT)
Dept: RHEUMATOLOGY | Facility: CLINIC | Age: 43
End: 2023-12-13
Payer: COMMERCIAL

## 2023-12-13 VITALS
BODY MASS INDEX: 27.6 KG/M2 | SYSTOLIC BLOOD PRESSURE: 122 MMHG | WEIGHT: 150 LBS | DIASTOLIC BLOOD PRESSURE: 74 MMHG | HEIGHT: 62 IN

## 2023-12-13 DIAGNOSIS — M79.7 FIBROMYALGIA: ICD-10-CM

## 2023-12-13 DIAGNOSIS — Z79.60 LONG-TERM USE OF IMMUNOSUPPRESSANT MEDICATION: ICD-10-CM

## 2023-12-13 DIAGNOSIS — M19.90 INFLAMMATORY ARTHRITIS: Primary | ICD-10-CM

## 2023-12-13 DIAGNOSIS — L40.9 PSORIASIS: ICD-10-CM

## 2023-12-13 DIAGNOSIS — K50.10 CROHN'S DISEASE OF COLON WITHOUT COMPLICATION (HCC): ICD-10-CM

## 2023-12-13 PROCEDURE — 99213 OFFICE O/P EST LOW 20 MIN: CPT | Performed by: INTERNAL MEDICINE

## 2023-12-13 RX ORDER — GABAPENTIN 600 MG/1
600 TABLET ORAL 4 TIMES DAILY
Qty: 360 TABLET | Refills: 1 | Status: SHIPPED | OUTPATIENT
Start: 2023-12-13

## 2023-12-19 ENCOUNTER — TELEPHONE (OUTPATIENT)
Dept: GASTROENTEROLOGY | Facility: CLINIC | Age: 43
End: 2023-12-19

## 2023-12-19 NOTE — TELEPHONE ENCOUNTER
New order created as other in chart not showing up to schedule. I lmom for pt to please call back to schedule a colonoscopy. Will call pt again if do not hear back from her.

## 2023-12-19 NOTE — TELEPHONE ENCOUNTER
----- Message from Yudith Subramanian sent at 12/19/2023  3:21 PM EST -----  Regarding: FW: Colonoscopy   Contact: [phone]    ----- Message -----  From: Martha Sandhu PA-C  Sent: 12/18/2023   4:16 PM EST  To: [facility] [address]  Clerical  Subject: FW: Colonoscopy                                  Please schedule colonoscopy with another provider since it looks like Dr. Gregg entered an order  ----- Message -----  From: Yane Riley  Sent: 12/18/2023   4:09 PM EST  To: [facility] [address] Provider  Subject: FW: Colonoscopy                                    ----- Message -----  From: Deb Farnsworth  Sent: 12/18/2023   2:53 PM EST  To: Gastroenterology Pod Clinical  Subject: Colonoscopy                                      Hello, I’m looking to reschedule my colonoscopy with Dr. Gregg, would you be able to assist me with this?    Deb

## 2024-01-04 ENCOUNTER — TELEPHONE (OUTPATIENT)
Age: 44
End: 2024-01-04

## 2024-01-04 DIAGNOSIS — K50.00 CROHN'S DISEASE OF SMALL INTESTINE WITHOUT COMPLICATION (HCC): Primary | ICD-10-CM

## 2024-01-04 RX ORDER — BISACODYL 5 MG/1
10 TABLET, DELAYED RELEASE ORAL ONCE
Qty: 2 TABLET | Refills: 0 | Status: SHIPPED | OUTPATIENT
Start: 2024-01-04 | End: 2024-01-04

## 2024-01-04 NOTE — TELEPHONE ENCOUNTER
01/04/24  Screened by: Catina Christensen    Referring Provider     Pre- Screening:     There is no height or weight on file to calculate BMI.  Has patient been referred for a routine screening Colonoscopy? yes  Is the patient between 45-75 years old? yes      Previous Colonoscopy yes   If yes:    Date: 4/26/22    Facility:     Reason:       SCHEDULING STAFF: If the patient is between 45yrs-49yrs, please advise patient to confirm benefits/coverage with their insurance company for a routine screening colonoscopy, some insurance carriers will only cover at 50yrs or older. If the patient is over 75years old, please schedule an office visit.     Does the patient want to see a Gastroenterologist prior to their procedure OR are they having any GI symptoms? no    Has the patient been hospitalized or had abdominal surgery in the past 6 months? no    Does the patient use supplemental oxygen? no    Does the patient take Coumadin, Lovenox, Plavix, Elliquis, Xarelto, or other blood thinning medication? no    Has the patient had a stroke, cardiac event, or stent placed in the past year? no    SCHEDULING STAFF: If patient answers NO to above questions, then schedule procedure. If patient answers YES to above questions, then schedule office appointment.     If patient is between 45yrs - 49yrs, please advise patient that we will have to confirm benefits & coverage with their insurance company for a routine screening colonoscopy.

## 2024-01-04 NOTE — TELEPHONE ENCOUNTER
Scheduled date of colonoscopy (as of today):2/7/24  Physician performing colonoscopy:Dr. Rojas  Location of colonoscopy:[facility]  Bowel prep reviewed with patient:ZAYRA/DUL SENT TO [facility]  Instructions reviewed with patient by:SAMUEL  Clearances:       PT STATED THAT SHE DOES THE HIRAM/DUL PREP BUT THEY USUALLY CALL HER IN SOMETHING AS WELL      Pt will call back with    Protopic Counseling: Patient may experience a mild burning sensation during topical application. Protopic is not approved in children less than 3years of age. There have been case reports of hematologic and skin malignancies in patients using topical calcineurin inhibitors although causality is questionable. Use Enhanced Medication Counseling?: No Erythromycin Counseling:  I discussed with the patient the risks of erythromycin including but not limited to GI upset, allergic reaction, drug rash, diarrhea, increase in liver enzymes, and yeast infections. Ivermectin Pregnancy And Lactation Text: This medication is Pregnancy Category C and it isn't known if it is safe during pregnancy. It is also excreted in breast milk. Hydroquinone Pregnancy And Lactation Text: This medication has not been assigned a Pregnancy Risk Category but animal studies failed to show danger with the topical medication. It is unknown if the medication is excreted in breast milk. SSKI Counseling:  I discussed with the patient the risks of SSKI including but not limited to thyroid abnormalities, metallic taste, GI upset, fever, headache, acne, arthralgias, paraesthesias, lymphadenopathy, easy bleeding, arrhythmias, and allergic reaction. Quinolones Pregnancy And Lactation Text: This medication is Pregnancy Category C and it isn't know if it is safe during pregnancy. It is also excreted in breast milk. Taltz Pregnancy And Lactation Text: The risk during pregnancy and breastfeeding is uncertain with this medication. Dapsone Counseling: I discussed with the patient the risks of dapsone including but not limited to hemolytic anemia, agranulocytosis, rashes, methemoglobinemia, kidney failure, peripheral neuropathy, headaches, GI upset, and liver toxicity. Patients who start dapsone require monitoring including baseline LFTs and weekly CBCs for the first month, then every month thereafter. The patient verbalized understanding of the proper use and possible adverse effects of dapsone. All of the patient's questions and concerns were addressed. Nsaids Pregnancy And Lactation Text: These medications are considered safe up to 30 weeks gestation. It is excreted in breast milk. Doxycycline Pregnancy And Lactation Text: This medication is Pregnancy Category D and not consider safe during pregnancy. It is also excreted in breast milk but is considered safe for shorter treatment courses. Zyclara Pregnancy And Lactation Text: This medication is Pregnancy Category C. It is unknown if this medication is excreted in breast milk. Ilumya Counseling: I discussed with the patient the risks of tildrakizumab including but not limited to immunosuppression, malignancy, posterior leukoencephalopathy syndrome, and serious infections. The patient understands that monitoring is required including a PPD at baseline and must alert us or the primary physician if symptoms of infection or other concerning signs are noted. Acitretin Counseling:  I discussed with the patient the risks of acitretin including but not limited to hair loss, dry lips/skin/eyes, liver damage, hyperlipidemia, depression/suicidal ideation, photosensitivity. Serious rare side effects can include but are not limited to pancreatitis, pseudotumor cerebri, bony changes, clot formation/stroke/heart attack. Patient understands that alcohol is contraindicated since it can result in liver toxicity and significantly prolong the elimination of the drug by many years. Ivermectin Counseling:  Patient instructed to take medication on an empty stomach with a full glass of water. Patient informed of potential adverse effects including but not limited to nausea, diarrhea, dizziness, itching, and swelling of the extremities or lymph nodes. The patient verbalized understanding of the proper use and possible adverse effects of ivermectin. All of the patient's questions and concerns were addressed. Gabapentin Counseling: I discussed with the patient the risks of gabapentin including but not limited to dizziness, somnolence, fatigue and ataxia. Birth Control Pills Counseling: Birth Control Pill Counseling: I discussed with the patient the potential side effects of OCPs including but not limited to increased risk of stroke, heart attack, thrombophlebitis, deep venous thrombosis, hepatic adenomas, breast changes, GI upset, headaches, and depression. The patient verbalized understanding of the proper use and possible adverse effects of OCPs. All of the patient's questions and concerns were addressed. Ketoconazole Counseling:   Patient counseled regarding improving absorption with orange juice. Adverse effects include but are not limited to breast enlargement, headache, diarrhea, nausea, upset stomach, liver function test abnormalities, taste disturbance, and stomach pain. There is a rare possibility of liver failure that can occur when taking ketoconazole. The patient understands that monitoring of LFTs may be required, especially at baseline. The patient verbalized understanding of the proper use and possible adverse effects of ketoconazole. All of the patient's questions and concerns were addressed. Cosentyx Pregnancy And Lactation Text: This medication is Pregnancy Category B and is considered safe during pregnancy. It is unknown if this medication is excreted in breast milk. Cyclosporine Counseling:  I discussed with the patient the risks of cyclosporine including but not limited to hypertension, gingival hyperplasia,myelosuppression, immunosuppression, liver damage, kidney damage, neurotoxicity, lymphoma, and serious infections. The patient understands that monitoring is required including baseline blood pressure, CBC, CMP, lipid panel and uric acid, and then 1-2 times monthly CMP and blood pressure. Drysol Counseling:  I discussed with the patient the risks of drysol/aluminum chloride including but not limited to skin rash, itching, irritation, burning. Azathioprine Counseling:  I discussed with the patient the risks of azathioprine including but not limited to myelosuppression, immunosuppression, hepatotoxicity, lymphoma, and infections. The patient understands that monitoring is required including baseline LFTs, Creatinine, possible TPMP genotyping and weekly CBCs for the first month and then every 2 weeks thereafter. The patient verbalized understanding of the proper use and possible adverse effects of azathioprine. All of the patient's questions and concerns were addressed. Gabapentin Pregnancy And Lactation Text: This medication is Pregnancy Category C and isn't considered safe during pregnancy. It is excreted in breast milk. Dupixent Counseling: I discussed with the patient the risks of dupilumab including but not limited to eye infection and irritation, cold sores, injection site reactions, worsening of asthma, allergic reactions and increased risk of parasitic infection. Live vaccines should be avoided while taking dupilumab. Dupilumab will also interact with certain medications such as warfarin and cyclosporine. The patient understands that monitoring is required and they must alert us or the primary physician if symptoms of infection or other concerning signs are noted. Ketoconazole Pregnancy And Lactation Text: This medication is Pregnancy Category C and it isn't know if it is safe during pregnancy. It is also excreted in breast milk and breast feeding isn't recommended. Drysol Pregnancy And Lactation Text: This medication is considered safe during pregnancy and breast feeding. Tremfya Counseling: I discussed with the patient the risks of guselkumab including but not limited to immunosuppression, serious infections, and drug reactions. The patient understands that monitoring is required including a PPD at baseline and must alert us or the primary physician if symptoms of infection or other concerning signs are noted. Protopic Pregnancy And Lactation Text: This medication is Pregnancy Category C. It is unknown if this medication is excreted in breast milk when applied topically. Fluconazole Counseling:  Patient counseled regarding adverse effects of fluconazole including but not limited to headache, diarrhea, nausea, upset stomach, liver function test abnormalities, taste disturbance, and stomach pain. There is a rare possibility of liver failure that can occur when taking fluconazole. The patient understands that monitoring of LFTs and kidney function test may be required, especially at baseline. The patient verbalized understanding of the proper use and possible adverse effects of fluconazole. All of the patient's questions and concerns were addressed. Benzoyl Peroxide Counseling: Patient counseled that medicine may cause skin irritation and bleach clothing. In the event of skin irritation, the patient was advised to reduce the amount of the drug applied or use it less frequently. The patient verbalized understanding of the proper use and possible adverse effects of benzoyl peroxide. All of the patient's questions and concerns were addressed. Bactrim Pregnancy And Lactation Text: This medication is Pregnancy Category D and is known to cause fetal risk. It is also excreted in breast milk. Acitretin Pregnancy And Lactation Text: This medication is Pregnancy Category X and should not be given to women who are pregnant or may become pregnant in the future. This medication is excreted in breast milk. Birth Control Pills Pregnancy And Lactation Text: This medication should be avoided if pregnant and for the first 30 days post-partum. Simponi Counseling:  I discussed with the patient the risks of golimumab including but not limited to myelosuppression, immunosuppression, autoimmune hepatitis, demyelinating diseases, lymphoma, and serious infections. The patient understands that monitoring is required including a PPD at baseline and must alert us or the primary physician if symptoms of infection or other concerning signs are noted. Cyclosporine Pregnancy And Lactation Text: This medication is Pregnancy Category C and it isn't know if it is safe during pregnancy. This medication is excreted in breast milk. Odomzo Counseling- I discussed with the patient the risks of Odomzo including but not limited to nausea, vomiting, diarrhea, constipation, weight loss, changes in the sense of taste, decreased appetite, muscle spasms, and hair loss. The patient verbalized understanding of the proper use and possible adverse effects of Odomzo. All of the patient's questions and concerns were addressed. Tazorac Pregnancy And Lactation Text: This medication is not safe during pregnancy. It is unknown if this medication is excreted in breast milk. Dupixent Pregnancy And Lactation Text: This medication likely crosses the placenta but the risk for the fetus is uncertain. This medication is excreted in breast milk. Methotrexate Counseling:  Patient counseled regarding adverse effects of methotrexate including but not limited to nausea, vomiting, abnormalities in liver function tests. Patients may develop mouth sores, rash, diarrhea, and abnormalities in blood counts. The patient understands that monitoring is required including LFT's and blood counts. There is a rare possibility of scarring of the liver and lung problems that can occur when taking methotrexate. Persistent nausea, loss of appetite, pale stools, dark urine, cough, and shortness of breath should be reported immediately. Patient advised to discontinue methotrexate treatment at least three months before attempting to become pregnant. I discussed the need for folate supplements while taking methotrexate. These supplements can decrease side effects during methotrexate treatment. The patient verbalized understanding of the proper use and possible adverse effects of methotrexate. All of the patient's questions and concerns were addressed. Odomzo Pregnancy And Lactation Text: This medication is Pregnancy Category X and is absolutely contraindicated during pregnancy. It is unknown if it is excreted in breast milk. Azathioprine Pregnancy And Lactation Text: This medication is Pregnancy Category D and isn't considered safe during pregnancy. It is unknown if this medication is excreted in breast milk. Glycopyrrolate Counseling:  I discussed with the patient the risks of glycopyrrolate including but not limited to skin rash, drowsiness, dry mouth, difficulty urinating, and blurred vision. Thalidomide Counseling: I discussed with the patient the risks of thalidomide including but not limited to birth defects, anxiety, weakness, chest pain, dizziness, cough and severe allergy. Terbinafine Counseling: Patient counseling regarding adverse effects of terbinafine including but not limited to headache, diarrhea, rash, upset stomach, liver function test abnormalities, itching, taste/smell disturbance, nausea, abdominal pain, and flatulence. There is a rare possibility of liver failure that can occur when taking terbinafine. The patient understands that a baseline LFT and kidney function test may be required. The patient verbalized understanding of the proper use and possible adverse effects of terbinafine. All of the patient's questions and concerns were addressed. Dapsone Pregnancy And Lactation Text: This medication is Pregnancy Category C and is not considered safe during pregnancy or breast feeding. Sski Pregnancy And Lactation Text: This medication is Pregnancy Category D and isn't considered safe during pregnancy. It is excreted in breast milk. Cimetidine Counseling:  I discussed with the patient the risks of Cimetidine including but not limited to gynecomastia, headache, diarrhea, nausea, drowsiness, arrhythmias, pancreatitis, skin rashes, psychosis, bone marrow suppression and kidney toxicity. Arava Counseling:  Patient counseled regarding adverse effects of Arava including but not limited to nausea, vomiting, abnormalities in liver function tests. Patients may develop mouth sores, rash, diarrhea, and abnormalities in blood counts. The patient understands that monitoring is required including LFTs and blood counts. There is a rare possibility of scarring of the liver and lung problems that can occur when taking methotrexate. Persistent nausea, loss of appetite, pale stools, dark urine, cough, and shortness of breath should be reported immediately. Patient advised to discontinue Arava treatment and consult with a physician prior to attempting conception. The patient will have to undergo a treatment to eliminate Arava from the body prior to conception. Erythromycin Pregnancy And Lactation Text: This medication is Pregnancy Category B and is considered safe during pregnancy. It is also excreted in breast milk. Imiquimod Counseling:  I discussed with the patient the risks of imiquimod including but not limited to erythema, scaling, itching, weeping, crusting, and pain. Patient understands that the inflammatory response to imiquimod is variable from person to person and was educated regarded proper titration schedule. If flu-like symptoms develop, patient knows to discontinue the medication and contact us. Rifampin Counseling: I discussed with the patient the risks of rifampin including but not limited to liver damage, kidney damage, red-orange body fluids, nausea/vomiting and severe allergy. Bexarotene Counseling:  I discussed with the patient the risks of bexarotene including but not limited to hair loss, dry lips/skin/eyes, liver abnormalities, hyperlipidemia, pancreatitis, depression/suicidal ideation, photosensitivity, drug rash/allergic reactions, hypothyroidism, anemia, leukopenia, infection, cataracts, and teratogenicity. Patient understands that they will need regular blood tests to check lipid profile, liver function tests, white blood cell count, thyroid function tests and pregnancy test if applicable. Topical Clindamycin Counseling: Patient counseled that this medication may cause skin irritation or allergic reactions. In the event of skin irritation, the patient was advised to reduce the amount of the drug applied or use it less frequently. The patient verbalized understanding of the proper use and possible adverse effects of clindamycin. All of the patient's questions and concerns were addressed. Cephalexin Counseling: I counseled the patient regarding use of cephalexin as an antibiotic for prophylactic and/or therapeutic purposes. Cephalexin (commonly prescribed under brand name Keflex) is a cephalosporin antibiotic which is active against numerous classes of bacteria, including most skin bacteria. Side effects may include nausea, diarrhea, gastrointestinal upset, rash, hives, yeast infections, and in rare cases, hepatitis, kidney disease, seizures, fever, confusion, neurologic symptoms, and others. Patients with severe allergies to penicillin medications are cautioned that there is about a 10% incidence of cross-reactivity with cephalosporins. When possible, patients with penicillin allergies should use alternatives to cephalosporins for antibiotic therapy. Infliximab Counseling:  I discussed with the patient the risks of infliximab including but not limited to myelosuppression, immunosuppression, autoimmune hepatitis, demyelinating diseases, lymphoma, and serious infections. The patient understands that monitoring is required including a PPD at baseline and must alert us or the primary physician if symptoms of infection or other concerning signs are noted. Glycopyrrolate Pregnancy And Lactation Text: This medication is Pregnancy Category B and is considered safe during pregnancy. It is unknown if it is excreted breast milk. Topical Clindamycin Pregnancy And Lactation Text: This medication is Pregnancy Category B and is considered safe during pregnancy. It is unknown if it is excreted in breast milk. Terbinafine Pregnancy And Lactation Text: This medication is Pregnancy Category B and is considered safe during pregnancy. It is also excreted in breast milk and breast feeding isn't recommended. Methotrexate Pregnancy And Lactation Text: This medication is Pregnancy Category X and is known to cause fetal harm. This medication is excreted in breast milk. Elidel Counseling: Patient may experience a mild burning sensation during topical application. Elidel is not approved in children less than 3years of age. There have been case reports of hematologic and skin malignancies in patients using topical calcineurin inhibitors although causality is questionable. Enbrel Counseling:  I discussed with the patient the risks of etanercept including but not limited to myelosuppression, immunosuppression, autoimmune hepatitis, demyelinating diseases, lymphoma, and infections. The patient understands that monitoring is required including a PPD at baseline and must alert us or the primary physician if symptoms of infection or other concerning signs are noted. Solaraze Pregnancy And Lactation Text: This medication is Pregnancy Category B and is considered safe. There is some data to suggest avoiding during the third trimester. It is unknown if this medication is excreted in breast milk. Griseofulvin Counseling:  I discussed with the patient the risks of griseofulvin including but not limited to photosensitivity, cytopenia, liver damage, nausea/vomiting and severe allergy. The patient understands that this medication is best absorbed when taken with a fatty meal (e.g., ice cream or french fries). Cellcept Counseling:  I discussed with the patient the risks of mycophenolate mofetil including but not limited to infection/immunosuppression, GI upset, hypokalemia, hypercholesterolemia, bone marrow suppression, lymphoproliferative disorders, malignancy, GI ulceration/bleed/perforation, colitis, interstitial lung disease, kidney failure, progressive multifocal leukoencephalopathy, and birth defects. The patient understands that monitoring is required including a baseline creatinine and regular CBC testing. In addition, patient must alert us immediately if symptoms of infection or other concerning signs are noted. Otezla Counseling: Canda Ends Counseling: The side effects of Canda Ends were discussed with the patient, including but not limited to worsening or new depression, weight loss, diarrhea, nausea, upper respiratory tract infection, and headache. Patient instructed to call the office should any adverse effect occur. The patient verbalized understanding of the proper use and possible adverse effects of Otezla. All the patient's questions and concerns were addressed. Carac Counseling:  I discussed with the patient the risks of Carac including but not limited to erythema, scaling, itching, weeping, crusting, and pain. Benzoyl Peroxide Pregnancy And Lactation Text: This medication is Pregnancy Category C. It is unknown if benzoyl peroxide is excreted in breast milk. Solaraze Counseling:  I discussed with the patient the risks of Solaraze including but not limited to erythema, scaling, itching, weeping, crusting, and pain. Skyrizi Counseling: I discussed with the patient the risks of risankizumab-rzaa including but not limited to immunosuppression, and serious infections. The patient understands that monitoring is required including a PPD at baseline and must alert us or the primary physician if symptoms of infection or other concerning signs are noted. Cephalexin Pregnancy And Lactation Text: This medication is Pregnancy Category B and considered safe during pregnancy. It is also excreted in breast milk but can be used safely for shorter doses. Metronidazole Counseling:  I discussed with the patient the risks of metronidazole including but not limited to seizures, nausea/vomiting, a metallic taste in the mouth, nausea/vomiting and severe allergy. Bexarotene Pregnancy And Lactation Text: This medication is Pregnancy Category X and should not be given to women who are pregnant or may become pregnant. This medication should not be used if you are breast feeding. Rifampin Pregnancy And Lactation Text: This medication is Pregnancy Category C and it isn't know if it is safe during pregnancy. It is also excreted in breast milk and should not be used if you are breast feeding. Otezla Pregnancy And Lactation Text: This medication is Pregnancy Category C and it isn't known if it is safe during pregnancy. It is unknown if it is excreted in breast milk. Rituxan Counseling:  I discussed with the patient the risks of Rituxan infusions. Side effects can include infusion reactions, severe drug rashes including mucocutaneous reactions, reactivation of latent hepatitis and other infections and rarely progressive multifocal leukoencephalopathy. All of the patient's questions and concerns were addressed. Isotretinoin Counseling: Patient should get monthly blood tests, not donate blood, not drive at night if vision affected, not share medication, and not undergo elective surgery for 6 months after tx completed. Side effects reviewed, pt to contact office should one occur. Topical Sulfur Applications Counseling: Topical Sulfur Counseling: Patient counseled that this medication may cause skin irritation or allergic reactions. In the event of skin irritation, the patient was advised to reduce the amount of the drug applied or use it less frequently. The patient verbalized understanding of the proper use and possible adverse effects of topical sulfur application. All of the patient's questions and concerns were addressed. Hydroxychloroquine Counseling:  I discussed with the patient that a baseline ophthalmologic exam is needed at the start of therapy and every year thereafter while on therapy. A CBC may also be warranted for monitoring. The side effects of this medication were discussed with the patient, including but not limited to agranulocytosis, aplastic anemia, seizures, rashes, retinopathy, and liver toxicity. Patient instructed to call the office should any adverse effect occur. The patient verbalized understanding of the proper use and possible adverse effects of Plaquenil. All the patient's questions and concerns were addressed. Prednisone Counseling:  I discussed with the patient the risks of prolonged use of prednisone including but not limited to weight gain, insomnia, osteoporosis, mood changes, diabetes, susceptibility to infection, glaucoma and high blood pressure. In cases where prednisone use is prolonged, patients should be monitored with blood pressure checks, serum glucose levels and an eye exam.  Additionally, the patient may need to be placed on GI prophylaxis, PCP prophylaxis, and calcium and vitamin D supplementation and/or a bisphosphonate. The patient verbalized understanding of the proper use and the possible adverse effects of prednisone. All of the patient's questions and concerns were addressed. Minoxidil Counseling: Minoxidil is a topical medication which can increase blood flow where it is applied. It is uncertain how this medication increases hair growth. Side effects are uncommon and include stinging and allergic reactions. Doxepin Counseling:  Patient advised that the medication is sedating and not to drive a car after taking this medication. Patient informed of potential adverse effects including but not limited to dry mouth, urinary retention, and blurry vision. The patient verbalized understanding of the proper use and possible adverse effects of doxepin. All of the patient's questions and concerns were addressed. Sarecycline Counseling: Patient advised regarding possible photosensitivity and discoloration of the teeth, skin, lips, tongue and gums. Patient instructed to avoid sunlight, if possible. When exposed to sunlight, patients should wear protective clothing, sunglasses, and sunscreen. The patient was instructed to call the office immediately if the following severe adverse effects occur:  hearing changes, easy bruising/bleeding, severe headache, or vision changes. The patient verbalized understanding of the proper use and possible adverse effects of sarecycline. All of the patient's questions and concerns were addressed. Saleem Counseling: Power Smimamie Counseling: I discussed with the patient the risks of Power Smiles therapy including increased risk of infection, liver issues, headache, diarrhea, or cold symptoms. Live vaccines should be avoided. They were instructed to call if they have any problems. Clindamycin Counseling: I counseled the patient regarding use of clindamycin as an antibiotic for prophylactic and/or therapeutic purposes. Clindamycin is active against numerous classes of bacteria, including skin bacteria. Side effects may include nausea, diarrhea, gastrointestinal upset, rash, hives, yeast infections, and in rare cases, colitis. Metronidazole Pregnancy And Lactation Text: This medication is Pregnancy Category B and considered safe during pregnancy. It is also excreted in breast milk. Clofazimine Counseling:  I discussed with the patient the risks of clofazimine including but not limited to skin and eye pigmentation, liver damage, nausea/vomiting, gastrointestinal bleeding and allergy. Clindamycin Pregnancy And Lactation Text: This medication can be used in pregnancy if certain situations. Clindamycin is also present in breast milk. Topical Sulfur Applications Pregnancy And Lactation Text: This medication is Pregnancy Category C and has an unknown safety profile during pregnancy. It is unknown if this topical medication is excreted in breast milk. Albendazole Counseling:  I discussed with the patient the risks of albendazole including but not limited to cytopenia, kidney damage, nausea/vomiting and severe allergy. The patient understands that this medication is being used in an off-label manner. Eucrisa Counseling: Patient may experience a mild burning sensation during topical application. Medardo Shan is not approved in children less than 3years of age. Stelara Counseling:  I discussed with the patient the risks of ustekinumab including but not limited to immunosuppression, malignancy, posterior leukoencephalopathy syndrome, and serious infections. The patient understands that monitoring is required including a PPD at baseline and must alert us or the primary physician if symptoms of infection or other concerning signs are noted. Minocycline Counseling: Patient advised regarding possible photosensitivity and discoloration of the teeth, skin, lips, tongue and gums. Patient instructed to avoid sunlight, if possible. When exposed to sunlight, patients should wear protective clothing, sunglasses, and sunscreen. The patient was instructed to call the office immediately if the following severe adverse effects occur:  hearing changes, easy bruising/bleeding, severe headache, or vision changes. The patient verbalized understanding of the proper use and possible adverse effects of minocycline. All of the patient's questions and concerns were addressed. Azithromycin Counseling:  I discussed with the patient the risks of azithromycin including but not limited to GI upset, allergic reaction, drug rash, diarrhea, and yeast infections. Oxybutynin Counseling:  I discussed with the patient the risks of oxybutynin including but not limited to skin rash, drowsiness, dry mouth, difficulty urinating, and blurred vision. Rituxan Pregnancy And Lactation Text: This medication is Pregnancy Category C and it isn't know if it is safe during pregnancy. It is unknown if this medication is excreted in breast milk but similar antibodies are known to be excreted. Topical Retinoid counseling:  Patient advised to apply a pea-sized amount only at bedtime and wait 30 minutes after washing their face before applying. If too drying, patient may add a non-comedogenic moisturizer. The patient verbalized understanding of the proper use and possible adverse effects of retinoids. All of the patient's questions and concerns were addressed. Griseofulvin Pregnancy And Lactation Text: This medication is Pregnancy Category X and is known to cause serious birth defects. It is unknown if this medication is excreted in breast milk but breast feeding should be avoided. Cimzia Counseling:  I discussed with the patient the risks of Cimzia including but not limited to immunosuppression, allergic reactions and infections. The patient understands that monitoring is required including a PPD at baseline and must alert us or the primary physician if symptoms of infection or other concerning signs are noted. Carac Pregnancy And Lactation Text: This medication is Pregnancy Category X and contraindicated in pregnancy and in women who may become pregnant. It is unknown if this medication is excreted in breast milk. Doxepin Pregnancy And Lactation Text: This medication is Pregnancy Category C and it isn't known if it is safe during pregnancy. It is also excreted in breast milk and breast feeding isn't recommended. Valtrex Counseling: I discussed with the patient the risks of valacyclovir including but not limited to kidney damage, nausea, vomiting and severe allergy. The patient understands that if the infection seems to be worsening or is not improving, they are to call. Sarecycline Pregnancy And Lactation Text: This medication is Pregnancy Category D and not consider safe during pregnancy. It is also excreted in breast milk. Xelcharliez Pregnancy And Lactation Text: This medication is Pregnancy Category D and is not considered safe during pregnancy. The risk during breast feeding is also uncertain. Detail Level: Detailed Isotretinoin Pregnancy And Lactation Text: This medication is Pregnancy Category X and is considered extremely dangerous during pregnancy. It is unknown if it is excreted in breast milk. Colchicine Counseling:  Patient counseled regarding adverse effects including but not limited to stomach upset (nausea, vomiting, stomach pain, or diarrhea). Patient instructed to limit alcohol consumption while taking this medication. Colchicine may reduce blood counts especially with prolonged use. The patient understands that monitoring of kidney function and blood counts may be required, especially at baseline. The patient verbalized understanding of the proper use and possible adverse effects of colchicine. All of the patient's questions and concerns were addressed. Valtrex Pregnancy And Lactation Text: this medication is Pregnancy Category B and is considered safe during pregnancy. This medication is not directly found in breast milk but it's metabolite acyclovir is present. Picato Counseling:  I discussed with the patient the risks of Picato including but not limited to erythema, scaling, itching, weeping, crusting, and pain. High Dose Vitamin A Counseling: Side effects reviewed, pt to contact office should one occur. Tetracycline Counseling: Patient counseled regarding possible photosensitivity and increased risk for sunburn. Patient instructed to avoid sunlight, if possible. When exposed to sunlight, patients should wear protective clothing, sunglasses, and sunscreen. The patient was instructed to call the office immediately if the following severe adverse effects occur:  hearing changes, easy bruising/bleeding, severe headache, or vision changes. The patient verbalized understanding of the proper use and possible adverse effects of tetracycline. All of the patient's questions and concerns were addressed. Patient understands to avoid pregnancy while on therapy due to potential birth defects. Siliq Counseling:  I discussed with the patient the risks of Siliq including but not limited to new or worsening depression, suicidal thoughts and behavior, immunosuppression, malignancy, posterior leukoencephalopathy syndrome, and serious infections. The patient understands that monitoring is required including a PPD at baseline and must alert us or the primary physician if symptoms of infection or other concerning signs are noted. There is also a special program designed to monitor depression which is required with Siliq. Zyclara Counseling:  I discussed with the patient the risks of imiquimod including but not limited to erythema, scaling, itching, weeping, crusting, and pain. Patient understands that the inflammatory response to imiquimod is variable from person to person and was educated regarded proper titration schedule. If flu-like symptoms develop, patient knows to discontinue the medication and contact us. Doxycycline Counseling:  Patient counseled regarding possible photosensitivity and increased risk for sunburn. Patient instructed to avoid sunlight, if possible. When exposed to sunlight, patients should wear protective clothing, sunglasses, and sunscreen. The patient was instructed to call the office immediately if the following severe adverse effects occur:  hearing changes, easy bruising/bleeding, severe headache, or vision changes. The patient verbalized understanding of the proper use and possible adverse effects of doxycycline. All of the patient's questions and concerns were addressed. Cyclophosphamide Counseling:  I discussed with the patient the risks of cyclophosphamide including but not limited to hair loss, hormonal abnormalities, decreased fertility, abdominal pain, diarrhea, nausea and vomiting, bone marrow suppression and infection. The patient understands that monitoring is required while taking this medication. Hydroxychloroquine Pregnancy And Lactation Text: This medication has been shown to cause fetal harm but it isn't assigned a Pregnancy Risk Category. There are small amounts excreted in breast milk. Humira Counseling:  I discussed with the patient the risks of adalimumab including but not limited to myelosuppression, immunosuppression, autoimmune hepatitis, demyelinating diseases, lymphoma, and serious infections. The patient understands that monitoring is required including a PPD at baseline and must alert us or the primary physician if symptoms of infection or other concerning signs are noted. Erivedge Counseling- I discussed with the patient the risks of Erivedge including but not limited to nausea, vomiting, diarrhea, constipation, weight loss, changes in the sense of taste, decreased appetite, muscle spasms, and hair loss. The patient verbalized understanding of the proper use and possible adverse effects of Erivedge. All of the patient's questions and concerns were addressed. Xolair Counseling:  Patient informed of potential adverse effects including but not limited to fever, muscle aches, rash and allergic reactions. The patient verbalized understanding of the proper use and possible adverse effects of Xolair. All of the patient's questions and concerns were addressed. Cimzia Pregnancy And Lactation Text: This medication crosses the placenta but can be considered safe in certain situations. Cimzia may be excreted in breast milk. 5-Fu Counseling: 5-Fluorouracil Counseling:  I discussed with the patient the risks of 5-fluorouracil including but not limited to erythema, scaling, itching, weeping, crusting, and pain. Hydroxyzine Counseling: Patient advised that the medication is sedating and not to drive a car after taking this medication. Patient informed of potential adverse effects including but not limited to dry mouth, urinary retention, and blurry vision. The patient verbalized understanding of the proper use and possible adverse effects of hydroxyzine. All of the patient's questions and concerns were addressed. Spironolactone Counseling: Patient advised regarding risks of diarrhea, abdominal pain, hyperkalemia, birth defects (for female patients), liver toxicity and renal toxicity. The patient may need blood work to monitor liver and kidney function and potassium levels while on therapy. The patient verbalized understanding of the proper use and possible adverse effects of spironolactone. All of the patient's questions and concerns were addressed. Itraconazole Counseling:  I discussed with the patient the risks of itraconazole including but not limited to liver damage, nausea/vomiting, neuropathy, and severe allergy. The patient understands that this medication is best absorbed when taken with acidic beverages such as non-diet cola or ginger ale. The patient understands that monitoring is required including baseline LFTs and repeat LFTs at intervals. The patient understands that they are to contact us or the primary physician if concerning signs are noted. Hydroquinone Counseling:  Patient advised that medication may result in skin irritation, lightening (hypopigmentation), dryness, and burning. In the event of skin irritation, the patient was advised to reduce the amount of the drug applied or use it less frequently. Rarely, spots that are treated with hydroquinone can become darker (pseudoochronosis). Should this occur, patient instructed to stop medication and call the office. The patient verbalized understanding of the proper use and possible adverse effects of hydroquinone. All of the patient's questions and concerns were addressed. Spironolactone Pregnancy And Lactation Text: This medication can cause feminization of the male fetus and should be avoided during pregnancy. The active metabolite is also found in breast milk. Taltz Counseling: I discussed with the patient the risks of ixekizumab including but not limited to immunosuppression, serious infections, worsening of inflammatory bowel disease and drug reactions. The patient understands that monitoring is required including a PPD at baseline and must alert us or the primary physician if symptoms of infection or other concerning signs are noted. Tazorac Counseling:  Patient advised that medication is irritating and drying. Patient may need to apply sparingly and wash off after an hour before eventually leaving it on overnight. The patient verbalized understanding of the proper use and possible adverse effects of tazorac. All of the patient's questions and concerns were addressed. Bactrim Counseling:  I discussed with the patient the risks of sulfa antibiotics including but not limited to GI upset, allergic reaction, drug rash, diarrhea, dizziness, photosensitivity, and yeast infections. Rarely, more serious reactions can occur including but not limited to aplastic anemia, agranulocytosis, methemoglobinemia, blood dyscrasias, liver or kidney failure, lung infiltrates or desquamative/blistering drug rashes. Quinolones Counseling:  I discussed with the patient the risks of fluoroquinolones including but not limited to GI upset, allergic reaction, drug rash, diarrhea, dizziness, photosensitivity, yeast infections, liver function test abnormalities, tendonitis/tendon rupture. High Dose Vitamin A Pregnancy And Lactation Text: High dose vitamin A therapy is contraindicated during pregnancy and breast feeding. Azithromycin Pregnancy And Lactation Text: This medication is considered safe during pregnancy and is also secreted in breast milk. Cyclophosphamide Pregnancy And Lactation Text: This medication is Pregnancy Category D and it isn't considered safe during pregnancy. This medication is excreted in breast milk. Nsaids Counseling: NSAID Counseling: I discussed with the patient that NSAIDs should be taken with food. Prolonged use of NSAIDs can result in the development of stomach ulcers. Patient advised to stop taking NSAIDs if abdominal pain occurs. The patient verbalized understanding of the proper use and possible adverse effects of NSAIDs. All of the patient's questions and concerns were addressed. Hydroxyzine Pregnancy And Lactation Text: This medication is not safe during pregnancy and should not be taken. It is also excreted in breast milk and breast feeding isn't recommended. Cosentyx Counseling:  I discussed with the patient the risks of Cosentyx including but not limited to worsening of Crohn's disease, immunosuppression, allergic reactions and infections. The patient understands that monitoring is required including a PPD at baseline and must alert us or the primary physician if symptoms of infection or other concerning signs are noted. Xolair Pregnancy And Lactation Text: This medication is Pregnancy Category B and is considered safe during pregnancy. This medication is excreted in breast milk.

## 2024-01-24 ENCOUNTER — ANESTHESIA (OUTPATIENT)
Dept: ANESTHESIOLOGY | Facility: AMBULATORY SURGERY CENTER | Age: 44
End: 2024-01-24

## 2024-01-24 ENCOUNTER — ANESTHESIA EVENT (OUTPATIENT)
Dept: ANESTHESIOLOGY | Facility: AMBULATORY SURGERY CENTER | Age: 44
End: 2024-01-24

## 2024-01-31 ENCOUNTER — TELEPHONE (OUTPATIENT)
Age: 44
End: 2024-01-31

## 2024-01-31 NOTE — TELEPHONE ENCOUNTER
Patient returned call regarding rescheduling colonoscopy. Patient prefers to contact insurance prior to rescheduling. She will contact our office back to reschedule when ready.

## 2024-02-05 NOTE — TELEPHONE ENCOUNTER
Patient contacted office back to reschedule her colonoscopy. Colonoscopy has been rescheduled for 2/22/24 with  at [place]. Patient has prep information and will  prep from pharmacy.

## 2024-02-09 ENCOUNTER — PATIENT MESSAGE (OUTPATIENT)
Dept: GASTROENTEROLOGY | Facility: CLINIC | Age: 44
End: 2024-02-09

## 2024-02-09 ENCOUNTER — TELEPHONE (OUTPATIENT)
Dept: GASTROENTEROLOGY | Facility: CLINIC | Age: 44
End: 2024-02-09

## 2024-02-14 NOTE — PATIENT COMMUNICATION
Called pt re upcoming procedure on 2/22. Procedure instructions were mailed to patient and sent thru [facility].

## 2024-03-08 ENCOUNTER — TELEPHONE (OUTPATIENT)
Dept: GASTROENTEROLOGY | Facility: CLINIC | Age: 44
End: 2024-03-08

## 2024-03-08 NOTE — TELEPHONE ENCOUNTER
Called patient, reached voicemail, left message to call back to schedule f/u visit with IBD specialist. Last OV 2/21/2022.

## 2024-04-05 ENCOUNTER — TELEPHONE (OUTPATIENT)
Dept: OTHER | Facility: OTHER | Age: 44
End: 2024-04-05

## 2024-04-05 NOTE — TELEPHONE ENCOUNTER
Patient called today regarding she would like Dr. Tabor to review her Blood Work and make a Referral to Hem/Onc. Please call Patient back to Confirm.

## 2024-04-06 NOTE — TELEPHONE ENCOUNTER
She has already seen Dr. Lyles with [facility], she can just call his office and make an appointment.

## 2024-04-08 ENCOUNTER — OFFICE VISIT (OUTPATIENT)
Age: 44
End: 2024-04-08
Payer: COMMERCIAL

## 2024-04-08 VITALS
BODY MASS INDEX: 25.91 KG/M2 | OXYGEN SATURATION: 100 % | HEIGHT: 62 IN | SYSTOLIC BLOOD PRESSURE: 130 MMHG | HEART RATE: 98 BPM | TEMPERATURE: 98.5 F | WEIGHT: 140.8 LBS | RESPIRATION RATE: 18 BRPM | DIASTOLIC BLOOD PRESSURE: 82 MMHG

## 2024-04-08 DIAGNOSIS — G89.29 CHRONIC LEFT SHOULDER PAIN: ICD-10-CM

## 2024-04-08 DIAGNOSIS — Z12.31 BREAST CANCER SCREENING BY MAMMOGRAM: ICD-10-CM

## 2024-04-08 DIAGNOSIS — I25.2 HISTORY OF HEART ATTACK: ICD-10-CM

## 2024-04-08 DIAGNOSIS — K50.00 CROHN'S DISEASE OF SMALL INTESTINE WITHOUT COMPLICATION (HCC): ICD-10-CM

## 2024-04-08 DIAGNOSIS — G43.001 MIGRAINE WITHOUT AURA AND WITH STATUS MIGRAINOSUS, NOT INTRACTABLE: ICD-10-CM

## 2024-04-08 DIAGNOSIS — M25.512 CHRONIC LEFT SHOULDER PAIN: ICD-10-CM

## 2024-04-08 DIAGNOSIS — F41.9 ANXIETY: ICD-10-CM

## 2024-04-08 DIAGNOSIS — T40.1X1A ACCIDENTAL OVERDOSE OF HEROIN, INITIAL ENCOUNTER (HCC): ICD-10-CM

## 2024-04-08 DIAGNOSIS — R91.8 PULMONARY NODULES: Primary | ICD-10-CM

## 2024-04-08 PROCEDURE — 99214 OFFICE O/P EST MOD 30 MIN: CPT

## 2024-04-08 RX ORDER — AMITRIPTYLINE HYDROCHLORIDE 100 MG/1
100 TABLET ORAL
Qty: 90 TABLET | Refills: 0 | Status: SHIPPED | OUTPATIENT
Start: 2024-04-08

## 2024-04-08 RX ORDER — ALBUTEROL SULFATE 90 UG/1
2 AEROSOL, METERED RESPIRATORY (INHALATION) EVERY 6 HOURS PRN
COMMUNITY
Start: 2024-04-06

## 2024-04-08 RX ORDER — DOXYCYCLINE 100 MG/1
100 TABLET ORAL 2 TIMES DAILY
COMMUNITY
Start: 2024-04-06 | End: 2024-04-16

## 2024-04-08 RX ORDER — MELOXICAM 15 MG/1
15 TABLET ORAL DAILY PRN
Qty: 30 TABLET | Refills: 3 | Status: CANCELLED | OUTPATIENT
Start: 2024-04-08

## 2024-04-08 RX ORDER — PREDNISONE 20 MG/1
TABLET ORAL
COMMUNITY
Start: 2024-04-06

## 2024-04-08 NOTE — ASSESSMENT & PLAN NOTE
Patient states she had a heart attack approximately a year ago and was supposed to follow-up with cardiology.  Referral placed.

## 2024-04-08 NOTE — ASSESSMENT & PLAN NOTE
Discussed options such as buspirone or an SSRI like Lexapro or Paxil. Discussed that there are risks with taking an SSRI with a TCA like the amitriptyline. I recommend starting one or the other now and not both simultaneously to reduce risk of serotonin syndrome. The patient would prefer to restart her amitriptyline at this time.  She would also like to speak to a psychiatrist.  Referral placed.  Recommend the patient continue to meet with her therapist for anxiety.

## 2024-04-08 NOTE — ASSESSMENT & PLAN NOTE
Discussed with the patient that I do not prescribe opioids for long-term pain control.  Discussed the risks of opioids and recommended alternative therapies for pain management, including pain management referral and continued use of Tylenol and ibuprofen.  Recommended taking ibuprofen with food to avoid stomach upset.

## 2024-04-08 NOTE — PROGRESS NOTES
Name: Deb Farnsworth      : 1980      MRN: [id number]  Encounter Provider: Shoshana Frank PA-C  Encounter Date: 2024   Encounter department: [facility]    Assessment & Plan     1. Pulmonary nodules  Assessment & Plan:  Ordered PET scan.  Recommend follow-up with pulmonology.  Orders placed.    Orders:  -     NM pet ct tumor imaging whole body; Future; Expected date: 2024  -     Ambulatory Referral to Pulmonology; Future    2. Anxiety  Assessment & Plan:  Discussed options such as buspirone or an SSRI like Lexapro or Paxil. Discussed that there are risks with taking an SSRI with a TCA like the amitriptyline. I recommend starting one or the other now and not both simultaneously to reduce risk of serotonin syndrome. The patient would prefer to restart her amitriptyline at this time.  She would also like to speak to a psychiatrist.  Referral placed.  Recommend the patient continue to meet with her therapist for anxiety.    Orders:  -     Ambulatory referral to Psych Services; Future    3. Chronic left shoulder pain  -     Ambulatory referral to Spine & Pain Management; Future    4. Accidental overdose of heroin, initial encounter ([facility])  Assessment & Plan:  Discussed with the patient that I do not prescribe opioids for long-term pain control.  Discussed the risks of opioids and recommended alternative therapies for pain management, including pain management referral and continued use of Tylenol and ibuprofen.  Recommended taking ibuprofen with food to avoid stomach upset.      5. History of heart attack  Assessment & Plan:  Patient states she had a heart attack approximately a year ago and was supposed to follow-up with cardiology.  Referral placed.    Orders:  -     Ambulatory Referral to Cardiology; Future    6. Crohn's disease of small intestine without complication (HCC)  Assessment & Plan:  Patient needs to reestablish with GI.  Referral placed.    Orders:  -      amitriptyline (ELAVIL) 100 mg tablet; Take 1 tablet (100 mg total) by mouth daily at bedtime  -     Ambulatory Referral to Gastroenterology; Future    7. Migraine without aura and with status migrainosus, not intractable  -     Ambulatory Referral to Neurology; Future    8. Breast cancer screening by mammogram  -     Mammo screening bilateral w 3d & cad; Future           Subjective      Patient is here for follow up from recent ER visit in which she was diagnosed with bronchitis and pulmonary nodules. A CT angiogram was done in the ER and found two pulmonary nodules, one 8 mm and another 10 mm, and she was urged to follow up with PCP and pulmonology regarding these findings. She has approximately a 15 pack year smoking history (1/2 ppd for 30 years), but says that she stopped smoking a few days ago when she found out about the pulmonary nodules. She has lost approximately 8 lbs in the past month.   She also has significant, 10/10 left shoulder pain that she has had for the past several months. No known injury.   She has been taking ibuprofen 600-800 mg twice daily and tylenol 1000 mg up to three times daily for her pain. She is requesting something stronger for her pain, like oxycodone.   She has also been feeling anxious lately and would like to see a psychiatrist to discuss treatment options. She has been on buspirone and lexapro in the past and said they did not work for her.  She is currently seeing a therapist once weekly.   She would like a refill of her amitriptyline, which she states helps her sleep. She said she was also given it for her fibromyalgia.        Review of Systems   Constitutional:  Negative for chills and fever.   HENT:  Negative for congestion and sore throat.    Eyes:  Negative for pain and visual disturbance.   Respiratory:  Positive for shortness of breath. Negative for cough.    Cardiovascular:  Negative for chest pain and palpitations.   Gastrointestinal:  Negative for abdominal pain,  constipation and diarrhea.   Genitourinary:  Negative for dysuria and hematuria.   Musculoskeletal:  Positive for myalgias (left shoulder). Negative for arthralgias.   Skin:  Negative for color change and rash.   Neurological:  Negative for dizziness and headaches.   Psychiatric/Behavioral:  Positive for sleep disturbance. Negative for dysphoric mood. The patient is nervous/anxious.        Current Outpatient Medications on File Prior to Visit   Medication Sig   • albuterol (PROVENTIL HFA,VENTOLIN HFA) 90 mcg/act inhaler Inhale 2 puffs every 6 (six) hours as needed   • doxycycline (ADOXA) 100 MG tablet Take 100 mg by mouth 2 (two) times a day   • gabapentin (NEURONTIN) 600 MG tablet Take 1 tablet (600 mg total) by mouth 4 (four) times a day   • predniSONE 20 mg tablet take 2 tablets by mouth daily for 4 days   • ustekinumab (Stelara) 90 mg/mL subcutaneous injection MAINTENANCE: INJECT 1 SYRINGE SUBCUTANEOUSLY EVERY 8 WEEKS. REFRIGERATE. DO NOT FREEZE.   • bisacodyl (DULCOLAX) 5 mg EC tablet Take 2 tablets (10 mg total) by mouth 1 (one) time for 1 dose Take per bowel prep instructions the night before  the colonoscopy   • clopidogrel (PLAVIX) 75 mg tablet Take 75 mg by mouth daily   • divalproex sodium (DEPAKOTE) 250 mg EC tablet Take 250 mg by mouth 2 (two) times a day     • dronabinol (MARINOL) 5 MG capsule take 1 capsule by mouth twice a day before meals   • escitalopram (LEXAPRO) 20 mg tablet Take 20 mg by mouth daily   • montelukast (SINGULAIR) 10 mg tablet Take 10 mg by mouth daily at bedtime   • ondansetron (ZOFRAN) 4 mg tablet Take 1 tablet (4 mg total) by mouth every 8 (eight) hours as needed for nausea or vomiting   • pantoprazole (PROTONIX) 40 mg tablet Take 40 mg by mouth daily   • polyethylene glycol (GOLYTELY) 4000 mL solution Take 4,000 mL by mouth once for 1 dose   • polyethylene glycol (GOLYTELY) 4000 mL solution Take 4,000 mL by mouth once for 1 dose Take per bowel prep instructions the day before  "the colonoscopy   • polyethylene glycol-electrolytes (NULYTELY) 4000 mL solution Take 4,000 mL by mouth once for 1 dose   • SUMAtriptan (IMITREX) 100 mg tablet Take 1 tablet by mouth once as needed     • VITAMIN D PO Take by mouth daily (Patient not taking: Reported on 4/8/2024)   • [DISCONTINUED] amitriptyline (ELAVIL) 100 mg tablet Take 1 tablet (100 mg total) by mouth daily at bedtime       Objective     /82 (BP Location: Right arm, Patient Position: Sitting, Cuff Size: Standard)   Pulse 98   Temp 98.5 °F (36.9 °C) (Tympanic)   Resp 18   Ht 5' 2\" (1.575 m)   Wt 63.9 kg (140 lb 12.8 oz)   SpO2 100%   BMI 25.75 kg/m²     Physical Exam  Constitutional:       Appearance: Normal appearance.   HENT:      Head: Normocephalic and atraumatic.      Right Ear: Tympanic membrane normal.      Left Ear: Tympanic membrane normal.      Nose: Nose normal.      Mouth/Throat:      Mouth: Mucous membranes are moist.      Pharynx: Oropharynx is clear. No oropharyngeal exudate.   Eyes:      Extraocular Movements: Extraocular movements intact.      Conjunctiva/sclera: Conjunctivae normal.   Cardiovascular:      Rate and Rhythm: Normal rate and regular rhythm.      Pulses: Normal pulses.      Heart sounds: Normal heart sounds. No murmur heard.  Pulmonary:      Effort: Pulmonary effort is normal. No respiratory distress.      Breath sounds: Wheezing present. No rhonchi or rales.   Abdominal:      Palpations: Abdomen is soft.   Musculoskeletal:         General: No swelling.      Right shoulder: Normal.      Left shoulder: Tenderness present. Decreased range of motion.      Cervical back: Normal range of motion.      Comments: Tenderness to palpation of the left shoulder.   No axillary lymphadenopathy noted.    Skin:     General: Skin is warm and dry.      Findings: No bruising, erythema or rash.   Neurological:      General: No focal deficit present.      Mental Status: She is alert. Mental status is at baseline. "   Psychiatric:         Mood and Affect: Mood normal.         Behavior: Behavior normal.       Shoshana Frank PA-C

## 2024-04-09 ENCOUNTER — TELEPHONE (OUTPATIENT)
Dept: HEMATOLOGY ONCOLOGY | Facility: CLINIC | Age: 44
End: 2024-04-09

## 2024-04-09 ENCOUNTER — TELEPHONE (OUTPATIENT)
Age: 44
End: 2024-04-09

## 2024-04-09 NOTE — TELEPHONE ENCOUNTER
Pt called asking if Dr. Tabor can put a referral in for her to see oncologist Dr. Lyles in [place]     Pt request a call back for updates    Thank you

## 2024-04-09 NOTE — TELEPHONE ENCOUNTER
She should let them know she has already seen him, they might not be aware, I also do not have a reason to refer her so she can contact PCP.

## 2024-04-09 NOTE — TELEPHONE ENCOUNTER
Message for Shoshana:    Please have her put in a referral for oncology     Call her when the referral is in     She said the referral with Dr Tabor's office has  and to contact her PCP office

## 2024-04-09 NOTE — TELEPHONE ENCOUNTER
Patient stated that she did inform them she was previously seen but their limit is within a year. Informed patient that she would need to contact PCP, she expressed understanding and said she would call.

## 2024-04-09 NOTE — TELEPHONE ENCOUNTER
Patient Call    Who are you speaking with? Patient    If it is not the patient, are they listed on an active communication consent form? N/A   What is the reason for this call? Patient was calling in to schedule an appointment with Dr Lyles.  There was no referral in there so she requested that I transfer the call to Dr Tabor'laura office.   Does this require a call back? N/A   If a call back is required, please list best call back number na   If a call back is required, advise that a message will be forwarded to their care team and someone will return their call as soon as possible.   Did you relay this information to the patient? N/A  Call Transfer   Who are you speaking with?  Patient   If it is not the patient, are they listed on an active communication consent form? N/A   Who is the patients HemOnc/SurgOnc provider? Dr. Lyles   What is the reason for this call? Patient was calling in to schedule an appointment with Dr Lyles.  There was no referral in there so she requested that I transfer the call to Dr Tabor'laura office.   Person/Department that the call was transferred to?    Time that call was transferred?    Sobeida Winchester's office [facility]    4/29/24 @ 11:22   Your call will be transferred now. If you receive a voicemail, please leave a detailed message and a member of the team will return your call as soon as possible.    Did you relay this information to the caller?  N/A

## 2024-04-09 NOTE — TELEPHONE ENCOUNTER
She does not need a referral, she is already established with his office. I also do not have a reason to refer her so if she is looking for a referral please ask her to contact PCP.

## 2024-04-11 ENCOUNTER — TELEPHONE (OUTPATIENT)
Dept: PSYCHIATRY | Facility: CLINIC | Age: 44
End: 2024-04-11

## 2024-04-11 NOTE — TELEPHONE ENCOUNTER
Contacted patient off of Medication Management  to verify needs of services in attempts to offer patient an appointment at [facility] . spoke with patient whom stated they are still interested in services and the have a current therapist but from the state due to nature of their ongoing divorce at this time. At this time they would like a therapist to manage all reasoning in  seeking services       PATIENT ADDED TO [facility] WAIT LIST FOR TALK THERAPY

## 2024-04-11 NOTE — TELEPHONE ENCOUNTER
"Behavioral Health Outpatient Intake Questions    Referred By   :     Please advise interviewee that they need to answer all questions truthfully to allow for best care, and any misrepresentations of information may affect their ability to be seen at this clinic   => Was this discussed? Yes     If Minor Child (under age 18)    Who is/are the legal guardian(s) of the child?     Is there a custody agreement?      If \"YES\"- Custody orders must be obtained prior to scheduling the first appointment  In addition, Consent to Treatment must be signed by all legal guardians prior to scheduling the first appointment    If \"NO\"- Consent to Treatment must be signed by all legal guardians prior to scheduling the first appointment    Behavioral Health Outpatient Intake History -     Presenting Problem (in patient's own words): nodes round lung,heart, pancreas,  possible cancer dx, anxiety, depression lupus, lyme disease, hx of sickness since 13 y/o , ADHD, going through divorce at this time    Are there any communication barriers for this patient?     Yes                                               If yes, please describe barriers: ADHD  If there is a unique situation, please refer to Dereck Velasquez/Gladys Samaniego for final determination.    Are you taking any psychiatric medications? Yes     If \"YES\" -What are they AmitriptylineElavil, Neurontin     If \"YES\" -Who prescribes?   Rheumatologist     Has the Patient previously received outpatient Talk Therapy or Medication Management from [facility]  No        If \"YES\"- When, Where and with Whom?         If \"NO\" -Has Patient received these services elsewhere?       If \"YES\" -When, Where, and with Whom?  Tlk therapy in [facility]   3 yr- current    Has the Patient abused alcohol or other substances in the last 6 months ? No  No concerns of substance abuse are reported.     If \"YES\" -What substance, How much, How often?     If illegal substance: Refer to [facility] (for " "ARJUN) or [facility] Offices.   If Alcohol in excess of 10 drinks per week:  Refer to [facility] (for ARJUN) or [facility] Offices    Legal History-     Is this treatment court ordered? No   If \"yes \"send to :  Talk Therapy : Send to Dereck Velasquez/Gladys Samaniego for final determination   Med Management: Send to Dr Schmitt for final determination     Has the Patient been convicted of a felony?  No   If \"Yes\" send to -When, What?  Talk Therapy : Send to Dereck Samaniego for final determination   Med Management: Send to Dr Schmitt for final determination     ACCEPTED as a patient Yes  If \"Yes\" Appointment Date:   Hari sparks 5/1 @ 930a    Referred Elsewhere? No  If “Yes” - (Where? Ex: [facility], [facility], [facility], [facility], etc.)       Name of Insurance Co:[facility]/ [facility]   Insurance ID# [id number] / [id number]   Insurance Phone #  If ins is primary or secondary?  If patient is a minor, parents information such as Name, D.O.B of guarantor.  "

## 2024-04-17 ENCOUNTER — TELEPHONE (OUTPATIENT)
Age: 44
End: 2024-04-17

## 2024-04-18 ENCOUNTER — TELEPHONE (OUTPATIENT)
Age: 44
End: 2024-04-18

## 2024-04-18 ENCOUNTER — TELEPHONE (OUTPATIENT)
Dept: CARDIOLOGY CLINIC | Facility: CLINIC | Age: 44
End: 2024-04-18

## 2024-04-18 NOTE — TELEPHONE ENCOUNTER
Patient calling back again. Wants to know what her next step is????? Chemo????? She is waiting for a return call ASAP

## 2024-04-18 NOTE — TELEPHONE ENCOUNTER
Called Deb and spoke to her regarding her PET scan results. Discussed the recommended follow-up of either repeat CT scan in 3 months or CT guided biopsy of the pulmonary nodules. She has an appointment with pulmonology next week and I recommended she speak with them about the results. She said she will ask for their input before deciding to pursue biopsy versus repeat CT in 3 months.

## 2024-04-18 NOTE — TELEPHONE ENCOUNTER
----- Message from Madison Danielson sent at 4/18/2024  5:04 PM EDT -----  Regarding: FW: PET scan  Contact: [phone]    ----- Message -----  From: Deb Farnsworth  Sent: 4/18/2024   5:04 PM EDT  To: [facility]  Subject: PET scan                                         Better pic

## 2024-04-18 NOTE — TELEPHONE ENCOUNTER
Lm for patient to call to reschedule appt.         Appointment canceled for Deb Farnsworth ([id number])   Visit Type: CONSULT PG   Date        Time      Length    Provider                  Department   4/23/2024   11:00 AM  40 mins.  José Miguel Haskins MD      [facility]      Reason for Cancellation: Lack of Transportation      Patient Comments: My transmission just went. I need to reschedule.ASP, just got PET scan results     Appointment canceled  (Newest Message First)  View All Conversations on this Encounter  Deb Farnsworth  [facility] [id number] hours ago (9:14 PM)       Appointment canceled for Deb Farnsworth ([id number])  Visit Type: CONSULT PG  Date        Time      Length    Provider                  Department  4/23/2024   11:00 AM  40 mins.  José Miguel Haskins MD      [facility]     Reason for Cancellation: Lack of Transportation     Patient Comments: My transmission just went. I need to reschedule.ASP, just got PET scan results

## 2024-04-24 ENCOUNTER — TELEPHONE (OUTPATIENT)
Age: 44
End: 2024-04-24

## 2024-04-24 NOTE — TELEPHONE ENCOUNTER
Called patient, was disconnected 2 times. Left message on the 3rd try.  Patient needs to reschedule due to provider having an emergency. Will not be in office this afternoon. Thank you.

## 2024-04-24 NOTE — TELEPHONE ENCOUNTER
Patients GI provider:  Dr. Gregg     Number to return call: [phone]    Reason for call: Pt calling stating she had an appt but could not make it asking to reschedule. There was no avaialble appt till October but denied and call dropped while I was looking at [place] location    Scheduled procedure/appointment date if applicable: Apt/procedure

## 2024-04-25 NOTE — TELEPHONE ENCOUNTER
Patient sees Dr Stephanie Reed  Patient did schedule an appointment for 11/9/2022, but she is still asking to try for another   Virtual even though the previous one did not work,  Can this be done, email is [email or web address]    Please let her know which is better      CB # [phone]
We can do a virtual appointment but it will be a phone call, so she should be available by phone 
No

## 2024-05-10 ENCOUNTER — TELEMEDICINE (OUTPATIENT)
Dept: PSYCHIATRY | Facility: CLINIC | Age: 44
End: 2024-05-10

## 2024-05-10 DIAGNOSIS — F41.1 GENERALIZED ANXIETY DISORDER: Primary | ICD-10-CM

## 2024-05-10 DIAGNOSIS — F33.1 MDD (MAJOR DEPRESSIVE DISORDER), RECURRENT EPISODE, MODERATE (HCC): ICD-10-CM

## 2024-05-10 DIAGNOSIS — F43.10 POST TRAUMATIC STRESS DISORDER (PTSD): ICD-10-CM

## 2024-05-10 PROBLEM — K51.90 MILD CHRONIC ULCERATIVE COLITIS (HCC): Status: ACTIVE | Noted: 2020-05-12

## 2024-05-10 RX ORDER — HYDROXYZINE HYDROCHLORIDE 25 MG/1
25 TABLET, FILM COATED ORAL 2 TIMES DAILY PRN
Qty: 60 TABLET | Refills: 1 | Status: SHIPPED | OUTPATIENT
Start: 2024-05-10

## 2024-05-10 RX ORDER — SERTRALINE HYDROCHLORIDE 25 MG/1
25 TABLET, FILM COATED ORAL DAILY
Qty: 30 TABLET | Refills: 1 | Status: SHIPPED | OUTPATIENT
Start: 2024-05-10

## 2024-05-10 NOTE — BH TREATMENT PLAN
TREATMENT PLAN (Medication Management Only)        [facility]    Name and Date of Birth:  Deb Farnsworth 44 y.o. 1980  Date of Treatment Plan: May 10, 2024  Diagnosis/Diagnoses:    1. Generalized anxiety disorder    2. Post traumatic stress disorder (PTSD)      Strengths/Personal Resources for Self-Care: ability to communicate needs, ability to communicate well, general fund of knowledge, good understanding of illness, independence.  Area/Areas of need (in own words): anxiety symptoms, depression  1. Long Term Goal: improve control of acceptable anxiety level.  Target Date:6 months - 11/10/2024  Person/Persons responsible for completion of goal: Deb  2.  Short Term Objective (s) - How will we reach this goal?:   A. Provider new recommended medication/dosage changes and/or continue medication(s): continue current medications as prescribed.  B. Attend medication management appointments regularly.  C. Take psychiatric medications responsibly.  Target Date:6 months - 11/10/2024  Person/Persons Responsible for Completion of Goal: Deb  Progress Towards Goals: initiating treatment  Treatment Modality: medication management every 1 months  Review due 180 days from date of this plan: 6 months - 11/10/2024  Expected length of service: ongoing treatment  My Physician/PA/NP and I have developed this plan together and I agree to work on the goals and objectives. I understand the treatment goals that were developed for my treatment.
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
## 2024-05-14 ENCOUNTER — TELEPHONE (OUTPATIENT)
Dept: PSYCHIATRY | Facility: CLINIC | Age: 44
End: 2024-05-14

## 2024-05-14 NOTE — TELEPHONE ENCOUNTER
Left voicemail informing patient and/or parent/guardian of the Psych Encounter form needing to be signed as a requirement from the insurance company for billing purposes. Patient can access form via [employer] and sign electronically.     Please make patient aware this form must be signed for each visit as a requirement to continue future visits with provider.

## 2024-05-22 ENCOUNTER — TELEPHONE (OUTPATIENT)
Dept: PSYCHIATRY | Facility: CLINIC | Age: 44
End: 2024-05-22

## 2024-05-29 ENCOUNTER — TELEPHONE (OUTPATIENT)
Dept: PSYCHIATRY | Facility: CLINIC | Age: 44
End: 2024-05-29

## 2024-07-17 DIAGNOSIS — K50.00 CROHN'S DISEASE OF SMALL INTESTINE WITHOUT COMPLICATION (HCC): ICD-10-CM

## 2024-07-17 RX ORDER — AMITRIPTYLINE HYDROCHLORIDE 100 MG/1
100 TABLET ORAL
Qty: 100 TABLET | Refills: 1 | Status: SHIPPED | OUTPATIENT
Start: 2024-07-17

## 2024-09-17 DIAGNOSIS — K50.00 CROHN'S DISEASE OF SMALL INTESTINE WITHOUT COMPLICATION (HCC): ICD-10-CM

## 2024-09-17 DIAGNOSIS — R63.0 ANOREXIA: ICD-10-CM

## 2024-09-17 DIAGNOSIS — M79.7 FIBROMYALGIA: ICD-10-CM

## 2024-09-17 RX ORDER — GABAPENTIN 600 MG/1
600 TABLET ORAL 4 TIMES DAILY
Qty: 360 TABLET | Refills: 0 | Status: SHIPPED | OUTPATIENT
Start: 2024-09-17

## 2024-09-17 NOTE — TELEPHONE ENCOUNTER
Patient needs an appointment. Please contact the patient to schedule an appointment. Courtesy Refill Provided

## 2024-09-18 DIAGNOSIS — K50.00 CROHN'S DISEASE OF SMALL INTESTINE WITHOUT COMPLICATION (HCC): Primary | ICD-10-CM

## 2024-09-18 RX ORDER — DRONABINOL 5 MG/1
CAPSULE ORAL
Qty: 200 CAPSULE | Refills: 0 | OUTPATIENT
Start: 2024-09-18

## 2024-09-18 RX ORDER — USTEKINUMAB 90 MG/ML
INJECTION, SOLUTION SUBCUTANEOUS
Qty: 2 ML | Refills: 0 | OUTPATIENT
Start: 2024-09-18

## 2024-09-18 RX ORDER — USTEKINUMAB 90 MG/ML
90 INJECTION, SOLUTION SUBCUTANEOUS
Qty: 1 ML | Refills: 5 | Status: SHIPPED | OUTPATIENT
Start: 2024-09-18 | End: 2024-09-19

## 2024-09-18 NOTE — TELEPHONE ENCOUNTER
Please call and schedule patient for follow-up appointment she has not been seen in office since 2022. I did renew Stelara. Marinol was order by another physician not GI.She needs appointment for continued medication renewal.  Thank you

## 2024-09-19 ENCOUNTER — TELEPHONE (OUTPATIENT)
Age: 44
End: 2024-09-19

## 2024-09-19 ENCOUNTER — TELEPHONE (OUTPATIENT)
Dept: GASTROENTEROLOGY | Facility: CLINIC | Age: 44
End: 2024-09-19

## 2024-09-20 NOTE — TELEPHONE ENCOUNTER
We have not seen pt since . In 2023 we tried reaching hr numerous times to reauth her Stelara and she did not respond to us. Letters were mailed to her as well.       Rachel - can you try to get pt in for a follow up appointment? Or to see if she has even been taking her medication? Her authorization  back in  and we were never able to reauth due to non compliance.

## 2024-09-20 NOTE — TELEPHONE ENCOUNTER
GI office left vm for patient to call back to schedule OV.     Christina WAGNER    9/19/24  2:24 PM  Note     Called pt to schedule a fu ov for medication renewal, left a message on pts vm to return call.

## 2024-09-23 NOTE — TELEPHONE ENCOUNTER
Pharmacy called to gt prior auth on stelera. Pharmacy advised pt stated she no longer takes the medication.

## 2024-09-23 NOTE — TELEPHONE ENCOUNTER
I spoke with the patient. Patient states is not currently taking Stelara at this time or for the past few months. OV appointment scheduled 10/1 [place]- Address provided to discuss biologic renewal. Pt agreeable.

## 2024-10-01 ENCOUNTER — PREP FOR PROCEDURE (OUTPATIENT)
Age: 44
End: 2024-10-01

## 2024-10-01 ENCOUNTER — OFFICE VISIT (OUTPATIENT)
Age: 44
End: 2024-10-01
Payer: COMMERCIAL

## 2024-10-01 VITALS
HEART RATE: 103 BPM | RESPIRATION RATE: 18 BRPM | SYSTOLIC BLOOD PRESSURE: 100 MMHG | BODY MASS INDEX: 27.6 KG/M2 | WEIGHT: 150 LBS | HEIGHT: 62 IN | DIASTOLIC BLOOD PRESSURE: 64 MMHG | OXYGEN SATURATION: 96 %

## 2024-10-01 DIAGNOSIS — K29.70 GASTRITIS WITHOUT BLEEDING, UNSPECIFIED CHRONICITY, UNSPECIFIED GASTRITIS TYPE: ICD-10-CM

## 2024-10-01 DIAGNOSIS — K50.90 CROHN'S DISEASE WITHOUT COMPLICATION, UNSPECIFIED GASTROINTESTINAL TRACT LOCATION (HCC): Primary | ICD-10-CM

## 2024-10-01 PROCEDURE — 99214 OFFICE O/P EST MOD 30 MIN: CPT | Performed by: PHYSICIAN ASSISTANT

## 2024-10-01 RX ORDER — CETIRIZINE HCL 1 MG/ML
SOLUTION, ORAL ORAL AS NEEDED
COMMUNITY
Start: 2024-07-30

## 2024-10-01 RX ORDER — CEFUROXIME AXETIL 500 MG/1
TABLET ORAL
COMMUNITY
Start: 2024-09-18 | End: 2024-10-10 | Stop reason: SDDI

## 2024-10-01 RX ORDER — CYCLOBENZAPRINE HCL 10 MG
TABLET ORAL
COMMUNITY
Start: 2024-07-08

## 2024-10-01 RX ORDER — PHENAZOPYRIDINE HYDROCHLORIDE 100 MG/1
TABLET, FILM COATED ORAL
COMMUNITY
Start: 2024-09-18

## 2024-10-01 RX ORDER — METHYLPREDNISOLONE 4 MG
TABLET, DOSE PACK ORAL
COMMUNITY
Start: 2024-07-27

## 2024-10-01 RX ORDER — ACETAMINOPHEN AND CODEINE PHOSPHATE 300; 30 MG/1; MG/1
1 TABLET ORAL 2 TIMES DAILY
COMMUNITY
Start: 2024-09-18

## 2024-10-01 RX ORDER — FAMOTIDINE 40 MG/1
40 TABLET, FILM COATED ORAL 2 TIMES DAILY PRN
COMMUNITY
Start: 2024-05-28 | End: 2025-05-28

## 2024-10-01 RX ORDER — DIVALPROEX SODIUM 500 MG/1
500 TABLET, DELAYED RELEASE ORAL 2 TIMES DAILY
COMMUNITY
Start: 2024-09-21

## 2024-10-01 RX ORDER — OXYCODONE AND ACETAMINOPHEN 5; 325 MG/1; MG/1
TABLET ORAL
COMMUNITY
Start: 2024-07-08

## 2024-10-01 RX ORDER — SUCRALFATE ORAL 1 G/10ML
1 SUSPENSION ORAL
COMMUNITY
Start: 2024-05-28

## 2024-10-01 RX ORDER — VALACYCLOVIR HYDROCHLORIDE 500 MG/1
TABLET, FILM COATED ORAL AS NEEDED
COMMUNITY
Start: 2024-07-30

## 2024-10-01 NOTE — PATIENT INSTRUCTIONS
Scheduled date of EGD/colonoscopy (as of today): 10/10/24  Physician performing EGD/colonoscopy: Demond  Location of EGD/colonoscopy: [facility]  Desired bowel prep reviewed with patient: Miralax  Instructions reviewed with patient by: Ramila VILLARREAL  Clearances:

## 2024-10-01 NOTE — PROGRESS NOTES
[facility] - Outpatient Follow-up Note  Deb Farnsworth 44 y.o. female MRN: [id number]  Encounter: [id number]          ASSESSMENT AND PLAN:      1. Crohn's disease  2. Gastritis     Patient presents for follow up of her crohn's disease.  She reports she was diagnosed in the 1990s.  She was previously on Humira and 6-MP.  Most recently she has been on Stelara 90mg SC q 8 weeks for her crohn's disease over the past few years and reports doing well but has been off of the medication since the Spring due to lack of follow up.  She reports a hx of gastritis as well.  Review of prior colonoscopy in 2019 showed terminal ileum ulcerations.  She reports of worsening abdominal pain and diarrhea.    Will plan for EGD and colonoscopy with visualization of the terminal ileum to investigate.  Will check MR enterography.  Will check CBC, CMP, CRP, hepatitis B serology, and TB quantiferon.  Will check stool cultures for c diff, enteric pathogens, giardia, fecal calprotectin, and pancreatic fecal elastase.  We can have our IBD team submit for reauthorization of her Stelara after obtaining negative prebiological testing (hepatitis B serology and TB quantiferon).  Smoking cessation recommended (smoking worsens crohn's disease). She is aware and declines cessation.  Vaccinations recommended per guidelines (pneumococcal, annual influenza, Shingrix). She declines. No live vaccines.  Annual eye, gyn, and dermatology evaluations.  Follow up after the above testing.    ______________________________________________________________________    SUBJECTIVE:  Patient is a pleasant 44 year old female presents to the office for follow-up of her Crohn's disease and gastritis.  Patient reports she was diagnosed with Crohn's disease in the 1990s.  She reports she was previously on Humira and 6-MP which she had side effects with.  Most recently she has been on Stelara 90 mg subcu 8 every 8 weeks for her Crohn's disease as  well as she reports for her psoriasis which she reports she did well on.  She reports she has been off of the medication since the spring due to lack of follow-up.  She has a history of gastritis as well review of prior colonoscopy in 2019 showed terminal ileal ulcerations.  She reports of having worsening diarrhea and abdominal pain.  She reports she takes Imodium as needed.  She is a smoker and declines cessation.  She also reports a history of lung nodules that she is following with pulmonary for follow up.  She reports a brother with colon cancer.      REVIEW OF SYSTEMS IS OTHERWISE NEGATIVE.      Historical Information   Past Medical History:   Diagnosis Date    Cervical cancer (HCC)     Crohn disease (HCC)     Endocarditis     Fibromyalgia     Gastritis     Hypertension     RA (rheumatoid arthritis) (HCC)     TIA (transient ischemic attack)     Vertigo      Past Surgical History:   Procedure Laterality Date    APPENDECTOMY      BRONCHOSCOPY      multiple    CARDIAC CATHETERIZATION  01/11/2023    COLONOSCOPY       Social History   Social History     Substance and Sexual Activity   Alcohol Use No     Social History     Substance and Sexual Activity   Drug Use Not Currently    Types: Marijuana     Social History     Tobacco Use   Smoking Status Every Day    Current packs/day: 1.00    Types: Cigarettes   Smokeless Tobacco Never     Family History   Problem Relation Age of Onset    Colon cancer Brother 35    Crohn's disease Brother        Meds/Allergies       Current Outpatient Medications:     acetaminophen-codeine (TYLENOL with CODEINE #3) 300-30 MG per tablet    albuterol (PROVENTIL HFA,VENTOLIN HFA) 90 mcg/act inhaler    amitriptyline (ELAVIL) 100 mg tablet    cefuroxime (CEFTIN) 500 mg tablet    clopidogrel (PLAVIX) 75 mg tablet    divalproex sodium (DEPAKOTE) 500 mg DR tablet    dronabinol (MARINOL) 5 MG capsule    gabapentin (NEURONTIN) 600 MG tablet    montelukast (SINGULAIR) 10 mg tablet    ondansetron  "(ZOFRAN) 4 mg tablet    pantoprazole (PROTONIX) 40 mg tablet    phenazopyridine (PYRIDIUM) 100 mg tablet    predniSONE 20 mg tablet    SUMAtriptan (IMITREX) 100 mg tablet    ustekinumab (Stelara) 90 mg/mL subcutaneous injection    valACYclovir (VALTREX) 500 mg tablet    ZyrTEC Allergy 10 MG tablet    bisacodyl (DULCOLAX) 5 mg EC tablet    cyclobenzaprine (FLEXERIL) 10 mg tablet    divalproex sodium (DEPAKOTE) 250 mg EC tablet    famotidine (PEPCID) 40 MG tablet    hydrOXYzine HCL (ATARAX) 25 mg tablet    methylPREDNISolone 4 MG tablet therapy pack    oxyCODONE-acetaminophen (PERCOCET) 5-325 mg per tablet    polyethylene glycol (GOLYTELY) 4000 mL solution    polyethylene glycol-electrolytes (NULYTELY) 4000 mL solution    sertraline (Zoloft) 25 mg tablet    Stelara 90 MG/ML subcutaneous injection    sucralfate (CARAFATE) 1 g/10 mL suspension    VITAMIN D PO  No current facility-administered medications for this visit.    Facility-Administered Medications Ordered in Other Visits:     lactated ringers infusion, , Intravenous, Continuous PRN, New Bag at 05/23/23 0956    Allergies   Allergen Reactions    Ketorolac Hives    Penicillins Hives    Ketorolac Tromethamine Rash           Objective     Blood pressure 100/64, pulse 103, resp. rate 18, height 5' 2\" (1.575 m), weight 68 kg (150 lb), last menstrual period 09/08/2024, SpO2 96%, not currently breastfeeding. Body mass index is 27.44 kg/m².      PHYSICAL EXAM:      General Appearance:   Alert, cooperative, no distress   HEENT:   Normocephalic, atraumatic, anicteric    Neck:  Supple, symmetrical, trachea midline   Lungs:   Clear to auscultation bilaterally; no rales, rhonchi or wheezing; respirations unlabored    Heart::   Regular rate and rhythm; no murmur, rub, or gallop.   Abdomen:   Soft, non-tender, non-distended; normal bowel sounds; no masses, no organomegaly    Genitalia:   Deferred    Rectal:   Deferred    Extremities:  No cyanosis, clubbing or edema    Pulses:  " 2+ and symmetric    Skin:  No jaundice, rashes, or lesions    Lymph nodes:  No palpable cervical lymphadenopathy        Lab Results:   No visits with results within 1 Day(s) from this visit.   Latest known visit with results is:   Hospital Outpatient Visit on 05/23/2023   Component Date Value    EXT Preg Test, Ur 05/23/2023 Negative     Control 05/23/2023 Valid          Radiology Results:   CTA abdomen pelvis w wo contrast    Result Date: 9/17/2024  Narrative: CT ABDOMEN AND PELVIS HISTORY: urinary retention, flank pain, sweats, diarrhea & lower abd pain TECHNIQUE:  Axial sections were obtained from the diaphragm to the pubic symphysis according to departmental protocol.  100 cc of Omnipaque 350 were administered intravenously. Prior study 05/28/2024. FINDINGS: There is a 2.5 mm RIGHT lower lobe pulmonary nodule (axial image 6 of series 3). The liver and spleen are normal for size. The gallbladder is nondistended but otherwise suboptimally evaluated with CT imaging. There is mild biliary ductal dilatation, similar to prior study. Adrenal glands appear within normal limits. There is mild to moderate dilatation of the main pancreatic duct, without significant interval change dating back to 09/21/2020. No acute peripancreatic fat stranding is identified. There is no abdominal aortic aneurysm. There is trace bilateral hydronephrosis. There is moderate diffuse wall thickening of the urinary bladder with increased wall enhancement. Recommend correlation with urinalysis to exclude acute urinary tract infection. The urinary bladder is nondistended. There is suggestion of at least one uterine fibroid. Vaginal tampon is present. There is no evidence of mechanical bowel obstruction. Patient is status post appendectomy.    Impression: IMPRESSION: 1.  Moderate diffuse wall thickening of the urinary bladder with increased wall enhancement. Recommend correlation with urinalysis to exclude acute urinary tract infection. 2.  Trace  bilateral hydronephrosis. 3.  Mild to moderate dilatation of the main pancreatic duct, without significant interval change dating back to September 2020. 4.  A 2.5 mm RIGHT lower lobe pulmonary nodule. 5.  Additional findings as above. Pulmonary nodule recommendation: Per Fleischner society guidelines, no further investigation recommended. Workstation:[id number]

## 2024-10-01 NOTE — H&P (VIEW-ONLY)
[facility] - Outpatient Follow-up Note  Deb Farnsworth 44 y.o. female MRN: [id number]  Encounter: [id number]          ASSESSMENT AND PLAN:      1. Crohn's disease  2. Gastritis     Patient presents for follow up of her crohn's disease.  She reports she was diagnosed in the 1990s.  She was previously on Humira and 6-MP.  Most recently she has been on Stelara 90mg SC q 8 weeks for her crohn's disease over the past few years and reports doing well but has been off of the medication since the Spring due to lack of follow up.  She reports a hx of gastritis as well.  Review of prior colonoscopy in 2019 showed terminal ileum ulcerations.  She reports of worsening abdominal pain and diarrhea.    Will plan for EGD and colonoscopy with visualization of the terminal ileum to investigate.  Will check MR enterography.  Will check CBC, CMP, CRP, hepatitis B serology, and TB quantiferon.  Will check stool cultures for c diff, enteric pathogens, giardia, fecal calprotectin, and pancreatic fecal elastase.  We can have our IBD team submit for reauthorization of her Stelara after obtaining negative prebiological testing (hepatitis B serology and TB quantiferon).  Smoking cessation recommended (smoking worsens crohn's disease). She is aware and declines cessation.  Vaccinations recommended per guidelines (pneumococcal, annual influenza, Shingrix). She declines. No live vaccines.  Annual eye, gyn, and dermatology evaluations.  Follow up after the above testing.    ______________________________________________________________________    SUBJECTIVE:  Patient is a pleasant 44 year old female presents to the office for follow-up of her Crohn's disease and gastritis.  Patient reports she was diagnosed with Crohn's disease in the 1990s.  She reports she was previously on Humira and 6-MP which she had side effects with.  Most recently she has been on Stelara 90 mg subcu 8 every 8 weeks for her Crohn's disease as  well as she reports for her psoriasis which she reports she did well on.  She reports she has been off of the medication since the spring due to lack of follow-up.  She has a history of gastritis as well review of prior colonoscopy in 2019 showed terminal ileal ulcerations.  She reports of having worsening diarrhea and abdominal pain.  She reports she takes Imodium as needed.  She is a smoker and declines cessation.  She also reports a history of lung nodules that she is following with pulmonary for follow up.  She reports a brother with colon cancer.      REVIEW OF SYSTEMS IS OTHERWISE NEGATIVE.      Historical Information   Past Medical History:   Diagnosis Date    Cervical cancer (HCC)     Crohn disease (HCC)     Endocarditis     Fibromyalgia     Gastritis     Hypertension     RA (rheumatoid arthritis) (HCC)     TIA (transient ischemic attack)     Vertigo      Past Surgical History:   Procedure Laterality Date    APPENDECTOMY      BRONCHOSCOPY      multiple    CARDIAC CATHETERIZATION  01/11/2023    COLONOSCOPY       Social History   Social History     Substance and Sexual Activity   Alcohol Use No     Social History     Substance and Sexual Activity   Drug Use Not Currently    Types: Marijuana     Social History     Tobacco Use   Smoking Status Every Day    Current packs/day: 1.00    Types: Cigarettes   Smokeless Tobacco Never     Family History   Problem Relation Age of Onset    Colon cancer Brother 35    Crohn's disease Brother        Meds/Allergies       Current Outpatient Medications:     acetaminophen-codeine (TYLENOL with CODEINE #3) 300-30 MG per tablet    albuterol (PROVENTIL HFA,VENTOLIN HFA) 90 mcg/act inhaler    amitriptyline (ELAVIL) 100 mg tablet    cefuroxime (CEFTIN) 500 mg tablet    clopidogrel (PLAVIX) 75 mg tablet    divalproex sodium (DEPAKOTE) 500 mg DR tablet    dronabinol (MARINOL) 5 MG capsule    gabapentin (NEURONTIN) 600 MG tablet    montelukast (SINGULAIR) 10 mg tablet    ondansetron  "(ZOFRAN) 4 mg tablet    pantoprazole (PROTONIX) 40 mg tablet    phenazopyridine (PYRIDIUM) 100 mg tablet    predniSONE 20 mg tablet    SUMAtriptan (IMITREX) 100 mg tablet    ustekinumab (Stelara) 90 mg/mL subcutaneous injection    valACYclovir (VALTREX) 500 mg tablet    ZyrTEC Allergy 10 MG tablet    bisacodyl (DULCOLAX) 5 mg EC tablet    cyclobenzaprine (FLEXERIL) 10 mg tablet    divalproex sodium (DEPAKOTE) 250 mg EC tablet    famotidine (PEPCID) 40 MG tablet    hydrOXYzine HCL (ATARAX) 25 mg tablet    methylPREDNISolone 4 MG tablet therapy pack    oxyCODONE-acetaminophen (PERCOCET) 5-325 mg per tablet    polyethylene glycol (GOLYTELY) 4000 mL solution    polyethylene glycol-electrolytes (NULYTELY) 4000 mL solution    sertraline (Zoloft) 25 mg tablet    Stelara 90 MG/ML subcutaneous injection    sucralfate (CARAFATE) 1 g/10 mL suspension    VITAMIN D PO  No current facility-administered medications for this visit.    Facility-Administered Medications Ordered in Other Visits:     lactated ringers infusion, , Intravenous, Continuous PRN, New Bag at 05/23/23 0956    Allergies   Allergen Reactions    Ketorolac Hives    Penicillins Hives    Ketorolac Tromethamine Rash           Objective     Blood pressure 100/64, pulse 103, resp. rate 18, height 5' 2\" (1.575 m), weight 68 kg (150 lb), last menstrual period 09/08/2024, SpO2 96%, not currently breastfeeding. Body mass index is 27.44 kg/m².      PHYSICAL EXAM:      General Appearance:   Alert, cooperative, no distress   HEENT:   Normocephalic, atraumatic, anicteric    Neck:  Supple, symmetrical, trachea midline   Lungs:   Clear to auscultation bilaterally; no rales, rhonchi or wheezing; respirations unlabored    Heart::   Regular rate and rhythm; no murmur, rub, or gallop.   Abdomen:   Soft, non-tender, non-distended; normal bowel sounds; no masses, no organomegaly    Genitalia:   Deferred    Rectal:   Deferred    Extremities:  No cyanosis, clubbing or edema    Pulses:  " 2+ and symmetric    Skin:  No jaundice, rashes, or lesions    Lymph nodes:  No palpable cervical lymphadenopathy        Lab Results:   No visits with results within 1 Day(s) from this visit.   Latest known visit with results is:   Hospital Outpatient Visit on 05/23/2023   Component Date Value    EXT Preg Test, Ur 05/23/2023 Negative     Control 05/23/2023 Valid          Radiology Results:   CTA abdomen pelvis w wo contrast    Result Date: 9/17/2024  Narrative: CT ABDOMEN AND PELVIS HISTORY: urinary retention, flank pain, sweats, diarrhea & lower abd pain TECHNIQUE:  Axial sections were obtained from the diaphragm to the pubic symphysis according to departmental protocol.  100 cc of Omnipaque 350 were administered intravenously. Prior study 05/28/2024. FINDINGS: There is a 2.5 mm RIGHT lower lobe pulmonary nodule (axial image 6 of series 3). The liver and spleen are normal for size. The gallbladder is nondistended but otherwise suboptimally evaluated with CT imaging. There is mild biliary ductal dilatation, similar to prior study. Adrenal glands appear within normal limits. There is mild to moderate dilatation of the main pancreatic duct, without significant interval change dating back to 09/21/2020. No acute peripancreatic fat stranding is identified. There is no abdominal aortic aneurysm. There is trace bilateral hydronephrosis. There is moderate diffuse wall thickening of the urinary bladder with increased wall enhancement. Recommend correlation with urinalysis to exclude acute urinary tract infection. The urinary bladder is nondistended. There is suggestion of at least one uterine fibroid. Vaginal tampon is present. There is no evidence of mechanical bowel obstruction. Patient is status post appendectomy.    Impression: IMPRESSION: 1.  Moderate diffuse wall thickening of the urinary bladder with increased wall enhancement. Recommend correlation with urinalysis to exclude acute urinary tract infection. 2.  Trace  bilateral hydronephrosis. 3.  Mild to moderate dilatation of the main pancreatic duct, without significant interval change dating back to September 2020. 4.  A 2.5 mm RIGHT lower lobe pulmonary nodule. 5.  Additional findings as above. Pulmonary nodule recommendation: Per Fleischner society guidelines, no further investigation recommended. Workstation:[id number]

## 2024-10-04 ENCOUNTER — TELEPHONE (OUTPATIENT)
Age: 44
End: 2024-10-04

## 2024-10-04 DIAGNOSIS — K50.00 CROHN'S DISEASE OF SMALL INTESTINE WITHOUT COMPLICATION (HCC): ICD-10-CM

## 2024-10-04 DIAGNOSIS — Z12.11 SCREENING FOR COLON CANCER: Primary | ICD-10-CM

## 2024-10-10 ENCOUNTER — ANESTHESIA EVENT (OUTPATIENT)
Dept: GASTROENTEROLOGY | Facility: HOSPITAL | Age: 44
End: 2024-10-10
Payer: COMMERCIAL

## 2024-10-10 ENCOUNTER — TELEPHONE (OUTPATIENT)
Age: 44
End: 2024-10-10

## 2024-10-10 ENCOUNTER — ANESTHESIA (OUTPATIENT)
Dept: GASTROENTEROLOGY | Facility: HOSPITAL | Age: 44
End: 2024-10-10
Payer: COMMERCIAL

## 2024-10-10 ENCOUNTER — HOSPITAL ENCOUNTER (OUTPATIENT)
Dept: GASTROENTEROLOGY | Facility: HOSPITAL | Age: 44
Setting detail: OUTPATIENT SURGERY
End: 2024-10-10
Payer: COMMERCIAL

## 2024-10-10 VITALS
RESPIRATION RATE: 37 BRPM | WEIGHT: 153.66 LBS | HEART RATE: 72 BPM | TEMPERATURE: 98.2 F | BODY MASS INDEX: 25.6 KG/M2 | OXYGEN SATURATION: 99 % | DIASTOLIC BLOOD PRESSURE: 78 MMHG | SYSTOLIC BLOOD PRESSURE: 121 MMHG | HEIGHT: 65 IN

## 2024-10-10 DIAGNOSIS — K29.70 GASTRITIS WITHOUT BLEEDING, UNSPECIFIED CHRONICITY, UNSPECIFIED GASTRITIS TYPE: Primary | ICD-10-CM

## 2024-10-10 DIAGNOSIS — K29.70 GASTRITIS WITHOUT BLEEDING, UNSPECIFIED CHRONICITY, UNSPECIFIED GASTRITIS TYPE: ICD-10-CM

## 2024-10-10 DIAGNOSIS — K50.00 CROHN'S DISEASE OF SMALL INTESTINE WITHOUT COMPLICATION (HCC): ICD-10-CM

## 2024-10-10 DIAGNOSIS — K50.90 CROHN'S DISEASE WITHOUT COMPLICATION, UNSPECIFIED GASTROINTESTINAL TRACT LOCATION (HCC): ICD-10-CM

## 2024-10-10 LAB
EXT PREGNANCY TEST URINE: NEGATIVE
EXT. CONTROL: NORMAL

## 2024-10-10 PROCEDURE — 44361 SMALL BOWEL ENDOSCOPY/BIOPSY: CPT | Performed by: INTERNAL MEDICINE

## 2024-10-10 PROCEDURE — 45385 COLONOSCOPY W/LESION REMOVAL: CPT | Performed by: INTERNAL MEDICINE

## 2024-10-10 PROCEDURE — 88305 TISSUE EXAM BY PATHOLOGIST: CPT | Performed by: STUDENT IN AN ORGANIZED HEALTH CARE EDUCATION/TRAINING PROGRAM

## 2024-10-10 PROCEDURE — 81025 URINE PREGNANCY TEST: CPT | Performed by: ANESTHESIOLOGY

## 2024-10-10 PROCEDURE — 45380 COLONOSCOPY AND BIOPSY: CPT | Performed by: INTERNAL MEDICINE

## 2024-10-10 RX ORDER — PROPOFOL 10 MG/ML
INJECTION, EMULSION INTRAVENOUS AS NEEDED
Status: DISCONTINUED | OUTPATIENT
Start: 2024-10-10 | End: 2024-10-10

## 2024-10-10 RX ORDER — ALBUTEROL SULFATE 90 UG/1
INHALANT RESPIRATORY (INHALATION) AS NEEDED
Status: DISCONTINUED | OUTPATIENT
Start: 2024-10-10 | End: 2024-10-10

## 2024-10-10 RX ORDER — PANTOPRAZOLE SODIUM 40 MG/1
40 TABLET, DELAYED RELEASE ORAL EVERY MORNING
Qty: 90 TABLET | Refills: 1 | Status: SHIPPED | OUTPATIENT
Start: 2024-10-10

## 2024-10-10 RX ORDER — SODIUM CHLORIDE, SODIUM LACTATE, POTASSIUM CHLORIDE, CALCIUM CHLORIDE 600; 310; 30; 20 MG/100ML; MG/100ML; MG/100ML; MG/100ML
125 INJECTION, SOLUTION INTRAVENOUS CONTINUOUS
Status: DISCONTINUED | OUTPATIENT
Start: 2024-10-10 | End: 2024-10-14 | Stop reason: HOSPADM

## 2024-10-10 RX ORDER — USTEKINUMAB 90 MG/ML
90 INJECTION, SOLUTION SUBCUTANEOUS
Qty: 1 ML | Refills: 5 | Status: SHIPPED | OUTPATIENT
Start: 2024-10-10 | End: 2025-07-18

## 2024-10-10 RX ORDER — LIDOCAINE HYDROCHLORIDE 20 MG/ML
INJECTION, SOLUTION EPIDURAL; INFILTRATION; INTRACAUDAL; PERINEURAL AS NEEDED
Status: DISCONTINUED | OUTPATIENT
Start: 2024-10-10 | End: 2024-10-10

## 2024-10-10 RX ADMIN — ALBUTEROL SULFATE 3 PUFF: 90 AEROSOL, METERED RESPIRATORY (INHALATION) at 08:28

## 2024-10-10 RX ADMIN — PROPOFOL 40 MG: 10 INJECTION, EMULSION INTRAVENOUS at 08:48

## 2024-10-10 RX ADMIN — PROPOFOL 40 MG: 10 INJECTION, EMULSION INTRAVENOUS at 08:45

## 2024-10-10 RX ADMIN — PROPOFOL 30 MG: 10 INJECTION, EMULSION INTRAVENOUS at 08:51

## 2024-10-10 RX ADMIN — PROPOFOL 50 MG: 10 INJECTION, EMULSION INTRAVENOUS at 08:37

## 2024-10-10 RX ADMIN — SODIUM CHLORIDE, SODIUM LACTATE, POTASSIUM CHLORIDE, AND CALCIUM CHLORIDE 125 ML/HR: .6; .31; .03; .02 INJECTION, SOLUTION INTRAVENOUS at 07:57

## 2024-10-10 RX ADMIN — PROPOFOL 30 MG: 10 INJECTION, EMULSION INTRAVENOUS at 08:41

## 2024-10-10 RX ADMIN — LIDOCAINE HYDROCHLORIDE 100 MG: 20 INJECTION, SOLUTION EPIDURAL; INFILTRATION; INTRACAUDAL; PERINEURAL at 08:34

## 2024-10-10 RX ADMIN — PROPOFOL 150 MG: 10 INJECTION, EMULSION INTRAVENOUS at 08:34

## 2024-10-10 NOTE — ANESTHESIA PREPROCEDURE EVALUATION
Procedure:  COLONOSCOPY  EGD    Relevant Problems   CARDIO   (+) Hypertension   (+) Intractable migraine with aura with status migrainosus   (+) Migraine without aura and with status migrainosus, not intractable      GI/HEPATIC   (+) Gastroesophageal reflux disease      MUSCULOSKELETAL   (+) RA (rheumatoid arthritis) (HCC)      NEURO/PSYCH   (+) Anxiety   (+) Chronic left shoulder pain   (+) Intractable migraine with aura with status migrainosus   (+) Migraine without aura and with status migrainosus, not intractable   (+) Seizures (HCC)      PULMONARY   (+) Smoking      FEN/Gastrointestinal   (+) Mild chronic ulcerative colitis (HCC)      Dermatology   (+) Psoriasis        Physical Exam    Airway    Mallampati score: II  TM Distance: >3 FB  Neck ROM: full     Dental       Cardiovascular      Pulmonary      Other Findings  post-pubertal.      Anesthesia Plan  ASA Score- 3     Anesthesia Type- IV sedation with anesthesia with ASA Monitors.         Additional Monitors:     Airway Plan:     Comment: Recent labs personally reviewed:  Lab Results       Component                Value               Date                       WBC                      8.51                03/06/2023                 HGB                      13.3                03/06/2023                 PLT                      345                 03/06/2023            Lab Results       Component                Value               Date                       K                        4.0                 10/03/2024                 BUN                      16                  10/03/2024                 CREATININE               0.60                10/03/2024            Lab Results       Component                Value               Date                       PTT                      31                  01/04/2023             Lab Results       Component                Value               Date                       INR                      1.15                 01/04/2023              Blood type     I, Holly Salazar MD, have personally seen and evaluated the patient prior to anesthetic care.  I have reviewed the pre-anesthetic record, medical history, allergies, medications and any other medical records if appropriate to the anesthetic care.  If a CRNA is involved in the case, I have reviewed the CRNA assessment, if present, and agree. Patient consented for IV Sedation, general anesthesia as back up. Discussed risks of aspiration, IV infiltration, indications for conversion to general anesthesia. All questions and concerns addressed.     .       Plan Factors-Exercise tolerance (METS): >4 METS.    Chart reviewed.   Existing labs reviewed. Patient summary reviewed.    Patient is not a current smoker.  Patient did not smoke on day of surgery.    Obstructive sleep apnea risk education given perioperatively.        Induction- intravenous.    Postoperative Plan-         Informed Consent- Anesthetic plan and risks discussed with patient.  I personally reviewed this patient with the CRNA. Discussed and agreed on the Anesthesia Plan with the CRNA..

## 2024-10-10 NOTE — ANESTHESIA POSTPROCEDURE EVALUATION
Post-Op Assessment Note    CV Status:  Stable    Pain management: adequate       Mental Status:  Alert and awake   Hydration Status:  Euvolemic   PONV Controlled:  Controlled   Airway Patency:  Patent     Post Op Vitals Reviewed: Yes    No anethesia notable event occurred.    Staff: Anesthesiologist           Last Filed PACU Vitals:  Vitals Value Taken Time   Temp 98.2 °F (36.8 °C) 10/10/24 0902   Pulse 71 10/10/24 0937   /73 10/10/24 0930   Resp 33 10/10/24 0936   SpO2 87 % 10/10/24 0937   Vitals shown include unfiled device data.    Modified Aleshia:  Activity: 2 (10/10/2024  9:25 AM)  Respiration: 2 (10/10/2024  9:25 AM)  Circulation: 2 (10/10/2024  9:25 AM)  Consciousness: 2 (10/10/2024  9:25 AM)  Oxygen Saturation: 2 (10/10/2024  9:25 AM)  Modified Aleshia Score: 10 (10/10/2024  9:25 AM)

## 2024-10-10 NOTE — INTERVAL H&P NOTE
H&P reviewed. After examining the patient I find no changes in the patients condition since the H&P had been written.    Vitals:    10/10/24 0734   BP: 113/68   Pulse: 83   Resp: 16   Temp: 97.7 °F (36.5 °C)   SpO2: 98%

## 2024-10-10 NOTE — TELEPHONE ENCOUNTER
----- Message from Lewis Lagos MD sent at 10/10/2024  9:21 AM EDT -----  Please put the patient back on her Stelara  Also renew her Protonix  She has not been taking either

## 2024-10-10 NOTE — ANESTHESIA POSTPROCEDURE EVALUATION
Post-Op Assessment Note    CV Status:  Stable  Pain Score: 0    Pain management: adequate       Mental Status:  Sleepy   Airway Patency:  Patent  Two or more mitigation strategies used for obstructive sleep apnea   Post Op Vitals Reviewed: Yes    No anethesia notable event occurred.            Last Filed PACU Vitals:  Vitals Value Taken Time   Temp     Pulse     BP     Resp     SpO2         Modified Aleshia:  Activity: 2 (10/10/2024  7:43 AM)  Respiration: 2 (10/10/2024  7:43 AM)  Circulation: 2 (10/10/2024  7:43 AM)  Consciousness: 2 (10/10/2024  7:43 AM)  Oxygen Saturation: 2 (10/10/2024  7:43 AM)  Modified Aleshia Score: 10 (10/10/2024  7:43 AM)

## 2024-10-11 ENCOUNTER — TELEPHONE (OUTPATIENT)
Age: 44
End: 2024-10-11

## 2024-10-16 ENCOUNTER — TELEPHONE (OUTPATIENT)
Age: 44
End: 2024-10-16

## 2024-10-16 PROCEDURE — 88305 TISSUE EXAM BY PATHOLOGIST: CPT | Performed by: STUDENT IN AN ORGANIZED HEALTH CARE EDUCATION/TRAINING PROGRAM

## 2024-10-16 NOTE — TELEPHONE ENCOUNTER
From: Shyanne Solorzano PA-C  Sent: 10/16/2024  12:01 PM EDT  To: Gastro Ibd    ----- Message from Shyanne Solorzano PA-C sent at 10/16/2024 12:01 PM EDT -----  Hi, can we have patient authorized to restart Stelara - 390mg IV x 1 and then 90mg SQ every 8 weeks.  She had negative Hep B serology and TB quant through [facility]. Ty!

## 2024-10-18 NOTE — TELEPHONE ENCOUNTER
Called [facility] at [phone] and spoke with Bita. Authorization was submitted for Stelara 390mg IV x 1 dose as [facility] at [facility].     Pending case #: [id number]. Clinicals faxed to [phone]    Turn around time is 72 hours

## 2024-10-22 NOTE — TELEPHONE ENCOUNTER
Medication: Stelara   Directions: Infuse 390mg for one dose  Pt weight: 69.7kg  Quantity: 390mg  Day Supply: 1 dose  Insurance: Lesly Owens Prior Auth was submitted:  Authorization Date range: 10/18/2024 - 12/13/2024  Authorization Number: [id number]  Pharmacy that fills med: [facility]  Infusion Center: [facility]  Patient aware of approval:     Letter in Media

## 2024-10-23 DIAGNOSIS — K50.00 CROHN'S DISEASE OF SMALL INTESTINE WITHOUT COMPLICATION (HCC): Primary | ICD-10-CM

## 2024-10-23 RX ORDER — SODIUM CHLORIDE 9 MG/ML
20 INJECTION, SOLUTION INTRAVENOUS ONCE
OUTPATIENT
Start: 2024-10-30

## 2024-11-05 ENCOUNTER — TELEPHONE (OUTPATIENT)
Dept: INFUSION CENTER | Facility: CLINIC | Age: 44
End: 2024-11-05

## 2024-11-05 NOTE — TELEPHONE ENCOUNTER
New patient phone call made. Reviewed policies and procedures of infusion center. Reviewed appointment date and time. All questions answered at this time.

## 2024-11-07 ENCOUNTER — HOSPITAL ENCOUNTER (OUTPATIENT)
Dept: INFUSION CENTER | Facility: CLINIC | Age: 44
Discharge: HOME | End: 2024-11-07
Payer: COMMERCIAL

## 2024-11-07 VITALS
TEMPERATURE: 97.8 F | WEIGHT: 152.9 LBS | BODY MASS INDEX: 28.87 KG/M2 | HEIGHT: 61 IN | RESPIRATION RATE: 18 BRPM | HEART RATE: 86 BPM | DIASTOLIC BLOOD PRESSURE: 62 MMHG | SYSTOLIC BLOOD PRESSURE: 133 MMHG

## 2024-11-07 DIAGNOSIS — K50.00 CROHN'S DISEASE OF SMALL INTESTINE WITHOUT COMPLICATION (HCC): Primary | ICD-10-CM

## 2024-11-07 PROCEDURE — 96365 THER/PROPH/DIAG IV INF INIT: CPT

## 2024-11-07 RX ORDER — SODIUM CHLORIDE 9 MG/ML
20 INJECTION, SOLUTION INTRAVENOUS ONCE
Status: COMPLETED | OUTPATIENT
Start: 2024-11-07 | End: 2024-11-07

## 2024-11-07 RX ORDER — SODIUM CHLORIDE 9 MG/ML
20 INJECTION, SOLUTION INTRAVENOUS ONCE
Status: CANCELLED | OUTPATIENT
Start: 2024-11-07

## 2024-11-07 RX ADMIN — USTEKINUMAB 390 MG: 130 SOLUTION INTRAVENOUS at 15:34

## 2024-11-07 RX ADMIN — SODIUM CHLORIDE 20 ML/HR: 0.9 INJECTION, SOLUTION INTRAVENOUS at 14:51

## 2024-11-07 NOTE — PROGRESS NOTES
[facility] Has no complaints at this time.  PIV established in her R hand. Tolerating infusion up to this point.   Report hand off given to Mag VAZQUEZ

## 2024-11-07 NOTE — PROGRESS NOTES
Pt presents for stelara offering no compliants. Pt tolerated treatment without incident. AVS declined. Therapy plan complete.

## 2024-11-07 NOTE — PROGRESS NOTES
Pt here today for a Stelera infusion infusion, complaints of much generalized pain, emotional support given, report given to Guerda

## 2024-12-06 DIAGNOSIS — K50.00 CROHN'S DISEASE OF SMALL INTESTINE WITHOUT COMPLICATION (HCC): ICD-10-CM

## 2024-12-06 RX ORDER — USTEKINUMAB 90 MG/ML
90 INJECTION, SOLUTION SUBCUTANEOUS
Qty: 1 ML | Refills: 5 | Status: SHIPPED | OUTPATIENT
Start: 2024-12-06 | End: 2025-09-13

## 2025-01-02 ENCOUNTER — TELEPHONE (OUTPATIENT)
Dept: HEMATOLOGY ONCOLOGY | Facility: CLINIC | Age: 45
End: 2025-01-02

## 2025-01-02 NOTE — TELEPHONE ENCOUNTER
Called pt and left msg with appt date , time ,location as well as [facility] number.    Reminded pt we are available via [facility] as well.

## 2025-01-03 ENCOUNTER — TELEPHONE (OUTPATIENT)
Age: 45
End: 2025-01-03

## 2025-01-03 NOTE — TELEPHONE ENCOUNTER
Patient on wait list for talk therapy services.  Called Pt to offer an appt. No answer, lvm for Pt to call back [facility] at [phone], opt 3.    1st attempt.

## 2025-01-13 DIAGNOSIS — K50.00 CROHN'S DISEASE OF SMALL INTESTINE WITHOUT COMPLICATION (HCC): ICD-10-CM

## 2025-01-14 RX ORDER — AMITRIPTYLINE HYDROCHLORIDE 100 MG/1
TABLET ORAL
Qty: 30 TABLET | Refills: 0 | Status: SHIPPED | OUTPATIENT
Start: 2025-01-14

## 2025-01-22 ENCOUNTER — TELEPHONE (OUTPATIENT)
Dept: PSYCHIATRY | Facility: CLINIC | Age: 45
End: 2025-01-22

## 2025-01-22 NOTE — TELEPHONE ENCOUNTER
Received [facility] on 1/20/25 that patient moved and now lives in [place], [place], which is no longer convenient to [facility].     Called and spoke to patient to verify that this information is accurate. Patient confirmed.    Before writer was able to move forward with offering scheduling, patient requested to call back. Writer said that was fine and was going on to say that patient should request to speak to support services upon return call, call disconnected in the middle of me saying that.     Please transfer to support services upon return call.

## 2025-01-27 ENCOUNTER — TELEPHONE (OUTPATIENT)
Dept: PSYCHIATRY | Facility: CLINIC | Age: 45
End: 2025-01-27

## 2025-01-27 NOTE — TELEPHONE ENCOUNTER
Forms sent via [employer].    [facility] requesting that forms be signed via [employer] prior to appt.

## 2025-01-29 ENCOUNTER — TELEPHONE (OUTPATIENT)
Dept: PSYCHIATRY | Facility: CLINIC | Age: 45
End: 2025-01-29

## 2025-01-29 NOTE — TELEPHONE ENCOUNTER
2nd outreach attempt. Called and left message for patient to return a call to [phone] regarding [facility]ITA appt for Medication Management . Please transfer call to Psych Support Services for assistance.

## 2025-01-29 NOTE — TELEPHONE ENCOUNTER
Received [facility] on 1/20/25 that patient moved and now lives in [place], [place], which is no longer convenient to [facility].

## 2025-02-13 ENCOUNTER — TELEPHONE (OUTPATIENT)
Dept: PSYCHIATRY | Facility: CLINIC | Age: 45
End: 2025-02-13

## 2025-02-21 ENCOUNTER — TELEPHONE (OUTPATIENT)
Dept: HEMATOLOGY ONCOLOGY | Facility: CLINIC | Age: 45
End: 2025-02-21

## 2025-02-21 NOTE — TELEPHONE ENCOUNTER
Claudia. Pt NS her NITA appt w/ Dr. Roach on 2/21 @ 900 am. Left hope line [phone] for pt to call back to rs.

## 2025-02-26 DIAGNOSIS — K50.00 CROHN'S DISEASE OF SMALL INTESTINE WITHOUT COMPLICATION (HCC): ICD-10-CM

## 2025-02-26 RX ORDER — AMITRIPTYLINE HYDROCHLORIDE 100 MG/1
TABLET ORAL
Qty: 30 TABLET | Refills: 5 | Status: SHIPPED | OUTPATIENT
Start: 2025-02-26

## 2025-03-18 ENCOUNTER — TELEPHONE (OUTPATIENT)
Age: 45
End: 2025-03-18

## 2025-03-18 NOTE — TELEPHONE ENCOUNTER
Rissa called from [employer] ([phone]) and said that they need more recent clinical information for the pt's Stelara to be approved. Recent OV notes, labs, imaging. She also stated that it is due by tomorrow at 8 a.m. the fax number is [phone]

## 2025-03-18 NOTE — TELEPHONE ENCOUNTER
Filomena from [employer] called again and stated they need this information by 9 am tomorrow. Please fax info over

## 2025-03-19 NOTE — TELEPHONE ENCOUNTER
Insurance has denied request for not seeing documentation that the member has experienced improvement on the medication.     Can you please send me a letter of medical necessity to include in appeals?

## 2025-03-19 NOTE — TELEPHONE ENCOUNTER
Rep from [facility] called with a denial and stated they never received clinical notes.  Advised it was faxed this morning at 7:35 but she states they have not received it.

## 2025-03-27 NOTE — TELEPHONE ENCOUNTER
Medication: Stelara 90mg syringe  Directions: inject 90mg every 8 weeks  Quantity:1ml  Day Supply: 56  Insurance: [email or web address]  How Prior Auth was submitted:  Authorization Date range: 3/21/2025 - 3/22/2026  Authorization Number: Letter in Media  [facility]arm[facility] med[facility]

## 2025-03-27 NOTE — TELEPHONE ENCOUNTER
Please send script for Stelara 90mg Q 8 weeks to [facility]